# Patient Record
Sex: FEMALE | Race: WHITE | Employment: OTHER | URBAN - METROPOLITAN AREA
[De-identification: names, ages, dates, MRNs, and addresses within clinical notes are randomized per-mention and may not be internally consistent; named-entity substitution may affect disease eponyms.]

---

## 2019-03-06 LAB
CHOLESTEROL, TOTAL: NORMAL
CHOLESTEROL/HDL RATIO: NORMAL
HDLC SERPL-MCNC: NORMAL MG/DL
LDL CHOLESTEROL CALCULATED: NORMAL
TRIGL SERPL-MCNC: NORMAL MG/DL
TSH SERPL DL<=0.05 MIU/L-ACNC: NORMAL M[IU]/L
VLDLC SERPL CALC-MCNC: NORMAL MG/DL

## 2019-05-18 LAB
AVERAGE GLUCOSE: NORMAL
HBA1C MFR BLD: 5.4 %

## 2019-05-30 ENCOUNTER — TELEPHONE (OUTPATIENT)
Dept: PRIMARY CARE CLINIC | Age: 74
End: 2019-05-30

## 2019-05-30 DIAGNOSIS — Z12.39 SCREENING BREAST EXAMINATION: Primary | ICD-10-CM

## 2019-06-05 ENCOUNTER — OFFICE VISIT (OUTPATIENT)
Dept: PRIMARY CARE CLINIC | Age: 74
End: 2019-06-05
Payer: MEDICARE

## 2019-06-05 VITALS
SYSTOLIC BLOOD PRESSURE: 124 MMHG | OXYGEN SATURATION: 95 % | HEART RATE: 56 BPM | BODY MASS INDEX: 27.29 KG/M2 | HEIGHT: 63 IN | DIASTOLIC BLOOD PRESSURE: 68 MMHG | TEMPERATURE: 97.7 F | WEIGHT: 154 LBS

## 2019-06-05 DIAGNOSIS — I25.10 CORONARY ARTERY DISEASE INVOLVING NATIVE CORONARY ARTERY OF NATIVE HEART WITHOUT ANGINA PECTORIS: Primary | ICD-10-CM

## 2019-06-05 DIAGNOSIS — I10 ESSENTIAL HYPERTENSION, BENIGN: Chronic | ICD-10-CM

## 2019-06-05 DIAGNOSIS — E03.9 HYPOTHYROIDISM, UNSPECIFIED TYPE: Chronic | ICD-10-CM

## 2019-06-05 DIAGNOSIS — E78.2 MIXED HYPERLIPIDEMIA: ICD-10-CM

## 2019-06-05 DIAGNOSIS — E11.9 TYPE 2 DIABETES MELLITUS WITHOUT COMPLICATION, WITHOUT LONG-TERM CURRENT USE OF INSULIN (HCC): ICD-10-CM

## 2019-06-05 DIAGNOSIS — I61.9 NONTRAUMATIC INTRACEREBRAL HEMORRHAGE, UNSPECIFIED CEREBRAL LOCATION, UNSPECIFIED LATERALITY (HCC): ICD-10-CM

## 2019-06-05 DIAGNOSIS — N18.30 CKD (CHRONIC KIDNEY DISEASE) STAGE 3, GFR 30-59 ML/MIN (HCC): ICD-10-CM

## 2019-06-05 PROCEDURE — 99214 OFFICE O/P EST MOD 30 MIN: CPT | Performed by: FAMILY MEDICINE

## 2019-06-05 RX ORDER — CITALOPRAM 20 MG/1
20 TABLET ORAL DAILY
Qty: 90 TABLET | Refills: 3 | Status: SHIPPED
Start: 2019-06-05 | End: 2020-03-10 | Stop reason: SDUPTHER

## 2019-06-05 RX ORDER — CITALOPRAM 20 MG/1
TABLET ORAL
COMMUNITY
End: 2019-06-05 | Stop reason: SDUPTHER

## 2019-06-05 RX ORDER — LABETALOL 100 MG/1
100 TABLET, FILM COATED ORAL 2 TIMES DAILY
Qty: 180 TABLET | Refills: 3 | Status: SHIPPED
Start: 2019-06-05 | End: 2020-03-10 | Stop reason: SDUPTHER

## 2019-06-05 RX ORDER — FUROSEMIDE 20 MG/1
TABLET ORAL
Refills: 3 | COMMUNITY
Start: 2019-06-01 | End: 2019-08-17 | Stop reason: SDUPTHER

## 2019-06-05 RX ORDER — SPIRONOLACTONE 25 MG/1
TABLET ORAL
Refills: 3 | COMMUNITY
Start: 2019-06-01 | End: 2019-06-05 | Stop reason: SDUPTHER

## 2019-06-05 RX ORDER — LABETALOL 100 MG/1
100 TABLET, FILM COATED ORAL 2 TIMES DAILY
COMMUNITY
End: 2019-06-05 | Stop reason: SDUPTHER

## 2019-06-05 RX ORDER — HYDRALAZINE HYDROCHLORIDE 25 MG/1
25 TABLET, FILM COATED ORAL 4 TIMES DAILY
Qty: 360 TABLET | Refills: 3 | Status: SHIPPED
Start: 2019-06-05 | End: 2020-03-10 | Stop reason: SDUPTHER

## 2019-06-05 RX ORDER — CLONIDINE HYDROCHLORIDE 0.1 MG/1
0.1 TABLET ORAL 2 TIMES DAILY
Qty: 180 TABLET | Refills: 3 | Status: SHIPPED
Start: 2019-06-05 | End: 2020-03-10 | Stop reason: SDUPTHER

## 2019-06-05 RX ORDER — NITROGLYCERIN 40 MG/1
1 PATCH TRANSDERMAL DAILY
Qty: 90 PATCH | Refills: 3 | Status: SHIPPED
Start: 2019-06-05 | End: 2020-05-12

## 2019-06-05 RX ORDER — LEVOTHYROXINE SODIUM 88 UG/1
TABLET ORAL
Refills: 1 | COMMUNITY
Start: 2019-06-01 | End: 2019-06-05 | Stop reason: SDUPTHER

## 2019-06-05 RX ORDER — LABETALOL 100 MG/1
TABLET, FILM COATED ORAL
COMMUNITY
Start: 2015-01-05 | End: 2019-06-05 | Stop reason: SDUPTHER

## 2019-06-05 RX ORDER — LEVOTHYROXINE SODIUM 88 UG/1
TABLET ORAL
Qty: 90 TABLET | Refills: 3 | Status: SHIPPED
Start: 2019-06-05 | End: 2020-03-10 | Stop reason: SDUPTHER

## 2019-06-05 RX ORDER — NITROGLYCERIN 40 MG/1
1 PATCH TRANSDERMAL DAILY
COMMUNITY
End: 2019-06-05 | Stop reason: SDUPTHER

## 2019-06-05 RX ORDER — SPIRONOLACTONE 25 MG/1
TABLET ORAL
Qty: 90 TABLET | Refills: 3 | Status: SHIPPED
Start: 2019-06-05 | End: 2020-03-10 | Stop reason: SDUPTHER

## 2019-06-05 ASSESSMENT — PATIENT HEALTH QUESTIONNAIRE - PHQ9
1. LITTLE INTEREST OR PLEASURE IN DOING THINGS: 1
SUM OF ALL RESPONSES TO PHQ QUESTIONS 1-9: 1
SUM OF ALL RESPONSES TO PHQ QUESTIONS 1-9: 1
2. FEELING DOWN, DEPRESSED OR HOPELESS: 0
SUM OF ALL RESPONSES TO PHQ9 QUESTIONS 1 & 2: 1

## 2019-06-05 ASSESSMENT — ENCOUNTER SYMPTOMS
ALLERGIC/IMMUNOLOGIC NEGATIVE: 1
RESPIRATORY NEGATIVE: 1
GASTROINTESTINAL NEGATIVE: 1
EYES NEGATIVE: 1

## 2019-06-05 NOTE — PROGRESS NOTES
19     Jhon Yee    : 1945 Sex: female   Age: 69 y.o. Chief Complaint   Patient presents with    Hypertension    Hypothyroidism    Diabetes       Prior to Admission medications    Medication Sig Start Date End Date Taking? Authorizing Provider   citalopram (CELEXA) 20 MG tablet Take by mouth   Yes Historical Provider, MD   furosemide (LASIX) 20 MG tablet TK 1/2 T PO QOD 19  Yes Historical Provider, MD   spironolactone (ALDACTONE) 25 MG tablet TK 1 T PO QD 19  Yes Historical Provider, MD   levothyroxine (SYNTHROID) 88 MCG tablet TK 1 T PO QD 19  Yes Historical Provider, MD   labetalol (NORMODYNE) 100 MG tablet Take by mouth 1/5/15  Yes Historical Provider, MD   labetalol (NORMODYNE) 100 MG tablet Take 100 mg by mouth 2 times daily   Yes Yennifer Nicholson,    cloNIDine (CATAPRES) 0.1 MG tablet 1 tablet by Per NG tube route every 8 hours. 14  Yes IRENA Busby   hydrALAZINE (APRESOLINE) 25 MG tablet 3 tablets by Per NG tube route every 6 hours. 14  Yes IRENA Busby   nystatin (MYCOSTATIN) 588744 UNIT/ML suspension Take 5 mLs by mouth 4 times daily. 14   IRENA Busby          HPI: Patient is seen today in medical follow-up coronary artery disease hypertensive disease hypothyroid hyperlipidemia diabetes mellitus chronic kidney disease. Stable with current medication and care. History of cerebral bleed in the past from uncontrolled hypertension. Has done well since. Review of Systems   Constitutional: Negative. HENT: Negative. Eyes: Negative. Respiratory: Negative. Gastrointestinal: Negative. Endocrine: Negative. Genitourinary: Negative. Musculoskeletal: Negative. Skin: Negative. Allergic/Immunologic: Negative. Neurological: Negative. Hematological: Negative. Psychiatric/Behavioral: Negative.      present systems review all stable no specific complaints no additional complaints. Current Outpatient Medications:     citalopram (CELEXA) 20 MG tablet, Take by mouth, Disp: , Rfl:     furosemide (LASIX) 20 MG tablet, TK 1/2 T PO QOD, Disp: , Rfl: 3    spironolactone (ALDACTONE) 25 MG tablet, TK 1 T PO QD, Disp: , Rfl: 3    levothyroxine (SYNTHROID) 88 MCG tablet, TK 1 T PO QD, Disp: , Rfl: 1    labetalol (NORMODYNE) 100 MG tablet, Take by mouth, Disp: , Rfl:     labetalol (NORMODYNE) 100 MG tablet, Take 100 mg by mouth 2 times daily, Disp: , Rfl:     cloNIDine (CATAPRES) 0.1 MG tablet, 1 tablet by Per NG tube route every 8 hours. , Disp: 60 tablet, Rfl: 3    hydrALAZINE (APRESOLINE) 25 MG tablet, 3 tablets by Per NG tube route every 6 hours. , Disp: 90 tablet, Rfl: 3    nystatin (MYCOSTATIN) 435391 UNIT/ML suspension, Take 5 mLs by mouth 4 times daily. , Disp: , Rfl:     Allergies   Allergen Reactions    Latex      Possible allergy pending    Norvasc [Amlodipine Besylate] Anaphylaxis    Dye  [Iodides]        Social History     Tobacco Use    Smoking status: Former Smoker     Packs/day: 1.50     Years: 8.00     Pack years: 12.00     Last attempt to quit: 1976     Years since quittin.4    Smokeless tobacco: Never Used   Substance Use Topics    Alcohol use: No    Drug use: No      Past Surgical History:   Procedure Laterality Date    CARDIAC SURGERY N/A heart stents    10 years ago     SECTION      COLONOSCOPY      CORONARY ANGIOPLASTY WITH STENT PLACEMENT      CYST REMOVAL      ENDOSCOPY, COLON, DIAGNOSTIC      GASTROSTOMY TUBE PLACEMENT  14    [place]    JOINT REPLACEMENT Left     KNEE SURGERY      TONSILLECTOMY      TRACHEOSTOMY  14    [place]     No family history on file.   Past Medical History:   Diagnosis Date    Closed left arm fracture     Diabetes mellitus ([address]     Hypertension     Ovarian cyst rupture     Respiratory failure (HCC)     history of    Thyroid disease     Unspecified cerebral artery occlusion with cerebral infarction 9/26/2014    right side flacid, hemorrhagic stroke       Vitals:    06/05/19 1101   Pulse: 56   Temp: 97.7 °F (36.5 °C)   TempSrc: Temporal   SpO2: 95%   Weight: 154 lb (69.9 kg)   Height: 5' 3\" (1.6 m)     BP Readings from Last 3 Encounters:   No data found for BP        Physical Exam   Constitutional: She is oriented to person, place, and time. She appears well-developed and well-nourished. HENT:   Head: Normocephalic. Right Ear: External ear normal.   Left Ear: External ear normal.   Nose: Nose normal.   Mouth/Throat: Oropharynx is clear and moist.   Eyes: Pupils are equal, round, and reactive to light. Conjunctivae and EOM are normal.   Neck: Normal range of motion. Neck supple. Cardiovascular: Normal rate and intact distal pulses. Pulmonary/Chest: Breath sounds normal.   Abdominal: Soft. Bowel sounds are normal.   Musculoskeletal: Normal range of motion. Neurological: She is alert and oriented to person, place, and time. Skin: Skin is warm and dry. Psychiatric: She has a normal mood and affect. Her behavior is normal.   Nursing note and vitals reviewed. vitals physical examination all stable as noted. No peripheral edema. Labs reviewed A1c at 5.4  GFR 31 stable follows with Dr. Aldo Montano  hemoglobin hematocrit 12.7 37.7 chemistries otherwise stable platelets [phone]    Maintain current care reassessment in 3 months blood work again prior. Plan Per Assessment:  Marquis Collins was seen today for hypertension, hypothyroidism and diabetes.     Diagnoses and all orders for this visit:    Coronary artery disease involving native coronary artery of native heart without angina pectoris    Essential hypertension, benign    Hypothyroidism, unspecified type    Nontraumatic intracerebral hemorrhage, unspecified cerebral location, unspecified laterality ([facility] [address]    Mixed hyperlipidemia    Type 2 diabetes mellitus without complication, without long-term current use of insulin ([facility]            No follow-ups on file. Francisco Chavez DO    Note was generated with the assistance of voice recognition software. Document was reviewed however may contain grammatical errors.

## 2019-07-17 ENCOUNTER — HOSPITAL ENCOUNTER (OUTPATIENT)
Age: 74
Discharge: HOME | End: 2019-07-19
Payer: MEDICARE

## 2019-07-17 ENCOUNTER — OFFICE VISIT (OUTPATIENT)
Dept: FAMILY MEDICINE CLINIC | Age: 74
End: 2019-07-17
Payer: MEDICARE

## 2019-07-17 VITALS
BODY MASS INDEX: 27.67 KG/M2 | WEIGHT: 156.2 LBS | OXYGEN SATURATION: 96 % | SYSTOLIC BLOOD PRESSURE: 130 MMHG | DIASTOLIC BLOOD PRESSURE: 80 MMHG | TEMPERATURE: 96.2 F | HEART RATE: 62 BPM

## 2019-07-17 DIAGNOSIS — R30.0 DYSURIA: Primary | ICD-10-CM

## 2019-07-17 DIAGNOSIS — R30.0 DYSURIA: ICD-10-CM

## 2019-07-17 DIAGNOSIS — N39.0 URINARY TRACT INFECTION WITH HEMATURIA, SITE UNSPECIFIED: ICD-10-CM

## 2019-07-17 DIAGNOSIS — R31.9 URINARY TRACT INFECTION WITH HEMATURIA, SITE UNSPECIFIED: ICD-10-CM

## 2019-07-17 LAB
BILIRUBIN, POC: NORMAL
BLOOD URINE, POC: NORMAL
CLARITY, POC: NORMAL
COLOR, POC: YELLOW
GLUCOSE URINE, POC: NORMAL
KETONES, POC: NORMAL
LEUKOCYTE EST, POC: NORMAL
NITRITE, POC: NORMAL
PH, POC: 6
PROTEIN, POC: NORMAL
SPECIFIC GRAVITY, POC: 1.01
UROBILINOGEN, POC: 0.2

## 2019-07-17 PROCEDURE — 87088 URINE BACTERIA CULTURE: CPT

## 2019-07-17 PROCEDURE — 81002 URINALYSIS NONAUTO W/O SCOPE: CPT | Performed by: PHYSICIAN ASSISTANT

## 2019-07-17 PROCEDURE — 99214 OFFICE O/P EST MOD 30 MIN: CPT | Performed by: PHYSICIAN ASSISTANT

## 2019-07-17 RX ORDER — CEPHALEXIN 500 MG/1
500 CAPSULE ORAL 2 TIMES DAILY
Qty: 14 CAPSULE | Refills: 0 | Status: SHIPPED | OUTPATIENT
Start: 2019-07-17 | End: 2019-07-24

## 2019-07-17 NOTE — PROGRESS NOTES
19  Misty Coon : 1945 Sex: female  Age 69 y.o. Subjective:  Chief Complaint   Patient presents with    Other     urine cloudy         HPI:   Misty Coon , 69 y.o. female presents to express care for evaluation of cloudy urine. The patient believes that she may have a urinary tract infection. The patient states that is not really having any burning with urination. No back pain or flank pain. The patient is eating and drinking normally. The patient denies any vaginal bleeding or vaginal discharge. The patient states that she is really not been drinking much fluids over the last several days. The patient is not having any back pain or flank pain. This is relatively consistent with her UTIs in the past.      ROS:   Unless otherwise stated in this report the patient's positive and negative responses for review of systems for constitutional, eyes, ENT, cardiovascular, respiratory, gastrointestinal, neurological, , musculoskeletal, and integument systems and related systems to the presenting problem are either stated in the history of present illness or were not pertinent or were negative for the symptoms and/or complaints related to the presenting medical problem. Positives and pertinent negatives as per HPI. All others reviewed and are negative.       PMH:     Past Medical History:   Diagnosis Date    Closed left arm fracture     Diabetes mellitus ([address]     Hypertension     Ovarian cyst rupture     Respiratory failure ([address]     history of    Thyroid disease     Unspecified cerebral artery occlusion with cerebral infarction 2014    right side flacid, hemorrhagic stroke       Past Surgical History:   Procedure Laterality Date    CARDIAC SURGERY N/A heart stents    10 years ago     SECTION      COLONOSCOPY      CORONARY ANGIOPLASTY WITH STENT PLACEMENT      CYST REMOVAL      ENDOSCOPY, COLON, DIAGNOSTIC      GASTROSTOMY TUBE PLACEMENT  14 [place]    JOINT REPLACEMENT Left     KNEE SURGERY      TONSILLECTOMY      TRACHEOSTOMY  14    [place]     Medications:     Current Outpatient Medications:     furosemide (LASIX) 20 MG tablet, TK 1/2 T PO QOD, Disp: , Rfl: 3    cloNIDine (CATAPRES) 0.1 MG tablet, Take 1 tablet by mouth 2 times daily, Disp: 180 tablet, Rfl: 3    hydrALAZINE (APRESOLINE) 25 MG tablet, Take 1 tablet by mouth 4 times daily, Disp: 360 tablet, Rfl: 3    citalopram (CELEXA) 20 MG tablet, Take 1 tablet by mouth daily, Disp: 90 tablet, Rfl: 3    levothyroxine (SYNTHROID) 88 MCG tablet, TK 1 T PO QD, Disp: 90 tablet, Rfl: 3    spironolactone (ALDACTONE) 25 MG tablet, TK 1 T PO QD, Disp: 90 tablet, Rfl: 3    labetalol (NORMODYNE) 100 MG tablet, Take 1 tablet by mouth 2 times daily, Disp: 180 tablet, Rfl: 3    nitroGLYCERIN (NITRODUR) 0.2 MG/HR, Place 1 patch onto the skin daily, Disp: 90 patch, Rfl: 3    nystatin (MYCOSTATIN) 301329 UNIT/ML suspension, Take 5 mLs by mouth 4 times daily. , Disp: , Rfl:     Allergies: Allergies   Allergen Reactions    Latex      Possible allergy pending    Norvasc [Amlodipine Besylate] Anaphylaxis    Dye  [Iodides]        Social History:     Social History     Tobacco Use    Smoking status: Former Smoker     Packs/day: 1.50     Years: 8.00     Pack years: 12.00     Last attempt to quit: 1976     Years since quittin.5    Smokeless tobacco: Never Used   Substance Use Topics    Alcohol use: No    Drug use: No       Physical Exam:     Vitals:    19 1418   BP: 130/80   Site: Left Upper Arm   Position: Sitting   Pulse: 62   Temp: 96.2 °F (35.7 °C)   SpO2: 96%   Weight: 156 lb 3.2 oz (70.9 kg)       Exam:  Physical Exam  Nurses note and vital signs reviewed and patient is not hypoxic. General: The patient appears well and in no apparent distress. Patient is resting comfortably on cart. Skin: Warm, dry, no pallor noted. There is no rash noted.   Head: Normocephalic,

## 2019-07-20 LAB — URINE CULTURE, ROUTINE: NORMAL

## 2019-08-13 DIAGNOSIS — Z12.39 SCREENING BREAST EXAMINATION: ICD-10-CM

## 2019-08-19 RX ORDER — FUROSEMIDE 20 MG/1
TABLET ORAL
Qty: 30 TABLET | Refills: 0 | Status: SHIPPED | OUTPATIENT
Start: 2019-08-19 | End: 2019-12-09 | Stop reason: SDUPTHER

## 2019-09-11 ENCOUNTER — OFFICE VISIT (OUTPATIENT)
Dept: PRIMARY CARE CLINIC | Age: 74
End: 2019-09-11
Payer: MEDICARE

## 2019-09-11 VITALS
DIASTOLIC BLOOD PRESSURE: 78 MMHG | OXYGEN SATURATION: 96 % | TEMPERATURE: 97.3 F | HEART RATE: 66 BPM | WEIGHT: 153 LBS | BODY MASS INDEX: 27.1 KG/M2 | SYSTOLIC BLOOD PRESSURE: 120 MMHG

## 2019-09-11 DIAGNOSIS — E03.9 HYPOTHYROIDISM, UNSPECIFIED TYPE: ICD-10-CM

## 2019-09-11 DIAGNOSIS — I10 ESSENTIAL HYPERTENSION: Primary | ICD-10-CM

## 2019-09-11 DIAGNOSIS — E55.9 VITAMIN D DEFICIENCY: ICD-10-CM

## 2019-09-11 DIAGNOSIS — F41.9 ANXIETY: ICD-10-CM

## 2019-09-11 DIAGNOSIS — N18.30 CKD (CHRONIC KIDNEY DISEASE) STAGE 3, GFR 30-59 ML/MIN (HCC): ICD-10-CM

## 2019-09-11 DIAGNOSIS — E11.9 TYPE 2 DIABETES MELLITUS WITHOUT COMPLICATION, WITHOUT LONG-TERM CURRENT USE OF INSULIN (HCC): ICD-10-CM

## 2019-09-11 PROCEDURE — 90653 IIV ADJUVANT VACCINE IM: CPT | Performed by: FAMILY MEDICINE

## 2019-09-11 PROCEDURE — G0008 ADMIN INFLUENZA VIRUS VAC: HCPCS | Performed by: FAMILY MEDICINE

## 2019-09-11 PROCEDURE — 99214 OFFICE O/P EST MOD 30 MIN: CPT | Performed by: FAMILY MEDICINE

## 2019-09-11 RX ORDER — ERGOCALCIFEROL 1.25 MG/1
CAPSULE ORAL
Refills: 2 | COMMUNITY
Start: 2019-07-07 | End: 2019-12-09 | Stop reason: SDUPTHER

## 2019-09-11 ASSESSMENT — ENCOUNTER SYMPTOMS
ALLERGIC/IMMUNOLOGIC NEGATIVE: 1
RESPIRATORY NEGATIVE: 1
EYES NEGATIVE: 1
GASTROINTESTINAL NEGATIVE: 1

## 2019-09-11 NOTE — PROGRESS NOTES
19     Rubin Samuels    : 1945 Sex: female   Age: 69 y.o. Chief Complaint   Patient presents with    Hypertension    Diabetes       Prior to Admission medications    Medication Sig Start Date End Date Taking? Authorizing Provider   vitamin D (ERGOCALCIFEROL) 41120 units CAPS capsule TK 1 C PO WEEKLY 19  Yes Historical Provider, MD   furosemide (LASIX) 20 MG tablet TAKE 1/2 TABLET BY MOUTH EVERY OTHER DAY 19  Yes Marcell Nicholson DO   cloNIDine (CATAPRES) 0.1 MG tablet Take 1 tablet by mouth 2 times daily 19  Yes Marcell Nicholson DO   hydrALAZINE (APRESOLINE) 25 MG tablet Take 1 tablet by mouth 4 times daily 19  Yes Marcell Nicholson DO   citalopram (CELEXA) 20 MG tablet Take 1 tablet by mouth daily 19  Yes Marcell Nicholson DO   levothyroxine (SYNTHROID) 88 MCG tablet TK 1 T PO QD 19  Yes Marcell Nicholson DO   spironolactone (ALDACTONE) 25 MG tablet TK 1 T PO QD 19  Yes Marcell Nicholson DO   labetalol (NORMODYNE) 100 MG tablet Take 1 tablet by mouth 2 times daily 19  Yes Marcell Nicholson DO   nitroGLYCERIN (NITRODUR) 0.2 MG/HR Place 1 patch onto the skin daily 19  Yes Marcell Nicholson DO   nystatin (MYCOSTATIN) 537266 UNIT/ML suspension Take 5 mLs by mouth 4 times daily. 14  Yes IRENA Beyer - CNS          HPI: [facility] seen today in medical follow-up hypertension diabetes chronic kidney disease hypothyroid anxiety vitamin D deficiency all of which is been stable. Medications well-tolerated. Flu shot offered today. Review of Systems   Constitutional: Negative. HENT: Negative. Eyes: Negative. Respiratory: Negative. Gastrointestinal: Negative. Endocrine: Negative. Genitourinary: Negative. Musculoskeletal: Negative. Skin: Negative. Allergic/Immunologic: Negative. Neurological: Negative. Hematological: Negative. Psychiatric/Behavioral: Negative.     Current systems review stable as noted

## 2019-10-18 ENCOUNTER — TELEPHONE (OUTPATIENT)
Dept: ADMINISTRATIVE | Age: 74
End: 2019-10-18

## 2019-10-30 ENCOUNTER — OFFICE VISIT (OUTPATIENT)
Dept: FAMILY MEDICINE CLINIC | Age: 74
End: 2019-10-30
Payer: MEDICARE

## 2019-10-30 VITALS
HEIGHT: 63 IN | TEMPERATURE: 97.8 F | HEART RATE: 70 BPM | DIASTOLIC BLOOD PRESSURE: 64 MMHG | OXYGEN SATURATION: 98 % | SYSTOLIC BLOOD PRESSURE: 124 MMHG | RESPIRATION RATE: 20 BRPM | BODY MASS INDEX: 28.21 KG/M2 | WEIGHT: 159.2 LBS

## 2019-10-30 DIAGNOSIS — N30.01 ACUTE CYSTITIS WITH HEMATURIA: ICD-10-CM

## 2019-10-30 DIAGNOSIS — R30.0 DYSURIA: Primary | ICD-10-CM

## 2019-10-30 LAB
BILIRUBIN, POC: NEGATIVE
BLOOD URINE, POC: NORMAL
CLARITY, POC: NORMAL
COLOR, POC: YELLOW
GLUCOSE URINE, POC: NEGATIVE
KETONES, POC: NEGATIVE
LEUKOCYTE EST, POC: NORMAL
NITRITE, POC: NEGATIVE
PH, POC: 5.5
PROTEIN, POC: 30
SPECIFIC GRAVITY, POC: 1.02
UROBILINOGEN, POC: 0.2

## 2019-10-30 PROCEDURE — 1090F PRES/ABSN URINE INCON ASSESS: CPT | Performed by: PHYSICIAN ASSISTANT

## 2019-10-30 PROCEDURE — G8427 DOCREV CUR MEDS BY ELIG CLIN: HCPCS | Performed by: PHYSICIAN ASSISTANT

## 2019-10-30 PROCEDURE — G8598 ASA/ANTIPLAT THER USED: HCPCS | Performed by: PHYSICIAN ASSISTANT

## 2019-10-30 PROCEDURE — G8400 PT W/DXA NO RESULTS DOC: HCPCS | Performed by: PHYSICIAN ASSISTANT

## 2019-10-30 PROCEDURE — 81002 URINALYSIS NONAUTO W/O SCOPE: CPT | Performed by: PHYSICIAN ASSISTANT

## 2019-10-30 PROCEDURE — G8482 FLU IMMUNIZE ORDER/ADMIN: HCPCS | Performed by: PHYSICIAN ASSISTANT

## 2019-10-30 PROCEDURE — G9899 SCRN MAM PERF RSLTS DOC: HCPCS | Performed by: PHYSICIAN ASSISTANT

## 2019-10-30 PROCEDURE — 4040F PNEUMOC VAC/ADMIN/RCVD: CPT | Performed by: PHYSICIAN ASSISTANT

## 2019-10-30 PROCEDURE — 3017F COLORECTAL CA SCREEN DOC REV: CPT | Performed by: PHYSICIAN ASSISTANT

## 2019-10-30 PROCEDURE — G8417 CALC BMI ABV UP PARAM F/U: HCPCS | Performed by: PHYSICIAN ASSISTANT

## 2019-10-30 PROCEDURE — 99214 OFFICE O/P EST MOD 30 MIN: CPT | Performed by: PHYSICIAN ASSISTANT

## 2019-10-30 PROCEDURE — 1036F TOBACCO NON-USER: CPT | Performed by: PHYSICIAN ASSISTANT

## 2019-10-30 PROCEDURE — 1123F ACP DISCUSS/DSCN MKR DOCD: CPT | Performed by: PHYSICIAN ASSISTANT

## 2019-10-30 RX ORDER — CEFDINIR 300 MG/1
300 CAPSULE ORAL 2 TIMES DAILY
Qty: 14 CAPSULE | Refills: 0 | Status: SHIPPED | OUTPATIENT
Start: 2019-10-30 | End: 2019-11-06

## 2019-11-13 ENCOUNTER — OFFICE VISIT (OUTPATIENT)
Dept: PRIMARY CARE CLINIC | Age: 74
End: 2019-11-13
Payer: MEDICARE

## 2019-11-13 VITALS
TEMPERATURE: 97.7 F | WEIGHT: 158 LBS | BODY MASS INDEX: 27.99 KG/M2 | HEART RATE: 65 BPM | DIASTOLIC BLOOD PRESSURE: 72 MMHG | OXYGEN SATURATION: 96 % | SYSTOLIC BLOOD PRESSURE: 136 MMHG

## 2019-11-13 DIAGNOSIS — R31.1 BENIGN ESSENTIAL MICROSCOPIC HEMATURIA: ICD-10-CM

## 2019-11-13 DIAGNOSIS — G89.29 CHRONIC PAIN OF RIGHT KNEE: ICD-10-CM

## 2019-11-13 DIAGNOSIS — E03.9 HYPOTHYROIDISM, UNSPECIFIED TYPE: Chronic | ICD-10-CM

## 2019-11-13 DIAGNOSIS — E78.2 MIXED HYPERLIPIDEMIA: ICD-10-CM

## 2019-11-13 DIAGNOSIS — M25.561 CHRONIC PAIN OF RIGHT KNEE: ICD-10-CM

## 2019-11-13 DIAGNOSIS — I10 ESSENTIAL HYPERTENSION: Primary | Chronic | ICD-10-CM

## 2019-11-13 DIAGNOSIS — F41.9 ANXIETY: ICD-10-CM

## 2019-11-13 DIAGNOSIS — E11.9 TYPE 2 DIABETES MELLITUS WITHOUT COMPLICATION, WITHOUT LONG-TERM CURRENT USE OF INSULIN (HCC): Chronic | ICD-10-CM

## 2019-11-13 DIAGNOSIS — N18.30 CKD (CHRONIC KIDNEY DISEASE) STAGE 3, GFR 30-59 ML/MIN (HCC): ICD-10-CM

## 2019-11-13 PROCEDURE — 1123F ACP DISCUSS/DSCN MKR DOCD: CPT | Performed by: FAMILY MEDICINE

## 2019-11-13 PROCEDURE — G8427 DOCREV CUR MEDS BY ELIG CLIN: HCPCS | Performed by: FAMILY MEDICINE

## 2019-11-13 PROCEDURE — 1036F TOBACCO NON-USER: CPT | Performed by: FAMILY MEDICINE

## 2019-11-13 PROCEDURE — G8400 PT W/DXA NO RESULTS DOC: HCPCS | Performed by: FAMILY MEDICINE

## 2019-11-13 PROCEDURE — 99214 OFFICE O/P EST MOD 30 MIN: CPT | Performed by: FAMILY MEDICINE

## 2019-11-13 PROCEDURE — G8598 ASA/ANTIPLAT THER USED: HCPCS | Performed by: FAMILY MEDICINE

## 2019-11-13 PROCEDURE — G8417 CALC BMI ABV UP PARAM F/U: HCPCS | Performed by: FAMILY MEDICINE

## 2019-11-13 PROCEDURE — 3044F HG A1C LEVEL LT 7.0%: CPT | Performed by: FAMILY MEDICINE

## 2019-11-13 PROCEDURE — 3017F COLORECTAL CA SCREEN DOC REV: CPT | Performed by: FAMILY MEDICINE

## 2019-11-13 PROCEDURE — 4040F PNEUMOC VAC/ADMIN/RCVD: CPT | Performed by: FAMILY MEDICINE

## 2019-11-13 PROCEDURE — 2022F DILAT RTA XM EVC RTNOPTHY: CPT | Performed by: FAMILY MEDICINE

## 2019-11-13 PROCEDURE — 1090F PRES/ABSN URINE INCON ASSESS: CPT | Performed by: FAMILY MEDICINE

## 2019-11-13 PROCEDURE — G8482 FLU IMMUNIZE ORDER/ADMIN: HCPCS | Performed by: FAMILY MEDICINE

## 2019-11-13 PROCEDURE — G9899 SCRN MAM PERF RSLTS DOC: HCPCS | Performed by: FAMILY MEDICINE

## 2019-11-13 ASSESSMENT — ENCOUNTER SYMPTOMS
EYES NEGATIVE: 1
ALLERGIC/IMMUNOLOGIC NEGATIVE: 1
RESPIRATORY NEGATIVE: 1
GASTROINTESTINAL NEGATIVE: 1

## 2019-12-02 LAB
ALBUMIN SERPL-MCNC: NORMAL G/DL
ALP BLD-CCNC: NORMAL U/L
ALT SERPL-CCNC: NORMAL U/L
ANION GAP SERPL CALCULATED.3IONS-SCNC: NORMAL MMOL/L
AST SERPL-CCNC: NORMAL U/L
BILIRUB SERPL-MCNC: NORMAL MG/DL
BUN BLDV-MCNC: 36 MG/DL
CALCIUM SERPL-MCNC: NORMAL MG/DL
CHLORIDE BLD-SCNC: NORMAL MMOL/L
CO2: NORMAL
CREAT SERPL-MCNC: 1.32 MG/DL
GFR CALCULATED: NORMAL
GLUCOSE BLD-MCNC: 84 MG/DL
POTASSIUM SERPL-SCNC: 4 MMOL/L
SODIUM BLD-SCNC: NORMAL MMOL/L
TOTAL PROTEIN: NORMAL

## 2019-12-09 ENCOUNTER — OFFICE VISIT (OUTPATIENT)
Dept: PRIMARY CARE CLINIC | Age: 74
End: 2019-12-09
Payer: MEDICARE

## 2019-12-09 VITALS
OXYGEN SATURATION: 96 % | TEMPERATURE: 97.7 F | WEIGHT: 158 LBS | BODY MASS INDEX: 27.99 KG/M2 | SYSTOLIC BLOOD PRESSURE: 120 MMHG | DIASTOLIC BLOOD PRESSURE: 62 MMHG | HEART RATE: 61 BPM

## 2019-12-09 DIAGNOSIS — Z23 NEED FOR PNEUMOCOCCAL VACCINATION: ICD-10-CM

## 2019-12-09 DIAGNOSIS — I10 ESSENTIAL HYPERTENSION: Primary | Chronic | ICD-10-CM

## 2019-12-09 DIAGNOSIS — I61.9 NONTRAUMATIC INTRACEREBRAL HEMORRHAGE, UNSPECIFIED CEREBRAL LOCATION, UNSPECIFIED LATERALITY (HCC): ICD-10-CM

## 2019-12-09 DIAGNOSIS — R35.0 URINARY FREQUENCY: ICD-10-CM

## 2019-12-09 DIAGNOSIS — E78.2 MIXED HYPERLIPIDEMIA: ICD-10-CM

## 2019-12-09 DIAGNOSIS — N18.30 CKD (CHRONIC KIDNEY DISEASE) STAGE 3, GFR 30-59 ML/MIN (HCC): ICD-10-CM

## 2019-12-09 DIAGNOSIS — F41.9 ANXIETY: ICD-10-CM

## 2019-12-09 DIAGNOSIS — E11.9 TYPE 2 DIABETES MELLITUS WITHOUT COMPLICATION, WITHOUT LONG-TERM CURRENT USE OF INSULIN (HCC): Chronic | ICD-10-CM

## 2019-12-09 DIAGNOSIS — E03.9 HYPOTHYROIDISM, UNSPECIFIED TYPE: Chronic | ICD-10-CM

## 2019-12-09 PROCEDURE — 1123F ACP DISCUSS/DSCN MKR DOCD: CPT | Performed by: FAMILY MEDICINE

## 2019-12-09 PROCEDURE — G8482 FLU IMMUNIZE ORDER/ADMIN: HCPCS | Performed by: FAMILY MEDICINE

## 2019-12-09 PROCEDURE — 1090F PRES/ABSN URINE INCON ASSESS: CPT | Performed by: FAMILY MEDICINE

## 2019-12-09 PROCEDURE — 99214 OFFICE O/P EST MOD 30 MIN: CPT | Performed by: FAMILY MEDICINE

## 2019-12-09 PROCEDURE — G0009 ADMIN PNEUMOCOCCAL VACCINE: HCPCS | Performed by: FAMILY MEDICINE

## 2019-12-09 PROCEDURE — G8400 PT W/DXA NO RESULTS DOC: HCPCS | Performed by: FAMILY MEDICINE

## 2019-12-09 PROCEDURE — 4040F PNEUMOC VAC/ADMIN/RCVD: CPT | Performed by: FAMILY MEDICINE

## 2019-12-09 PROCEDURE — 3017F COLORECTAL CA SCREEN DOC REV: CPT | Performed by: FAMILY MEDICINE

## 2019-12-09 PROCEDURE — 3044F HG A1C LEVEL LT 7.0%: CPT | Performed by: FAMILY MEDICINE

## 2019-12-09 PROCEDURE — 1036F TOBACCO NON-USER: CPT | Performed by: FAMILY MEDICINE

## 2019-12-09 PROCEDURE — G8417 CALC BMI ABV UP PARAM F/U: HCPCS | Performed by: FAMILY MEDICINE

## 2019-12-09 PROCEDURE — 2022F DILAT RTA XM EVC RTNOPTHY: CPT | Performed by: FAMILY MEDICINE

## 2019-12-09 PROCEDURE — G8427 DOCREV CUR MEDS BY ELIG CLIN: HCPCS | Performed by: FAMILY MEDICINE

## 2019-12-09 PROCEDURE — 90732 PPSV23 VACC 2 YRS+ SUBQ/IM: CPT | Performed by: FAMILY MEDICINE

## 2019-12-09 PROCEDURE — G8598 ASA/ANTIPLAT THER USED: HCPCS | Performed by: FAMILY MEDICINE

## 2019-12-09 PROCEDURE — G9899 SCRN MAM PERF RSLTS DOC: HCPCS | Performed by: FAMILY MEDICINE

## 2019-12-09 RX ORDER — ERGOCALCIFEROL 1.25 MG/1
CAPSULE ORAL
Qty: 12 CAPSULE | Refills: 3 | Status: SHIPPED
Start: 2019-12-09 | End: 2020-10-27 | Stop reason: SDUPTHER

## 2019-12-09 RX ORDER — FUROSEMIDE 20 MG/1
TABLET ORAL
Qty: 30 TABLET | Refills: 11 | Status: SHIPPED
Start: 2019-12-09 | End: 2020-03-10

## 2019-12-09 ASSESSMENT — ENCOUNTER SYMPTOMS
EYES NEGATIVE: 1
ALLERGIC/IMMUNOLOGIC NEGATIVE: 1
GASTROINTESTINAL NEGATIVE: 1
RESPIRATORY NEGATIVE: 1

## 2019-12-19 ENCOUNTER — TELEPHONE (OUTPATIENT)
Dept: PRIMARY CARE CLINIC | Age: 74
End: 2019-12-19

## 2020-03-09 ENCOUNTER — HOSPITAL ENCOUNTER (OUTPATIENT)
Age: 75
Discharge: HOME | End: 2020-03-09
Payer: MEDICARE

## 2020-03-09 LAB
ALBUMIN SERPL-MCNC: 4.7 G/DL (ref 3.5–5.2)
ALP BLD-CCNC: 57 U/L (ref 35–104)
ALT SERPL-CCNC: 11 U/L (ref 0–32)
ANION GAP SERPL CALCULATED.3IONS-SCNC: 13 MMOL/L (ref 7–16)
AST SERPL-CCNC: 18 U/L (ref 0–31)
BASOPHILS ABSOLUTE: 0.03 E9/L (ref 0–0.2)
BASOPHILS RELATIVE PERCENT: 0.7 % (ref 0–2)
BILIRUB SERPL-MCNC: 0.5 MG/DL (ref 0–1.2)
BUN BLDV-MCNC: 37 MG/DL (ref 8–23)
CALCIUM SERPL-MCNC: 10.3 MG/DL (ref 8.6–10.2)
CHLORIDE BLD-SCNC: 101 MMOL/L (ref 98–107)
CO2: 26 MMOL/L (ref 22–29)
CREAT SERPL-MCNC: 1.6 MG/DL (ref 0.5–1)
EOSINOPHILS ABSOLUTE: 0.07 E9/L (ref 0.05–0.5)
EOSINOPHILS RELATIVE PERCENT: 1.6 % (ref 0–6)
GFR AFRICAN AMERICAN: 38
GFR NON-AFRICAN AMERICAN: 32 ML/MIN/1.73
GLUCOSE BLD-MCNC: 98 MG/DL (ref 74–99)
HBA1C MFR BLD: 5.5 % (ref 4–5.6)
HCT VFR BLD CALC: 43.1 % (ref 34–48)
HEMOGLOBIN: 14 G/DL (ref 11.5–15.5)
IMMATURE GRANULOCYTES #: 0.02 E9/L
IMMATURE GRANULOCYTES %: 0.5 % (ref 0–5)
LYMPHOCYTES ABSOLUTE: 1.46 E9/L (ref 1.5–4)
LYMPHOCYTES RELATIVE PERCENT: 33 % (ref 20–42)
MCH RBC QN AUTO: 30.2 PG (ref 26–35)
MCHC RBC AUTO-ENTMCNC: 32.5 % (ref 32–34.5)
MCV RBC AUTO: 92.9 FL (ref 80–99.9)
MONOCYTES ABSOLUTE: 0.43 E9/L (ref 0.1–0.95)
MONOCYTES RELATIVE PERCENT: 9.7 % (ref 2–12)
NEUTROPHILS ABSOLUTE: 2.42 E9/L (ref 1.8–7.3)
NEUTROPHILS RELATIVE PERCENT: 54.5 % (ref 43–80)
PDW BLD-RTO: 12.6 FL (ref 11.5–15)
PLATELET # BLD: 133 E9/L (ref 130–450)
PMV BLD AUTO: 10.1 FL (ref 7–12)
POTASSIUM SERPL-SCNC: 4.9 MMOL/L (ref 3.5–5)
RBC # BLD: 4.64 E12/L (ref 3.5–5.5)
SODIUM BLD-SCNC: 140 MMOL/L (ref 132–146)
T4 TOTAL: 9.5 MCG/DL (ref 4.5–11.7)
TOTAL PROTEIN: 8.2 G/DL (ref 6.4–8.3)
TSH SERPL DL<=0.05 MIU/L-ACNC: 2.44 UIU/ML (ref 0.27–4.2)
WBC # BLD: 4.4 E9/L (ref 4.5–11.5)

## 2020-03-09 PROCEDURE — 36415 COLL VENOUS BLD VENIPUNCTURE: CPT

## 2020-03-09 PROCEDURE — 84443 ASSAY THYROID STIM HORMONE: CPT

## 2020-03-09 PROCEDURE — 84436 ASSAY OF TOTAL THYROXINE: CPT

## 2020-03-09 PROCEDURE — 83036 HEMOGLOBIN GLYCOSYLATED A1C: CPT

## 2020-03-09 PROCEDURE — 80053 COMPREHEN METABOLIC PANEL: CPT

## 2020-03-09 PROCEDURE — 85025 COMPLETE CBC W/AUTO DIFF WBC: CPT

## 2020-03-10 ENCOUNTER — OFFICE VISIT (OUTPATIENT)
Dept: PRIMARY CARE CLINIC | Age: 75
End: 2020-03-10
Payer: MEDICARE

## 2020-03-10 VITALS
TEMPERATURE: 97.4 F | DIASTOLIC BLOOD PRESSURE: 58 MMHG | WEIGHT: 157 LBS | BODY MASS INDEX: 27.81 KG/M2 | HEART RATE: 78 BPM | RESPIRATION RATE: 18 BRPM | OXYGEN SATURATION: 97 % | SYSTOLIC BLOOD PRESSURE: 104 MMHG

## 2020-03-10 PROBLEM — I10 ESSENTIAL HYPERTENSION, BENIGN: Chronic | Status: ACTIVE | Noted: 2020-03-10

## 2020-03-10 PROBLEM — R06.2 WHEEZING: Status: ACTIVE | Noted: 2020-03-10

## 2020-03-10 PROCEDURE — G8400 PT W/DXA NO RESULTS DOC: HCPCS | Performed by: FAMILY MEDICINE

## 2020-03-10 PROCEDURE — G8427 DOCREV CUR MEDS BY ELIG CLIN: HCPCS | Performed by: FAMILY MEDICINE

## 2020-03-10 PROCEDURE — G8482 FLU IMMUNIZE ORDER/ADMIN: HCPCS | Performed by: FAMILY MEDICINE

## 2020-03-10 PROCEDURE — G8417 CALC BMI ABV UP PARAM F/U: HCPCS | Performed by: FAMILY MEDICINE

## 2020-03-10 PROCEDURE — 1123F ACP DISCUSS/DSCN MKR DOCD: CPT | Performed by: FAMILY MEDICINE

## 2020-03-10 PROCEDURE — 3017F COLORECTAL CA SCREEN DOC REV: CPT | Performed by: FAMILY MEDICINE

## 2020-03-10 PROCEDURE — 2022F DILAT RTA XM EVC RTNOPTHY: CPT | Performed by: FAMILY MEDICINE

## 2020-03-10 PROCEDURE — 4040F PNEUMOC VAC/ADMIN/RCVD: CPT | Performed by: FAMILY MEDICINE

## 2020-03-10 PROCEDURE — 1036F TOBACCO NON-USER: CPT | Performed by: FAMILY MEDICINE

## 2020-03-10 PROCEDURE — 3044F HG A1C LEVEL LT 7.0%: CPT | Performed by: FAMILY MEDICINE

## 2020-03-10 PROCEDURE — 1090F PRES/ABSN URINE INCON ASSESS: CPT | Performed by: FAMILY MEDICINE

## 2020-03-10 PROCEDURE — 99214 OFFICE O/P EST MOD 30 MIN: CPT | Performed by: FAMILY MEDICINE

## 2020-03-10 RX ORDER — SPIRONOLACTONE 25 MG/1
TABLET ORAL
Qty: 90 TABLET | Refills: 3 | Status: SHIPPED
Start: 2020-03-10 | End: 2020-06-10

## 2020-03-10 RX ORDER — LEVOTHYROXINE SODIUM 88 UG/1
TABLET ORAL
Qty: 90 TABLET | Refills: 3 | Status: SHIPPED
Start: 2020-03-10 | End: 2021-05-14

## 2020-03-10 RX ORDER — LABETALOL 100 MG/1
100 TABLET, FILM COATED ORAL 2 TIMES DAILY
Qty: 180 TABLET | Refills: 3 | Status: SHIPPED
Start: 2020-03-10 | End: 2021-02-09

## 2020-03-10 RX ORDER — HYDRALAZINE HYDROCHLORIDE 25 MG/1
25 TABLET, FILM COATED ORAL 4 TIMES DAILY
Qty: 360 TABLET | Refills: 3 | Status: SHIPPED
Start: 2020-03-10 | End: 2020-05-14

## 2020-03-10 RX ORDER — CLONIDINE HYDROCHLORIDE 0.1 MG/1
0.1 TABLET ORAL 2 TIMES DAILY
Qty: 180 TABLET | Refills: 3 | Status: SHIPPED
Start: 2020-03-10 | End: 2020-05-14

## 2020-03-10 RX ORDER — CITALOPRAM 20 MG/1
20 TABLET ORAL DAILY
Qty: 90 TABLET | Refills: 3 | Status: SHIPPED
Start: 2020-03-10 | End: 2020-05-14

## 2020-03-10 ASSESSMENT — PATIENT HEALTH QUESTIONNAIRE - PHQ9
2. FEELING DOWN, DEPRESSED OR HOPELESS: 0
SUM OF ALL RESPONSES TO PHQ9 QUESTIONS 1 & 2: 0
1. LITTLE INTEREST OR PLEASURE IN DOING THINGS: 0
SUM OF ALL RESPONSES TO PHQ QUESTIONS 1-9: 0
SUM OF ALL RESPONSES TO PHQ QUESTIONS 1-9: 0

## 2020-03-10 ASSESSMENT — ENCOUNTER SYMPTOMS
EYES NEGATIVE: 1
ALLERGIC/IMMUNOLOGIC NEGATIVE: 1
GASTROINTESTINAL NEGATIVE: 1
WHEEZING: 1

## 2020-03-10 NOTE — PROGRESS NOTES
3/10/20     Milad Dietz    : 1945 Sex: female   Age: 69 y.o. Chief Complaint   Patient presents with    Hypertension    Hyperlipidemia    Hypothyroidism    Anxiety    Chronic Kidney Disease    Discuss Labs    Referral - General     pulmonary at [facility] Dr. Kaden Nelson       Prior to Admission medications    Medication Sig Start Date End Date Taking? Authorizing Provider   citalopram (CELEXA) 20 MG tablet Take 1 tablet by mouth daily 3/10/20  Yes Gavi Gravel Traikoff, DO   cloNIDine (CATAPRES) 0.1 MG tablet Take 1 tablet by mouth 2 times daily 3/10/20  Yes Gavi Gravel Traikoff, DO   labetalol (NORMODYNE) 100 MG tablet Take 1 tablet by mouth 2 times daily 3/10/20  Yes Gavi Gravel Traikoff, DO   levothyroxine (SYNTHROID) 88 MCG tablet TK 1 T PO QD 3/10/20  Yes Gavi Gravel Traikoff, DO   spironolactone (ALDACTONE) 25 MG tablet TK 1 T PO QD 3/10/20  Yes Gavi Gravel Traikoff, DO   hydrALAZINE (APRESOLINE) 25 MG tablet Take 1 tablet by mouth 4 times daily 3/10/20  Yes Gavi Gravel Traikoff, DO   vitamin D (ERGOCALCIFEROL) 1.25 MG (87865 UT) CAPS capsule TK 1 C PO WEEKLY 19  Yes Gavi Hatfieldoff, DO   furosemide (LASIX) 20 MG tablet TAKE 1/2 TABLET BY MOUTH EVERY OTHER DAY 19  Yes Gavi Nicholson, DO   nitroGLYCERIN (NITRODUR) 0.2 MG/HR Place 1 patch onto the skin daily 19  Yes Valeria Hodge DO          HPI: Patient evaluated today for issues of hypothyroid hypertension hyperlipidemia chronic kidney disease diabetes mellitus. Stable current medications and care. Systems review overall stable. Some concerns for intermittent wheezing and reflux related symptoms. Possibly related to laryngeal pharyngeal reflux. Will be evaluated by pulmonary in [facility]. Review of Systems   Constitutional: Negative. HENT: Negative. Eyes: Negative. Respiratory: Positive for wheezing. Gastrointestinal: Negative. Endocrine: Negative. Genitourinary: Negative. PLACEMENT      CYST REMOVAL      ENDOSCOPY, COLON, DIAGNOSTIC      GASTROSTOMY TUBE PLACEMENT  9/24/14    [place]    JOINT REPLACEMENT Left     KNEE SURGERY      POLYPECTOMY  07/24/2013    internal hemorrhoids - michelle    TONSILLECTOMY      TRACHEOSTOMY  9/24/14    [place]    UPPER GASTROINTESTINAL ENDOSCOPY      gastritis w. superficial antral ulerations - [place]     No family history on file. Past Medical History:   Diagnosis Date    Closed left arm fracture     Diabetes mellitus ([address]     Hemorrhoids     Internal    Hypertension     Ovarian cyst rupture     Respiratory failure (HCC)     history of    Thyroid disease     Unspecified cerebral artery occlusion with cerebral infarction 9/26/2014    right side flacid, hemorrhagic stroke       Vitals:    03/10/20 1010   BP: (!) 104/58   Pulse: 78   Resp: 18   Temp: 97.4 °F (36.3 °C)   TempSrc: Temporal   SpO2: 97%   Weight: 157 lb (71.2 kg)     BP Readings from Last 3 Encounters:   03/10/20 (!) 104/58   12/09/19 120/62   11/13/19 136/72        Physical Exam  Vitals signs and nursing note reviewed. Constitutional:       Appearance: She is well-developed. HENT:      Head: Normocephalic. Right Ear: External ear normal.      Left Ear: External ear normal.      Nose: Nose normal.   Eyes:      Conjunctiva/sclera: Conjunctivae normal.      Pupils: Pupils are equal, round, and reactive to light. Neck:      Musculoskeletal: Normal range of motion and neck supple. Cardiovascular:      Rate and Rhythm: Normal rate. Pulmonary:      Breath sounds: Normal breath sounds. Abdominal:      General: Bowel sounds are normal.      Palpations: Abdomen is soft. Musculoskeletal: Normal range of motion. Skin:     General: Skin is warm and dry. Neurological:      Mental Status: She is alert and oriented to person, place, and time. Psychiatric:         Behavior: Behavior normal.     Today's exam findings are excellent. Lasix discontinued.   Labs

## 2020-05-12 RX ORDER — NITROGLYCERIN 40 MG/1
PATCH TRANSDERMAL
Qty: 90 PATCH | Refills: 3 | Status: SHIPPED
Start: 2020-05-12 | End: 2021-06-01 | Stop reason: SDUPTHER

## 2020-05-14 RX ORDER — CLONIDINE HYDROCHLORIDE 0.1 MG/1
TABLET ORAL
Qty: 180 TABLET | Refills: 3 | Status: SHIPPED
Start: 2020-05-14 | End: 2021-06-07 | Stop reason: SDUPTHER

## 2020-05-14 RX ORDER — CITALOPRAM 20 MG/1
20 TABLET ORAL DAILY
Qty: 90 TABLET | Refills: 3 | Status: SHIPPED
Start: 2020-05-14 | End: 2021-06-07 | Stop reason: SDUPTHER

## 2020-05-14 RX ORDER — HYDRALAZINE HYDROCHLORIDE 25 MG/1
TABLET, FILM COATED ORAL
Qty: 360 TABLET | Refills: 3 | Status: SHIPPED
Start: 2020-05-14 | End: 2021-06-07 | Stop reason: SDUPTHER

## 2020-06-03 ENCOUNTER — HOSPITAL ENCOUNTER (OUTPATIENT)
Age: 75
Discharge: HOME | End: 2020-06-05
Payer: MEDICARE

## 2020-06-03 LAB
ALBUMIN SERPL-MCNC: 4.7 G/DL (ref 3.5–5.2)
ALP BLD-CCNC: 54 U/L (ref 35–104)
ALT SERPL-CCNC: 12 U/L (ref 0–32)
ANION GAP SERPL CALCULATED.3IONS-SCNC: 14 MMOL/L (ref 7–16)
AST SERPL-CCNC: 21 U/L (ref 0–31)
BASOPHILS ABSOLUTE: 0.03 E9/L (ref 0–0.2)
BASOPHILS RELATIVE PERCENT: 0.7 % (ref 0–2)
BILIRUB SERPL-MCNC: 0.6 MG/DL (ref 0–1.2)
BUN BLDV-MCNC: 39 MG/DL (ref 8–23)
CALCIUM SERPL-MCNC: 9.9 MG/DL (ref 8.6–10.2)
CHLORIDE BLD-SCNC: 102 MMOL/L (ref 98–107)
CHOLESTEROL, TOTAL: 188 MG/DL (ref 0–199)
CO2: 22 MMOL/L (ref 22–29)
CREAT SERPL-MCNC: 1.5 MG/DL (ref 0.5–1)
EOSINOPHILS ABSOLUTE: 0.06 E9/L (ref 0.05–0.5)
EOSINOPHILS RELATIVE PERCENT: 1.4 % (ref 0–6)
GFR AFRICAN AMERICAN: 41
GFR NON-AFRICAN AMERICAN: 34 ML/MIN/1.73
GLUCOSE BLD-MCNC: 87 MG/DL (ref 74–99)
HBA1C MFR BLD: 5.3 % (ref 4–5.6)
HCT VFR BLD CALC: 42 % (ref 34–48)
HDLC SERPL-MCNC: 58 MG/DL
HEMOGLOBIN: 13.1 G/DL (ref 11.5–15.5)
IMMATURE GRANULOCYTES #: 0.01 E9/L
IMMATURE GRANULOCYTES %: 0.2 % (ref 0–5)
LDL CHOLESTEROL CALCULATED: 115 MG/DL (ref 0–99)
LYMPHOCYTES ABSOLUTE: 1.38 E9/L (ref 1.5–4)
LYMPHOCYTES RELATIVE PERCENT: 32.5 % (ref 20–42)
MCH RBC QN AUTO: 29.8 PG (ref 26–35)
MCHC RBC AUTO-ENTMCNC: 31.2 % (ref 32–34.5)
MCV RBC AUTO: 95.5 FL (ref 80–99.9)
MONOCYTES ABSOLUTE: 0.39 E9/L (ref 0.1–0.95)
MONOCYTES RELATIVE PERCENT: 9.2 % (ref 2–12)
NEUTROPHILS ABSOLUTE: 2.38 E9/L (ref 1.8–7.3)
NEUTROPHILS RELATIVE PERCENT: 56 % (ref 43–80)
PDW BLD-RTO: 13.6 FL (ref 11.5–15)
PLATELET # BLD: 142 E9/L (ref 130–450)
PMV BLD AUTO: 11.4 FL (ref 7–12)
POTASSIUM SERPL-SCNC: 5.7 MMOL/L (ref 3.5–5)
RBC # BLD: 4.4 E12/L (ref 3.5–5.5)
SODIUM BLD-SCNC: 138 MMOL/L (ref 132–146)
T4 TOTAL: 9.2 MCG/DL (ref 4.5–11.7)
TOTAL PROTEIN: 7.7 G/DL (ref 6.4–8.3)
TRIGL SERPL-MCNC: 77 MG/DL (ref 0–149)
TSH SERPL DL<=0.05 MIU/L-ACNC: 1.82 UIU/ML (ref 0.27–4.2)
VLDLC SERPL CALC-MCNC: 15 MG/DL
WBC # BLD: 4.3 E9/L (ref 4.5–11.5)

## 2020-06-03 PROCEDURE — 80053 COMPREHEN METABOLIC PANEL: CPT

## 2020-06-03 PROCEDURE — 84443 ASSAY THYROID STIM HORMONE: CPT

## 2020-06-03 PROCEDURE — 80061 LIPID PANEL: CPT

## 2020-06-03 PROCEDURE — 36415 COLL VENOUS BLD VENIPUNCTURE: CPT

## 2020-06-03 PROCEDURE — 84436 ASSAY OF TOTAL THYROXINE: CPT

## 2020-06-03 PROCEDURE — 85025 COMPLETE CBC W/AUTO DIFF WBC: CPT

## 2020-06-03 PROCEDURE — 83036 HEMOGLOBIN GLYCOSYLATED A1C: CPT

## 2020-06-10 ENCOUNTER — OFFICE VISIT (OUTPATIENT)
Dept: PRIMARY CARE CLINIC | Age: 75
End: 2020-06-10
Payer: MEDICARE

## 2020-06-10 VITALS
BODY MASS INDEX: 29.05 KG/M2 | RESPIRATION RATE: 16 BRPM | WEIGHT: 164 LBS | HEART RATE: 75 BPM | OXYGEN SATURATION: 96 % | TEMPERATURE: 98.1 F

## 2020-06-10 PROBLEM — M17.11 PRIMARY OSTEOARTHRITIS OF RIGHT KNEE: Status: ACTIVE | Noted: 2020-06-10

## 2020-06-10 PROBLEM — E87.5 HYPERKALEMIA: Status: ACTIVE | Noted: 2020-06-10

## 2020-06-10 PROCEDURE — 2022F DILAT RTA XM EVC RTNOPTHY: CPT | Performed by: FAMILY MEDICINE

## 2020-06-10 PROCEDURE — G8400 PT W/DXA NO RESULTS DOC: HCPCS | Performed by: FAMILY MEDICINE

## 2020-06-10 PROCEDURE — 1036F TOBACCO NON-USER: CPT | Performed by: FAMILY MEDICINE

## 2020-06-10 PROCEDURE — 99214 OFFICE O/P EST MOD 30 MIN: CPT | Performed by: FAMILY MEDICINE

## 2020-06-10 PROCEDURE — G8427 DOCREV CUR MEDS BY ELIG CLIN: HCPCS | Performed by: FAMILY MEDICINE

## 2020-06-10 PROCEDURE — 4040F PNEUMOC VAC/ADMIN/RCVD: CPT | Performed by: FAMILY MEDICINE

## 2020-06-10 PROCEDURE — 1090F PRES/ABSN URINE INCON ASSESS: CPT | Performed by: FAMILY MEDICINE

## 2020-06-10 PROCEDURE — 1123F ACP DISCUSS/DSCN MKR DOCD: CPT | Performed by: FAMILY MEDICINE

## 2020-06-10 PROCEDURE — 3017F COLORECTAL CA SCREEN DOC REV: CPT | Performed by: FAMILY MEDICINE

## 2020-06-10 PROCEDURE — 3044F HG A1C LEVEL LT 7.0%: CPT | Performed by: FAMILY MEDICINE

## 2020-06-10 PROCEDURE — G8417 CALC BMI ABV UP PARAM F/U: HCPCS | Performed by: FAMILY MEDICINE

## 2020-06-10 ASSESSMENT — ENCOUNTER SYMPTOMS
ALLERGIC/IMMUNOLOGIC NEGATIVE: 1
GASTROINTESTINAL NEGATIVE: 1
EYES NEGATIVE: 1
RESPIRATORY NEGATIVE: 1

## 2020-06-22 ENCOUNTER — HOSPITAL ENCOUNTER (OUTPATIENT)
Age: 75
Discharge: HOME | End: 2020-06-24
Payer: MEDICARE

## 2020-06-22 LAB
ANION GAP SERPL CALCULATED.3IONS-SCNC: 12 MMOL/L (ref 7–16)
BUN BLDV-MCNC: 27 MG/DL (ref 8–23)
CALCIUM SERPL-MCNC: 9.3 MG/DL (ref 8.6–10.2)
CHLORIDE BLD-SCNC: 107 MMOL/L (ref 98–107)
CO2: 23 MMOL/L (ref 22–29)
CREAT SERPL-MCNC: 1.7 MG/DL (ref 0.5–1)
GFR AFRICAN AMERICAN: 36
GFR NON-AFRICAN AMERICAN: 29 ML/MIN/1.73
GLUCOSE BLD-MCNC: 90 MG/DL (ref 74–99)
POTASSIUM SERPL-SCNC: 4.1 MMOL/L (ref 3.5–5)
SODIUM BLD-SCNC: 142 MMOL/L (ref 132–146)

## 2020-06-22 PROCEDURE — 36415 COLL VENOUS BLD VENIPUNCTURE: CPT

## 2020-06-22 PROCEDURE — 80048 BASIC METABOLIC PNL TOTAL CA: CPT

## 2020-06-24 ENCOUNTER — OFFICE VISIT (OUTPATIENT)
Dept: PRIMARY CARE CLINIC | Age: 75
End: 2020-06-24
Payer: MEDICARE

## 2020-06-24 VITALS — HEART RATE: 64 BPM | OXYGEN SATURATION: 97 % | SYSTOLIC BLOOD PRESSURE: 136 MMHG | DIASTOLIC BLOOD PRESSURE: 72 MMHG

## 2020-06-24 PROCEDURE — 1036F TOBACCO NON-USER: CPT | Performed by: FAMILY MEDICINE

## 2020-06-24 PROCEDURE — 1090F PRES/ABSN URINE INCON ASSESS: CPT | Performed by: FAMILY MEDICINE

## 2020-06-24 PROCEDURE — 4040F PNEUMOC VAC/ADMIN/RCVD: CPT | Performed by: FAMILY MEDICINE

## 2020-06-24 PROCEDURE — G8400 PT W/DXA NO RESULTS DOC: HCPCS | Performed by: FAMILY MEDICINE

## 2020-06-24 PROCEDURE — 1123F ACP DISCUSS/DSCN MKR DOCD: CPT | Performed by: FAMILY MEDICINE

## 2020-06-24 PROCEDURE — 3017F COLORECTAL CA SCREEN DOC REV: CPT | Performed by: FAMILY MEDICINE

## 2020-06-24 PROCEDURE — G8417 CALC BMI ABV UP PARAM F/U: HCPCS | Performed by: FAMILY MEDICINE

## 2020-06-24 PROCEDURE — 99214 OFFICE O/P EST MOD 30 MIN: CPT | Performed by: FAMILY MEDICINE

## 2020-06-24 PROCEDURE — 2022F DILAT RTA XM EVC RTNOPTHY: CPT | Performed by: FAMILY MEDICINE

## 2020-06-24 PROCEDURE — 3044F HG A1C LEVEL LT 7.0%: CPT | Performed by: FAMILY MEDICINE

## 2020-06-24 PROCEDURE — G8427 DOCREV CUR MEDS BY ELIG CLIN: HCPCS | Performed by: FAMILY MEDICINE

## 2020-06-24 ASSESSMENT — ENCOUNTER SYMPTOMS
GASTROINTESTINAL NEGATIVE: 1
EYES NEGATIVE: 1
RESPIRATORY NEGATIVE: 1
ALLERGIC/IMMUNOLOGIC NEGATIVE: 1

## 2020-06-30 ENCOUNTER — TELEPHONE (OUTPATIENT)
Dept: PRIMARY CARE CLINIC | Age: 75
End: 2020-06-30

## 2020-06-30 NOTE — TELEPHONE ENCOUNTER
Pt's daughter asking if you think she will need a cardiology clearance before having a knee surgery. Her orthopaedic dr wanted your opinion on clearance.

## 2020-07-02 ENCOUNTER — TELEPHONE (OUTPATIENT)
Dept: PRIMARY CARE CLINIC | Age: 75
End: 2020-07-02

## 2020-07-02 NOTE — TELEPHONE ENCOUNTER
The pt's daughter is calling because the pt needs cardiac clearance for her surgery that is scheduled 8/28.  The pt saw Dr. Clemente Hancock in the past but hasn't had to see him in awhile so they need a referral in order for her to see him

## 2020-07-07 ENCOUNTER — TELEPHONE (OUTPATIENT)
Dept: PRIMARY CARE CLINIC | Age: 75
End: 2020-07-07

## 2020-07-08 NOTE — TELEPHONE ENCOUNTER
pts daughter called and stated she got a phone call regarding this. Stated she will call her mother to advise.

## 2020-07-14 ENCOUNTER — HOSPITAL ENCOUNTER (OUTPATIENT)
Age: 75
Discharge: HOME | End: 2020-07-14
Payer: MEDICARE

## 2020-07-14 LAB
ALBUMIN SERPL-MCNC: 4.7 G/DL (ref 3.5–5.2)
ALP BLD-CCNC: 51 U/L (ref 35–104)
ALT SERPL-CCNC: 14 U/L (ref 0–32)
ANION GAP SERPL CALCULATED.3IONS-SCNC: 15 MMOL/L (ref 7–16)
AST SERPL-CCNC: 22 U/L (ref 0–31)
BASOPHILS ABSOLUTE: 0.02 E9/L (ref 0–0.2)
BASOPHILS RELATIVE PERCENT: 0.5 % (ref 0–2)
BILIRUB SERPL-MCNC: 0.6 MG/DL (ref 0–1.2)
BUN BLDV-MCNC: 30 MG/DL (ref 8–23)
CALCIUM SERPL-MCNC: 9.4 MG/DL (ref 8.6–10.2)
CHLORIDE BLD-SCNC: 98 MMOL/L (ref 98–107)
CHOLESTEROL, TOTAL: 214 MG/DL (ref 0–199)
CO2: 22 MMOL/L (ref 22–29)
CREAT SERPL-MCNC: 1.5 MG/DL (ref 0.5–1)
EOSINOPHILS ABSOLUTE: 0.05 E9/L (ref 0.05–0.5)
EOSINOPHILS RELATIVE PERCENT: 1.1 % (ref 0–6)
GFR AFRICAN AMERICAN: 41
GFR NON-AFRICAN AMERICAN: 34 ML/MIN/1.73
GLUCOSE BLD-MCNC: 134 MG/DL (ref 74–99)
HCT VFR BLD CALC: 41.7 % (ref 34–48)
HDLC SERPL-MCNC: 58 MG/DL
HEMOGLOBIN: 13.9 G/DL (ref 11.5–15.5)
IMMATURE GRANULOCYTES #: 0.02 E9/L
IMMATURE GRANULOCYTES %: 0.5 % (ref 0–5)
LDL CHOLESTEROL CALCULATED: 134 MG/DL (ref 0–99)
LYMPHOCYTES ABSOLUTE: 1.1 E9/L (ref 1.5–4)
LYMPHOCYTES RELATIVE PERCENT: 24.8 % (ref 20–42)
MCH RBC QN AUTO: 31.3 PG (ref 26–35)
MCHC RBC AUTO-ENTMCNC: 33.3 % (ref 32–34.5)
MCV RBC AUTO: 93.9 FL (ref 80–99.9)
MONOCYTES ABSOLUTE: 0.37 E9/L (ref 0.1–0.95)
MONOCYTES RELATIVE PERCENT: 8.4 % (ref 2–12)
NEUTROPHILS ABSOLUTE: 2.87 E9/L (ref 1.8–7.3)
NEUTROPHILS RELATIVE PERCENT: 64.7 % (ref 43–80)
PARATHYROID HORMONE INTACT: 46 PG/ML (ref 15–65)
PDW BLD-RTO: 13.2 FL (ref 11.5–15)
PHOSPHORUS: 4.1 MG/DL (ref 2.5–4.5)
PLATELET # BLD: 136 E9/L (ref 130–450)
PMV BLD AUTO: 10.8 FL (ref 7–12)
POTASSIUM SERPL-SCNC: 5.2 MMOL/L (ref 3.5–5)
RBC # BLD: 4.44 E12/L (ref 3.5–5.5)
SODIUM BLD-SCNC: 135 MMOL/L (ref 132–146)
TOTAL PROTEIN: 7.6 G/DL (ref 6.4–8.3)
TRIGL SERPL-MCNC: 108 MG/DL (ref 0–149)
VITAMIN D 25-HYDROXY: 72 NG/ML (ref 30–100)
VLDLC SERPL CALC-MCNC: 22 MG/DL
WBC # BLD: 4.4 E9/L (ref 4.5–11.5)

## 2020-07-14 PROCEDURE — 82306 VITAMIN D 25 HYDROXY: CPT

## 2020-07-14 PROCEDURE — 80053 COMPREHEN METABOLIC PANEL: CPT

## 2020-07-14 PROCEDURE — 85025 COMPLETE CBC W/AUTO DIFF WBC: CPT

## 2020-07-14 PROCEDURE — 83970 ASSAY OF PARATHORMONE: CPT

## 2020-07-14 PROCEDURE — 80061 LIPID PANEL: CPT

## 2020-07-14 PROCEDURE — 36415 COLL VENOUS BLD VENIPUNCTURE: CPT

## 2020-07-14 PROCEDURE — 84100 ASSAY OF PHOSPHORUS: CPT

## 2020-07-17 ENCOUNTER — TELEPHONE (OUTPATIENT)
Dept: PRIMARY CARE CLINIC | Age: 75
End: 2020-07-17

## 2020-07-17 RX ORDER — SPIRONOLACTONE 25 MG/1
25 TABLET ORAL DAILY
COMMUNITY
End: 2021-05-14

## 2020-07-17 RX ORDER — FUROSEMIDE 20 MG/1
10 TABLET ORAL EVERY OTHER DAY
COMMUNITY
End: 2020-08-05 | Stop reason: SDUPTHER

## 2020-07-17 NOTE — TELEPHONE ENCOUNTER
The pt's daughter is not comfortable with that because she just got back on that because the last time you took her off it her BP geoff rocketed. She is very upset asking to talk to you doctor, she says she just wants to put the pt back on everything she was on before when everything was okay.  If you want to contact her, her number is [phone]

## 2020-07-27 ENCOUNTER — TELEPHONE (OUTPATIENT)
Dept: PRIMARY CARE CLINIC | Age: 75
End: 2020-07-27

## 2020-07-27 NOTE — TELEPHONE ENCOUNTER
Called and spoke with Pt's daughter Ed Romero, who is on the HIPAA release form. She advised that Dr. Justin Maxwell wanted the lab work done 2 days prior to the pt next apt that's on 08/05/2020. Extended the order.    Pablo Kc

## 2020-08-03 ENCOUNTER — HOSPITAL ENCOUNTER (OUTPATIENT)
Age: 75
Discharge: HOME | End: 2020-08-05
Payer: MEDICARE

## 2020-08-03 LAB
ANION GAP SERPL CALCULATED.3IONS-SCNC: 15 MMOL/L (ref 7–16)
BUN BLDV-MCNC: 52 MG/DL (ref 8–23)
CALCIUM SERPL-MCNC: 10 MG/DL (ref 8.6–10.2)
CHLORIDE BLD-SCNC: 101 MMOL/L (ref 98–107)
CO2: 22 MMOL/L (ref 22–29)
CREAT SERPL-MCNC: 1.7 MG/DL (ref 0.5–1)
GFR AFRICAN AMERICAN: 36
GFR NON-AFRICAN AMERICAN: 29 ML/MIN/1.73
GLUCOSE BLD-MCNC: 87 MG/DL (ref 74–99)
POTASSIUM SERPL-SCNC: 4.7 MMOL/L (ref 3.5–5)
SODIUM BLD-SCNC: 138 MMOL/L (ref 132–146)

## 2020-08-03 PROCEDURE — 80048 BASIC METABOLIC PNL TOTAL CA: CPT

## 2020-08-03 PROCEDURE — 36415 COLL VENOUS BLD VENIPUNCTURE: CPT

## 2020-08-04 ENCOUNTER — HOSPITAL ENCOUNTER (OUTPATIENT)
Dept: NUCLEAR MEDICINE | Age: 75
Discharge: HOME | End: 2020-08-04
Payer: MEDICARE

## 2020-08-04 ENCOUNTER — HOSPITAL ENCOUNTER (OUTPATIENT)
Dept: NON INVASIVE DIAGNOSTICS | Age: 75
Discharge: HOME | End: 2020-08-04
Payer: MEDICARE

## 2020-08-04 VITALS — BODY MASS INDEX: 26.93 KG/M2 | WEIGHT: 152 LBS | HEIGHT: 63 IN

## 2020-08-04 LAB
LV EF: 51 %
LVEF MODALITY: NORMAL

## 2020-08-04 PROCEDURE — 78452 HT MUSCLE IMAGE SPECT MULT: CPT

## 2020-08-04 PROCEDURE — A9500 TC99M SESTAMIBI: HCPCS | Performed by: RADIOLOGY

## 2020-08-04 PROCEDURE — 3430000000 HC RX DIAGNOSTIC RADIOPHARMACEUTICAL: Performed by: RADIOLOGY

## 2020-08-04 PROCEDURE — 93017 CV STRESS TEST TRACING ONLY: CPT

## 2020-08-04 PROCEDURE — 6360000002 HC RX W HCPCS: Performed by: INTERNAL MEDICINE

## 2020-08-04 RX ADMIN — REGADENOSON 0.4 MG: 0.08 INJECTION, SOLUTION INTRAVENOUS at 09:41

## 2020-08-04 RX ADMIN — Medication 30 MILLICURIE: at 10:00

## 2020-08-04 RX ADMIN — Medication 10 MILLICURIE: at 08:00

## 2020-08-04 NOTE — PROGRESS NOTES
Stress test completed with procedure masks worn by Physician, RN, Nuclear tech. Pt. Removed her mask during stress portion of test for breathing purposes.

## 2020-08-05 ENCOUNTER — OFFICE VISIT (OUTPATIENT)
Dept: PRIMARY CARE CLINIC | Age: 75
End: 2020-08-05
Payer: MEDICARE

## 2020-08-05 VITALS
BODY MASS INDEX: 27.28 KG/M2 | SYSTOLIC BLOOD PRESSURE: 114 MMHG | RESPIRATION RATE: 18 BRPM | TEMPERATURE: 97.5 F | OXYGEN SATURATION: 98 % | HEART RATE: 66 BPM | WEIGHT: 154 LBS | DIASTOLIC BLOOD PRESSURE: 62 MMHG

## 2020-08-05 PROCEDURE — 2022F DILAT RTA XM EVC RTNOPTHY: CPT | Performed by: FAMILY MEDICINE

## 2020-08-05 PROCEDURE — 1123F ACP DISCUSS/DSCN MKR DOCD: CPT | Performed by: FAMILY MEDICINE

## 2020-08-05 PROCEDURE — 99214 OFFICE O/P EST MOD 30 MIN: CPT | Performed by: FAMILY MEDICINE

## 2020-08-05 PROCEDURE — 3017F COLORECTAL CA SCREEN DOC REV: CPT | Performed by: FAMILY MEDICINE

## 2020-08-05 PROCEDURE — G8417 CALC BMI ABV UP PARAM F/U: HCPCS | Performed by: FAMILY MEDICINE

## 2020-08-05 PROCEDURE — G8400 PT W/DXA NO RESULTS DOC: HCPCS | Performed by: FAMILY MEDICINE

## 2020-08-05 PROCEDURE — G8427 DOCREV CUR MEDS BY ELIG CLIN: HCPCS | Performed by: FAMILY MEDICINE

## 2020-08-05 PROCEDURE — 1036F TOBACCO NON-USER: CPT | Performed by: FAMILY MEDICINE

## 2020-08-05 PROCEDURE — 3044F HG A1C LEVEL LT 7.0%: CPT | Performed by: FAMILY MEDICINE

## 2020-08-05 PROCEDURE — 4040F PNEUMOC VAC/ADMIN/RCVD: CPT | Performed by: FAMILY MEDICINE

## 2020-08-05 PROCEDURE — 1090F PRES/ABSN URINE INCON ASSESS: CPT | Performed by: FAMILY MEDICINE

## 2020-08-05 RX ORDER — FUROSEMIDE 20 MG/1
10 TABLET ORAL EVERY OTHER DAY
Qty: 45 TABLET | Refills: 3 | Status: SHIPPED
Start: 2020-08-05 | End: 2021-08-25

## 2020-08-05 NOTE — PROGRESS NOTES
20     Elizabeth Mcmillan    : 1945 Sex: female   Age: 77 y.o. Chief Complaint   Patient presents with    Diabetes    Hypertension    Coronary Artery Disease    Chronic Kidney Disease    Hyperlipidemia    Discuss Labs     BMP done 8/3/20       Prior to Admission medications    Medication Sig Start Date End Date Taking? Authorizing Provider   furosemide (LASIX) 20 MG tablet Take 10 mg by mouth every other day    Yes Historical Provider, MD   spironolactone (ALDACTONE) 25 MG tablet Take 25 mg by mouth daily   Yes Historical Provider, MD   cloNIDine (CATAPRES) 0.1 MG tablet TAKE 1 TABLET BY MOUTH TWICE DAILY 20  Yes Mark Nicholson DO   hydrALAZINE (APRESOLINE) 25 MG tablet TAKE 1 TABLET BY MOUTH FOUR TIMES DAILY  Patient taking differently: 50 mg 2 times daily  20  Yes Angel Nicholson DO   citalopram (CELEXA) 20 MG tablet TAKE 1 TABLET BY MOUTH DAILY 20  Yes Mark Nicholson DO   nitroGLYCERIN (NITRODUR) 0.2 MG/HR APPLY 1 PATCH EXTERNALLY TO THE SKIN DAILY 20  Yes Mark Nicholson DO   labetalol (NORMODYNE) 100 MG tablet Take 1 tablet by mouth 2 times daily 3/10/20  Yes Mark Nicholson DO   levothyroxine (SYNTHROID) 88 MCG tablet TK 1 T PO QD 3/10/20  Yes Mark Nicholson DO   vitamin D (ERGOCALCIFEROL) 1.25 MG (47488 UT) CAPS capsule TK 1 C PO WEEKLY 19  Yes Carl Canales DO          HPI: Patient evaluated today hypertension hypothyroid diabetes mellitus chronic kidney disease. She is going to have surgery on  of this month for knee replacement. Medically she is very stable. Recent cardiology evaluation with Dr. Aviles Ahr was essentially stable no evidence of ischemia ejection fraction of 51% low risk from a cardiac standpoint. Review of Systems   Constitutional: Negative. HENT: Negative. Eyes: Negative. Respiratory: Negative. Gastrointestinal: Negative. Endocrine: Negative. Genitourinary: Negative. Musculoskeletal: Negative. Skin: Negative. Allergic/Immunologic: Negative. Neurological: Negative. Hematological: Negative. Psychiatric/Behavioral: Negative. Systems review is overall stable.       Current Outpatient Medications:     furosemide (LASIX) 20 MG tablet, Take 10 mg by mouth every other day , Disp: , Rfl:     spironolactone (ALDACTONE) 25 MG tablet, Take 25 mg by mouth daily, Disp: , Rfl:     cloNIDine (CATAPRES) 0.1 MG tablet, TAKE 1 TABLET BY MOUTH TWICE DAILY, Disp: 180 tablet, Rfl: 3    hydrALAZINE (APRESOLINE) 25 MG tablet, TAKE 1 TABLET BY MOUTH FOUR TIMES DAILY (Patient taking differently: 50 mg 2 times daily ), Disp: 360 tablet, Rfl: 3    citalopram (CELEXA) 20 MG tablet, TAKE 1 TABLET BY MOUTH DAILY, Disp: 90 tablet, Rfl: 3    nitroGLYCERIN (NITRODUR) 0.2 MG/HR, APPLY 1 PATCH EXTERNALLY TO THE SKIN DAILY, Disp: 90 patch, Rfl: 3    labetalol (NORMODYNE) 100 MG tablet, Take 1 tablet by mouth 2 times daily, Disp: 180 tablet, Rfl: 3    levothyroxine (SYNTHROID) 88 MCG tablet, TK 1 T PO QD, Disp: 90 tablet, Rfl: 3    vitamin D (ERGOCALCIFEROL) 1.25 MG (67764 UT) CAPS capsule, TK 1 C PO WEEKLY, Disp: 12 capsule, Rfl: 3    Allergies   Allergen Reactions    Latex      Possible allergy pending    Norvasc [Amlodipine Besylate] Anaphylaxis    Dye  [Iodides]        Social History     Tobacco Use    Smoking status: Former Smoker     Packs/day: 1.50     Years: 8.00     Pack years: 12.00     Last attempt to quit: 1976     Years since quittin.6    Smokeless tobacco: Never Used   Substance Use Topics    Alcohol use: No    Drug use: No      Past Surgical History:   Procedure Laterality Date    CARDIAC SURGERY N/A heart stents    10 years ago     SECTION      COLONOSCOPY      CORONARY ANGIOPLASTY WITH STENT PLACEMENT      CYST REMOVAL      ENDOSCOPY, COLON, DIAGNOSTIC      GASTROSTOMY TUBE PLACEMENT  14    [place]    JOINT REPLACEMENT Left     today for diabetes, hypertension, coronary artery disease, chronic kidney disease, hyperlipidemia and discuss labs. Diagnoses and all orders for this visit:    Essential hypertension    Hypothyroidism, unspecified type    Type 2 diabetes mellitus without complication, without long-term current use of insulin (HCC)    CKD (chronic kidney disease) stage 3, GFR 30-59 ml/min (HCC)    Anxiety    Chronic pain of right knee            No follow-ups on file. Kimberlyjermaine Canada DO    Note was generated with the assistance of voice recognition software. Document was reviewed however may contain grammatical errors.

## 2020-08-12 ASSESSMENT — KOOS JR
STANDING UPRIGHT: 3
BENDING TO THE FLOOR TO PICK UP OBJECT: 4
STRAIGHTENING KNEE FULLY: 3
HOW SEVERE IS YOUR KNEE STIFFNESS AFTER FIRST WAKING IN MORNING: 1
GOING UP OR DOWN STAIRS: 2
TWISING OR PIVOTING ON KNEE: 3
RISING FROM SITTING: 2

## 2020-08-12 ASSESSMENT — PROMIS GLOBAL HEALTH SCALE
HOW IS THE PROMIS V1.1 BEING ADMINISTERED?: 2
IN THE PAST 7 DAYS, HOW WOULD YOU RATE YOUR PAIN ON AVERAGE [ON A SCALE FROM 0 (NO PAIN) TO 10 (WORST IMAGINABLE PAIN)]?: 6
TO WHAT EXTENT ARE YOU ABLE TO CARRY OUT YOUR EVERYDAY PHYSICAL ACTIVITIES SUCH AS WALKING, CLIMBING STAIRS, CARRYING GROCERIES, OR MOVING A CHAIR [ON A SCALE OF 1 (NOT AT ALL) TO 5 (COMPLETELY)]?: 2
IN GENERAL, HOW WOULD YOU RATE YOUR PHYSICAL HEALTH [ON A SCALE OF 1 (POOR) TO 5 (EXCELLENT)]?: 3
IN GENERAL, WOULD YOU SAY YOUR HEALTH IS...[ON A SCALE OF 1 (POOR) TO 5 (EXCELLENT)]: 3
IN GENERAL, HOW WOULD YOU RATE YOUR SATISFACTION WITH YOUR SOCIAL ACTIVITIES AND RELATIONSHIPS [ON A SCALE OF 1 (POOR) TO 5 (EXCELLENT)]?: 3
IN GENERAL, PLEASE RATE HOW WELL YOU CARRY OUT YOUR USUAL SOCIAL ACTIVITIES (INCLUDES ACTIVITIES AT HOME, AT WORK, AND IN YOUR COMMUNITY, AND RESPONSIBILITIES AS A PARENT, CHILD, SPOUSE, EMPLOYEE, FRIEND, ETC) [ON A SCALE OF 1 (POOR) TO 5 (EXCELLENT)]?: 1
IN GENERAL, WOULD YOU SAY YOUR QUALITY OF LIFE IS...[ON A SCALE OF 1 (POOR) TO 5 (EXCELLENT)]: 4
IN GENERAL, HOW WOULD YOU RATE YOUR MENTAL HEALTH, INCLUDING YOUR MOOD AND YOUR ABILITY TO THINK [ON A SCALE OF 1 (POOR) TO 5 (EXCELLENT)]?: 4
IN THE PAST 7 DAYS, HOW WOULD YOU RATE YOUR FATIGUE ON AVERAGE [ON A SCALE FROM 1 (NONE) TO 5 (VERY SEVERE)]?: 3
IN THE PAST 7 DAYS, HOW OFTEN HAVE YOU BEEN BOTHERED BY EMOTIONAL PROBLEMS, SUCH AS FEELING ANXIOUS, DEPRESSED, OR IRRITABLE [ON A SCALE FROM 1 (NEVER) TO 5 (ALWAYS)]?: 2
WHO IS THE PERSON COMPLETING THE PROMIS V1.1 SURVEY?: 1

## 2020-08-20 NOTE — H&P
[address] [address]                       PREOPERATIVE HISTORY AND PHYSICAL    PATIENT NAME: Sarwat Jha              :        1945  MED REC NO:   [id number]                            ROOM:  ACCOUNT NO:   [de-identified]                           ADMIT DATE: 2020  PROVIDER:     SUZY Brown    DATE OF SURGERY:  2020    CHIEF COMPLAINT:  Right knee pain. HISTORY OF PRESENT ILLNESS:  This is a 58-year-old female with chronic  right knee pain secondary to osteoarthritis. She has failed  conservative treatments with anti-inflammatories, analgesic, injections,  home exercising, and bracing. She is now scheduled for right total knee  arthroplasty. PAST MEDICAL HISTORY:  Hypertension, heart disease, chronic kidney  disease, thyroid disease, and prior CVA. PRIOR SURGERIES:  Left total knee arthroplasty. CURRENT MEDICATIONS:  Citalopram, levothyroxine, Aldactone,  nitroglycerin, labetalol, clonidine, hydralazine, and vitamin D. ALLERGIES:  NORVASC. FAMILY HISTORY:  Diabetes and heart disease. SOCIAL HISTORY:  She admits to prior tobacco use. Denies alcohol  consumption. PCP is Dr. Jeanette Cox. REVIEW OF SYSTEMS:  ALLERGIES:  As stated above. SKIN AND LYMPHATIC:  Denies rashes or lesions. CNS:  Prior CVA. CIRCULATORY:  History of heart disease and hypertension. DIGESTIVE:  No dyspepsia. GENITOURINARY:  No dysuria. METABOLIC AND ENDOCRINE:  Positive thyroid disease. HEMATOLOGIC:  No anemia. MUSCULOSKELETAL:  Positive OA. PSYCHIATRIC:  Negative. PHYSICAL EXAMINATION:  GENERAL APPEARANCE:  A well-nourished, well-developed 58-year-old  female, no acute distress, alert and oriented x3. SKIN:  Without masses, rashes, or lesions. LYMPH NODES:  No adenopathy. HEAD:  Normocephalic, atraumatic. EARS:  Clear and functional.  EYES AND FUNDI:  PERRLA.   NOSE:  Clear without deviation. MOUTH, TEETH, AND THROAT:  Clear and functional.  NECK:  Supple. No JVD. CHEST:  No excursion. BREASTS:  Deferred. LUNGS:  Clear to auscultation and percussion. HEART:  Regular rate and rhythm. No murmurs, gallops, or rubs  appreciated. ABDOMEN:  Rounded, soft, nontender. Hernia negative. BACK:  Nontender. EXTREMITIES:  Without clubbing, cyanosis, or edema. Exam of right knee:  10 to 100 degrees range of motion. Varus alignment. Positive medial  joint tenderness. Positive laxity. No effusion. Positive crepitus. 2/2 pulses. Neurovascular status distally is intact. NEUROLOGIC:  Cranial nerves II through XII grossly intact. GENITAL AND RECTAL:  Deferred. DIAGNOSTIC IMPRESSION:  OA, right knee. PROCEDURE:  Right total knee arthroplasty.         SUZY De La Rosa    D: 08/20/2020 8:12:21       T: 08/20/2020 10:34:33     [id number]  Job#: [id number]     Doc#: [id number]

## 2020-08-24 ENCOUNTER — HOSPITAL ENCOUNTER (OUTPATIENT)
Age: 75
Discharge: HOME | End: 2020-08-26
Payer: MEDICARE

## 2020-08-24 ENCOUNTER — HOSPITAL ENCOUNTER (OUTPATIENT)
Dept: PREADMISSION TESTING | Age: 75
Discharge: HOME | End: 2020-08-24
Payer: MEDICARE

## 2020-08-24 ENCOUNTER — ANESTHESIA EVENT (OUTPATIENT)
Dept: OPERATING ROOM | Age: 75
End: 2020-08-24
Payer: MEDICARE

## 2020-08-24 VITALS
TEMPERATURE: 97.8 F | SYSTOLIC BLOOD PRESSURE: 112 MMHG | HEART RATE: 58 BPM | WEIGHT: 153 LBS | RESPIRATION RATE: 16 BRPM | BODY MASS INDEX: 27.11 KG/M2 | DIASTOLIC BLOOD PRESSURE: 69 MMHG | OXYGEN SATURATION: 95 % | HEIGHT: 63 IN

## 2020-08-24 LAB
ANION GAP SERPL CALCULATED.3IONS-SCNC: 10 MMOL/L (ref 7–16)
BUN BLDV-MCNC: 42 MG/DL (ref 8–23)
CALCIUM SERPL-MCNC: 9.8 MG/DL (ref 8.6–10.2)
CHLORIDE BLD-SCNC: 102 MMOL/L (ref 98–107)
CO2: 26 MMOL/L (ref 22–29)
CREAT SERPL-MCNC: 1.8 MG/DL (ref 0.5–1)
GFR AFRICAN AMERICAN: 33
GFR NON-AFRICAN AMERICAN: 27 ML/MIN/1.73
GLUCOSE BLD-MCNC: 94 MG/DL (ref 74–99)
HCT VFR BLD CALC: 41.6 % (ref 34–48)
HEMOGLOBIN: 13.3 G/DL (ref 11.5–15.5)
MCH RBC QN AUTO: 29.9 PG (ref 26–35)
MCHC RBC AUTO-ENTMCNC: 32 % (ref 32–34.5)
MCV RBC AUTO: 93.5 FL (ref 80–99.9)
PDW BLD-RTO: 12.8 FL (ref 11.5–15)
PLATELET # BLD: 121 E9/L (ref 130–450)
PMV BLD AUTO: 10.6 FL (ref 7–12)
POTASSIUM SERPL-SCNC: 5.2 MMOL/L (ref 3.5–5)
PREALBUMIN: 23 MG/DL (ref 20–40)
RBC # BLD: 4.45 E12/L (ref 3.5–5.5)
SODIUM BLD-SCNC: 138 MMOL/L (ref 132–146)
WBC # BLD: 3.8 E9/L (ref 4.5–11.5)

## 2020-08-24 PROCEDURE — 36415 COLL VENOUS BLD VENIPUNCTURE: CPT

## 2020-08-24 PROCEDURE — 84134 ASSAY OF PREALBUMIN: CPT

## 2020-08-24 PROCEDURE — 87081 CULTURE SCREEN ONLY: CPT

## 2020-08-24 PROCEDURE — 80048 BASIC METABOLIC PNL TOTAL CA: CPT

## 2020-08-24 PROCEDURE — 85027 COMPLETE CBC AUTOMATED: CPT

## 2020-08-24 PROCEDURE — U0003 INFECTIOUS AGENT DETECTION BY NUCLEIC ACID (DNA OR RNA); SEVERE ACUTE RESPIRATORY SYNDROME CORONAVIRUS 2 (SARS-COV-2) (CORONAVIRUS DISEASE [COVID-19]), AMPLIFIED PROBE TECHNIQUE, MAKING USE OF HIGH THROUGHPUT TECHNOLOGIES AS DESCRIBED BY CMS-2020-01-R: HCPCS

## 2020-08-24 RX ORDER — FENTANYL CITRATE 50 UG/ML
25 INJECTION, SOLUTION INTRAMUSCULAR; INTRAVENOUS PRN
Status: CANCELLED | OUTPATIENT
Start: 2020-08-24

## 2020-08-24 RX ORDER — MIDAZOLAM HYDROCHLORIDE 1 MG/ML
0.5 INJECTION INTRAMUSCULAR; INTRAVENOUS PRN
Status: CANCELLED | OUTPATIENT
Start: 2020-08-24

## 2020-08-24 RX ORDER — LIDOCAINE HYDROCHLORIDE 10 MG/ML
10 INJECTION, SOLUTION INFILTRATION; PERINEURAL
Status: CANCELLED | OUTPATIENT
Start: 2020-08-24 | End: 2020-08-24

## 2020-08-24 RX ORDER — LEVOTHYROXINE SODIUM 0.05 MG/1
50 TABLET ORAL DAILY
COMMUNITY
End: 2020-10-27

## 2020-08-24 ASSESSMENT — LIFESTYLE VARIABLES: SMOKING_STATUS: 0

## 2020-08-24 NOTE — PROGRESS NOTES
Have you been tested for COVID  Yes preop tested 8/24/2020         Have you been told you were positive for COVID No  Have you had any known exposure to someone that is positive for COVID No  Do you have a cough                   No              Do you have shortness of breath No                 Do you have a sore throat            No                Are you having chills                    No                Are you having muscle aches. No                    Please come to the hospital wearing a mask and have your significant other wear a mask as well. Both of you should check your temperature before leaving to come here,  if it is 100 or higher please call [phone] for instruction.

## 2020-08-24 NOTE — PROGRESS NOTES
Notified Dr. Christiane Gonzales of [facility],Bmp results from today, informed her will fax results to the surgeon. Faxed these  Results to Dr. Ron Ground office and also spoke with Stephenie Snell at office of these abnormal labs.

## 2020-08-24 NOTE — PROGRESS NOTES
[facility] PRE-ADMISSION TESTING INSTRUCTIONS    The Preadmission Testing patient is instructed accordingly using the following criteria (check applicable):    ARRIVAL INSTRUCTIONS:  [x] Parking the day of Surgery is located in the Main Entrance lot. Upon entering the door, make an immediate right to the surgery reception desk    [] [phone] is available Monday through Friday 6 am to 6 pm    [] Bring photo ID and insurance card    [x] Bring in a copy of Living will or Durable Power of  papers. [] Please be sure to arrange for responsible adult to provide transportation to and from the hospital    [] Please arrange for responsible adult to be with you for the 24 hour period post procedure due to having anesthesia      GENERAL INSTRUCTIONS:    [x] Nothing by mouth after midnight, including gum, candy, mints or water    [x] You may brush your teeth, but do not swallow any water    [x] Take medications as instructed with 1-2 oz of water    [x] Stop herbal supplements and vitamins 5 days prior to procedure    [] Follow preop dosing of blood thinners per physician instructions    [] Take 1/2 dose of evening insulin, but no insulin after midnight    [] No oral diabetic medications after midnight    [] If diabetic and have low blood sugar or feel symptomatic, take 1-2oz apple juice only    [] Bring inhalers day of surgery    [] Bring C-PAP/ Bi-Pap day of surgery    [] Bring urine specimen day of surgery    [x]  no lotion, powders or creams     [] Follow bowel prep as instructed per surgeon    [] No tobacco products within 24 hours of surgery     [] No alcohol or illegal drug use within 24 hours of surgery.     [x] Jewelry, body piercing's, eyeglasses, contact lenses and dentures are not permitted into surgery (bring cases)      [x] Please do not wear any nail polish, make up or hair products on the day of surgery    [] If not already done, you can expect a call from registration    [x] You can expect a call the business day prior to procedure to notify you if your arrival time changes    [x] If you receive a survey after surgery we would greatly appreciate your comments    [] Parent/guardian of a minor must accompany their child and remain on the premises  the entire time they are under our care     [] Pediatric patients may bring favorite toy, blanket or comfort item with them    [x] A caregiver or family member must remain with the patient during their stay if they are mentally handicapped, have dementia, disoriented or unable to use a call light or would be a safety concern if left unattended    [x] Please notify surgeon if you develop any illness between now and time of surgery (cold, cough, sore throat, fever, nausea, vomiting) or any signs of infections  including skin, wounds, and dental.    []  The Outpatient Pharmacy is available to fill your prescription here on your day of surgery, ask your preop nurse for details    [] Other instructions  EDUCATIONAL MATERIALS PROVIDED:    [x] PAT Preoperative Education Packet/Booklet     [x] Medication List    [] Fluoroscopy Information Pamphlet    [] Transfusion bracelet applied with instructions    [] Joint replacement video reviewed    [x] Shower with soap, lather and rinse well, and use CHG wipes provided the evening before surgery as instructed

## 2020-08-24 NOTE — PROGRESS NOTES
Patient hs a hx of CAD, follows with DR. Jansen, has cardiac clearance on chart. Patient and daughter denies any cardiac issues since saw Dr. Arcenio Jon.

## 2020-08-24 NOTE — ANESTHESIA PRE PROCEDURE
Department of Anesthesiology  Preprocedure Note       Name:  Thelma Light   Age:  77 y.o.  :  1945                                          MRN:  [id number]         Date:  2020      Surgeon: Jessica Nuno):  Lisa Gomez MD    Procedure: Procedure(s):  RIGHT KNEE TOTAL ARTHROPLASTY    ++RAYMOND++   ++PNB++     ++LATEX ALLERGY++    Medications prior to admission:   Prior to Admission medications    Medication Sig Start Date End Date Taking?  Authorizing Provider   furosemide (LASIX) 20 MG tablet Take 0.5 tablets by mouth every other day 20   Mattie Nicholson, DO   spironolactone (ALDACTONE) 25 MG tablet Take 25 mg by mouth daily    Historical Provider, MD   cloNIDine (CATAPRES) 0.1 MG tablet TAKE 1 TABLET BY MOUTH TWICE DAILY 20   [facility]   hydrALAZINE (APRESOLINE) 25 MG tablet TAKE 1 TABLET BY MOUTH FOUR TIMES DAILY  Patient taking differently: 50 mg 2 times daily  20   Mattie Nicholson, DO   citalopram (CELEXA) 20 MG tablet TAKE 1 TABLET BY MOUTH DAILY 20   [facility] Aleksandr, DO   nitroGLYCERIN (NITRODUR) 0.2 MG/HR APPLY 1 PATCH EXTERNALLY TO THE SKIN DAILY 20   [facility]   labetalol (NORMODYNE) 100 MG tablet Take 1 tablet by mouth 2 times daily 3/10/20   [facility] Aleksandr, DO   levothyroxine (SYNTHROID) 88 MCG tablet TK 1 T PO QD 3/10/20   [facility], DO   vitamin D (ERGOCALCIFEROL) 1.25 MG (46180 UT) CAPS capsule TK 1 C PO WEEKLY 19   [facility], DO       Current medications:    Current Outpatient Medications   Medication Sig Dispense Refill    furosemide (LASIX) 20 MG tablet Take 0.5 tablets by mouth every other day 45 tablet 3    spironolactone (ALDACTONE) 25 MG tablet Take 25 mg by mouth daily      cloNIDine (CATAPRES) 0.1 MG tablet TAKE 1 TABLET BY MOUTH TWICE DAILY 180 tablet 3    hydrALAZINE (APRESOLINE) 25 MG tablet TAKE 1 TABLET BY MOUTH FOUR TIMES DAILY (Patient taking differently: 50 mg 2 times daily ) 360 tablet 3    citalopram (CELEXA) 20 MG tablet TAKE 1 TABLET BY MOUTH DAILY 90 tablet 3    nitroGLYCERIN (NITRODUR) 0.2 MG/HR APPLY 1 PATCH EXTERNALLY TO THE SKIN DAILY 90 patch 3    labetalol (NORMODYNE) 100 MG tablet Take 1 tablet by mouth 2 times daily 180 tablet 3    levothyroxine (SYNTHROID) 88 MCG tablet TK 1 T PO QD 90 tablet 3    vitamin D (ERGOCALCIFEROL) 1.25 MG (60614 UT) CAPS capsule TK 1 C PO WEEKLY 12 capsule 3     No current facility-administered medications for this encounter. Allergies: Allergies   Allergen Reactions    Latex      Possible allergy pending    Norvasc [Amlodipine Besylate] Anaphylaxis    Dye  [Iodides]        Problem List:    Patient Active Problem List   Diagnosis Code    [facility] J96.00    Essential hypertension I10    Type 2 diabetes mellitus without complication, without long-term current use of insulin ([facility]) E11.9    Intracranial hemorrhage ([facility]) I62.9    MANAN (acute kidney injury) ([facility] N17.9    Malignant hypertension I10    CVA (cerebrovascular accident due to intracerebral hemorrhage) ([facility]) I61.9    CAD (coronary artery disease), native coronary artery I25.10    Angioedema T78. 3XXA    Hemorrhagic stroke ([facility]) I61.9    Contrast dye induced nephropathy N14.1, T50.8X5A    Mixed hyperlipidemia E78.2    CKD (chronic kidney disease) stage 3, GFR 30-59 ml/min ([facility]) N18.3    Hypothyroidism E03.9    Anxiety F41.9    Benign essential microscopic hematuria R31.1    Chronic pain of right knee M25.561, G89.29    Essential hypertension, benign I10    Wheezing R06.2    Primary osteoarthritis of right knee M17.11    Hyperkalemia E87.5       Past Medical History:        Diagnosis Date    Closed left arm fracture     Diabetes mellitus ([facility]     Hemorrhoids     Internal    Hypertension     Ovarian cyst rupture     Respiratory failure ([facility])     history of    Thyroid disease     Unspecified cerebral artery occlusion with cerebral infarction 2014    right side flacid, hemorrhagic stroke       Past Surgical History:        Procedure Laterality Date    CARDIAC SURGERY N/A heart stents    10 years ago     SECTION      COLONOSCOPY      CORONARY ANGIOPLASTY WITH STENT PLACEMENT      CYST REMOVAL      ENDOSCOPY, COLON, DIAGNOSTIC      GASTROSTOMY TUBE PLACEMENT  14    [place]    JOINT REPLACEMENT Left     KNEE SURGERY      POLYPECTOMY  2013    internal hemorrhoids - michelle    TONSILLECTOMY      TRACHEOSTOMY  14    [place]    UPPER GASTROINTESTINAL ENDOSCOPY      gastritis w. superficial antral ulerations - [facility]       Social History:    Social History     Tobacco Use    Smoking status: Former Smoker     Packs/day: 1.50     Years: 8.00     Pack years: 12.00     Last attempt to quit: 1976     Years since quittin.6    Smokeless tobacco: Never Used   Substance Use Topics    Alcohol use: No                                Counseling given: Not Answered      Vital Signs (Current): There were no vitals filed for this visit.                                            BP Readings from Last 3 Encounters:   20 114/62   20 136/72   03/10/20 (!) 104/58       NPO Status:  greater than 8 hours                                                                               BMI:   Wt Readings from Last 3 Encounters:   20 154 lb (69.9 kg)   20 152 lb (68.9 kg)   06/10/20 164 lb (74.4 kg)     There is no height or weight on file to calculate BMI.    CBC:   Lab Results   Component Value Date    WBC 4.4 2020    RBC 4.44 2020    HGB 13.9 2020    HCT 41.7 2020    MCV 93.9 2020    RDW 13.2 2020     2020       CMP:   Lab Results   Component Value Date     2020    K 4.7 2020     2020    CO2 22 2020    BUN 52 2020    CREATININE 1.7 2020    GFRAA 36 2020    LABGLOM 29 2020    GLUCOSE 87 08/03/2020    PROT 7.6 07/14/2020    CALCIUM 10.0 08/03/2020    BILITOT 0.6 07/14/2020    ALKPHOS 51 07/14/2020    AST 22 07/14/2020    ALT 14 07/14/2020       POC Tests: No results for input(s): POCGLU, POCNA, POCK, POCCL, POCBUN, POCHEMO, POCHCT in the last 72 hours. Coags:   Lab Results   Component Value Date    PROTIME 13.6 11/06/2014    INR 1.2 11/06/2014    APTT 70.2 11/06/2014       HCG (If Applicable): No results found for: PREGTESTUR, PREGSERUM, HCG, HCGQUANT     ABGs: No results found for: PHART, PO2ART, RXQ6KGG, SKO2ISK, BEART, J3PKDQNX     Type & Screen (If Applicable):  No results found for: LABABO, LABRH    Drug/Infectious Status (If Applicable):  No results found for: HIV, HEPCAB    COVID-19 Screening (If Applicable): No results found for: COVID19      Anesthesia Evaluation  Patient summary reviewed and Nursing notes reviewed no history of anesthetic complications:   Airway: Mallampati: III  TM distance: >3 FB   Neck ROM: full  Comment: Tracheal stenosis  Mouth opening: > = 3 FB Dental:    (+) caps and implants      Pulmonary:Negative Pulmonary ROS breath sounds clear to auscultation      (-) not a current smoker                           Cardiovascular:  Exercise tolerance: poor (<4 METS),   (+) hypertension:, CAD:, CABG/stent:,         Rhythm: regular  Rate: normal    Stress test reviewed       Beta Blocker:  Not on Beta Blocker      ROS comment:      FINDINGS:   Perfusion images demonstrate no reversible perfusion defect. Wall motion is within normal limits. The end diastolic volume is 37 ml. The end systolic volume is 18 ml. The estimated ejection fraction is 51 %.         Impression   1. No reversible perfusion defect   2. Ejection fraction is 51 %.    3. No significant wall motion abnormality                  Neuro/Psych:   (+) CVA: residual symptoms, depression/anxiety              ROS comment: R sided motor diminished  Aphasia  Memory loss GI/Hepatic/Renal:             Endo/Other: (+) DiabetesType II DM, , hypothyroidism: arthritis:., .                 Abdominal:           Vascular: negative vascular ROS. Anesthesia Plan      general and spinal     ASA 3     (Agrees to PNB & spinal with Ga as backup. See note from [facility] tracheal stenosis)  Induction: intravenous. Anesthetic plan and risks discussed with patient. Jaylene Clancy MD   8/24/2020      PNB Consent  Benefits, alternatives and risks of PNBs were discussed with patient. Risks include but are not limited to; bleeding, LAST, infection and possible nerve trauma. All questions answered. PNB was recommended and encouraged as part of multi-modal pain therapy. After consideration;    patient agrees to:   right        Adductor Canal Block. for management of post-op pain. Jaylene Clancy MD  Staff Anesthesiologist  August 24, 2020  7:46 AM     DOS STAFF ADDENDUM:    Patient seen and chart reviewed. No interval change in history or exam.   Anesthesia plan discussed, risk/benefits addressed. Patient's concerns and questions answered.      [address],   August 28, 2020  6:31 AM

## 2020-08-25 LAB — MRSA CULTURE ONLY: NORMAL

## 2020-08-26 LAB
SARS-COV-2: NOT DETECTED
SOURCE: NORMAL

## 2020-08-28 ENCOUNTER — HOSPITAL ENCOUNTER (OUTPATIENT)
Age: 75
Setting detail: OBSERVATION
Discharge: HOME | End: 2020-08-29
Attending: ORTHOPAEDIC SURGERY | Admitting: ORTHOPAEDIC SURGERY
Payer: MEDICARE

## 2020-08-28 ENCOUNTER — ANESTHESIA (OUTPATIENT)
Dept: OPERATING ROOM | Age: 75
End: 2020-08-28
Payer: MEDICARE

## 2020-08-28 VITALS — OXYGEN SATURATION: 98 % | SYSTOLIC BLOOD PRESSURE: 123 MMHG | TEMPERATURE: 98.2 F | DIASTOLIC BLOOD PRESSURE: 58 MMHG

## 2020-08-28 LAB
METER GLUCOSE: 161 MG/DL (ref 74–99)
METER GLUCOSE: 191 MG/DL (ref 74–99)
METER GLUCOSE: 198 MG/DL (ref 74–99)

## 2020-08-28 PROCEDURE — 6360000002 HC RX W HCPCS

## 2020-08-28 PROCEDURE — 6360000002 HC RX W HCPCS: Performed by: NURSE ANESTHETIST, CERTIFIED REGISTERED

## 2020-08-28 PROCEDURE — C1713 ANCHOR/SCREW BN/BN,TIS/BN: HCPCS | Performed by: ORTHOPAEDIC SURGERY

## 2020-08-28 PROCEDURE — 7100000001 HC PACU RECOVERY - ADDTL 15 MIN: Performed by: ORTHOPAEDIC SURGERY

## 2020-08-28 PROCEDURE — 97535 SELF CARE MNGMENT TRAINING: CPT

## 2020-08-28 PROCEDURE — 97165 OT EVAL LOW COMPLEX 30 MIN: CPT

## 2020-08-28 PROCEDURE — 88311 DECALCIFY TISSUE: CPT

## 2020-08-28 PROCEDURE — 6370000000 HC RX 637 (ALT 250 FOR IP)

## 2020-08-28 PROCEDURE — 6360000002 HC RX W HCPCS: Performed by: PHYSICIAN ASSISTANT

## 2020-08-28 PROCEDURE — C1776 JOINT DEVICE (IMPLANTABLE): HCPCS | Performed by: ORTHOPAEDIC SURGERY

## 2020-08-28 PROCEDURE — 2500000003 HC RX 250 WO HCPCS: Performed by: NURSE ANESTHETIST, CERTIFIED REGISTERED

## 2020-08-28 PROCEDURE — 6370000000 HC RX 637 (ALT 250 FOR IP): Performed by: ORTHOPAEDIC SURGERY

## 2020-08-28 PROCEDURE — 6370000000 HC RX 637 (ALT 250 FOR IP): Performed by: PHYSICIAN ASSISTANT

## 2020-08-28 PROCEDURE — G0378 HOSPITAL OBSERVATION PER HR: HCPCS

## 2020-08-28 PROCEDURE — 3600000005 HC SURGERY LEVEL 5 BASE: Performed by: ORTHOPAEDIC SURGERY

## 2020-08-28 PROCEDURE — 99214 OFFICE O/P EST MOD 30 MIN: CPT | Performed by: INTERNAL MEDICINE

## 2020-08-28 PROCEDURE — 6360000002 HC RX W HCPCS: Performed by: ANESTHESIOLOGY

## 2020-08-28 PROCEDURE — 3700000000 HC ANESTHESIA ATTENDED CARE: Performed by: ORTHOPAEDIC SURGERY

## 2020-08-28 PROCEDURE — 97161 PT EVAL LOW COMPLEX 20 MIN: CPT

## 2020-08-28 PROCEDURE — 6370000000 HC RX 637 (ALT 250 FOR IP): Performed by: INTERNAL MEDICINE

## 2020-08-28 PROCEDURE — 3600000015 HC SURGERY LEVEL 5 ADDTL 15MIN: Performed by: ORTHOPAEDIC SURGERY

## 2020-08-28 PROCEDURE — 88305 TISSUE EXAM BY PATHOLOGIST: CPT

## 2020-08-28 PROCEDURE — 3700000001 HC ADD 15 MINUTES (ANESTHESIA): Performed by: ORTHOPAEDIC SURGERY

## 2020-08-28 PROCEDURE — 6360000002 HC RX W HCPCS: Performed by: ORTHOPAEDIC SURGERY

## 2020-08-28 PROCEDURE — 2709999900 HC NON-CHARGEABLE SUPPLY: Performed by: ORTHOPAEDIC SURGERY

## 2020-08-28 PROCEDURE — 97530 THERAPEUTIC ACTIVITIES: CPT

## 2020-08-28 PROCEDURE — 7100000000 HC PACU RECOVERY - FIRST 15 MIN: Performed by: ORTHOPAEDIC SURGERY

## 2020-08-28 PROCEDURE — 82962 GLUCOSE BLOOD TEST: CPT

## 2020-08-28 PROCEDURE — 2580000003 HC RX 258: Performed by: PHYSICIAN ASSISTANT

## 2020-08-28 PROCEDURE — 64447 NJX AA&/STRD FEMORAL NRV IMG: CPT | Performed by: ANESTHESIOLOGY

## 2020-08-28 PROCEDURE — 2580000003 HC RX 258: Performed by: ORTHOPAEDIC SURGERY

## 2020-08-28 PROCEDURE — 2580000003 HC RX 258: Performed by: NURSE ANESTHETIST, CERTIFIED REGISTERED

## 2020-08-28 PROCEDURE — 2500000003 HC RX 250 WO HCPCS: Performed by: ORTHOPAEDIC SURGERY

## 2020-08-28 PROCEDURE — 2720000010 HC SURG SUPPLY STERILE: Performed by: ORTHOPAEDIC SURGERY

## 2020-08-28 DEVICE — COMPONENT PAT DIA29MM THK9MM SUP INFERIOR KNEE CONVENTIONAL: Type: IMPLANTABLE DEVICE | Site: KNEE | Status: FUNCTIONAL

## 2020-08-28 DEVICE — CEMENT BNE 40 GM RADIOPAQUE BA SIMPLEX P: Type: IMPLANTABLE DEVICE | Site: KNEE | Status: FUNCTIONAL

## 2020-08-28 DEVICE — COMPONENT FEM SZ 2 R KNEE POST STBL CEM TRIATHLON: Type: IMPLANTABLE DEVICE | Site: KNEE | Status: FUNCTIONAL

## 2020-08-28 DEVICE — INSERT TIB SZ 3 11MM KNEE POLY POST STBL CONVENTIONAL: Type: IMPLANTABLE DEVICE | Site: KNEE | Status: FUNCTIONAL

## 2020-08-28 DEVICE — BASEPLATE TIB SZ 3 KNEE TRITANIUM CEM PRI LO PROF TRIATHLON: Type: IMPLANTABLE DEVICE | Site: KNEE | Status: FUNCTIONAL

## 2020-08-28 RX ORDER — MIDAZOLAM HYDROCHLORIDE 1 MG/ML
0.5 INJECTION INTRAMUSCULAR; INTRAVENOUS PRN
Status: DISCONTINUED | OUTPATIENT
Start: 2020-08-28 | End: 2020-08-28 | Stop reason: HOSPADM

## 2020-08-28 RX ORDER — NITROGLYCERIN 40 MG/1
1 PATCH TRANSDERMAL DAILY
Status: DISCONTINUED | OUTPATIENT
Start: 2020-08-28 | End: 2020-08-29 | Stop reason: HOSPADM

## 2020-08-28 RX ORDER — DEXAMETHASONE SODIUM PHOSPHATE 10 MG/ML
8 INJECTION, SOLUTION INTRAMUSCULAR; INTRAVENOUS ONCE
Status: DISCONTINUED | OUTPATIENT
Start: 2020-08-28 | End: 2020-08-28 | Stop reason: HOSPADM

## 2020-08-28 RX ORDER — CHLORAL HYDRATE 500 MG
1000 CAPSULE ORAL DAILY
Status: DISCONTINUED | OUTPATIENT
Start: 2020-08-28 | End: 2020-08-28 | Stop reason: DRUGHIGH

## 2020-08-28 RX ORDER — ROPIVACAINE HYDROCHLORIDE 5 MG/ML
INJECTION, SOLUTION EPIDURAL; INFILTRATION; PERINEURAL
Status: COMPLETED | OUTPATIENT
Start: 2020-08-28 | End: 2020-08-28

## 2020-08-28 RX ORDER — SODIUM CHLORIDE 9 MG/ML
INJECTION, SOLUTION INTRAVENOUS CONTINUOUS PRN
Status: DISCONTINUED | OUTPATIENT
Start: 2020-08-28 | End: 2020-08-28 | Stop reason: SDUPTHER

## 2020-08-28 RX ORDER — SPIRONOLACTONE 25 MG/1
25 TABLET ORAL DAILY
Status: DISCONTINUED | OUTPATIENT
Start: 2020-08-28 | End: 2020-08-29 | Stop reason: HOSPADM

## 2020-08-28 RX ORDER — DEXAMETHASONE SODIUM PHOSPHATE 4 MG/ML
INJECTION, SOLUTION INTRA-ARTICULAR; INTRALESIONAL; INTRAMUSCULAR; INTRAVENOUS; SOFT TISSUE PRN
Status: DISCONTINUED | OUTPATIENT
Start: 2020-08-28 | End: 2020-08-28 | Stop reason: SDUPTHER

## 2020-08-28 RX ORDER — ACETAMINOPHEN 500 MG
1000 TABLET ORAL EVERY 8 HOURS SCHEDULED
Status: DISCONTINUED | OUTPATIENT
Start: 2020-08-28 | End: 2020-08-29 | Stop reason: HOSPADM

## 2020-08-28 RX ORDER — PROMETHAZINE HYDROCHLORIDE 25 MG/1
12.5 TABLET ORAL EVERY 6 HOURS PRN
Status: DISCONTINUED | OUTPATIENT
Start: 2020-08-28 | End: 2020-08-29 | Stop reason: HOSPADM

## 2020-08-28 RX ORDER — FENTANYL CITRATE 50 UG/ML
INJECTION, SOLUTION INTRAMUSCULAR; INTRAVENOUS
Status: COMPLETED
Start: 2020-08-28 | End: 2020-08-28

## 2020-08-28 RX ORDER — SODIUM CHLORIDE 9 MG/ML
INJECTION, SOLUTION INTRAVENOUS CONTINUOUS
Status: DISCONTINUED | OUTPATIENT
Start: 2020-08-28 | End: 2020-08-28

## 2020-08-28 RX ORDER — SODIUM CHLORIDE 0.9 % (FLUSH) 0.9 %
10 SYRINGE (ML) INJECTION EVERY 12 HOURS SCHEDULED
Status: DISCONTINUED | OUTPATIENT
Start: 2020-08-28 | End: 2020-08-28 | Stop reason: HOSPADM

## 2020-08-28 RX ORDER — SODIUM CHLORIDE 0.9 % (FLUSH) 0.9 %
10 SYRINGE (ML) INJECTION EVERY 12 HOURS SCHEDULED
Status: DISCONTINUED | OUTPATIENT
Start: 2020-08-28 | End: 2020-08-29 | Stop reason: HOSPADM

## 2020-08-28 RX ORDER — TRAMADOL HYDROCHLORIDE 50 MG/1
100 TABLET ORAL EVERY 6 HOURS PRN
Status: DISCONTINUED | OUTPATIENT
Start: 2020-08-28 | End: 2020-08-29 | Stop reason: HOSPADM

## 2020-08-28 RX ORDER — LABETALOL 100 MG/1
100 TABLET, FILM COATED ORAL 2 TIMES DAILY
Status: DISCONTINUED | OUTPATIENT
Start: 2020-08-28 | End: 2020-08-29 | Stop reason: HOSPADM

## 2020-08-28 RX ORDER — NICOTINE POLACRILEX 4 MG
15 LOZENGE BUCCAL PRN
Status: DISCONTINUED | OUTPATIENT
Start: 2020-08-28 | End: 2020-08-29 | Stop reason: HOSPADM

## 2020-08-28 RX ORDER — CLONIDINE HYDROCHLORIDE 0.1 MG/1
0.1 TABLET ORAL 2 TIMES DAILY
Status: DISCONTINUED | OUTPATIENT
Start: 2020-08-28 | End: 2020-08-29 | Stop reason: HOSPADM

## 2020-08-28 RX ORDER — ONDANSETRON 2 MG/ML
INJECTION INTRAMUSCULAR; INTRAVENOUS PRN
Status: DISCONTINUED | OUTPATIENT
Start: 2020-08-28 | End: 2020-08-28 | Stop reason: SDUPTHER

## 2020-08-28 RX ORDER — LEVOTHYROXINE SODIUM 0.05 MG/1
50 TABLET ORAL DAILY
Status: DISCONTINUED | OUTPATIENT
Start: 2020-08-28 | End: 2020-08-29 | Stop reason: HOSPADM

## 2020-08-28 RX ORDER — SENNA AND DOCUSATE SODIUM 50; 8.6 MG/1; MG/1
1 TABLET, FILM COATED ORAL 2 TIMES DAILY
Status: DISCONTINUED | OUTPATIENT
Start: 2020-08-28 | End: 2020-08-29 | Stop reason: HOSPADM

## 2020-08-28 RX ORDER — DEXAMETHASONE SODIUM PHOSPHATE 10 MG/ML
10 INJECTION INTRAMUSCULAR; INTRAVENOUS ONCE
Status: COMPLETED | OUTPATIENT
Start: 2020-08-29 | End: 2020-08-29

## 2020-08-28 RX ORDER — ACETAMINOPHEN 500 MG
1000 TABLET ORAL ONCE
Status: COMPLETED | OUTPATIENT
Start: 2020-08-28 | End: 2020-08-28

## 2020-08-28 RX ORDER — MIDAZOLAM HYDROCHLORIDE 1 MG/ML
INJECTION INTRAMUSCULAR; INTRAVENOUS
Status: COMPLETED
Start: 2020-08-28 | End: 2020-08-28

## 2020-08-28 RX ORDER — TRAMADOL HYDROCHLORIDE 50 MG/1
50 TABLET ORAL EVERY 6 HOURS PRN
Status: DISCONTINUED | OUTPATIENT
Start: 2020-08-28 | End: 2020-08-29 | Stop reason: HOSPADM

## 2020-08-28 RX ORDER — GABAPENTIN 300 MG/1
300 CAPSULE ORAL ONCE
Status: COMPLETED | OUTPATIENT
Start: 2020-08-28 | End: 2020-08-28

## 2020-08-28 RX ORDER — PROPOFOL 10 MG/ML
INJECTION, EMULSION INTRAVENOUS CONTINUOUS PRN
Status: DISCONTINUED | OUTPATIENT
Start: 2020-08-28 | End: 2020-08-28 | Stop reason: SDUPTHER

## 2020-08-28 RX ORDER — CITALOPRAM 20 MG/1
20 TABLET ORAL DAILY
Status: DISCONTINUED | OUTPATIENT
Start: 2020-08-28 | End: 2020-08-29 | Stop reason: HOSPADM

## 2020-08-28 RX ORDER — BUPIVACAINE HYDROCHLORIDE 7.5 MG/ML
INJECTION, SOLUTION INTRASPINAL PRN
Status: DISCONTINUED | OUTPATIENT
Start: 2020-08-28 | End: 2020-08-28 | Stop reason: SDUPTHER

## 2020-08-28 RX ORDER — FENTANYL CITRATE 50 UG/ML
25 INJECTION, SOLUTION INTRAMUSCULAR; INTRAVENOUS PRN
Status: DISCONTINUED | OUTPATIENT
Start: 2020-08-28 | End: 2020-08-28 | Stop reason: HOSPADM

## 2020-08-28 RX ORDER — DEXTROSE MONOHYDRATE 25 G/50ML
12.5 INJECTION, SOLUTION INTRAVENOUS PRN
Status: DISCONTINUED | OUTPATIENT
Start: 2020-08-28 | End: 2020-08-29 | Stop reason: HOSPADM

## 2020-08-28 RX ORDER — SODIUM CHLORIDE 9 MG/ML
INJECTION, SOLUTION INTRAVENOUS CONTINUOUS
Status: DISCONTINUED | OUTPATIENT
Start: 2020-08-28 | End: 2020-08-29 | Stop reason: HOSPADM

## 2020-08-28 RX ORDER — GABAPENTIN 300 MG/1
CAPSULE ORAL
Status: DISCONTINUED
Start: 2020-08-28 | End: 2020-08-28

## 2020-08-28 RX ORDER — LIDOCAINE HYDROCHLORIDE 20 MG/ML
INJECTION, SOLUTION EPIDURAL; INFILTRATION; INTRACAUDAL; PERINEURAL PRN
Status: DISCONTINUED | OUTPATIENT
Start: 2020-08-28 | End: 2020-08-28 | Stop reason: SDUPTHER

## 2020-08-28 RX ORDER — ACETAMINOPHEN 500 MG
TABLET ORAL
Status: COMPLETED
Start: 2020-08-28 | End: 2020-08-28

## 2020-08-28 RX ORDER — ONDANSETRON 2 MG/ML
4 INJECTION INTRAMUSCULAR; INTRAVENOUS EVERY 6 HOURS PRN
Status: DISCONTINUED | OUTPATIENT
Start: 2020-08-28 | End: 2020-08-29 | Stop reason: HOSPADM

## 2020-08-28 RX ORDER — SODIUM CHLORIDE 0.9 % (FLUSH) 0.9 %
10 SYRINGE (ML) INJECTION PRN
Status: DISCONTINUED | OUTPATIENT
Start: 2020-08-28 | End: 2020-08-29 | Stop reason: HOSPADM

## 2020-08-28 RX ORDER — DEXTROSE MONOHYDRATE 50 MG/ML
100 INJECTION, SOLUTION INTRAVENOUS PRN
Status: DISCONTINUED | OUTPATIENT
Start: 2020-08-28 | End: 2020-08-29 | Stop reason: HOSPADM

## 2020-08-28 RX ORDER — LIDOCAINE HYDROCHLORIDE 10 MG/ML
10 INJECTION, SOLUTION INFILTRATION; PERINEURAL
Status: DISCONTINUED | OUTPATIENT
Start: 2020-08-28 | End: 2020-08-28 | Stop reason: HOSPADM

## 2020-08-28 RX ORDER — SODIUM CHLORIDE 0.9 % (FLUSH) 0.9 %
10 SYRINGE (ML) INJECTION PRN
Status: DISCONTINUED | OUTPATIENT
Start: 2020-08-28 | End: 2020-08-28 | Stop reason: HOSPADM

## 2020-08-28 RX ORDER — ROPIVACAINE HYDROCHLORIDE 5 MG/ML
INJECTION, SOLUTION EPIDURAL; INFILTRATION; PERINEURAL
Status: COMPLETED
Start: 2020-08-28 | End: 2020-08-28

## 2020-08-28 RX ORDER — ROPIVACAINE HYDROCHLORIDE 5 MG/ML
30 INJECTION, SOLUTION EPIDURAL; INFILTRATION; PERINEURAL ONCE
Status: COMPLETED | OUTPATIENT
Start: 2020-08-28 | End: 2020-08-28

## 2020-08-28 RX ORDER — FUROSEMIDE 20 MG/1
10 TABLET ORAL EVERY OTHER DAY
Status: DISCONTINUED | OUTPATIENT
Start: 2020-08-29 | End: 2020-08-29 | Stop reason: HOSPADM

## 2020-08-28 RX ORDER — HYDRALAZINE HYDROCHLORIDE 50 MG/1
50 TABLET, FILM COATED ORAL 2 TIMES DAILY
Status: DISCONTINUED | OUTPATIENT
Start: 2020-08-28 | End: 2020-08-29 | Stop reason: HOSPADM

## 2020-08-28 RX ADMIN — ROPIVACAINE HYDROCHLORIDE 30 ML: 5 INJECTION, SOLUTION EPIDURAL; INFILTRATION; PERINEURAL at 07:00

## 2020-08-28 RX ADMIN — SODIUM CHLORIDE: 9 INJECTION, SOLUTION INTRAVENOUS at 08:14

## 2020-08-28 RX ADMIN — LEVOTHYROXINE SODIUM 50 MCG: 50 TABLET ORAL at 15:41

## 2020-08-28 RX ADMIN — SODIUM CHLORIDE, PRESERVATIVE FREE 10 ML: 5 INJECTION INTRAVENOUS at 11:10

## 2020-08-28 RX ADMIN — SODIUM CHLORIDE: 9 INJECTION, SOLUTION INTRAVENOUS at 06:15

## 2020-08-28 RX ADMIN — Medication 2 G: at 15:41

## 2020-08-28 RX ADMIN — DOCUSATE SODIUM 50 MG AND SENNOSIDES 8.6 MG 1 TABLET: 8.6; 5 TABLET, FILM COATED ORAL at 11:09

## 2020-08-28 RX ADMIN — FENTANYL CITRATE 50 MCG: 50 INJECTION, SOLUTION INTRAMUSCULAR; INTRAVENOUS at 06:35

## 2020-08-28 RX ADMIN — PROPOFOL 50 MCG/KG/MIN: 10 INJECTION, EMULSION INTRAVENOUS at 07:23

## 2020-08-28 RX ADMIN — Medication 1000 MG: at 06:17

## 2020-08-28 RX ADMIN — LIDOCAINE HYDROCHLORIDE 100 MG: 20 INJECTION, SOLUTION EPIDURAL; INFILTRATION; INTRACAUDAL; PERINEURAL at 07:23

## 2020-08-28 RX ADMIN — MIDAZOLAM HYDROCHLORIDE 0.5 MG: 1 INJECTION INTRAMUSCULAR; INTRAVENOUS at 06:35

## 2020-08-28 RX ADMIN — DEXAMETHASONE SODIUM PHOSPHATE 10 MG: 4 INJECTION, SOLUTION INTRAMUSCULAR; INTRAVENOUS at 07:21

## 2020-08-28 RX ADMIN — MIDAZOLAM HYDROCHLORIDE 0.5 MG: 1 INJECTION, SOLUTION INTRAMUSCULAR; INTRAVENOUS at 06:35

## 2020-08-28 RX ADMIN — SPIRONOLACTONE 25 MG: 25 TABLET ORAL at 15:41

## 2020-08-28 RX ADMIN — ACETAMINOPHEN 1000 MG: 500 TABLET ORAL at 06:17

## 2020-08-28 RX ADMIN — ACETAMINOPHEN 1000 MG: 500 TABLET ORAL at 14:23

## 2020-08-28 RX ADMIN — GABAPENTIN 300 MG: 300 CAPSULE ORAL at 06:18

## 2020-08-28 RX ADMIN — BUPIVACAINE HYDROCHLORIDE IN DEXTROSE 1.6 ML: 7.5 INJECTION, SOLUTION SUBARACHNOID at 07:08

## 2020-08-28 RX ADMIN — ROPIVACAINE HYDROCHLORIDE 150 MG: 5 INJECTION, SOLUTION EPIDURAL; INFILTRATION; PERINEURAL at 06:42

## 2020-08-28 RX ADMIN — ONDANSETRON 4 MG: 2 INJECTION INTRAMUSCULAR; INTRAVENOUS at 07:22

## 2020-08-28 RX ADMIN — INSULIN LISPRO 1 UNITS: 100 INJECTION, SOLUTION INTRAVENOUS; SUBCUTANEOUS at 21:24

## 2020-08-28 RX ADMIN — Medication 2 G: at 07:00

## 2020-08-28 RX ADMIN — DOCUSATE SODIUM 50 MG AND SENNOSIDES 8.6 MG 1 TABLET: 8.6; 5 TABLET, FILM COATED ORAL at 21:24

## 2020-08-28 RX ADMIN — SODIUM CHLORIDE: 9 INJECTION, SOLUTION INTRAVENOUS at 06:00

## 2020-08-28 RX ADMIN — SODIUM CHLORIDE: 9 INJECTION, SOLUTION INTRAVENOUS at 11:22

## 2020-08-28 RX ADMIN — SODIUM CHLORIDE: 9 INJECTION, SOLUTION INTRAVENOUS at 22:28

## 2020-08-28 RX ADMIN — SODIUM CHLORIDE, PRESERVATIVE FREE 10 ML: 5 INJECTION INTRAVENOUS at 15:45

## 2020-08-28 RX ADMIN — ASPIRIN 325 MG: 325 TABLET, DELAYED RELEASE ORAL at 11:09

## 2020-08-28 RX ADMIN — ACETAMINOPHEN 1000 MG: 500 TABLET ORAL at 22:29

## 2020-08-28 RX ADMIN — Medication 2 G: at 22:29

## 2020-08-28 ASSESSMENT — PULMONARY FUNCTION TESTS
PIF_VALUE: 0

## 2020-08-28 ASSESSMENT — PAIN SCALES - GENERAL
PAINLEVEL_OUTOF10: 0
PAINLEVEL_OUTOF10: 5
PAINLEVEL_OUTOF10: 0
PAINLEVEL_OUTOF10: 5
PAINLEVEL_OUTOF10: 0
PAINLEVEL_OUTOF10: 5
PAINLEVEL_OUTOF10: 0

## 2020-08-28 ASSESSMENT — PAIN - FUNCTIONAL ASSESSMENT: PAIN_FUNCTIONAL_ASSESSMENT: 0-10

## 2020-08-28 ASSESSMENT — PAIN SCALES - WONG BAKER: WONGBAKER_NUMERICALRESPONSE: 0

## 2020-08-28 ASSESSMENT — PAIN DESCRIPTION - DESCRIPTORS: DESCRIPTORS: DISCOMFORT

## 2020-08-28 NOTE — PROGRESS NOTES
Occupational Therapy  OCCUPATIONAL THERAPY INITIAL EVALUATION      Date:2020  Patient Name: Alberto Cuadra  MRN: [id number]  : 1945  Room: [address] [place] [address]    Referring Provider: Venita Elizabeth MD     Evaluating OT: Berta Tello OTR/L [id number]    AM-PAC Daily Activity Raw Score:     Recommended Adaptive Equipment: TBD    Diagnosis: R knee OA   Surgery: 20 R TKA   Pertinent Medical History: h/o CVA with R sided deficits   Precautions:  Falls, FWBAT R LE     Home Living: Pt lives with friend, daughter will be staying initially at home, in a 2 story home with 1st floor B/B and ramp on entry. Bathroom setup: walk in shower with grab bar and seat, handicapped height commode with grab bar     Prior Level of Function: Independent with ADLs, friend assists PRN with IADLs; completed functional mobility with no AD. Has [employer].     Pain Level: no reported pain    Cognition: A&O: 4/4. Pt is alert and oriented. Slow responses. Minimally conversive. Problem solving:  fair   Judgement/safety:  fair     Functional Assessment:   Initial Eval Status  Date: 20 Treatment session:  Short Term Goals  Treatment frequency: 2-5x/wk PRN x1-3 wks     Feeding Independent     Grooming SBA  Standing sink level for hand hygiene  Independent   UB Dressing Set up  Mod I   LB Dressing Mod A  Donning brief  Mod I    Bathing Mod A  Mod I   Toileting Mod A  Use of grab bar for support in transfer  Able to manage front yuan care   Assist in management of brief up/down over hips  Mod I   Bed Mobility  Supine to sit:  Max A     Functional Transfers STS: Min A  Mod I   Functional Mobility Min A with [employer]  Household distance  Slow pace  Min cues for safety  Mod I during ADLs   Balance Sitting: fair plus    Standing: fair minus at [employer]     Activity Tolerance fair  standing ale x6-7 min with fair plus balance during self care tasks           Treatment: Patient educated on techniques for completion of ADL, safe functional transfers and functional mobility. Patient required cues for follow through with proper hand/foot placement, pacing, safety, precautions and technique in bed mobility, functional transfers, functional mobility, toileting, grooming and LB dressing in preparation for maximum independence in all self care tasks. Hand Dominance: Right []  Left []   Strength ROM Additional Info:    RUE  4/5 WFL good  and fair FMC/dexterity noted during ADL tasks    Occasional ataxic movements R UE  Decreased dexterity     LUE 4/5 WFL good  and FMC/dexterity noted during ADL tasks         Hearing: WFL   Vision: WFL   Sensation:  No c/o numbness or tingling   Tone: WFL   Edema: mild numbness R LE                            Long Term Goal (1-3 wks): Pt will maximize functional performance in all self care tasks/functional transfers with good follow through of all trained techniques for safe transition to next level of care    Eval Complexity: Low    Assessment of current deficits   Functional mobility [x]  ADLs [x] Strength [x]  Cognition []  Functional transfers  [x] IADLs [x] Safety Awareness [x]  Endurance [x]  Fine Motor Coordination [] Balance [x] Vision/perception [] Sensation []   Gross Motor Coordination [] ROM [] Delirium []                  Motor Control []    Plan of Care:   ADL retraining [x]   Equipment needs [x]   Neuromuscular re-education [x] Energy Conservation Techniques [x]  Functional Transfer training [x] Patient and/or Family Education [x]  Functional Mobility training [x]  Environmental Modifications [x]  Cognitive re-training []   Compensatory techniques for ADLs [x]  Splinting Needs []   Positioning to improve overall function [x]   Therapeutic Activity [x]  Therapeutic Exercise  [x]  Visual/Perceptual: []    Delirium prevention/treatment  []   Other:  []    Rehab Potential: Good for established goals     Patient / Family Goal: To get home.        Patient and/or family were instructed on functional diagnosis, prognosis/goals and OT plan of care. Pt and daughter verbalized understanding. Upon arrival, patient supine in bed. At end of session, patient seated in armchair with call light and phone within reach, all lines and tubes intact. Pt would benefit from continued skilled OT to increase safety and independence with completion of ADL/IADL tasks for functional independence and quality of life. Daughter present.     Low Evaluation + 10 timed treatment minutes  Treatment Time In:1414   Treatment Time Out: 1424   Treatment Charges: Mins Units    ADL/Home Mgt [id number] 1    Thera Activities 56763 2     Ther Ex 31876      Manual Therapy 04997      Neuro Re-ed 19811      Orthotic manage/training  [id number]      Non Billable Time      Total Timed Treatment 10 1          Evaluation time includes thorough review of current medical information, gathering information on past medical history/social history and prior level of function, completion of standardized testing/informal observation of tasks, assessment of data, and development of POC/Goals    Soraya Robin OTR/L  [id number]

## 2020-08-28 NOTE — PROGRESS NOTES
The combination of citalopram and tramadol may cause serotonin syndrome. Please monitor patient for signs/symptoms of serotonin syndrome.   Fabio Peterson RPh 8/28/2020 3:14 PM

## 2020-08-28 NOTE — PROGRESS NOTES
Physical Therapy    Facility/Department: [facility] SURGERY  Initial Assessment    NAME: Jose Mcdonnell  : 1945  MRN: [id number]    Date of Service: 2020      Attending Provider:  Timo Rolon MD    Evaluating PT:  Emily Garcia. Sahra Morley, P.T. Room #:  [address] [place], [place] [address]  Diagnosis:  R knee OA  Pertinent PMHx/PSHx:  CVA with residual R side weakness  Procedure/Surgery:  R TKA 20  Precautions:  WBAT RLE, falls    SUBJECTIVE:    Pt lives with a friend and her daughter took FMLA to stay with mother at time of discharge. She lives in a 2 story home with a ramp to enter. Her bed and bath are on the first floor. Pt ambulated with ww PRN due to R knee and hip pain PTA. OBJECTIVE:   Initial Evaluation  Date: 20 Treatment Short Term/ Long Term   Goals   Was pt agreeable to Eval/treatment? yes     Does pt have pain? No c/o pain     Bed Mobility  Rolling: MOD A  Supine to sit: MAX A  Sit to supine: NA  Scooting: MIN A  Independent   Transfers Sit to stand: MIN A  Stand to sit: MIN A  Stand pivot: MIN A with ww  supervision   Ambulation   15+40 feet with ww MIN A  200 feet with ww SBA   Stair negotiation: ascended and descended NA  NA   AM-PAC 6 Clicks        BLE ROM is WFL, except R knee is 0-90°. LLE strength is grossly 4+/5 and R hip is 4/5, knee NA, and ankle 3-/5 DF and 4/5 PF. Sensation:  Pt c/o numbness RLE  Edema:  None noted  Balance: sitting is supervision and standing with ww is MIN A  Endurance: fair    Patient education  Pt educated on hand placement during tranfers and gait sequence with ww. Patient response to education:   Pt verbalized understanding Pt demonstrated skill Pt requires further education in this area   yes yes yes     ASSESSMENT:    Comments:  Pt has residual weakness RUE and RLE daughter states has been since her CVA. Pt walked to BR and transferred on/off commode with MIN A. She walked with ww another 40 feet before sitting in chair.   Pt walked with slow gait speed and mild drop foot on R. Treatment:  Patient practiced and was instructed in the following treatment:     Bed mobility, transfers, ADLs, and gait to improve functional strength and endurance. Pt was left sitting up in chair with call light left by patient and daughter was present. Pt's/ family goals   1. To go home. Patient and or family understand(s) diagnosis, prognosis, and plan of care. PLAN:    PT care will be provided in accordance with the objectives noted above. Exercises and functional mobility practice will be used as well as appropriate assistive devices or modalities to obtain goals. Patient and family education will also be administered as needed. Frequency of treatments: BID    Time in  14:00  Time out  14:20    Total Treatment Time  10 minutes     Evaluation Time includes thorough review of current medical information, gathering information on past medical history/social history and prior level of function, completion of standardized testing/informal observation of tasks, assessment of data and education on plan of care and goals. CPT codes:  [x] Low Complexity PT evaluation 33279  [] Moderate Complexity PT evaluation 57538  [] High Complexity PT evaluation 54362  [] PT Re-evaluation 94897  [] Gait training 54018 ** minutes  [] Manual therapy 19937 ** minutes  [x] Therapeutic activities 64923 10 minutes  [] Therapeutic exercises 74184 ** minutes  [] Neuromuscular reeducation 13820 ** minutes     Jorgito Solis., P.T.   License Number: [id number]

## 2020-08-28 NOTE — ANESTHESIA POSTPROCEDURE EVALUATION
Department of Anesthesiology  Postprocedure Note    Patient: Dakota Cárdenas  MRN: [id number]  YOB: 1945  Date of evaluation: 8/28/2020  Time:  11:30 AM     Procedure Summary     Date:  08/28/20 Room / Location:  [place] [address] / [address] [place] [address]    Anesthesia Start:  7006 Anesthesia Stop:  3396    Procedure:  RIGHT KNEE TOTAL ARTHROPLASTY    ++RAYMOND++   ++PNB++     ++LATEX ALLERGY++   ++IODINE ALLERGY++ (Right ) Diagnosis:  (OSTEOARTHRITIS)    Surgeon:  Sreekanth Barlow MD Responsible Provider:  Jerson Guerrero DO    Anesthesia Type:  general, spinal ASA Status:  3          Anesthesia Type: general, spinal    Darin Phase I: Darin Score: 10    Darin Phase II:      Last vitals: Reviewed and per EMR flowsheets.        Anesthesia Post Evaluation    Patient location during evaluation: PACU  Patient participation: complete - patient participated  Level of consciousness: awake and alert  Airway patency: patent  Nausea & Vomiting: no nausea and no vomiting  Complications: no  Cardiovascular status: hemodynamically stable  Respiratory status: acceptable  Hydration status: euvolemic

## 2020-08-28 NOTE — OP NOTE
Operative Note      Patient: Yany Penny  YOB: 1945  MRN: [id number]    Date of Procedure: 8/28/2020    Pre-Op Diagnosis: OSTEOARTHRITIS Right Knee    Post-Op Diagnosis: Same       Procedure(s):  RIGHT KNEE TOTAL ARTHROPLASTY    ++RAYMOND++   ++PNB++     ++LATEX ALLERGY++   ++IODINE ALLERGY++    Surgeon(s):  Lety Goff MD    Assistant:   Physician Assistant: SUZY Mendez    Anesthesia: Spinal    Estimated Blood Loss (mL): less than 50     Complications: None    Specimens:   ID Type Source Tests Collected by Time Destination   A : RIGHT KNEE BONE Bone Bone SURGICAL PATHOLOGY Lety Goff MD 8/28/2020 7277        Implants:  Implant Name Type Inv. Item Serial No.  Lot No. LRB No. Used Action   CEMENT BARIUM RADIOPQ FULL 40GR Cement CEMENT BARIUM RADIOPQ FULL 40GR  RAYMOND: ORTH[employer] [id number] Right 1 Implanted   CEMENT BARIUM RADIOPQ FULL 40GR Cement CEMENT BARIUM RADIOPQ FULL 40GR  RAYMOND: ORTHOPAEDICS [id number] Right 1 Implanted   IMPL KNEE TIB BASEPLT PRIME SZ 3 Knee IMPL KNEE TIB BASEPLT PRIME SZ 3  RAYMOND: ORTHOPAEDICS [id number] Right 1 Implanted   IMPL KNEE TIB INSRT TRIATHLON SZ 3 11MM Knee IMPL KNEE TIB INSRT TRIATHLON SZ 3 11MM  RAYMOND: ORTHOPAEDICS [id number] Right 1 Implanted   IMPL KNEE FEM COMP CMNTD SZ 2 Knee IMPL KNEE FEM COMP CMNTD SZ 2  RAYMOND: ORTHOPAEDICS [id number] Right 1 Implanted   IMPL KNEE PATELLA ASYM 9X29MM Knee IMPL KNEE PATELLA ASYM 9X29MM  RAYMOND: ORTHOPAEDICS [id number] Right 1 Implanted         Drains:   Gastrostomy/Enterostomy PEG-jejunostomy RLQ (Active)       Urethral Catheter Non-latex;Straight-tip (Active)       Findings: OA    Detailed Description of Procedure:                                                                                    HISTORY:  The patient is an 77y.o. -year-old female  with progressive pain in the Right knee. X-rays and evaluation revealed significant osteoarthritis, bone on bone, and significant peripheral osteophytes. ligaments, and found that 4 degrees external rotation gave us a rectangular flexion gap. We marked for that, and applied the size 2 cutting block. We made our posterior condylar cuts. We applied the spacer block. We also had a 11 mm flexion gap which matched our extension gap. We therefore made the remainder of our cuts for the size 2 Right posterior stabilized femoral component. We went to the tibia and sized the tibia to a size 3  tibia. We applied the trial femur and inserted the trial tibia with the 11 mm poly trial.  We set rotation for the tibial component and secured the tibial trial.  We then had excellent stability through a  full range of motion, and excellent patella tracking with a 29 mm patella trial in place. We removed the femoral trial and prepared for the tibial keel. We therefore chose this combination of implants. We inserted a distal bone plug in the femur. We copiously irrigated all bone cut surfaces and dried all bone cut surfaces in preparation for cementing. We mixed cement on the back table and cemented in our components, starting with a size 3 tibia, followed by a size 2 Right posterior stabilized femur. We cleared away cement and put in a 11 mm poly and put the knee in extension to get full compression of our implants. We again cleared away cement, and cemented in a 29 mm patella. We applied the clamp, removed the clamp, removed extraneous cement, and copiously irrigated the knee. We closed the deep and superficial layers of the vastus medialis using 0 Vicryl suture. We closed the capsule with #1 Stratofix running suture. We closed the subcutaneous layer with 2-0   Vicryl inverted suture and the skin with a running 3-0 Monocryl subcuticular stitch. Steri-Strips were applied to the skin. Sterile dressings applied with Aquacel. Tourniquet was let down at approximately 100 minutes of tourniquet time.   The patient was retrieved from anesthesia and taken to recovery room in good condition, having tolerated the procedure well. All needle, sponge and instrument counts were correct. SUMMARY OF IMPLANTS USED:  Octavia Triathlon knee system with a size 2 Right posterior stabilized femur, size 3  tibia, 11 mm posterior stabilized poly, and a 29 mm patella. We cut the femur at 5 degrees of valgus, 8 mm distal thickness, and 4 degrees external rotation on the femoral component. SPECIMEN:  Bone cuts      Lilia Quinones PA-C, was present for the entire procedure, assisting in   positioning & draping, each step of the procedure, and wound closure.         Shahnaz Nelson           Electronically signed by Shahnaz Nelson MD on 8/28/2020 at 8:46 AM

## 2020-08-28 NOTE — CONSULTS
[facility]      Reason for Consult:  Medical management    History of Present Illness:  77 y.o. female with a history of diabetes, was on insulin in the past, however made lifestyle modification and does not require any medications, hypertension, well controlled with Aldactone and Lasix, do have CKD as well as hyperlipidemia presents following surgical management for her right knee osteoarthritis requiring right total knee arthroplasty. Informant(s) for H&P: Patient as well as her daughter    REVIEW OF SYSTEMS:  Complete ROS performed with patient, pertinent positives and negatives are listed in the HPI. PMH:  Past Medical History:   Diagnosis Date    Arthritis     CAD (coronary artery disease)     follows with Dr. Keren Tidwell CKD (chronic kidney disease)     stage 3 / follows with Dr. Kevyn Simmons Hypertension     Respiratory failure ([facility] [address]     history of /   when had stroke, trach  x 2 weeks    Thyroid disease     Tracheal stenosis     Unspecified cerebral artery occlusion with cerebral infarction 2014    right side flacid, hemorrhagic stroke dt elevated BP/residual s/s is at right side loss of fine motor skills and some weakness, drags right leg, slight memory loss and aphasia when she is tired       Surgical History:  Past Surgical History:   Procedure Laterality Date     SECTION      1    COLONOSCOPY      CORONARY ANGIOPLASTY WITH STENT PLACEMENT  2004    CYST REMOVAL  's    ENDOSCOPY, COLON, DIAGNOSTIC      GASTROSTOMY TUBE PLACEMENT  2014    [place]. Nima Lozano  and later removed    HEEL SPUR SURGERY Left years ago    JOINT REPLACEMENT Left late 's    OTHER SURGICAL HISTORY      scraping of trach x 2 to treat stenosis / last one 2016    POLYPECTOMY  2013    internal hemorrhoids - michelle    TONSILLECTOMY      TOTAL KNEE ARTHROPLASTY Right 2020    RIGHT KNEE TOTAL auscultation, patient receiving IV fluids, did speak to nursing once he takes good p.o. liquids and when okay with orthopedics to DC the IV fluids. 6.    DVT prophylaxis, patient is already started on aspirin. Thank you very much for this interesting consult and we will continue to follow along. Feel free to call with any questions at [phone]. Electronically signed by Halle Combs MD on 8/28/2020 at 1:34 PM    NOTE: This report was transcribed using voice recognition software.  Every effort was made to ensure accuracy; however, inadvertent computerized transcription errors may be present.

## 2020-08-28 NOTE — ANESTHESIA PROCEDURE NOTES
Peripheral Block    Patient location during procedure: pre-op  Start time: 8/28/2020 6:40 AM  End time: 8/28/2020 6:45 AM  Staffing  Anesthesiologist: Vidhi Zabala DO  Performed: anesthesiologist   Preanesthetic Checklist  Completed: patient identified, site marked, surgical consent, pre-op evaluation, timeout performed, IV checked, risks and benefits discussed, monitors and equipment checked, anesthesia consent given, oxygen available and patient being monitored  Peripheral Block  Patient position: sitting  Prep: ChloraPrep  Patient monitoring: cardiac monitor, continuous pulse ox, frequent blood pressure checks and IV access  Block type: Femoral  Laterality: right  Injection technique: single-shot  Procedures: ultrasound guided  Local infiltration: lidocaine  Infiltration strength: 1 %  Dose: 3 mL  Adductor canal  Provider prep: mask and sterile gloves  Local infiltration: lidocaine  Needle  Needle gauge: 21 G  Needle length: 10 cm  Needle localization: ultrasound guidance  Assessment  Injection assessment: negative aspiration for heme, no paresthesia on injection and local visualized surrounding nerve on ultrasound  Paresthesia pain: none  Slow fractionated injection: yes  Hemodynamics: stable  Additional Notes  U/S 94738.  (1) Under ultrasound guidance, a 21 gauge needle was inserted and placed in close proximity to the  nerve.  (2) Ultrasound was also used to visualize the spread of the anesthetic in close proximity to the nerve being blocked. (3) The nerve appeared anatomically normal, and (4 there were no apparent abnormal pathological findings on the image that were readily visible and related to the nerve being blocked. (5) A permanent ultrasound image was saved in the patient's record.             Medications Administered  Ropivacaine (NAROPIN) injection 0.5%, 30 mL  Reason for block: post-op pain management and at surgeon's request

## 2020-08-28 NOTE — ANESTHESIA PROCEDURE NOTES
Spinal Block    Patient location during procedure: OR  Start time: 8/28/2020 7:02 AM  End time: 8/28/2020 7:08 AM  Reason for block: primary anesthetic and at surgeon's request  Staffing  Anesthesiologist: Scarlet Campos DO  Resident/CRNA: IRENA Simental - CRNA  Performed: resident/CRNA   Preanesthetic Checklist  Completed: patient identified, site marked, surgical consent, pre-op evaluation, timeout performed, IV checked, risks and benefits discussed, monitors and equipment checked, anesthesia consent given, oxygen available and patient being monitored  Spinal Block  Patient position: sitting  Prep: ChloraPrep  Patient monitoring: cardiac monitor, continuous pulse ox, continuous capnometry and frequent blood pressure checks  Approach: midline  Location: L3/L4  Provider prep: mask, sterile gloves and sterile gown  Local infiltration: lidocaine  Agent: bupivacaine  Dose: 1.6  Dose: 1.6  Needle  Needle type: Pencan   Needle gauge: 25 G  Needle length: 3.5 in  Assessment  Sensory level: T6  Swirl obtained: Yes  CSF: clear  Attempts: 1  Hemodynamics: stable

## 2020-08-29 VITALS
RESPIRATION RATE: 16 BRPM | OXYGEN SATURATION: 95 % | HEART RATE: 61 BPM | WEIGHT: 149 LBS | HEIGHT: 63 IN | TEMPERATURE: 98 F | SYSTOLIC BLOOD PRESSURE: 152 MMHG | BODY MASS INDEX: 26.4 KG/M2 | DIASTOLIC BLOOD PRESSURE: 70 MMHG

## 2020-08-29 LAB
HCT VFR BLD CALC: 32.4 % (ref 34–48)
HEMOGLOBIN: 10.3 G/DL (ref 11.5–15.5)
METER GLUCOSE: 150 MG/DL (ref 74–99)
METER GLUCOSE: 91 MG/DL (ref 74–99)

## 2020-08-29 PROCEDURE — 97140 MANUAL THERAPY 1/> REGIONS: CPT

## 2020-08-29 PROCEDURE — 85018 HEMOGLOBIN: CPT

## 2020-08-29 PROCEDURE — G0378 HOSPITAL OBSERVATION PER HR: HCPCS

## 2020-08-29 PROCEDURE — 36415 COLL VENOUS BLD VENIPUNCTURE: CPT

## 2020-08-29 PROCEDURE — 85014 HEMATOCRIT: CPT

## 2020-08-29 PROCEDURE — 97116 GAIT TRAINING THERAPY: CPT

## 2020-08-29 PROCEDURE — 6370000000 HC RX 637 (ALT 250 FOR IP): Performed by: ORTHOPAEDIC SURGERY

## 2020-08-29 PROCEDURE — 96374 THER/PROPH/DIAG INJ IV PUSH: CPT

## 2020-08-29 PROCEDURE — 6370000000 HC RX 637 (ALT 250 FOR IP): Performed by: INTERNAL MEDICINE

## 2020-08-29 PROCEDURE — 97535 SELF CARE MNGMENT TRAINING: CPT

## 2020-08-29 PROCEDURE — 6360000002 HC RX W HCPCS: Performed by: ORTHOPAEDIC SURGERY

## 2020-08-29 PROCEDURE — 99214 OFFICE O/P EST MOD 30 MIN: CPT | Performed by: INTERNAL MEDICINE

## 2020-08-29 PROCEDURE — 82962 GLUCOSE BLOOD TEST: CPT

## 2020-08-29 PROCEDURE — 97110 THERAPEUTIC EXERCISES: CPT

## 2020-08-29 RX ORDER — ASPIRIN 325 MG
325 TABLET, DELAYED RELEASE (ENTERIC COATED) ORAL DAILY
Qty: 28 TABLET | Refills: 0 | Status: SHIPPED | OUTPATIENT
Start: 2020-08-29 | End: 2022-01-10 | Stop reason: SDUPTHER

## 2020-08-29 RX ORDER — TRAMADOL HYDROCHLORIDE 50 MG/1
50-100 TABLET ORAL EVERY 6 HOURS PRN
Qty: 56 TABLET | Refills: 1 | Status: SHIPPED | OUTPATIENT
Start: 2020-08-29 | End: 2020-09-05

## 2020-08-29 RX ORDER — ACETAMINOPHEN 500 MG
1000 TABLET ORAL EVERY 8 HOURS
Qty: 180 TABLET | Refills: 0 | Status: SHIPPED | OUTPATIENT
Start: 2020-08-29 | End: 2022-02-24

## 2020-08-29 RX ADMIN — TRAMADOL HYDROCHLORIDE 100 MG: 50 TABLET, FILM COATED ORAL at 17:12

## 2020-08-29 RX ADMIN — DEXAMETHASONE SODIUM PHOSPHATE 10 MG: 10 INJECTION INTRAMUSCULAR; INTRAVENOUS at 06:37

## 2020-08-29 RX ADMIN — HYDRALAZINE HYDROCHLORIDE 50 MG: 50 TABLET, FILM COATED ORAL at 08:47

## 2020-08-29 RX ADMIN — TRAMADOL HYDROCHLORIDE 100 MG: 50 TABLET, FILM COATED ORAL at 05:11

## 2020-08-29 RX ADMIN — ACETAMINOPHEN 1000 MG: 500 TABLET ORAL at 15:51

## 2020-08-29 RX ADMIN — DOCUSATE SODIUM 50 MG AND SENNOSIDES 8.6 MG 1 TABLET: 8.6; 5 TABLET, FILM COATED ORAL at 08:47

## 2020-08-29 RX ADMIN — LABETALOL HYDROCHLORIDE 100 MG: 100 TABLET, FILM COATED ORAL at 08:47

## 2020-08-29 RX ADMIN — ASPIRIN 325 MG: 325 TABLET, DELAYED RELEASE ORAL at 08:47

## 2020-08-29 RX ADMIN — SPIRONOLACTONE 25 MG: 25 TABLET ORAL at 06:36

## 2020-08-29 RX ADMIN — CLONIDINE HYDROCHLORIDE 0.1 MG: 0.1 TABLET ORAL at 08:47

## 2020-08-29 RX ADMIN — FUROSEMIDE 10 MG: 20 TABLET ORAL at 06:36

## 2020-08-29 RX ADMIN — ACETAMINOPHEN 1000 MG: 500 TABLET ORAL at 06:35

## 2020-08-29 RX ADMIN — CITALOPRAM HYDROBROMIDE 20 MG: 20 TABLET ORAL at 08:47

## 2020-08-29 RX ADMIN — LEVOTHYROXINE SODIUM 50 MCG: 50 TABLET ORAL at 06:37

## 2020-08-29 RX ADMIN — TRAMADOL HYDROCHLORIDE 100 MG: 50 TABLET, FILM COATED ORAL at 11:12

## 2020-08-29 ASSESSMENT — PAIN SCALES - GENERAL
PAINLEVEL_OUTOF10: 4
PAINLEVEL_OUTOF10: 7
PAINLEVEL_OUTOF10: 7
PAINLEVEL_OUTOF10: 8
PAINLEVEL_OUTOF10: 7

## 2020-08-29 ASSESSMENT — PAIN DESCRIPTION - ORIENTATION: ORIENTATION: RIGHT

## 2020-08-29 ASSESSMENT — PAIN DESCRIPTION - ONSET: ONSET: ON-GOING

## 2020-08-29 ASSESSMENT — PAIN DESCRIPTION - LOCATION: LOCATION: KNEE

## 2020-08-29 ASSESSMENT — PAIN DESCRIPTION - DESCRIPTORS: DESCRIPTORS: ACHING;DISCOMFORT;DULL

## 2020-08-29 ASSESSMENT — PAIN DESCRIPTION - PAIN TYPE: TYPE: SURGICAL PAIN

## 2020-08-29 ASSESSMENT — PAIN DESCRIPTION - FREQUENCY: FREQUENCY: INTERMITTENT

## 2020-08-29 ASSESSMENT — PAIN - FUNCTIONAL ASSESSMENT: PAIN_FUNCTIONAL_ASSESSMENT: ACTIVITIES ARE NOT PREVENTED

## 2020-08-29 ASSESSMENT — PAIN DESCRIPTION - PROGRESSION: CLINICAL_PROGRESSION: GRADUALLY WORSENING

## 2020-08-29 NOTE — PROGRESS NOTES
Physical Therapy    S: In bed with Dtr in room on arrival.  Reports no increased in pain from AM session. Reports was using a [employer] past few years due to RT knee pain. RT sided CVA left some weakness RTLE with baseline limitations with ankle ROM with foot drop; has RT AFO but per Dtr never wore it and she will not wear it. Home today and re-Instructed on HEP and heel rool to facilitate knee ext. Also educated on imp on getting knee ext per Dr Krunal Atkins. Patient and Dtr fully aware and understand.      O: Bed mob: Indep  Transfers: S/Indep   ROM: RT knee -15* to 95* progressing post manual -5*-105*, RT ankle DF neutral, PF WNL  MMT: RT hip 3/5, knee 3-/5 ext and flex 3+/5, ankle 3+/5  Amb: [employer] S/'  There-x/manual: RTLE  Education: universal knee prec, HEP for ROM, knee ext heel rool/no posterior knee roll for comfort, transfers/hand place, gait pattern     A: Very pleasant and cooperative. Mild confusion with LTM impairments; Dtr facilitation/collateral information form her. Re-educated Dtr and patient on HEP for ROM as well as positioning for knee ext. Again improved ROM post manual stretching to -5*-105*. Gait pattern step to pattern short step/strides with RT foot drop minimal with ability to improve DF at ankle with decreased foot dropping; as AM session rapid return to toe walking. Good there-ex and manual ROM tolerances. wanted to remain in chair following care with Dtr assisting care.   Very appreciative for care.       P: Possible D/C home today with [facility] follow up care  [facility] 20/24

## 2020-08-29 NOTE — PLAN OF CARE
Problem: Falls - Risk of:  Goal: Will remain free from falls  Description: Will remain free from falls  Outcome: Met This Shift  Goal: Absence of physical injury  Description: Absence of physical injury  Outcome: Met This Shift     Problem: Infection - Surgical Site:  Goal: Will show no infection signs and symptoms  Description: Will show no infection signs and symptoms  Outcome: Met This Shift     Problem: Mobility - Impaired:  Goal: Mobility will improve  Description: Mobility will improve  Outcome: Met This Shift     Problem: Infection - Surgical Site:  Goal: Will show no infection signs and symptoms  Description: Will show no infection signs and symptoms  Outcome: Met This Shift     Problem: Pain - Acute:  Goal: Pain level will decrease  Description: Pain level will decrease  Outcome: Met This Shift     Problem: Cardiac:  Goal: Ability to maintain vital signs within normal range will improve  Description: Ability to maintain vital signs within normal range will improve  Outcome: Met This Shift

## 2020-08-29 NOTE — PROGRESS NOTES
Physical Therapy    S: In chair with Dtr in room on arrival.  Dtr assisting with Q&A as patient having some difficulty with questions. Reports was using a [employer] past few years due to RT knee pain. RT sided CVA left some weakness RTLE with baseline limitations with ankle ROM with foot drop. Dtr reports mom walks on her toes RT foot with sometimes able to self-limit that and have improved DF to advance foot/leg. Instructed on HEP and heel rool to facilitate knee ext. Also educated on imp on getting knee ext per Dr Madisyn Mcdonald. Patient and Dtr fully aware and understand. O: Bed mob: S/Indep  Transfers: SBA/S   ROM: RT knee -15* to 95* progressing post manual -5*-105*, RT ankle DF neutral, PF WNL  MMT: RT hip 3/5, knee 3-/5 ext and flex 3+/5, ankle 3+/5  Amb: [employer] S/'  There-x/manual: RTLE  Education: universal knee prec, HEP for ROM, knee ext heel rool/no posterior knee roll for comfort, transfers/hand place, gait pattern    A: Very pleasant and cooperative. Mild confusion with LTM impairments; Dtr facilitation/collateral information form her. Educated Dtra nd patient on HEP for ROM as well as positioning for knee ext. Improved ROM post manual stretching to -5*-105*. Gait pattern step to pattern short step/strides with RT foot drop minimal with ability to improve DF at ankle with decreased foot dropping; rapid return to toe walking/foot drop advancing. Good there-ex and manual ROM tolerances. Remained in chair following care with Dtr assisting care. Very appreciative for care.       P: Possible D/C home today with [facility] follow up care  [facility] 18/24

## 2020-08-29 NOTE — PROGRESS NOTES
[facility] Hospitalist   Progress Note    Admitting Date and Time: 8/28/2020  5:48 AM  Admit Dx: Primary osteoarthritis of right knee [M17.11]  Primary osteoarthritis of right knee [M17.11]    Subjective: Following for medical management. Patient was admitted with Primary osteoarthritis of right knee [M17.11]  Primary osteoarthritis of right knee [M17.11]. Patient feels much better, at this time awake, alert, sitting in chair, did work with physical therapy yesterday, patient was able to get up and go to the bathroom with help of 8 without difficulty. Per RN: No overnight issues. ROS: denies fever, chills, cp, sob, n/v, HA unless stated above.      sodium chloride flush  10 mL Intravenous 2 times per day    acetaminophen  1,000 mg Oral 3 times per day    sennosides-docusate sodium  1 tablet Oral BID    aspirin  325 mg Oral Daily    citalopram  20 mg Oral Daily    cloNIDine  0.1 mg Oral BID    furosemide  10 mg Oral Every Other Day    hydrALAZINE  50 mg Oral BID    labetalol  100 mg Oral BID    levothyroxine  50 mcg Oral Daily    nitroGLYCERIN  1 patch Transdermal Daily    spironolactone  25 mg Oral Daily    insulin lispro  0-6 Units Subcutaneous TID WC    insulin lispro  0-3 Units Subcutaneous Nightly     sodium chloride flush, 10 mL, PRN  magnesium hydroxide, 30 mL, Daily PRN  traMADol, 50 mg, Q6H PRN    Or  traMADol, 100 mg, Q6H PRN  HYDROmorphone, 0.5 mg, Q3H PRN  promethazine, 12.5 mg, Q6H PRN    Or  ondansetron, 4 mg, Q6H PRN  glucose, 15 g, PRN  dextrose, 12.5 g, PRN  glucagon (rDNA), 1 mg, PRN  dextrose, 100 mL/hr, PRN         Objective:    /81   Pulse 64   Temp 98.4 °F (36.9 °C) (Oral)   Resp 16   Ht 5' 3\" (1.6 m)   Wt 149 lb (67.6 kg)   SpO2 94%   BMI 26.39 kg/m²   General Appearance: alert and oriented to person, place and time, well-developed and well-nourished, in no acute distress  Skin: warm and dry, no rash or erythema  Head: normocephalic and atraumatic  Eyes: pupils equal, round, and reactive to light, extraocular eye movements intact, conjunctivae normal  ENT: tympanic membrane, external ear and ear canal normal bilaterally, oropharynx clear and moist with normal mucous membranes  Neck: neck supple and non tender without mass, no thyromegaly or thyroid nodules, no cervical lymphadenopathy   Pulmonary/Chest: clear to auscultation bilaterally- no wheezes, rales or rhonchi, normal air movement, no respiratory distress  Cardiovascular: normal rate, normal S1 and S2, no gallops, intact distal pulses and no carotid bruits  Abdomen: soft, non-tender, non-distended, normal bowel sounds, no masses or organomegaly      No results for input(s): NA, K, CL, CO2, BUN, CREATININE, GLUCOSE, CALCIUM in the last 72 hours. Recent Labs     08/29/20  0500   HGB 10.3*   HCT 32.4*     Blood glucose well controlled    Radiology:   No orders to display       Assessment:    Principal Problem:    Primary osteoarthritis of right knee  Resolved Problems:    * No resolved hospital problems. *      Plan:  1. Right knee arthroplasty, patient improving well, management as per the primary service. 2.    Hypertension, well controlled. 3.    Diabetes, blood glucose well controlled in spite of patient receiving Decadron, likely will DC Accu-Cheks if pain remains well controlled. 4.    Patient remains on DVT prophylaxis with aspirin.       Electronically signed by Brea Kellogg MD on 8/29/2020 at 8:22 AM

## 2020-08-29 NOTE — PROGRESS NOTES
Occupational Therapy  OT BEDSIDE TREATMENT NOTE      Date:2020  Patient Name: Patito Smith  MRN: [id number]  : 1945  Room: [address] [place], [place] [address]     Referring Provider: Maged Palacios MD      Evaluating OT: Robert Huitron OTR/L [id number]     AM-PAC Daily Activity Raw Score: 17     Recommended Adaptive Equipment: TBD     Diagnosis: R knee OA   Surgery: 20 R TKA   Pertinent Medical History: h/o CVA with R sided deficits   Precautions:  Falls, FWBAT R LE     Home Living: Pt lives with friend, daughter will be staying initially at home, in a 2 story home with 1st floor B/B and ramp on entry. Bathroom setup: walk in shower with grab bar and seat, handicapped height commode with grab bar      Prior Level of Function: Independent with ADLs, friend assists PRN with IADLs; completed functional mobility with no AD. Has [employer].      Pain Level: no reported pain     Cognition: A&O: Pt is pleasant and cooperative. Easily confused. Mildly impulsive. Daughter present throughout session to assist in answering questions regarding functioning and previous home set up              Problem solving:  fair              Judgement/safety:  fair                Functional Assessment:    Initial Eval Status  Date: 20 Treatment session:  Short Term Goals  Treatment frequency: 2-5x/wk PRN x1-3 wks      Feeding Independent       Grooming SBA  Standing sink level for hand hygiene   Independent   UB Dressing Set up   Mod I   LB Dressing Mod A  Donning brief Mod A  Donning/doffing socks  Mod I    Bathing Mod A Min A  Simulated transfer technique  Mod I   Toileting Mod A  Use of grab bar for support in transfer  Able to manage front yuan care   Assist in management of brief up/down over hips   Mod I   Bed Mobility  Supine to sit:  Max A  Sit to Supine: Min A     Functional Transfers STS: Min A  STS: CGA Mod I   Functional Mobility Min A with [employer]  Household distance  Slow pace  Min cues for safety CGA with [employer] distance  Min cues throughout for safety and technique  Mod I during ADLs   Balance Sitting: fair plus     Standing: fair minus at [employer]  sitting: fair plus    Standing: fair minus at [employer]     Activity Tolerance fair Fair  standing ale x6-7 min with fair plus balance during self care tasks     ADL: Pt trained on proper technique and precautions for LB dressing to avoid knee twisting. Pt requires increased time and cues throughout. Daughter present and verbalizes understanding. OT offers training on AE however daughter reports pt has had AE in past and it did not work well for her due to R hand coordination deficits and decreased sequencing/problem solving ability. Pts daughter trained on how to assist pt in LB dressing while promoting maximum independence for patient and maintain safety of R knee with good understanding. Pt's daughter to assist in CELIA hose management at home, she reports she has used CELIA hose in the past and is comfortable with techniques. OT trains pt and daughter on safe shower transfer techniques in simulated bathroom environment. Good understanding from pt and daughter. Daughter reports she is comfortable assisting pt in bathing initially at home. OT educates daughter and pt on proper techniques during commode transfers and safety in yuan care to avoid twist with good understanding. All questions/concerns addressed this session. Pt and daughter report feeling comfortable to return home. Comments: Upon arrival pt seated in armchair. At end of session supine in bed all lines and tubes intact, call light and phone within reach. Bed/chair alarm: ON    · Pt has made good progress towards set goals.    · Continue with current plan of care    Treatment Time In:1220  Treatment Time Out: 8098  Treatment Charges: Mins Units    ADL/Home Mgt 76153 20 2    Thera Activities 89184 5     Ther Ex 13010      Manual Therapy 42644      Neuro Re-ed 09262      Orthotic manage/training  [id number]      Non Billable Time Total Timed Treatment 25 2        Geeta Clipper OTR/L  [id number]

## 2020-09-24 ENCOUNTER — OFFICE VISIT (OUTPATIENT)
Dept: PRIMARY CARE CLINIC | Age: 75
End: 2020-09-24
Payer: MEDICARE

## 2020-09-24 VITALS
TEMPERATURE: 97 F | HEART RATE: 70 BPM | DIASTOLIC BLOOD PRESSURE: 70 MMHG | OXYGEN SATURATION: 97 % | SYSTOLIC BLOOD PRESSURE: 124 MMHG

## 2020-09-24 PROBLEM — R21 RASH AND NONSPECIFIC SKIN ERUPTION: Status: ACTIVE | Noted: 2020-09-24

## 2020-09-24 PROBLEM — Z96.651 HISTORY OF TOTAL RIGHT KNEE REPLACEMENT: Status: ACTIVE | Noted: 2020-09-24

## 2020-09-24 PROCEDURE — G8417 CALC BMI ABV UP PARAM F/U: HCPCS | Performed by: FAMILY MEDICINE

## 2020-09-24 PROCEDURE — 4040F PNEUMOC VAC/ADMIN/RCVD: CPT | Performed by: FAMILY MEDICINE

## 2020-09-24 PROCEDURE — 2022F DILAT RTA XM EVC RTNOPTHY: CPT | Performed by: FAMILY MEDICINE

## 2020-09-24 PROCEDURE — 99214 OFFICE O/P EST MOD 30 MIN: CPT | Performed by: FAMILY MEDICINE

## 2020-09-24 PROCEDURE — G8400 PT W/DXA NO RESULTS DOC: HCPCS | Performed by: FAMILY MEDICINE

## 2020-09-24 PROCEDURE — 1090F PRES/ABSN URINE INCON ASSESS: CPT | Performed by: FAMILY MEDICINE

## 2020-09-24 PROCEDURE — 1123F ACP DISCUSS/DSCN MKR DOCD: CPT | Performed by: FAMILY MEDICINE

## 2020-09-24 PROCEDURE — 3017F COLORECTAL CA SCREEN DOC REV: CPT | Performed by: FAMILY MEDICINE

## 2020-09-24 PROCEDURE — 3044F HG A1C LEVEL LT 7.0%: CPT | Performed by: FAMILY MEDICINE

## 2020-09-24 PROCEDURE — 1036F TOBACCO NON-USER: CPT | Performed by: FAMILY MEDICINE

## 2020-09-24 PROCEDURE — G8427 DOCREV CUR MEDS BY ELIG CLIN: HCPCS | Performed by: FAMILY MEDICINE

## 2020-09-24 RX ORDER — TRAMADOL HYDROCHLORIDE 50 MG/1
TABLET ORAL
COMMUNITY
Start: 2020-09-18 | End: 2022-02-24 | Stop reason: ALTCHOICE

## 2020-09-24 RX ORDER — PREDNISONE 20 MG/1
20 TABLET ORAL DAILY
Qty: 10 TABLET | Refills: 0 | Status: SHIPPED | OUTPATIENT
Start: 2020-09-24 | End: 2020-10-04

## 2020-09-24 ASSESSMENT — ENCOUNTER SYMPTOMS
GASTROINTESTINAL NEGATIVE: 1
ALLERGIC/IMMUNOLOGIC NEGATIVE: 1
RESPIRATORY NEGATIVE: 1
EYES NEGATIVE: 1

## 2020-09-24 NOTE — PROGRESS NOTES
20     Phoenix Benavidez    : 1945 Sex: female   Age: 77 y.o. Chief Complaint   Patient presents with    Rash     x 2 week. Right arm        Prior to Admission medications    Medication Sig Start Date End Date Taking?  Authorizing Provider   traMADol (ULTRAM) 50 MG tablet TK 1 TO 2 TS PO Q 6 H PRN P 20  Yes Historical Provider, MD   predniSONE (DELTASONE) 20 MG tablet Take 1 tablet by mouth daily for 10 days 9/24/20 10/4/20 Yes Hima Nicholson DO   acetaminophen (TYLENOL) 500 MG tablet Take 2 tablets by mouth every 8 hours 20  Yes Nolberto Stovall MD   aspirin 325 MG EC tablet Take 1 tablet by mouth daily for 28 days 20 Yes Nolberto Stovall MD   Omega-3 Fatty Acids (FISH OIL PO) Take by mouth daily LD 2020   Yes Historical Provider, MD   levothyroxine (SYNTHROID) 50 MCG tablet Take 50 mcg by mouth Daily   Yes Historical Provider, MD   furosemide (LASIX) 20 MG tablet Take 0.5 tablets by mouth every other day 20  Yes Hima Nicholson DO   spironolactone (ALDACTONE) 25 MG tablet Take 25 mg by mouth daily   Yes Historical Provider, MD   cloNIDine (CATAPRES) 0.1 MG tablet TAKE 1 TABLET BY MOUTH TWICE DAILY 20  Yes Hima Nicholson DO   hydrALAZINE (APRESOLINE) 25 MG tablet TAKE 1 TABLET BY MOUTH FOUR TIMES DAILY  Patient taking differently: 50 mg 2 times daily Take 50 mg / twice daily 20  Yes Angel Nicholson DO   citalopram (CELEXA) 20 MG tablet TAKE 1 TABLET BY MOUTH DAILY 20  Yes Hima Nicholson DO   nitroGLYCERIN (NITRODUR) 0.2 MG/HR APPLY 1 PATCH EXTERNALLY TO THE SKIN DAILY 20  Yes Hima Nicholson DO   labetalol (NORMODYNE) 100 MG tablet Take 1 tablet by mouth 2 times daily 3/10/20  Yes Hima Nicholson DO   levothyroxine (SYNTHROID) 88 MCG tablet TK 1 T PO QD 3/10/20  Yes Hima Nicholson DO   vitamin D (ERGOCALCIFEROL) 1.25 MG (37854 UT) CAPS capsule TK 1 C PO WEEKLY 19  Yes Gunnar Wood DO          HPI: Delbert Kumari seen today in follow-up complaints of rash consistent with poison ivy reaction. Treatment with prednisone 20 mg daily for 10 days follow-up if persistent. Benadryl as needed. Coronary artery disease hypertensive disease hypothyroidism stable. Labs to be completed prior to next visit. Diabetes mellitus in the past has been stable with diet alone currently on no medications will follow-up glycohemoglobin. Medically otherwise stable. Review of Systems   Constitutional: Negative. HENT: Negative. Eyes: Negative. Respiratory: Negative. Gastrointestinal: Negative. Endocrine: Negative. Genitourinary: Negative. Musculoskeletal: Negative. Skin: Negative. Allergic/Immunologic: Negative. Neurological: Negative. Hematological: Negative. Psychiatric/Behavioral: Negative. System review is all stable aside from skin rash as noted.           Current Outpatient Medications:     traMADol (ULTRAM) 50 MG tablet, TK 1 TO 2 TS PO Q 6 H PRN P, Disp: , Rfl:     predniSONE (DELTASONE) 20 MG tablet, Take 1 tablet by mouth daily for 10 days, Disp: 10 tablet, Rfl: 0    acetaminophen (TYLENOL) 500 MG tablet, Take 2 tablets by mouth every 8 hours, Disp: 180 tablet, Rfl: 0    aspirin 325 MG EC tablet, Take 1 tablet by mouth daily for 28 days, Disp: 28 tablet, Rfl: 0    Omega-3 Fatty Acids (FISH OIL PO), Take by mouth daily LD 8/22/2020, Disp: , Rfl:     levothyroxine (SYNTHROID) 50 MCG tablet, Take 50 mcg by mouth Daily, Disp: , Rfl:     furosemide (LASIX) 20 MG tablet, Take 0.5 tablets by mouth every other day, Disp: 45 tablet, Rfl: 3    spironolactone (ALDACTONE) 25 MG tablet, Take 25 mg by mouth daily, Disp: , Rfl:     cloNIDine (CATAPRES) 0.1 MG tablet, TAKE 1 TABLET BY MOUTH TWICE DAILY, Disp: 180 tablet, Rfl: 3    hydrALAZINE (APRESOLINE) 25 MG tablet, TAKE 1 TABLET BY MOUTH FOUR TIMES DAILY (Patient taking differently: 50 mg 2 times daily Take 50 mg / twice daily), Disp: 360 tablet, Rfl: 3    citalopram (CELEXA) 20 MG tablet, TAKE 1 TABLET BY MOUTH DAILY, Disp: 90 tablet, Rfl: 3    nitroGLYCERIN (NITRODUR) 0.2 MG/HR, APPLY 1 PATCH EXTERNALLY TO THE SKIN DAILY, Disp: 90 patch, Rfl: 3    labetalol (NORMODYNE) 100 MG tablet, Take 1 tablet by mouth 2 times daily, Disp: 180 tablet, Rfl: 3    levothyroxine (SYNTHROID) 88 MCG tablet, TK 1 T PO QD, Disp: 90 tablet, Rfl: 3    vitamin D (ERGOCALCIFEROL) 1.25 MG (73026 UT) CAPS capsule, TK 1 C PO WEEKLY, Disp: 12 capsule, Rfl: 3    Allergies   Allergen Reactions    Latex      Reaction unknown / may have been swelling    Norvasc [Amlodipine Besylate] Anaphylaxis    Dye [Iodides]      Reaction unknown       Social History     Tobacco Use    Smoking status: Former Smoker     Packs/day: 1.50     Years: 8.00     Pack years: 12.00     Last attempt to quit: 1976     Years since quittin.7    Smokeless tobacco: Never Used   Substance Use Topics    Alcohol use: No    Drug use: Never      Past Surgical History:   Procedure Laterality Date     SECTION      1    COLONOSCOPY      CORONARY ANGIOPLASTY WITH STENT PLACEMENT  2004    CYST REMOVAL  's    ENDOSCOPY, COLON, DIAGNOSTIC      GASTROSTOMY TUBE PLACEMENT  2014    [place]. Jordansuma Gutierrez and later removed    HEEL SPUR SURGERY Left years ago    JOINT REPLACEMENT Left late 's    OTHER SURGICAL HISTORY      scraping of trach x 2 to treat stenosis / last one 2016    POLYPECTOMY  2013    internal hemorrhoids - michelle    TONSILLECTOMY      TOTAL KNEE ARTHROPLASTY Right 2020    RIGHT KNEE TOTAL ARTHROPLASTY    ++RAYMOND++   ++PNB++     ++LATEX ALLERGY++   ++IODINE ALLERGY++ performed by Fabian Randle MD at [address] [place] [address],[address]  14    [place]    UPPER GASTROINTESTINAL ENDOSCOPY      gastritis w. superficial antral ulerations - [place]     No family history on file.   Past Medical History:   Diagnosis Date    Arthritis     CAD (coronary artery of native heart without angina pectoris  -     CBC Auto Differential; Future  -     Comprehensive Metabolic Panel; Future  -     Hemoglobin A1C; Future  -     Lipid Panel; Future  -     T4; Future  -     TSH without Reflex; Future    Essential hypertension  -     CBC Auto Differential; Future  -     Comprehensive Metabolic Panel; Future  -     Hemoglobin A1C; Future  -     Lipid Panel; Future  -     T4; Future  -     TSH without Reflex; Future    Hypothyroidism, unspecified type  -     CBC Auto Differential; Future  -     Comprehensive Metabolic Panel; Future  -     Hemoglobin A1C; Future  -     Lipid Panel; Future  -     T4; Future  -     TSH without Reflex; Future    History of total right knee replacement    Type 2 diabetes mellitus without complication, without long-term current use of insulin (HCC)  -     CBC Auto Differential; Future  -     Comprehensive Metabolic Panel; Future  -     Hemoglobin A1C; Future  -     Lipid Panel; Future  -     T4; Future  -     TSH without Reflex; Future    Other orders  -     predniSONE (DELTASONE) 20 MG tablet; Take 1 tablet by mouth daily for 10 days            No follow-ups on file. Lloyd Velazquez DO    Note was generated with the assistance of voice recognition software. Document was reviewed however may contain grammatical errors.

## 2020-10-22 ENCOUNTER — HOSPITAL ENCOUNTER (OUTPATIENT)
Age: 75
Discharge: HOME | End: 2020-10-24
Payer: MEDICARE

## 2020-10-22 LAB
ALBUMIN SERPL-MCNC: 4.4 G/DL (ref 3.5–5.2)
ALP BLD-CCNC: 55 U/L (ref 35–104)
ALT SERPL-CCNC: 11 U/L (ref 0–32)
ANION GAP SERPL CALCULATED.3IONS-SCNC: 15 MMOL/L (ref 7–16)
AST SERPL-CCNC: 16 U/L (ref 0–31)
BASOPHILS ABSOLUTE: 0.02 E9/L (ref 0–0.2)
BASOPHILS RELATIVE PERCENT: 0.7 % (ref 0–2)
BILIRUB SERPL-MCNC: 0.4 MG/DL (ref 0–1.2)
BUN BLDV-MCNC: 36 MG/DL (ref 8–23)
CALCIUM SERPL-MCNC: 9.6 MG/DL (ref 8.6–10.2)
CHLORIDE BLD-SCNC: 106 MMOL/L (ref 98–107)
CHOLESTEROL, TOTAL: 174 MG/DL (ref 0–199)
CO2: 21 MMOL/L (ref 22–29)
CREAT SERPL-MCNC: 1.5 MG/DL (ref 0.5–1)
EOSINOPHILS ABSOLUTE: 0.1 E9/L (ref 0.05–0.5)
EOSINOPHILS RELATIVE PERCENT: 3.4 % (ref 0–6)
GFR AFRICAN AMERICAN: 41
GFR NON-AFRICAN AMERICAN: 34 ML/MIN/1.73
GLUCOSE BLD-MCNC: 103 MG/DL (ref 74–99)
HBA1C MFR BLD: 5 % (ref 4–5.6)
HCT VFR BLD CALC: 39.4 % (ref 34–48)
HDLC SERPL-MCNC: 66 MG/DL
HEMOGLOBIN: 12.3 G/DL (ref 11.5–15.5)
IMMATURE GRANULOCYTES #: 0.01 E9/L
IMMATURE GRANULOCYTES %: 0.3 % (ref 0–5)
LDL CHOLESTEROL CALCULATED: 99 MG/DL (ref 0–99)
LYMPHOCYTES ABSOLUTE: 0.95 E9/L (ref 1.5–4)
LYMPHOCYTES RELATIVE PERCENT: 32.8 % (ref 20–42)
MCH RBC QN AUTO: 30.8 PG (ref 26–35)
MCHC RBC AUTO-ENTMCNC: 31.2 % (ref 32–34.5)
MCV RBC AUTO: 98.5 FL (ref 80–99.9)
MONOCYTES ABSOLUTE: 0.33 E9/L (ref 0.1–0.95)
MONOCYTES RELATIVE PERCENT: 11.4 % (ref 2–12)
NEUTROPHILS ABSOLUTE: 1.49 E9/L (ref 1.8–7.3)
NEUTROPHILS RELATIVE PERCENT: 51.4 % (ref 43–80)
PDW BLD-RTO: 13.8 FL (ref 11.5–15)
PLATELET # BLD: 153 E9/L (ref 130–450)
PMV BLD AUTO: 10.9 FL (ref 7–12)
POTASSIUM SERPL-SCNC: 4.4 MMOL/L (ref 3.5–5)
RBC # BLD: 4 E12/L (ref 3.5–5.5)
SODIUM BLD-SCNC: 142 MMOL/L (ref 132–146)
T4 TOTAL: 8.9 MCG/DL (ref 4.5–11.7)
TOTAL PROTEIN: 7 G/DL (ref 6.4–8.3)
TRIGL SERPL-MCNC: 47 MG/DL (ref 0–149)
TSH SERPL DL<=0.05 MIU/L-ACNC: 0.89 UIU/ML (ref 0.27–4.2)
VLDLC SERPL CALC-MCNC: 9 MG/DL
WBC # BLD: 2.9 E9/L (ref 4.5–11.5)

## 2020-10-22 PROCEDURE — 83036 HEMOGLOBIN GLYCOSYLATED A1C: CPT

## 2020-10-22 PROCEDURE — 85025 COMPLETE CBC W/AUTO DIFF WBC: CPT

## 2020-10-22 PROCEDURE — 36415 COLL VENOUS BLD VENIPUNCTURE: CPT

## 2020-10-22 PROCEDURE — 84443 ASSAY THYROID STIM HORMONE: CPT

## 2020-10-22 PROCEDURE — 80061 LIPID PANEL: CPT

## 2020-10-22 PROCEDURE — 84436 ASSAY OF TOTAL THYROXINE: CPT

## 2020-10-22 PROCEDURE — 80053 COMPREHEN METABOLIC PANEL: CPT

## 2020-10-27 ENCOUNTER — OFFICE VISIT (OUTPATIENT)
Dept: PRIMARY CARE CLINIC | Age: 75
End: 2020-10-27
Payer: MEDICARE

## 2020-10-27 VITALS
WEIGHT: 150 LBS | DIASTOLIC BLOOD PRESSURE: 82 MMHG | BODY MASS INDEX: 26.57 KG/M2 | HEART RATE: 62 BPM | SYSTOLIC BLOOD PRESSURE: 124 MMHG | OXYGEN SATURATION: 97 % | TEMPERATURE: 95.4 F

## 2020-10-27 PROBLEM — Z23 NEEDS FLU SHOT: Status: ACTIVE | Noted: 2020-10-27

## 2020-10-27 PROCEDURE — 90694 VACC AIIV4 NO PRSRV 0.5ML IM: CPT | Performed by: FAMILY MEDICINE

## 2020-10-27 PROCEDURE — 3017F COLORECTAL CA SCREEN DOC REV: CPT | Performed by: FAMILY MEDICINE

## 2020-10-27 PROCEDURE — G8400 PT W/DXA NO RESULTS DOC: HCPCS | Performed by: FAMILY MEDICINE

## 2020-10-27 PROCEDURE — 1123F ACP DISCUSS/DSCN MKR DOCD: CPT | Performed by: FAMILY MEDICINE

## 2020-10-27 PROCEDURE — 2022F DILAT RTA XM EVC RTNOPTHY: CPT | Performed by: FAMILY MEDICINE

## 2020-10-27 PROCEDURE — 1036F TOBACCO NON-USER: CPT | Performed by: FAMILY MEDICINE

## 2020-10-27 PROCEDURE — G8484 FLU IMMUNIZE NO ADMIN: HCPCS | Performed by: FAMILY MEDICINE

## 2020-10-27 PROCEDURE — G8427 DOCREV CUR MEDS BY ELIG CLIN: HCPCS | Performed by: FAMILY MEDICINE

## 2020-10-27 PROCEDURE — G8417 CALC BMI ABV UP PARAM F/U: HCPCS | Performed by: FAMILY MEDICINE

## 2020-10-27 PROCEDURE — 4040F PNEUMOC VAC/ADMIN/RCVD: CPT | Performed by: FAMILY MEDICINE

## 2020-10-27 PROCEDURE — 99214 OFFICE O/P EST MOD 30 MIN: CPT | Performed by: FAMILY MEDICINE

## 2020-10-27 PROCEDURE — 1090F PRES/ABSN URINE INCON ASSESS: CPT | Performed by: FAMILY MEDICINE

## 2020-10-27 PROCEDURE — G0008 ADMIN INFLUENZA VIRUS VAC: HCPCS | Performed by: FAMILY MEDICINE

## 2020-10-27 PROCEDURE — 3044F HG A1C LEVEL LT 7.0%: CPT | Performed by: FAMILY MEDICINE

## 2020-10-27 RX ORDER — ERGOCALCIFEROL 1.25 MG/1
CAPSULE ORAL
Qty: 12 CAPSULE | Refills: 3 | Status: SHIPPED
Start: 2020-10-27 | End: 2021-11-22

## 2020-10-27 ASSESSMENT — ENCOUNTER SYMPTOMS
ALLERGIC/IMMUNOLOGIC NEGATIVE: 1
GASTROINTESTINAL NEGATIVE: 1
RESPIRATORY NEGATIVE: 1
EYES NEGATIVE: 1

## 2020-10-27 NOTE — PROGRESS NOTES
10/27/20     Nicolette Burrows    : 1945 Sex: female   Age: 77 y.o. Chief Complaint   Patient presents with    Discuss Labs    Hypertension    Post-Op Check     2 months post op check knee replacement. Doing PT       Prior to Admission medications    Medication Sig Start Date End Date Taking? Authorizing Provider   vitamin D (ERGOCALCIFEROL) 1.25 MG (20373 UT) CAPS capsule TK 1 C PO WEEKLY 10/27/20  Yes Bee Branch Reap Traikoff, DO   traMADol (ULTRAM) 50 MG tablet TK 1 TO 2 TS PO Q 6 H PRN P 20  Yes Historical Provider, MD   acetaminophen (TYLENOL) 500 MG tablet Take 2 tablets by mouth every 8 hours 20  Yes Caitlin Can MD   Omega-3 Fatty Acids (FISH OIL PO) Take by mouth daily LD 2020   Yes Historical Provider, MD   furosemide (LASIX) 20 MG tablet Take 0.5 tablets by mouth every other day 20  Yes Bee Branch Reap Traikoff, DO   spironolactone (ALDACTONE) 25 MG tablet Take 25 mg by mouth daily   Yes Historical Provider, MD   cloNIDine (CATAPRES) 0.1 MG tablet TAKE 1 TABLET BY MOUTH TWICE DAILY 20  Yes Bee Branch Reap Traikoff, DO   hydrALAZINE (APRESOLINE) 25 MG tablet TAKE 1 TABLET BY MOUTH FOUR TIMES DAILY 20  Yes Bee Branch Reap Traikoff, DO   citalopram (CELEXA) 20 MG tablet TAKE 1 TABLET BY MOUTH DAILY 20  Yes Bee Branch Reap Traikoff, DO   nitroGLYCERIN (NITRODUR) 0.2 MG/HR APPLY 1 PATCH EXTERNALLY TO THE SKIN DAILY 20  Yes Bee Branch Reap Traikoff, DO   labetalol (NORMODYNE) 100 MG tablet Take 1 tablet by mouth 2 times daily 3/10/20  Yes Bee Branch Reap Traikoff, DO   levothyroxine (SYNTHROID) 88 MCG tablet TK 1 T PO QD 3/10/20  Yes Bee Branch Reap Traikoff, DO   aspirin 325 MG EC tablet Take 1 tablet by mouth daily for 28 days 20  Caitlin Can MD          HPI: She evaluated in follow-up on hypertension coronary artery disease diabetes hypothyroid hyperlipidemia and chronic kidney disease. Medically she is doing well. Flu shot updated today.   Medications reviewed and all well-tolerated. Review of Systems   Constitutional: Negative. HENT: Negative. Eyes: Negative. Respiratory: Negative. Gastrointestinal: Negative. Endocrine: Negative. Genitourinary: Negative. Musculoskeletal: Negative. Skin: Negative. Allergic/Immunologic: Negative. Neurological: Negative. Hematological: Negative. Psychiatric/Behavioral: Negative. Systems review currently stable and no additional complaints.           Current Outpatient Medications:     vitamin D (ERGOCALCIFEROL) 1.25 MG (09906 UT) CAPS capsule, TK 1 C PO WEEKLY, Disp: 12 capsule, Rfl: 3    traMADol (ULTRAM) 50 MG tablet, TK 1 TO 2 TS PO Q 6 H PRN P, Disp: , Rfl:     acetaminophen (TYLENOL) 500 MG tablet, Take 2 tablets by mouth every 8 hours, Disp: 180 tablet, Rfl: 0    Omega-3 Fatty Acids (FISH OIL PO), Take by mouth daily LD 8/22/2020, Disp: , Rfl:     furosemide (LASIX) 20 MG tablet, Take 0.5 tablets by mouth every other day, Disp: 45 tablet, Rfl: 3    spironolactone (ALDACTONE) 25 MG tablet, Take 25 mg by mouth daily, Disp: , Rfl:     cloNIDine (CATAPRES) 0.1 MG tablet, TAKE 1 TABLET BY MOUTH TWICE DAILY, Disp: 180 tablet, Rfl: 3    hydrALAZINE (APRESOLINE) 25 MG tablet, TAKE 1 TABLET BY MOUTH FOUR TIMES DAILY, Disp: 360 tablet, Rfl: 3    citalopram (CELEXA) 20 MG tablet, TAKE 1 TABLET BY MOUTH DAILY, Disp: 90 tablet, Rfl: 3    nitroGLYCERIN (NITRODUR) 0.2 MG/HR, APPLY 1 PATCH EXTERNALLY TO THE SKIN DAILY, Disp: 90 patch, Rfl: 3    labetalol (NORMODYNE) 100 MG tablet, Take 1 tablet by mouth 2 times daily, Disp: 180 tablet, Rfl: 3    levothyroxine (SYNTHROID) 88 MCG tablet, TK 1 T PO QD, Disp: 90 tablet, Rfl: 3    aspirin 325 MG EC tablet, Take 1 tablet by mouth daily for 28 days, Disp: 28 tablet, Rfl: 0    Allergies   Allergen Reactions    Latex      Reaction unknown / may have been swelling    Norvasc [Amlodipine Besylate] Anaphylaxis    Dye [Iodides]      Reaction unknown Social History     Tobacco Use    Smoking status: Former Smoker     Packs/day: 1.50     Years: 8.00     Pack years: 12.00     Last attempt to quit: 1976     Years since quittin.8    Smokeless tobacco: Never Used   Substance Use Topics    Alcohol use: No    Drug use: Never      Past Surgical History:   Procedure Laterality Date     SECTION      1    COLONOSCOPY      CORONARY ANGIOPLASTY WITH STENT PLACEMENT  2004    CYST REMOVAL  's    ENDOSCOPY, COLON, DIAGNOSTIC      GASTROSTOMY TUBE PLACEMENT  2014    [place]. Lorean Snare and later removed    HEEL SPUR SURGERY Left years ago    JOINT REPLACEMENT Left late     OTHER SURGICAL HISTORY      scraping of trach x 2 to treat stenosis / last one 2016    POLYPECTOMY  2013    internal hemorrhoids - michelle    TONSILLECTOMY      TOTAL KNEE ARTHROPLASTY Right 2020    RIGHT KNEE TOTAL ARTHROPLASTY    ++RAYMOND++   ++PNB++     ++LATEX ALLERGY++   ++IODINE ALLERGY++ performed by Louann Jeronimo MD at [address] [place] [address],[facility] Floor  14    [place]    UPPER GASTROINTESTINAL ENDOSCOPY      gastritis w. superficial antral ulerations - [place]     No family history on file.   Past Medical History:   Diagnosis Date    Arthritis     CAD (coronary artery disease)     follows with Dr. Fernando Soriano CKD (chronic kidney disease)     stage 3 / follows with Dr. Esther Mensah Hypertension     Respiratory failure ([facility] [address]     history of 2014  when had stroke, trach  x 2 weeks    Thyroid disease     Tracheal stenosis     Unspecified cerebral artery occlusion with cerebral infarction 2014    right side flacid, hemorrhagic stroke dt elevated BP/residual s/s is at right side loss of fine motor skills and some weakness, drags right leg, slight memory loss and aphasia when she is tired       Vitals:    10/27/20 1304   BP: 124/82   Pulse: 62   Temp: 95.4 °F (35.2 °C)   SpO2: 97%   Weight: 150 lb (68 kg)     BP Readings from Last 3 Encounters:   10/27/20 124/82   09/24/20 124/70   08/29/20 (!) 152/70    110/64    Physical Exam  Vitals signs and nursing note reviewed. Constitutional:       Appearance: She is well-developed. HENT:      Head: Normocephalic. Right Ear: External ear normal.      Left Ear: External ear normal.      Nose: Nose normal.   Eyes:      Conjunctiva/sclera: Conjunctivae normal.      Pupils: Pupils are equal, round, and reactive to light. Neck:      Musculoskeletal: Normal range of motion and neck supple. Cardiovascular:      Rate and Rhythm: Normal rate. Pulmonary:      Breath sounds: Normal breath sounds. Abdominal:      General: Bowel sounds are normal.      Palpations: Abdomen is soft. Musculoskeletal: Normal range of motion. Skin:     General: Skin is warm and dry. Neurological:      Mental Status: She is alert and oriented to person, place, and time. Psychiatric:         Behavior: Behavior normal.     Today's vitals physical examination stable. We will maintain present medications care. Reassessment 3 months with blood work at that time. Current labs are very stable. Renal function stable. A1c at 5.0.       Lab Results   Component Value Date    TSH 0.887 10/22/2020    TSH 1.820 06/03/2020    Y5QVJXG 8.9 10/22/2020    Q4YLLUS 9.2 06/03/2020     Lab Results   Component Value Date    CHOL 174 10/22/2020    CHOL 214 (H) 07/14/2020     Lab Results   Component Value Date    TRIG 47 10/22/2020    TRIG 108 07/14/2020     Lab Results   Component Value Date    HDL 66 10/22/2020    HDL 58 07/14/2020     No results found for: MATTY [facility]  Lab Results   Component Value Date    LABVLDL 9 10/22/2020    LABVLDL 22 07/14/2020     No results found for: [facility]  Lab Results   Component Value Date    WBC 2.9 (L) 10/22/2020    HGB 12.3 10/22/2020    HCT 39.4 10/22/2020    MCV 98.5 10/22/2020     10/22/2020    LYMPHOPCT 32.8 10/22/2020    RBC 4.00 10/22/2020    MCH 30.8 10/22/2020    MCHC 31.2 (L) 10/22/2020    RDW 13.8 10/22/2020     Lab Results   Component Value Date     10/22/2020    K 4.4 10/22/2020     10/22/2020    CO2 21 (L) 10/22/2020    BUN 36 (H) 10/22/2020    CREATININE 1.5 (H) 10/22/2020    GLUCOSE 103 (H) 10/22/2020    CALCIUM 9.6 10/22/2020    PROT 7.0 10/22/2020    LABALBU 4.4 10/22/2020    BILITOT 0.4 10/22/2020    ALKPHOS 55 10/22/2020    AST 16 10/22/2020    ALT 11 10/22/2020    LABGLOM 34 10/22/2020    GFRAA 41 10/22/2020      No results found for: PSA, PSADIA   Lab Results   Component Value Date    LABA1C 5.0 10/22/2020    LABA1C 5.3 06/03/2020    LABA1C 5.5 03/09/2020     No results found for: EAG     Plan Per Assessment:  Alesha Laird was seen today for discuss labs, hypertension and post-op check. Diagnoses and all orders for this visit:    Needs flu shot  -     INFLUENZA, QUADV, ADJUVANTED, 65 YRS =, IM, PF, PREFILL SYR, 0.5ML (FLUAD)    Other orders  -     vitamin D (ERGOCALCIFEROL) 1.25 MG (37991 UT) CAPS capsule; TK 1 C PO WEEKLY            No follow-ups on file. Lacie Turpin DO    Note was generated with the assistance of voice recognition software. Document was reviewed however may contain grammatical errors.

## 2020-11-03 PROBLEM — I10 ESSENTIAL HYPERTENSION, BENIGN: Status: RESOLVED | Noted: 2020-03-10 | Resolved: 2020-11-03

## 2021-01-22 DIAGNOSIS — I25.10 CORONARY ARTERY DISEASE INVOLVING NATIVE CORONARY ARTERY OF NATIVE HEART WITHOUT ANGINA PECTORIS: ICD-10-CM

## 2021-01-22 DIAGNOSIS — E11.9 TYPE 2 DIABETES MELLITUS WITHOUT COMPLICATION, WITHOUT LONG-TERM CURRENT USE OF INSULIN (HCC): Chronic | ICD-10-CM

## 2021-01-22 DIAGNOSIS — I10 ESSENTIAL HYPERTENSION, BENIGN: Chronic | ICD-10-CM

## 2021-01-22 DIAGNOSIS — E78.2 MIXED HYPERLIPIDEMIA: ICD-10-CM

## 2021-01-22 DIAGNOSIS — E03.9 HYPOTHYROIDISM, UNSPECIFIED TYPE: ICD-10-CM

## 2021-01-22 DIAGNOSIS — N18.32 STAGE 3B CHRONIC KIDNEY DISEASE (HCC): ICD-10-CM

## 2021-01-22 LAB
ALBUMIN SERPL-MCNC: 4.5 G/DL (ref 3.5–5.2)
ALP BLD-CCNC: 56 U/L (ref 35–104)
ALT SERPL-CCNC: 14 U/L (ref 0–32)
ANION GAP SERPL CALCULATED.3IONS-SCNC: 17 MMOL/L (ref 7–16)
AST SERPL-CCNC: 18 U/L (ref 0–31)
BASOPHILS ABSOLUTE: 0.02 E9/L (ref 0–0.2)
BASOPHILS RELATIVE PERCENT: 0.6 % (ref 0–2)
BILIRUB SERPL-MCNC: 0.5 MG/DL (ref 0–1.2)
BUN BLDV-MCNC: 35 MG/DL (ref 8–23)
CALCIUM SERPL-MCNC: 10 MG/DL (ref 8.6–10.2)
CHLORIDE BLD-SCNC: 105 MMOL/L (ref 98–107)
CO2: 23 MMOL/L (ref 22–29)
CREAT SERPL-MCNC: 1.6 MG/DL (ref 0.5–1)
EOSINOPHILS ABSOLUTE: 0.06 E9/L (ref 0.05–0.5)
EOSINOPHILS RELATIVE PERCENT: 1.7 % (ref 0–6)
GFR AFRICAN AMERICAN: 38
GFR NON-AFRICAN AMERICAN: 31 ML/MIN/1.73
GLUCOSE BLD-MCNC: 96 MG/DL (ref 74–99)
HBA1C MFR BLD: 5.8 % (ref 4–5.6)
HCT VFR BLD CALC: 42.6 % (ref 34–48)
HEMOGLOBIN: 13.4 G/DL (ref 11.5–15.5)
IMMATURE GRANULOCYTES #: 0.01 E9/L
IMMATURE GRANULOCYTES %: 0.3 % (ref 0–5)
LYMPHOCYTES ABSOLUTE: 1.12 E9/L (ref 1.5–4)
LYMPHOCYTES RELATIVE PERCENT: 32 % (ref 20–42)
MCH RBC QN AUTO: 29.5 PG (ref 26–35)
MCHC RBC AUTO-ENTMCNC: 31.5 % (ref 32–34.5)
MCV RBC AUTO: 93.8 FL (ref 80–99.9)
MONOCYTES ABSOLUTE: 0.35 E9/L (ref 0.1–0.95)
MONOCYTES RELATIVE PERCENT: 10 % (ref 2–12)
NEUTROPHILS ABSOLUTE: 1.94 E9/L (ref 1.8–7.3)
NEUTROPHILS RELATIVE PERCENT: 55.4 % (ref 43–80)
PDW BLD-RTO: 12.4 FL (ref 11.5–15)
PLATELET # BLD: 129 E9/L (ref 130–450)
PMV BLD AUTO: 11.2 FL (ref 7–12)
POTASSIUM SERPL-SCNC: 4.9 MMOL/L (ref 3.5–5)
RBC # BLD: 4.54 E12/L (ref 3.5–5.5)
SODIUM BLD-SCNC: 145 MMOL/L (ref 132–146)
TOTAL PROTEIN: 7.5 G/DL (ref 6.4–8.3)
WBC # BLD: 3.5 E9/L (ref 4.5–11.5)

## 2021-02-09 RX ORDER — LABETALOL 100 MG/1
TABLET, FILM COATED ORAL
Qty: 180 TABLET | Refills: 3 | Status: SHIPPED
Start: 2021-02-09 | End: 2022-02-08 | Stop reason: SDUPTHER

## 2021-04-05 ENCOUNTER — TELEPHONE (OUTPATIENT)
Dept: PRIMARY CARE CLINIC | Age: 76
End: 2021-04-05

## 2021-04-05 DIAGNOSIS — E78.2 MIXED HYPERLIPIDEMIA: ICD-10-CM

## 2021-04-05 DIAGNOSIS — I10 MALIGNANT HYPERTENSION: ICD-10-CM

## 2021-04-05 DIAGNOSIS — E11.9 TYPE 2 DIABETES MELLITUS WITHOUT COMPLICATION, WITHOUT LONG-TERM CURRENT USE OF INSULIN (HCC): Chronic | ICD-10-CM

## 2021-04-05 DIAGNOSIS — E03.9 HYPOTHYROIDISM, UNSPECIFIED TYPE: ICD-10-CM

## 2021-04-05 DIAGNOSIS — R31.1 BENIGN ESSENTIAL MICROSCOPIC HEMATURIA: ICD-10-CM

## 2021-04-05 DIAGNOSIS — N18.32 STAGE 3B CHRONIC KIDNEY DISEASE (HCC): ICD-10-CM

## 2021-04-05 DIAGNOSIS — I10 MALIGNANT HYPERTENSION: Primary | ICD-10-CM

## 2021-04-05 LAB
ALBUMIN SERPL-MCNC: 4.5 G/DL (ref 3.5–5.2)
ALP BLD-CCNC: 54 U/L (ref 35–104)
ALT SERPL-CCNC: 14 U/L (ref 0–32)
ANION GAP SERPL CALCULATED.3IONS-SCNC: 11 MMOL/L (ref 7–16)
AST SERPL-CCNC: 19 U/L (ref 0–31)
BASOPHILS ABSOLUTE: 0.03 E9/L (ref 0–0.2)
BASOPHILS RELATIVE PERCENT: 0.7 % (ref 0–2)
BILIRUB SERPL-MCNC: 0.5 MG/DL (ref 0–1.2)
BUN BLDV-MCNC: 45 MG/DL (ref 8–23)
CALCIUM SERPL-MCNC: 9.7 MG/DL (ref 8.6–10.2)
CHLORIDE BLD-SCNC: 102 MMOL/L (ref 98–107)
CHOLESTEROL, TOTAL: 207 MG/DL (ref 0–199)
CO2: 26 MMOL/L (ref 22–29)
CREAT SERPL-MCNC: 1.8 MG/DL (ref 0.5–1)
EOSINOPHILS ABSOLUTE: 0.08 E9/L (ref 0.05–0.5)
EOSINOPHILS RELATIVE PERCENT: 2 % (ref 0–6)
GFR AFRICAN AMERICAN: 33
GFR NON-AFRICAN AMERICAN: 27 ML/MIN/1.73
GLUCOSE BLD-MCNC: 96 MG/DL (ref 74–99)
HBA1C MFR BLD: 5.4 % (ref 4–5.6)
HCT VFR BLD CALC: 42.1 % (ref 34–48)
HDLC SERPL-MCNC: 60 MG/DL
HEMOGLOBIN: 13.2 G/DL (ref 11.5–15.5)
IMMATURE GRANULOCYTES #: 0.01 E9/L
IMMATURE GRANULOCYTES %: 0.2 % (ref 0–5)
LDL CHOLESTEROL CALCULATED: 131 MG/DL (ref 0–99)
LYMPHOCYTES ABSOLUTE: 1.52 E9/L (ref 1.5–4)
LYMPHOCYTES RELATIVE PERCENT: 37.1 % (ref 20–42)
MCH RBC QN AUTO: 29.3 PG (ref 26–35)
MCHC RBC AUTO-ENTMCNC: 31.4 % (ref 32–34.5)
MCV RBC AUTO: 93.6 FL (ref 80–99.9)
MONOCYTES ABSOLUTE: 0.41 E9/L (ref 0.1–0.95)
MONOCYTES RELATIVE PERCENT: 10 % (ref 2–12)
NEUTROPHILS ABSOLUTE: 2.05 E9/L (ref 1.8–7.3)
NEUTROPHILS RELATIVE PERCENT: 50 % (ref 43–80)
PDW BLD-RTO: 13.6 FL (ref 11.5–15)
PLATELET # BLD: 132 E9/L (ref 130–450)
PMV BLD AUTO: 11.4 FL (ref 7–12)
POTASSIUM SERPL-SCNC: 5.1 MMOL/L (ref 3.5–5)
RBC # BLD: 4.5 E12/L (ref 3.5–5.5)
SODIUM BLD-SCNC: 139 MMOL/L (ref 132–146)
T4 TOTAL: 8.4 MCG/DL (ref 4.5–11.7)
TOTAL PROTEIN: 7.4 G/DL (ref 6.4–8.3)
TRIGL SERPL-MCNC: 80 MG/DL (ref 0–149)
TSH SERPL DL<=0.05 MIU/L-ACNC: 1.15 UIU/ML (ref 0.27–4.2)
VLDLC SERPL CALC-MCNC: 16 MG/DL
WBC # BLD: 4.1 E9/L (ref 4.5–11.5)

## 2021-04-07 ENCOUNTER — OFFICE VISIT (OUTPATIENT)
Dept: PRIMARY CARE CLINIC | Age: 76
End: 2021-04-07
Payer: MEDICARE

## 2021-04-07 VITALS
HEART RATE: 53 BPM | SYSTOLIC BLOOD PRESSURE: 120 MMHG | HEIGHT: 63 IN | WEIGHT: 162 LBS | BODY MASS INDEX: 28.7 KG/M2 | DIASTOLIC BLOOD PRESSURE: 76 MMHG | OXYGEN SATURATION: 96 % | TEMPERATURE: 97.8 F

## 2021-04-07 DIAGNOSIS — N18.32 STAGE 3B CHRONIC KIDNEY DISEASE (HCC): ICD-10-CM

## 2021-04-07 DIAGNOSIS — E11.9 TYPE 2 DIABETES MELLITUS WITHOUT COMPLICATION, WITHOUT LONG-TERM CURRENT USE OF INSULIN (HCC): ICD-10-CM

## 2021-04-07 DIAGNOSIS — E03.9 HYPOTHYROIDISM, UNSPECIFIED TYPE: ICD-10-CM

## 2021-04-07 DIAGNOSIS — I10 ESSENTIAL HYPERTENSION: Primary | ICD-10-CM

## 2021-04-07 DIAGNOSIS — E78.2 MIXED HYPERLIPIDEMIA: ICD-10-CM

## 2021-04-07 DIAGNOSIS — I61.9 HEMORRHAGIC STROKE (HCC): ICD-10-CM

## 2021-04-07 PROCEDURE — 1090F PRES/ABSN URINE INCON ASSESS: CPT | Performed by: FAMILY MEDICINE

## 2021-04-07 PROCEDURE — G8400 PT W/DXA NO RESULTS DOC: HCPCS | Performed by: FAMILY MEDICINE

## 2021-04-07 PROCEDURE — 4040F PNEUMOC VAC/ADMIN/RCVD: CPT | Performed by: FAMILY MEDICINE

## 2021-04-07 PROCEDURE — 3044F HG A1C LEVEL LT 7.0%: CPT | Performed by: FAMILY MEDICINE

## 2021-04-07 PROCEDURE — 3017F COLORECTAL CA SCREEN DOC REV: CPT | Performed by: FAMILY MEDICINE

## 2021-04-07 PROCEDURE — 2022F DILAT RTA XM EVC RTNOPTHY: CPT | Performed by: FAMILY MEDICINE

## 2021-04-07 PROCEDURE — G8428 CUR MEDS NOT DOCUMENT: HCPCS | Performed by: FAMILY MEDICINE

## 2021-04-07 PROCEDURE — 1123F ACP DISCUSS/DSCN MKR DOCD: CPT | Performed by: FAMILY MEDICINE

## 2021-04-07 PROCEDURE — 99214 OFFICE O/P EST MOD 30 MIN: CPT | Performed by: FAMILY MEDICINE

## 2021-04-07 PROCEDURE — G8417 CALC BMI ABV UP PARAM F/U: HCPCS | Performed by: FAMILY MEDICINE

## 2021-04-07 PROCEDURE — 1036F TOBACCO NON-USER: CPT | Performed by: FAMILY MEDICINE

## 2021-04-07 ASSESSMENT — PATIENT HEALTH QUESTIONNAIRE - PHQ9
SUM OF ALL RESPONSES TO PHQ QUESTIONS 1-9: 0
2. FEELING DOWN, DEPRESSED OR HOPELESS: 0
SUM OF ALL RESPONSES TO PHQ9 QUESTIONS 1 & 2: 0

## 2021-04-07 NOTE — PROGRESS NOTES
21     Marilynn Bullock    : 1945 Sex: female   Age: 77 y.o. Chief Complaint   Patient presents with    Discuss Labs    Hypertension       Prior to Admission medications    Medication Sig Start Date End Date Taking? Authorizing Provider   labetalol (NORMODYNE) 100 MG tablet TAKE 1 TABLET BY MOUTH TWICE DAILY 21   Aris Yung DO   vitamin D (ERGOCALCIFEROL) 1.25 MG (42683 UT) CAPS capsule TK 1 C PO WEEKLY 10/27/20   Sally Nicholson,    traMADol (ULTRAM) 50 MG tablet TK 1 TO 2 TS PO Q 6 H PRN P 20   Historical Provider, MD   acetaminophen (TYLENOL) 500 MG tablet Take 2 tablets by mouth every 8 hours 20   Stephen Valdes MD   aspirin 325 MG EC tablet Take 1 tablet by mouth daily for 28 days 20  Stephen Valdes MD   Omega-3 Fatty Acids (FISH OIL PO) Take by mouth daily LD 2020    Historical Provider, MD   furosemide (LASIX) 20 MG tablet Take 0.5 tablets by mouth every other day 20   Aris Yung DO   spironolactone (ALDACTONE) 25 MG tablet Take 25 mg by mouth daily    Historical Provider, MD   cloNIDine (CATAPRES) 0.1 MG tablet TAKE 1 TABLET BY MOUTH TWICE DAILY 20   Aris Yung, DO   hydrALAZINE (APRESOLINE) 25 MG tablet TAKE 1 TABLET BY MOUTH FOUR TIMES DAILY 20   Aris Yung, DO   citalopram (CELEXA) 20 MG tablet TAKE 1 TABLET BY MOUTH DAILY 20   Sally Nicholson DO   nitroGLYCERIN (NITRODUR) 0.2 MG/HR APPLY 1 PATCH EXTERNALLY TO THE SKIN DAILY 20   Aris Yung DO   levothyroxine (SYNTHROID) 88 MCG tablet TK 1 T PO QD 3/10/20   Aris Yung DO          HPI: Ciararichard seen today medical follow-up diabetes hypertension hypothyroid hemorrhagic CVA in the past.  CVA was secondary to hypertensive bleed. Chronic kidney disease hyperlipidemia stable. All meds reviewed at this time stable. She is up 12 pounds since last October so I did encourage tighter control on a diet and exercise.   Medications reviewed and will be maintained as prescribed. Review of Systems   Constitutional: Negative. HENT: Negative. Eyes: Negative. Respiratory: Negative. Gastrointestinal: Negative. Endocrine: Negative. Genitourinary: Negative. Musculoskeletal: Negative. Skin: Negative. Allergic/Immunologic: Negative. Neurological: Negative. Hematological: Negative. Psychiatric/Behavioral: Negative. Today systems review stable.           Current Outpatient Medications:     labetalol (NORMODYNE) 100 MG tablet, TAKE 1 TABLET BY MOUTH TWICE DAILY, Disp: 180 tablet, Rfl: 3    vitamin D (ERGOCALCIFEROL) 1.25 MG (57165 UT) CAPS capsule, TK 1 C PO WEEKLY, Disp: 12 capsule, Rfl: 3    traMADol (ULTRAM) 50 MG tablet, TK 1 TO 2 TS PO Q 6 H PRN P, Disp: , Rfl:     acetaminophen (TYLENOL) 500 MG tablet, Take 2 tablets by mouth every 8 hours, Disp: 180 tablet, Rfl: 0    aspirin 325 MG EC tablet, Take 1 tablet by mouth daily for 28 days, Disp: 28 tablet, Rfl: 0    Omega-3 Fatty Acids (FISH OIL PO), Take by mouth daily LD 8/22/2020, Disp: , Rfl:     furosemide (LASIX) 20 MG tablet, Take 0.5 tablets by mouth every other day, Disp: 45 tablet, Rfl: 3    spironolactone (ALDACTONE) 25 MG tablet, Take 25 mg by mouth daily, Disp: , Rfl:     cloNIDine (CATAPRES) 0.1 MG tablet, TAKE 1 TABLET BY MOUTH TWICE DAILY, Disp: 180 tablet, Rfl: 3    hydrALAZINE (APRESOLINE) 25 MG tablet, TAKE 1 TABLET BY MOUTH FOUR TIMES DAILY, Disp: 360 tablet, Rfl: 3    citalopram (CELEXA) 20 MG tablet, TAKE 1 TABLET BY MOUTH DAILY, Disp: 90 tablet, Rfl: 3    nitroGLYCERIN (NITRODUR) 0.2 MG/HR, APPLY 1 PATCH EXTERNALLY TO THE SKIN DAILY, Disp: 90 patch, Rfl: 3    levothyroxine (SYNTHROID) 88 MCG tablet, TK 1 T PO QD, Disp: 90 tablet, Rfl: 3    Allergies   Allergen Reactions    Latex      Reaction unknown / may have been swelling    Norvasc [Amlodipine Besylate] Anaphylaxis    Dye [Iodides]      Reaction unknown       Social History     Tobacco Use    Smoking status: Former Smoker     Packs/day: 1.50     Years: 8.00     Pack years: 12.00     Quit date: 1976     Years since quittin.2    Smokeless tobacco: Never Used   Substance Use Topics    Alcohol use: No    Drug use: Never      Past Surgical History:   Procedure Laterality Date     SECTION      1    COLONOSCOPY      CORONARY ANGIOPLASTY WITH STENT PLACEMENT  2004    CYST REMOVAL  's    ENDOSCOPY, COLON, DIAGNOSTIC      GASTROSTOMY TUBE PLACEMENT  2014    [place]. Eusebio Couch and later removed    HEEL SPUR SURGERY Left years ago    JOINT REPLACEMENT Left late     OTHER SURGICAL HISTORY      scraping of trach x 2 to treat stenosis / last one 2016    POLYPECTOMY  2013    internal hemorrhoids - michelle    TONSILLECTOMY      TOTAL KNEE ARTHROPLASTY Right 2020    RIGHT KNEE TOTAL ARTHROPLASTY    ++RAYMOND++   ++PNB++     ++LATEX ALLERGY++   ++IODINE ALLERGY++ performed by Deedee Brunner, MD at [address] [place] [address] Road  [address]    [place]    UPPER GASTROINTESTINAL ENDOSCOPY      gastritis w. superficial antral ulerations - [place]     No family history on file.   Past Medical History:   Diagnosis Date    Arthritis     CAD (coronary artery disease)     follows with Dr. Manuelito Samano CKD (chronic kidney disease)     stage 3 / follows with Dr. Óscar Stevens Hypertension     Respiratory failure ([facility] [address]     history 2014  when had stroke, trach  x 2 weeks    Thyroid disease     Tracheal stenosis     Unspecified cerebral artery occlusion with cerebral infarction 2014    right side flacid, hemorrhagic stroke dt elevated BP/residual s/s is at right side loss of fine motor skills and some weakness, drags right leg, slight memory loss and aphasia when she is tired       Vitals:    21 1303   BP: 120/76   Pulse: 53   Temp: 97.8 °F (36.6 °C)   SpO2: 96%   Weight: 162 lb (73.5 kg)   Height: 5' 3\" (1.6 m)     BP Readings from Last 3 Encounters:   04/07/21 120/76   10/27/20 124/82   09/24/20 124/70      120/78  Physical Exam  Vitals signs and nursing note reviewed. Constitutional:       Appearance: She is well-developed. HENT:      Head: Normocephalic. Right Ear: External ear normal.      Left Ear: External ear normal.      Nose: Nose normal.   Eyes:      Conjunctiva/sclera: Conjunctivae normal.      Pupils: Pupils are equal, round, and reactive to light. Neck:      Musculoskeletal: Normal range of motion and neck supple. Cardiovascular:      Rate and Rhythm: Normal rate. Pulmonary:      Breath sounds: Normal breath sounds. Abdominal:      General: Bowel sounds are normal.      Palpations: Abdomen is soft. Musculoskeletal: Normal range of motion. Skin:     General: Skin is warm and dry. Neurological:      Mental Status: She is alert and oriented to person, place, and time. Psychiatric:         Behavior: Behavior normal.     Present vitals physical examination stable medications as prescribed. Blood pressure very well controlled. A1c at 5.4 cholesterol 207. Comfortable with current meds current dosing tighter control on a diet and exercise.   Reassessment with me in 3 months blood work prior  Lab Results   Component Value Date    TSH 1.150 04/05/2021    TSH 0.887 10/22/2020    S2SXYWC 8.4 04/05/2021    N0GMBRN 8.9 10/22/2020     Lab Results   Component Value Date    CHOL 207 (H) 04/05/2021    CHOL 174 10/22/2020     Lab Results   Component Value Date    TRIG 80 04/05/2021    TRIG 47 10/22/2020     Lab Results   Component Value Date    HDL 60 04/05/2021    HDL 66 10/22/2020     No results found for: [facility]  Lab Results   Component Value Date    LABVLDL 16 04/05/2021    LABVLDL 9 10/22/2020     No results found for: [facility]  Lab Results   Component Value Date    WBC 4.1 (L) 04/05/2021    HGB 13.2 04/05/2021    HCT 42.1 04/05/2021    MCV 93.6 04/05/2021     04/05/2021    LYMPHOPCT 37.1 04/05/2021 RBC 4.50 04/05/2021    MCH 29.3 04/05/2021    MCHC 31.4 (L) 04/05/2021    RDW 13.6 04/05/2021     Lab Results   Component Value Date     04/05/2021    K 5.1 (H) 04/05/2021     04/05/2021    CO2 26 04/05/2021    BUN 45 (H) 04/05/2021    CREATININE 1.8 (H) 04/05/2021    GLUCOSE 96 04/05/2021    CALCIUM 9.7 04/05/2021    PROT 7.4 04/05/2021    LABALBU 4.5 04/05/2021    BILITOT 0.5 04/05/2021    ALKPHOS 54 04/05/2021    AST 19 04/05/2021    ALT 14 04/05/2021    LABGLOM 27 04/05/2021    GFRAA 33 04/05/2021      No results found for: PSA, PSADIA   Lab Results   Component Value Date    LABA1C 5.4 04/05/2021    LABA1C 5.8 (H) 01/22/2021    LABA1C 5.0 10/22/2020     No results found for: EAG           Plan Per Assessment:  Aris Fernandes was seen today for discuss labs and hypertension. Diagnoses and all orders for this visit:    Essential hypertension  -     Comprehensive Metabolic Panel; Future  -     Lipid Panel; Future  -     Hemoglobin A1C; Future  -     CBC Auto Differential; Future  -     T4; Future  -     TSH without Reflex; Future    Type 2 diabetes mellitus without complication, without long-term current use of insulin (HCC)  -     Comprehensive Metabolic Panel; Future  -     Lipid Panel; Future  -     Hemoglobin A1C; Future    Hypothyroidism, unspecified type  -     Comprehensive Metabolic Panel; Future  -     Lipid Panel; Future  -     Hemoglobin A1C; Future  -     CBC Auto Differential; Future  -     T4; Future  -     TSH without Reflex; Future    Hemorrhagic stroke (HCC)    Stage 3b chronic kidney disease  -     Comprehensive Metabolic Panel; Future  -     Lipid Panel; Future  -     Hemoglobin A1C; Future    Mixed hyperlipidemia  -     Comprehensive Metabolic Panel; Future  -     Lipid Panel; Future  -     Hemoglobin A1C; Future            Return in about 3 months (around 7/7/2021). Heather Wilson DO    Note was generated with the assistance of voice recognition software.   Document was reviewed however may contain grammatical errors.

## 2021-04-20 ENCOUNTER — TELEPHONE (OUTPATIENT)
Dept: PRIMARY CARE CLINIC | Age: 76
End: 2021-04-20

## 2021-04-20 RX ORDER — SULFAMETHOXAZOLE AND TRIMETHOPRIM 800; 160 MG/1; MG/1
1 TABLET ORAL 2 TIMES DAILY
Qty: 14 TABLET | Refills: 0 | Status: SHIPPED | OUTPATIENT
Start: 2021-04-20 | End: 2021-04-27

## 2021-04-20 NOTE — TELEPHONE ENCOUNTER
Pt c/o urinary frequency for the past few days. She said that it has gotten worse over the past couple of days and she is not feeling well. She has no other symptoms. She does not think she can make it into the office and wants to know if you can send something in for her.

## 2021-05-14 RX ORDER — SPIRONOLACTONE 25 MG/1
TABLET ORAL
Qty: 90 TABLET | Refills: 1 | Status: SHIPPED
Start: 2021-05-14 | End: 2021-11-23

## 2021-05-14 RX ORDER — LEVOTHYROXINE SODIUM 88 UG/1
TABLET ORAL
Qty: 90 TABLET | Refills: 3 | Status: SHIPPED
Start: 2021-05-14 | End: 2022-02-22 | Stop reason: SDUPTHER

## 2021-06-01 RX ORDER — NITROGLYCERIN 40 MG/1
PATCH TRANSDERMAL
Qty: 90 PATCH | Refills: 3 | Status: SHIPPED
Start: 2021-06-01 | End: 2022-02-22 | Stop reason: SDUPTHER

## 2021-06-07 RX ORDER — HYDRALAZINE HYDROCHLORIDE 25 MG/1
TABLET, FILM COATED ORAL
Qty: 360 TABLET | Refills: 3 | Status: SHIPPED
Start: 2021-06-07 | End: 2022-02-22 | Stop reason: SDUPTHER

## 2021-06-07 RX ORDER — CLONIDINE HYDROCHLORIDE 0.1 MG/1
TABLET ORAL
Qty: 180 TABLET | Refills: 3 | Status: SHIPPED
Start: 2021-06-07 | End: 2022-02-22 | Stop reason: SDUPTHER

## 2021-06-07 RX ORDER — CITALOPRAM 20 MG/1
20 TABLET ORAL DAILY
Qty: 90 TABLET | Refills: 3 | Status: SHIPPED
Start: 2021-06-07 | End: 2022-02-22 | Stop reason: SDUPTHER

## 2021-06-30 ENCOUNTER — OFFICE VISIT (OUTPATIENT)
Dept: PRIMARY CARE CLINIC | Age: 76
End: 2021-06-30
Payer: MEDICARE

## 2021-06-30 VITALS
HEIGHT: 63 IN | TEMPERATURE: 97.1 F | OXYGEN SATURATION: 97 % | SYSTOLIC BLOOD PRESSURE: 132 MMHG | DIASTOLIC BLOOD PRESSURE: 86 MMHG | HEART RATE: 62 BPM | BODY MASS INDEX: 28.7 KG/M2

## 2021-06-30 DIAGNOSIS — I10 ESSENTIAL HYPERTENSION: ICD-10-CM

## 2021-06-30 DIAGNOSIS — I25.10 CORONARY ARTERY DISEASE INVOLVING NATIVE CORONARY ARTERY OF NATIVE HEART WITHOUT ANGINA PECTORIS: ICD-10-CM

## 2021-06-30 DIAGNOSIS — E11.9 TYPE 2 DIABETES MELLITUS WITHOUT COMPLICATION, WITHOUT LONG-TERM CURRENT USE OF INSULIN (HCC): Chronic | ICD-10-CM

## 2021-06-30 DIAGNOSIS — R21 RASH: Primary | ICD-10-CM

## 2021-06-30 DIAGNOSIS — E03.9 HYPOTHYROIDISM, UNSPECIFIED TYPE: ICD-10-CM

## 2021-06-30 PROBLEM — D68.9 COAGULATION DEFECT (HCC): Status: ACTIVE | Noted: 2021-06-30

## 2021-06-30 PROCEDURE — 99214 OFFICE O/P EST MOD 30 MIN: CPT | Performed by: FAMILY MEDICINE

## 2021-06-30 PROCEDURE — 1036F TOBACCO NON-USER: CPT | Performed by: FAMILY MEDICINE

## 2021-06-30 PROCEDURE — 2022F DILAT RTA XM EVC RTNOPTHY: CPT | Performed by: FAMILY MEDICINE

## 2021-06-30 PROCEDURE — G8427 DOCREV CUR MEDS BY ELIG CLIN: HCPCS | Performed by: FAMILY MEDICINE

## 2021-06-30 PROCEDURE — G8400 PT W/DXA NO RESULTS DOC: HCPCS | Performed by: FAMILY MEDICINE

## 2021-06-30 PROCEDURE — 4040F PNEUMOC VAC/ADMIN/RCVD: CPT | Performed by: FAMILY MEDICINE

## 2021-06-30 PROCEDURE — G8417 CALC BMI ABV UP PARAM F/U: HCPCS | Performed by: FAMILY MEDICINE

## 2021-06-30 PROCEDURE — 1123F ACP DISCUSS/DSCN MKR DOCD: CPT | Performed by: FAMILY MEDICINE

## 2021-06-30 PROCEDURE — 3017F COLORECTAL CA SCREEN DOC REV: CPT | Performed by: FAMILY MEDICINE

## 2021-06-30 PROCEDURE — 3044F HG A1C LEVEL LT 7.0%: CPT | Performed by: FAMILY MEDICINE

## 2021-06-30 PROCEDURE — 1090F PRES/ABSN URINE INCON ASSESS: CPT | Performed by: FAMILY MEDICINE

## 2021-06-30 RX ORDER — PREDNISONE 10 MG/1
TABLET ORAL
Qty: 30 TABLET | Refills: 0 | Status: SHIPPED
Start: 2021-06-30 | End: 2021-08-19

## 2021-06-30 NOTE — PROGRESS NOTES
21     Abdiel Lemon    : 1945 Sex: female   Age: 77 y.o. Chief Complaint   Patient presents with    Rash     stared a few days ago, very itchy and all over body hasbeen using benadryl and hydrocortisone cream to help but no relief       Prior to Admission medications    Medication Sig Start Date End Date Taking?  Authorizing Provider   predniSONE (DELTASONE) 10 MG tablet 3 qd x 5 days then 2 qd x 5 days then 1 qd x 5days 21  Yes Becky Nicholson DO   citalopram (CELEXA) 20 MG tablet Take 1 tablet by mouth daily 21  Yes Becky Nicholson DO   cloNIDine (CATAPRES) 0.1 MG tablet TAKE 1 TABLET BY MOUTH TWICE DAILY 21  Yes Becky Nicholson DO   hydrALAZINE (APRESOLINE) 25 MG tablet TAKE 1 TABLET BY MOUTH FOUR TIMES DAILY 21  Yes Becky Nicholson DO   nitroGLYCERIN (NITRODUR) 0.2 MG/HR APPLY 1 PATCH EXTERNALLY TO THE SKIN DAILY 21  Yes Becky Nicholson DO   spironolactone (ALDACTONE) 25 MG tablet TAKE 1 TABLET BY MOUTH EVERY DAY 21  Yes Becky Nicholson DO   levothyroxine (SYNTHROID) 88 MCG tablet TAKE 1 TABLET BY MOUTH EVERY DAY 21  Yes Becky Nicholson DO   labetalol (NORMODYNE) 100 MG tablet TAKE 1 TABLET BY MOUTH TWICE DAILY 21  Yes Becky Nicholson DO   vitamin D (ERGOCALCIFEROL) 1.25 MG (18448 UT) CAPS capsule TK 1 C PO WEEKLY 10/27/20  Yes Angel Nicholson, DO   traMADol (ULTRAM) 50 MG tablet TK 1 TO 2 TS PO Q 6 H PRN P 20  Yes Historical Provider, MD   acetaminophen (TYLENOL) 500 MG tablet Take 2 tablets by mouth every 8 hours 20  Yes Matty Pederson MD   Omega-3 Fatty Acids (FISH OIL PO) Take by mouth daily LD 2020   Yes Historical Provider, MD   furosemide (LASIX) 20 MG tablet Take 0.5 tablets by mouth every other day 20  Yes Becky Nicholson, DO   aspirin 325 MG EC tablet Take 1 tablet by mouth daily for 28 days 20  Matty Pederson MD          HPI: Patient seen today complaints of rash related to poison ivy. Past week. We will going to treat with some prednisone weaning course and recommending some Benadryl as needed. Hypertension coronary artery disease hypothyroid diabetes have all been stable. Lab work to be completed and then follow-up with me in the month. Sooner if problems. Medically otherwise stable. Review of Systems   Constitutional: Negative. HENT: Negative. Eyes: Negative. Respiratory: Negative. Gastrointestinal: Negative. Endocrine: Negative. Genitourinary: Negative. Musculoskeletal: Negative. Skin: Positive for rash. Allergic/Immunologic: Negative. Neurological: Negative. Hematological: Negative. Psychiatric/Behavioral: Negative. Today system review stable aside from the rash as noted.         Current Outpatient Medications:     predniSONE (DELTASONE) 10 MG tablet, 3 qd x 5 days then 2 qd x 5 days then 1 qd x 5days, Disp: 30 tablet, Rfl: 0    citalopram (CELEXA) 20 MG tablet, Take 1 tablet by mouth daily, Disp: 90 tablet, Rfl: 3    cloNIDine (CATAPRES) 0.1 MG tablet, TAKE 1 TABLET BY MOUTH TWICE DAILY, Disp: 180 tablet, Rfl: 3    hydrALAZINE (APRESOLINE) 25 MG tablet, TAKE 1 TABLET BY MOUTH FOUR TIMES DAILY, Disp: 360 tablet, Rfl: 3    nitroGLYCERIN (NITRODUR) 0.2 MG/HR, APPLY 1 PATCH EXTERNALLY TO THE SKIN DAILY, Disp: 90 patch, Rfl: 3    spironolactone (ALDACTONE) 25 MG tablet, TAKE 1 TABLET BY MOUTH EVERY DAY, Disp: 90 tablet, Rfl: 1    levothyroxine (SYNTHROID) 88 MCG tablet, TAKE 1 TABLET BY MOUTH EVERY DAY, Disp: 90 tablet, Rfl: 3    labetalol (NORMODYNE) 100 MG tablet, TAKE 1 TABLET BY MOUTH TWICE DAILY, Disp: 180 tablet, Rfl: 3    vitamin D (ERGOCALCIFEROL) 1.25 MG (17633 UT) CAPS capsule, TK 1 C PO WEEKLY, Disp: 12 capsule, Rfl: 3    traMADol (ULTRAM) 50 MG tablet, TK 1 TO 2 TS PO Q 6 H PRN P, Disp: , Rfl:     acetaminophen (TYLENOL) 500 MG tablet, Take 2 tablets by mouth every 8 hours, Disp: 180 tablet, Rfl: 0   Omega-3 Fatty Acids (FISH OIL PO), Take by mouth daily LD 2020, Disp: , Rfl:     furosemide (LASIX) 20 MG tablet, Take 0.5 tablets by mouth every other day, Disp: 45 tablet, Rfl: 3    aspirin 325 MG EC tablet, Take 1 tablet by mouth daily for 28 days, Disp: 28 tablet, Rfl: 0    Allergies   Allergen Reactions    Latex      Reaction unknown / may have been swelling    Norvasc [Amlodipine Besylate] Anaphylaxis    Dye [Iodides]      Reaction unknown       Social History     Tobacco Use    Smoking status: Former Smoker     Packs/day: 1.50     Years: 8.00     Pack years: 12.00     Quit date: 1976     Years since quittin.5    Smokeless tobacco: Never Used   Substance Use Topics    Alcohol use: No    Drug use: Never      Past Surgical History:   Procedure Laterality Date     SECTION      1    COLONOSCOPY      CORONARY ANGIOPLASTY WITH STENT PLACEMENT      CYST REMOVAL  's    ENDOSCOPY, COLON, DIAGNOSTIC      GASTROSTOMY TUBE PLACEMENT  2014    [place]. Nicole Cano and later removed    HEEL SPUR SURGERY Left years ago    JOINT REPLACEMENT Left late     OTHER SURGICAL HISTORY      scraping of trach x 2 to treat stenosis / last one 2016    POLYPECTOMY  2013    internal hemorrhoids - michelle    TONSILLECTOMY      TOTAL KNEE ARTHROPLASTY Right 2020    RIGHT KNEE TOTAL ARTHROPLASTY    ++RAYMOND++   ++PNB++     ++LATEX ALLERGY++   ++IODINE ALLERGY++ performed by Simon Hancock MD at [address] [place] [address],[address]  [address]    [place]    UPPER GASTROINTESTINAL ENDOSCOPY      gastritis w. superficial antral ulerations - [place]     No family history on file.   Past Medical History:   Diagnosis Date    Arthritis     CAD (coronary artery disease)     follows with Dr. Sha Javier CKD (chronic kidney disease)     stage 3 / follows with Dr. Kelly Ignacio Depression     Hypertension     Respiratory failure ([facility] [address]     history of /   when had stroke, trach  x 2 weeks    Thyroid disease     Tracheal stenosis     Unspecified cerebral artery occlusion with cerebral infarction 9/26/2014    right side flacid, hemorrhagic stroke dt elevated BP/residual s/s is at right side loss of fine motor skills and some weakness, drags right leg, slight memory loss and aphasia when she is tired       Vitals:    06/30/21 1243   BP: 132/86   Pulse: 62   Temp: 97.1 °F (36.2 °C)   SpO2: 97%   Weight: Comment: pt refused   Height: 5' 3\" (1.6 m)     BP Readings from Last 3 Encounters:   06/30/21 132/86   04/07/21 120/76   10/27/20 124/82        Physical Exam  Vitals and nursing note reviewed. Constitutional:       Appearance: She is well-developed. HENT:      Head: Normocephalic. Right Ear: External ear normal.      Left Ear: External ear normal.      Nose: Nose normal.   Eyes:      Conjunctiva/sclera: Conjunctivae normal.      Pupils: Pupils are equal, round, and reactive to light. Cardiovascular:      Rate and Rhythm: Normal rate. Pulmonary:      Breath sounds: Normal breath sounds. Abdominal:      General: Bowel sounds are normal.      Palpations: Abdomen is soft. Musculoskeletal:         General: Normal range of motion. Cervical back: Normal range of motion and neck supple. Skin:     General: Skin is warm and dry. Neurological:      Mental Status: She is alert and oriented to person, place, and time. Psychiatric:         Behavior: Behavior normal.     Present vitals physical examination stable. I will maintain current meds and care. Follow-up visit 1 month and lab studies to be completed prior. Sooner if problems. Prednisone discussed and continue over the next 2 weeks follow-up if worsening symptoms. Plan Per Assessment:  Abimael Paez was seen today for rash.     Diagnoses and all orders for this visit:    Rash    Essential hypertension    Coronary artery disease involving native coronary artery of native heart without angina pectoris    Hypothyroidism, unspecified type    Type 2 diabetes mellitus without complication, without long-term current use of insulin (HCC)    Other orders  -     predniSONE (DELTASONE) 10 MG tablet; 3 qd x 5 days then 2 qd x 5 days then 1 qd x 5days            Return in about 1 month (around 7/30/2021). [facility]     Note was generated with the assistance of voice recognition software. Document was reviewed however may contain grammatical errors.

## 2021-08-19 ENCOUNTER — OFFICE VISIT (OUTPATIENT)
Dept: PRIMARY CARE CLINIC | Age: 76
End: 2021-08-19
Payer: MEDICARE

## 2021-08-19 VITALS
OXYGEN SATURATION: 96 % | HEIGHT: 63 IN | SYSTOLIC BLOOD PRESSURE: 120 MMHG | WEIGHT: 165 LBS | DIASTOLIC BLOOD PRESSURE: 76 MMHG | HEART RATE: 69 BPM | TEMPERATURE: 96.9 F | BODY MASS INDEX: 29.23 KG/M2

## 2021-08-19 DIAGNOSIS — N18.32 STAGE 3B CHRONIC KIDNEY DISEASE (HCC): ICD-10-CM

## 2021-08-19 DIAGNOSIS — M79.671 RIGHT FOOT PAIN: ICD-10-CM

## 2021-08-19 DIAGNOSIS — I25.10 CORONARY ARTERY DISEASE INVOLVING NATIVE CORONARY ARTERY OF NATIVE HEART WITHOUT ANGINA PECTORIS: Primary | ICD-10-CM

## 2021-08-19 DIAGNOSIS — E03.9 HYPOTHYROIDISM, UNSPECIFIED TYPE: ICD-10-CM

## 2021-08-19 DIAGNOSIS — I10 ESSENTIAL HYPERTENSION: Chronic | ICD-10-CM

## 2021-08-19 PROBLEM — D68.9 COAGULATION DEFECT (HCC): Status: RESOLVED | Noted: 2021-06-30 | Resolved: 2021-08-19

## 2021-08-19 PROCEDURE — G8400 PT W/DXA NO RESULTS DOC: HCPCS | Performed by: FAMILY MEDICINE

## 2021-08-19 PROCEDURE — 3017F COLORECTAL CA SCREEN DOC REV: CPT | Performed by: FAMILY MEDICINE

## 2021-08-19 PROCEDURE — 99214 OFFICE O/P EST MOD 30 MIN: CPT | Performed by: FAMILY MEDICINE

## 2021-08-19 PROCEDURE — 1090F PRES/ABSN URINE INCON ASSESS: CPT | Performed by: FAMILY MEDICINE

## 2021-08-19 PROCEDURE — 1123F ACP DISCUSS/DSCN MKR DOCD: CPT | Performed by: FAMILY MEDICINE

## 2021-08-19 PROCEDURE — G8417 CALC BMI ABV UP PARAM F/U: HCPCS | Performed by: FAMILY MEDICINE

## 2021-08-19 PROCEDURE — 4040F PNEUMOC VAC/ADMIN/RCVD: CPT | Performed by: FAMILY MEDICINE

## 2021-08-19 PROCEDURE — G8427 DOCREV CUR MEDS BY ELIG CLIN: HCPCS | Performed by: FAMILY MEDICINE

## 2021-08-19 PROCEDURE — 1036F TOBACCO NON-USER: CPT | Performed by: FAMILY MEDICINE

## 2021-08-19 RX ORDER — METHYLPREDNISOLONE 4 MG/1
TABLET ORAL
Qty: 21 TABLET | Refills: 0 | Status: SHIPPED
Start: 2021-08-19 | End: 2021-10-06

## 2021-08-19 NOTE — PROGRESS NOTES
21     Lorri Blackwell    : 1945 Sex: female   Age: 77 y.o. Chief Complaint   Patient presents with    Foot Pain     right foot pain for about a month, had ice on it yesterday        Prior to Admission medications    Medication Sig Start Date End Date Taking? Authorizing Provider   methylPREDNISolone (MEDROL DOSEPACK) 4 MG tablet Take by mouth as directed.  21  Yes Ezio Nicholson, DO   citalopram (CELEXA) 20 MG tablet Take 1 tablet by mouth daily 21  Yes Ezio Nicholson, DO   cloNIDine (CATAPRES) 0.1 MG tablet TAKE 1 TABLET BY MOUTH TWICE DAILY 21  Yes Ezio Nicholson, DO   hydrALAZINE (APRESOLINE) 25 MG tablet TAKE 1 TABLET BY MOUTH FOUR TIMES DAILY 21  Yes Ezio Nicholson DO   nitroGLYCERIN (NITRODUR) 0.2 MG/HR APPLY 1 PATCH EXTERNALLY TO THE SKIN DAILY 21  Yes Ezio Nicholson DO   spironolactone (ALDACTONE) 25 MG tablet TAKE 1 TABLET BY MOUTH EVERY DAY 21  Yes Ezio Nicholson DO   levothyroxine (SYNTHROID) 88 MCG tablet TAKE 1 TABLET BY MOUTH EVERY DAY 21  Yes Ezio Nicholson DO   labetalol (NORMODYNE) 100 MG tablet TAKE 1 TABLET BY MOUTH TWICE DAILY 21  Yes Ezio Nicholson,    vitamin D (ERGOCALCIFEROL) 1.25 MG (85922 UT) CAPS capsule TK 1 C PO WEEKLY 10/27/20  Yes Ezio Nicholson DO   traMADol (ULTRAM) 50 MG tablet TK 1 TO 2 TS PO Q 6 H PRN P 20  Yes Historical Provider, MD   acetaminophen (TYLENOL) 500 MG tablet Take 2 tablets by mouth every 8 hours 20  Yes Izabel Combs MD   Omega-3 Fatty Acids (FISH OIL PO) Take by mouth daily LD 2020   Yes Historical Provider, MD   furosemide (LASIX) 20 MG tablet Take 0.5 tablets by mouth every other day 20  Yes Loreatha Rode, DO   aspirin 325 MG EC tablet Take 1 tablet by mouth daily for 28 days 20  Izabel Combs MD          HPI: Halima Milligan presents today medical problems coronary artery disease hypertensive disease hypothyroid chronic kidney disease all of which is been stable. History of malignant hypertension with cerebral bleed in the past and continues to do remarkably well. Blood sugars have been excellent. Blood work was to be completed and I have asked that she had that done prior to next week's visit. Today problems with left foot pain etiology uncertain. Pain is across the dorsal aspect of the left foot involving the metatarsals. No significant ankle pain. Review of Systems   Constitutional: Negative. HENT: Negative. Eyes: Negative. Respiratory: Negative. Gastrointestinal: Negative. Endocrine: Negative. Genitourinary: Negative. Musculoskeletal: Positive for arthralgias. Skin: Negative. Allergic/Immunologic: Negative. Neurological: Negative. Hematological: Negative. Psychiatric/Behavioral: Negative. Systems review stable aside from the foot pain as noted.         Current Outpatient Medications:     methylPREDNISolone (MEDROL DOSEPACK) 4 MG tablet, Take by mouth as directed., Disp: 21 tablet, Rfl: 0    citalopram (CELEXA) 20 MG tablet, Take 1 tablet by mouth daily, Disp: 90 tablet, Rfl: 3    cloNIDine (CATAPRES) 0.1 MG tablet, TAKE 1 TABLET BY MOUTH TWICE DAILY, Disp: 180 tablet, Rfl: 3    hydrALAZINE (APRESOLINE) 25 MG tablet, TAKE 1 TABLET BY MOUTH FOUR TIMES DAILY, Disp: 360 tablet, Rfl: 3    nitroGLYCERIN (NITRODUR) 0.2 MG/HR, APPLY 1 PATCH EXTERNALLY TO THE SKIN DAILY, Disp: 90 patch, Rfl: 3    spironolactone (ALDACTONE) 25 MG tablet, TAKE 1 TABLET BY MOUTH EVERY DAY, Disp: 90 tablet, Rfl: 1    levothyroxine (SYNTHROID) 88 MCG tablet, TAKE 1 TABLET BY MOUTH EVERY DAY, Disp: 90 tablet, Rfl: 3    labetalol (NORMODYNE) 100 MG tablet, TAKE 1 TABLET BY MOUTH TWICE DAILY, Disp: 180 tablet, Rfl: 3    vitamin D (ERGOCALCIFEROL) 1.25 MG (13424 UT) CAPS capsule, TK 1 C PO WEEKLY, Disp: 12 capsule, Rfl: 3    traMADol (ULTRAM) 50 MG tablet, TK 1 TO 2 TS PO Q 6 H PRN P, Disp: , Rfl:     acetaminophen Respiratory failure ([facility] [address]     history of / 2014  when had stroke, trach  x 2 weeks    Thyroid disease     Tracheal stenosis     Unspecified cerebral artery occlusion with cerebral infarction 9/26/2014    right side flacid, hemorrhagic stroke dt elevated BP/residual s/s is at right side loss of fine motor skills and some weakness, drags right leg, slight memory loss and aphasia when she is tired       Vitals:    08/19/21 1412   BP: 120/76   Pulse: 69   Temp: 96.9 °F (36.1 °C)   SpO2: 96%   Weight: 165 lb (74.8 kg)   Height: 5' 3\" (1.6 m)     BP Readings from Last 3 Encounters:   08/19/21 120/76   06/30/21 132/86   04/07/21 120/76        Physical Exam  Vitals and nursing note reviewed. Constitutional:       Appearance: She is well-developed. HENT:      Head: Normocephalic. Right Ear: External ear normal.      Left Ear: External ear normal.      Nose: Nose normal.   Eyes:      Conjunctiva/sclera: Conjunctivae normal.      Pupils: Pupils are equal, round, and reactive to light. Cardiovascular:      Rate and Rhythm: Normal rate. Pulmonary:      Breath sounds: Normal breath sounds. Abdominal:      General: Bowel sounds are normal.      Palpations: Abdomen is soft. Musculoskeletal:         General: Normal range of motion. Cervical back: Normal range of motion and neck supple. Skin:     General: Skin is warm and dry. Neurological:      Mental Status: She is alert and oriented to person, place, and time. Psychiatric:         Behavior: Behavior normal.     Present vitals physical exam stable. Foot pain and swelling is present dorsal aspect of the left foot. No known trauma. No bruising present. X-ray will be completed and then follow-up by phone. Medrol dose pack x1. Remainder meds as prescribed and then lab studies and follow-up peer next week for review. Further recommendations at that time.  Has a friend that is currently running a room offer her by the name of Pablo Elizabeth and he has been helpful with her activities of daily living and significant needs. Plan Per Assessment:  Armando Pelayo was seen today for foot pain. Diagnoses and all orders for this visit:    Coronary artery disease involving native coronary artery of native heart without angina pectoris    Essential hypertension    Hypothyroidism, unspecified type    Stage 3b chronic kidney disease (HCC)    Right foot pain  -     XR FOOT RIGHT (MIN 3 VIEWS); Future    Other orders  -     methylPREDNISolone (MEDROL DOSEPACK) 4 MG tablet; Take by mouth as directed. Return in about 1 week (around 8/26/2021). Cesia Cuenca DO    Note was generated with the assistance of voice recognition software. Document was reviewed however may contain grammatical errors.

## 2021-08-25 ENCOUNTER — OFFICE VISIT (OUTPATIENT)
Dept: PRIMARY CARE CLINIC | Age: 76
End: 2021-08-25
Payer: MEDICARE

## 2021-08-25 VITALS
OXYGEN SATURATION: 96 % | TEMPERATURE: 97.4 F | HEART RATE: 64 BPM | DIASTOLIC BLOOD PRESSURE: 78 MMHG | SYSTOLIC BLOOD PRESSURE: 140 MMHG

## 2021-08-25 DIAGNOSIS — E11.9 TYPE 2 DIABETES MELLITUS WITHOUT COMPLICATION, WITHOUT LONG-TERM CURRENT USE OF INSULIN (HCC): Chronic | ICD-10-CM

## 2021-08-25 DIAGNOSIS — E78.2 MIXED HYPERLIPIDEMIA: ICD-10-CM

## 2021-08-25 DIAGNOSIS — M79.671 RIGHT FOOT PAIN: ICD-10-CM

## 2021-08-25 DIAGNOSIS — I61.9 NONTRAUMATIC INTRACEREBRAL HEMORRHAGE, UNSPECIFIED CEREBRAL LOCATION, UNSPECIFIED LATERALITY (HCC): ICD-10-CM

## 2021-08-25 DIAGNOSIS — I10 ESSENTIAL HYPERTENSION: Primary | Chronic | ICD-10-CM

## 2021-08-25 PROCEDURE — G8400 PT W/DXA NO RESULTS DOC: HCPCS | Performed by: FAMILY MEDICINE

## 2021-08-25 PROCEDURE — 3017F COLORECTAL CA SCREEN DOC REV: CPT | Performed by: FAMILY MEDICINE

## 2021-08-25 PROCEDURE — 4040F PNEUMOC VAC/ADMIN/RCVD: CPT | Performed by: FAMILY MEDICINE

## 2021-08-25 PROCEDURE — G8427 DOCREV CUR MEDS BY ELIG CLIN: HCPCS | Performed by: FAMILY MEDICINE

## 2021-08-25 PROCEDURE — 1123F ACP DISCUSS/DSCN MKR DOCD: CPT | Performed by: FAMILY MEDICINE

## 2021-08-25 PROCEDURE — G8417 CALC BMI ABV UP PARAM F/U: HCPCS | Performed by: FAMILY MEDICINE

## 2021-08-25 PROCEDURE — 2022F DILAT RTA XM EVC RTNOPTHY: CPT | Performed by: FAMILY MEDICINE

## 2021-08-25 PROCEDURE — 3044F HG A1C LEVEL LT 7.0%: CPT | Performed by: FAMILY MEDICINE

## 2021-08-25 PROCEDURE — 99214 OFFICE O/P EST MOD 30 MIN: CPT | Performed by: FAMILY MEDICINE

## 2021-08-25 PROCEDURE — 1036F TOBACCO NON-USER: CPT | Performed by: FAMILY MEDICINE

## 2021-08-25 PROCEDURE — 1090F PRES/ABSN URINE INCON ASSESS: CPT | Performed by: FAMILY MEDICINE

## 2021-08-25 RX ORDER — FUROSEMIDE 20 MG/1
TABLET ORAL
Qty: 45 TABLET | Refills: 3 | Status: SHIPPED
Start: 2021-08-25 | End: 2021-10-06

## 2021-08-25 ASSESSMENT — ENCOUNTER SYMPTOMS
GASTROINTESTINAL NEGATIVE: 1
EYES NEGATIVE: 1
ALLERGIC/IMMUNOLOGIC NEGATIVE: 1
RESPIRATORY NEGATIVE: 1

## 2021-08-25 NOTE — PROGRESS NOTES
21     Stuart Reynoso    : 1945 Sex: female   Age: 77 y.o. Chief Complaint   Patient presents with    Foot Pain     recheck       Prior to Admission medications    Medication Sig Start Date End Date Taking? Authorizing Provider   furosemide (LASIX) 20 MG tablet TAKE 1/2 TABLET BY MOUTH EVERY OTHER DAY 21  Yes Selina Nicholson DO   methylPREDNISolone (MEDROL DOSEPACK) 4 MG tablet Take by mouth as directed. 21  Yes Selina Nicholson DO   citalopram (CELEXA) 20 MG tablet Take 1 tablet by mouth daily 21  Yes Selina Nicholson DO   cloNIDine (CATAPRES) 0.1 MG tablet TAKE 1 TABLET BY MOUTH TWICE DAILY 21  Yes Selina Nicholson DO   hydrALAZINE (APRESOLINE) 25 MG tablet TAKE 1 TABLET BY MOUTH FOUR TIMES DAILY 21  Yes Selina Nicholson DO   nitroGLYCERIN (NITRODUR) 0.2 MG/HR APPLY 1 PATCH EXTERNALLY TO THE SKIN DAILY 21  Yes Selina Nicholson DO   spironolactone (ALDACTONE) 25 MG tablet TAKE 1 TABLET BY MOUTH EVERY DAY 21  Yes Selina Nicholson DO   levothyroxine (SYNTHROID) 88 MCG tablet TAKE 1 TABLET BY MOUTH EVERY DAY 21  Yes Selina Nicholson DO   labetalol (NORMODYNE) 100 MG tablet TAKE 1 TABLET BY MOUTH TWICE DAILY 21  Yes Selina Nicholson DO   vitamin D (ERGOCALCIFEROL) 1.25 MG (81686 UT) CAPS capsule TK 1 C PO WEEKLY 10/27/20  Yes Selina Nicholson DO   traMADol (ULTRAM) 50 MG tablet TK 1 TO 2 TS PO Q 6 H PRN P 20  Yes Historical Provider, MD   acetaminophen (TYLENOL) 500 MG tablet Take 2 tablets by mouth every 8 hours 20  Yes Alexandria Harris MD   aspirin 325 MG EC tablet Take 1 tablet by mouth daily for 28 days 20 Yes Alexandria Harris MD   Omega-3 Fatty Acids (FISH OIL PO) Take by mouth daily LD 2020   Yes Historical Provider, MD          HPI: Patient evaluated today with hypertension diabetes history of cerebral hemorrhage hyperlipidemia all of which have been stable.   Problems with right foot pain related to degenerative joint disease and that is improving. Current medications to continue as prescribed. Reassessment 6 weeks. Concerns for weight gain and recommended diet and exercise with biking. Home exercise bike. Review of Systems   Constitutional: Negative. HENT: Negative. Eyes: Negative. Respiratory: Negative. Gastrointestinal: Negative. Endocrine: Negative. Genitourinary: Negative. Musculoskeletal: Negative. Skin: Negative. Allergic/Immunologic: Negative. Neurological: Negative. Hematological: Negative. Psychiatric/Behavioral: Negative.                Current Outpatient Medications:     furosemide (LASIX) 20 MG tablet, TAKE 1/2 TABLET BY MOUTH EVERY OTHER DAY, Disp: 45 tablet, Rfl: 3    methylPREDNISolone (MEDROL DOSEPACK) 4 MG tablet, Take by mouth as directed., Disp: 21 tablet, Rfl: 0    citalopram (CELEXA) 20 MG tablet, Take 1 tablet by mouth daily, Disp: 90 tablet, Rfl: 3    cloNIDine (CATAPRES) 0.1 MG tablet, TAKE 1 TABLET BY MOUTH TWICE DAILY, Disp: 180 tablet, Rfl: 3    hydrALAZINE (APRESOLINE) 25 MG tablet, TAKE 1 TABLET BY MOUTH FOUR TIMES DAILY, Disp: 360 tablet, Rfl: 3    nitroGLYCERIN (NITRODUR) 0.2 MG/HR, APPLY 1 PATCH EXTERNALLY TO THE SKIN DAILY, Disp: 90 patch, Rfl: 3    spironolactone (ALDACTONE) 25 MG tablet, TAKE 1 TABLET BY MOUTH EVERY DAY, Disp: 90 tablet, Rfl: 1    levothyroxine (SYNTHROID) 88 MCG tablet, TAKE 1 TABLET BY MOUTH EVERY DAY, Disp: 90 tablet, Rfl: 3    labetalol (NORMODYNE) 100 MG tablet, TAKE 1 TABLET BY MOUTH TWICE DAILY, Disp: 180 tablet, Rfl: 3    vitamin D (ERGOCALCIFEROL) 1.25 MG (56362 UT) CAPS capsule, TK 1 C PO WEEKLY, Disp: 12 capsule, Rfl: 3    traMADol (ULTRAM) 50 MG tablet, TK 1 TO 2 TS PO Q 6 H PRN P, Disp: , Rfl:     acetaminophen (TYLENOL) 500 MG tablet, Take 2 tablets by mouth every 8 hours, Disp: 180 tablet, Rfl: 0    aspirin 325 MG EC tablet, Take 1 tablet by mouth daily for 28 days, Disp: 28 tablet, Rfl: 0    Omega-3 Fatty Acids (FISH OIL PO), Take by mouth daily LD 2020, Disp: , Rfl:     Allergies   Allergen Reactions    Latex      Reaction unknown / may have been swelling    Norvasc [Amlodipine Besylate] Anaphylaxis    Dye [Iodides]      Reaction unknown       Social History     Tobacco Use    Smoking status: Former Smoker     Packs/day: 1.50     Years: 8.00     Pack years: 12.00     Quit date: 1976     Years since quittin.6    Smokeless tobacco: Never Used   Substance Use Topics    Alcohol use: No    Drug use: Never      Past Surgical History:   Procedure Laterality Date     SECTION      1    COLONOSCOPY      CORONARY ANGIOPLASTY WITH STENT PLACEMENT      CYST REMOVAL  's    ENDOSCOPY, COLON, DIAGNOSTIC      GASTROSTOMY TUBE PLACEMENT  2014    [place]. Christijennifer Khushbu and later removed    HEEL SPUR SURGERY Left years ago    JOINT REPLACEMENT Left late     OTHER SURGICAL HISTORY      scraping of trach x 2 to treat stenosis / last one 2016    POLYPECTOMY  2013    internal hemorrhoids - michelle    TONSILLECTOMY      TOTAL KNEE ARTHROPLASTY Right 2020    RIGHT KNEE TOTAL ARTHROPLASTY    ++RAYMOND++   ++PNB++     ++LATEX ALLERGY++   ++IODINE ALLERGY++ performed by Kathy Barbosa MD at [address] [place] [address],[address]  14    [place]    UPPER GASTROINTESTINAL ENDOSCOPY      gastritis w. superficial antral ulerations - [place]     No family history on file.   Past Medical History:   Diagnosis Date    Arthritis     CAD (coronary artery disease)     follows with Dr. Brendolyn Cushing CKD (chronic kidney disease)     stage 3 / follows with Dr. Erendira Muñoz Hypertension     Respiratory failure ([facility] [address]     history of /   when had stroke, trach  x 2 weeks    Thyroid disease     Tracheal stenosis     Unspecified cerebral artery occlusion with cerebral infarction 2014    right side flacid, hemorrhagic stroke dt elevated BP/residual s/s is at right side loss of fine motor skills and some weakness, drags right leg, slight memory loss and aphasia when she is tired       Vitals:    08/25/21 1528   BP: (!) 140/78   Pulse: 64   Temp: 97.4 °F (36.3 °C)   SpO2: 96%     BP Readings from Last 3 Encounters:   08/25/21 (!) 140/78   08/19/21 120/76   06/30/21 132/86        Physical Exam  Vitals and nursing note reviewed. Constitutional:       Appearance: She is well-developed. HENT:      Head: Normocephalic. Right Ear: External ear normal.      Left Ear: External ear normal.      Nose: Nose normal.   Eyes:      Conjunctiva/sclera: Conjunctivae normal.      Pupils: Pupils are equal, round, and reactive to light. Cardiovascular:      Rate and Rhythm: Normal rate. Pulmonary:      Breath sounds: Normal breath sounds. Abdominal:      General: Bowel sounds are normal.      Palpations: Abdomen is soft. Musculoskeletal:         General: Normal range of motion. Cervical back: Normal range of motion and neck supple. Skin:     General: Skin is warm and dry. Neurological:      Mental Status: She is alert and oriented to person, place, and time. Psychiatric:         Behavior: Behavior normal.     Today's vitals physical examination stable. Reviewed x-rays of the foot and were stable. Discussed weight management through diet and exercise and encourage Mediterranean diet. Reassessment with me 6 weeks and sooner if problems. Plan Per Assessment:  Jennifer Sanchez was seen today for foot pain. Diagnoses and all orders for this visit:    Essential hypertension    Type 2 diabetes mellitus without complication, without long-term current use of insulin (HCC)    Nontraumatic intracerebral hemorrhage, unspecified cerebral location, unspecified laterality ([address]    Right foot pain    Mixed hyperlipidemia            Return in about 6 weeks (around 10/6/2021). Morro Tidwell DO    Note was generated with the assistance of voice recognition software. Document was reviewed however may contain grammatical errors.

## 2021-10-06 ENCOUNTER — OFFICE VISIT (OUTPATIENT)
Dept: PRIMARY CARE CLINIC | Age: 76
End: 2021-10-06
Payer: MEDICARE

## 2021-10-06 VITALS
TEMPERATURE: 97.3 F | WEIGHT: 166.6 LBS | HEART RATE: 65 BPM | BODY MASS INDEX: 29.52 KG/M2 | DIASTOLIC BLOOD PRESSURE: 74 MMHG | HEIGHT: 63 IN | OXYGEN SATURATION: 97 % | SYSTOLIC BLOOD PRESSURE: 126 MMHG

## 2021-10-06 VITALS
TEMPERATURE: 97.3 F | HEART RATE: 65 BPM | BODY MASS INDEX: 29.52 KG/M2 | WEIGHT: 166.6 LBS | SYSTOLIC BLOOD PRESSURE: 126 MMHG | HEIGHT: 63 IN | DIASTOLIC BLOOD PRESSURE: 74 MMHG | OXYGEN SATURATION: 97 %

## 2021-10-06 DIAGNOSIS — N18.32 STAGE 3B CHRONIC KIDNEY DISEASE (HCC): ICD-10-CM

## 2021-10-06 DIAGNOSIS — Z23 NEED FOR PROPHYLACTIC VACCINATION AND INOCULATION AGAINST VARICELLA: ICD-10-CM

## 2021-10-06 DIAGNOSIS — I10 ESSENTIAL HYPERTENSION: Primary | Chronic | ICD-10-CM

## 2021-10-06 DIAGNOSIS — Z23 NEED FOR PROPHYLACTIC VACCINATION AGAINST DIPHTHERIA-TETANUS-PERTUSSIS (DTP): ICD-10-CM

## 2021-10-06 DIAGNOSIS — Z23 NEEDS FLU SHOT: ICD-10-CM

## 2021-10-06 DIAGNOSIS — Z96.651 HISTORY OF TOTAL RIGHT KNEE REPLACEMENT: ICD-10-CM

## 2021-10-06 DIAGNOSIS — E78.2 MIXED HYPERLIPIDEMIA: ICD-10-CM

## 2021-10-06 DIAGNOSIS — Z00.00 ROUTINE GENERAL MEDICAL EXAMINATION AT A HEALTH CARE FACILITY: ICD-10-CM

## 2021-10-06 PROCEDURE — 99214 OFFICE O/P EST MOD 30 MIN: CPT | Performed by: FAMILY MEDICINE

## 2021-10-06 PROCEDURE — 3017F COLORECTAL CA SCREEN DOC REV: CPT | Performed by: FAMILY MEDICINE

## 2021-10-06 PROCEDURE — 1036F TOBACCO NON-USER: CPT | Performed by: FAMILY MEDICINE

## 2021-10-06 PROCEDURE — 1123F ACP DISCUSS/DSCN MKR DOCD: CPT | Performed by: FAMILY MEDICINE

## 2021-10-06 PROCEDURE — 90694 VACC AIIV4 NO PRSRV 0.5ML IM: CPT | Performed by: FAMILY MEDICINE

## 2021-10-06 PROCEDURE — G8427 DOCREV CUR MEDS BY ELIG CLIN: HCPCS | Performed by: FAMILY MEDICINE

## 2021-10-06 PROCEDURE — G8417 CALC BMI ABV UP PARAM F/U: HCPCS | Performed by: FAMILY MEDICINE

## 2021-10-06 PROCEDURE — G8484 FLU IMMUNIZE NO ADMIN: HCPCS | Performed by: FAMILY MEDICINE

## 2021-10-06 PROCEDURE — 4040F PNEUMOC VAC/ADMIN/RCVD: CPT | Performed by: FAMILY MEDICINE

## 2021-10-06 PROCEDURE — 1090F PRES/ABSN URINE INCON ASSESS: CPT | Performed by: FAMILY MEDICINE

## 2021-10-06 PROCEDURE — G8400 PT W/DXA NO RESULTS DOC: HCPCS | Performed by: FAMILY MEDICINE

## 2021-10-06 PROCEDURE — G0438 PPPS, INITIAL VISIT: HCPCS | Performed by: FAMILY MEDICINE

## 2021-10-06 PROCEDURE — G0008 ADMIN INFLUENZA VIRUS VAC: HCPCS | Performed by: FAMILY MEDICINE

## 2021-10-06 SDOH — ECONOMIC STABILITY: FOOD INSECURITY: WITHIN THE PAST 12 MONTHS, THE FOOD YOU BOUGHT JUST DIDN'T LAST AND YOU DIDN'T HAVE MONEY TO GET MORE.: NEVER TRUE

## 2021-10-06 SDOH — ECONOMIC STABILITY: FOOD INSECURITY: WITHIN THE PAST 12 MONTHS, YOU WORRIED THAT YOUR FOOD WOULD RUN OUT BEFORE YOU GOT MONEY TO BUY MORE.: NEVER TRUE

## 2021-10-06 ASSESSMENT — PATIENT HEALTH QUESTIONNAIRE - PHQ9
1. LITTLE INTEREST OR PLEASURE IN DOING THINGS: 0
SUM OF ALL RESPONSES TO PHQ QUESTIONS 1-9: 0
SUM OF ALL RESPONSES TO PHQ9 QUESTIONS 1 & 2: 0
SUM OF ALL RESPONSES TO PHQ QUESTIONS 1-9: 0
2. FEELING DOWN, DEPRESSED OR HOPELESS: 0
SUM OF ALL RESPONSES TO PHQ QUESTIONS 1-9: 0

## 2021-10-06 ASSESSMENT — SOCIAL DETERMINANTS OF HEALTH (SDOH): HOW HARD IS IT FOR YOU TO PAY FOR THE VERY BASICS LIKE FOOD, HOUSING, MEDICAL CARE, AND HEATING?: NOT HARD AT ALL

## 2021-10-06 ASSESSMENT — LIFESTYLE VARIABLES: HOW OFTEN DO YOU HAVE A DRINK CONTAINING ALCOHOL: 0

## 2021-10-06 NOTE — PROGRESS NOTES
Medicare Annual Wellness Visit  Name: Dewayne Vela Date: 10/6/2021   MRN: [id number] Sex: Female   Age: 77 y.o. Ethnicity: Declined   : 1945 Race: White (non-)      Kiki Mota is here for Medicare AWV    Screenings for behavioral, psychosocial and functional/safety risks, and cognitive dysfunction are all negative except as indicated below. These results, as well as other patient data from the [address] form, are documented in Flowsheets linked to this Encounter. Allergies   Allergen Reactions    Latex      Reaction unknown / may have been swelling    Norvasc [Amlodipine Besylate] Anaphylaxis    Dye [Iodides]      Reaction unknown       Prior to Visit Medications    Medication Sig Taking?  Authorizing Provider   citalopram (CELEXA) 20 MG tablet Take 1 tablet by mouth daily  Mercedes Medina,    cloNIDine (CATAPRES) 0.1 MG tablet TAKE 1 TABLET BY MOUTH TWICE DAILY  Ceferino Nicholson DO   hydrALAZINE (APRESOLINE) 25 MG tablet TAKE 1 TABLET BY MOUTH FOUR TIMES DAILY  Ceferino Nicholson, DO   nitroGLYCERIN (NITRODUR) 0.2 MG/HR APPLY 1 PATCH EXTERNALLY TO THE SKIN DAILY  Ceferino Nicholson, DO   spironolactone (ALDACTONE) 25 MG tablet TAKE 1 TABLET BY MOUTH EVERY DAY  Ceferino Nicholson DO   levothyroxine (SYNTHROID) 88 MCG tablet TAKE 1 TABLET BY MOUTH EVERY DAY  Ceferino Nicholson DO   labetalol (NORMODYNE) 100 MG tablet TAKE 1 TABLET BY MOUTH TWICE DAILY  Ceferino Nicholson, DO   vitamin D (ERGOCALCIFEROL) 1.25 MG (30235 UT) CAPS capsule TK 1 C PO WEEKLY  Angel Nicholson DO   traMADol (ULTRAM) 50 MG tablet TK 1 TO 2 TS PO Q 6 H PRN P  Historical Provider, MD   acetaminophen (TYLENOL) 500 MG tablet Take 2 tablets by mouth every 8 hours  Caitlin Woo MD   aspirin 325 MG EC tablet Take 1 tablet by mouth daily for 28 days  Caitlin Woo MD   Omega-3 Fatty Acids (FISH OIL PO) Take by mouth daily LD 2020  Historical Provider, MD       Past Medical History:   Diagnosis Date    Arthritis     CAD (coronary artery disease)     follows with Dr. Hammad Altamirano CKD (chronic kidney disease)     stage 3 / follows with Dr. Jaylin Levy Hypertension     Respiratory failure ([facility] [address]     history of /   when had stroke, trach  x 2 weeks    Thyroid disease     Tracheal stenosis     Unspecified cerebral artery occlusion with cerebral infarction 2014    right side flacid, hemorrhagic stroke dt elevated BP/residual s/s is at right side loss of fine motor skills and some weakness, drags right leg, slight memory loss and aphasia when she is tired       Past Surgical History:   Procedure Laterality Date     SECTION      1    COLONOSCOPY      CORONARY ANGIOPLASTY WITH STENT PLACEMENT      CYST REMOVAL      ENDOSCOPY, COLON, DIAGNOSTIC      GASTROSTOMY TUBE PLACEMENT  2014    [place]. Sherryle Moder and later removed    HEEL SPUR SURGERY Left years ago    JOINT REPLACEMENT Left late     OTHER SURGICAL HISTORY      scraping of trach x 2 to treat stenosis / last one     POLYPECTOMY  2013    internal hemorrhoids - michelle    TONSILLECTOMY      TOTAL KNEE ARTHROPLASTY Right 2020    RIGHT KNEE TOTAL ARTHROPLASTY    ++RAYMOND++   ++PNB++     ++LATEX ALLERGY++   ++IODINE ALLERGY++ performed by Danelle Davis MD at [address] [place] [address]  14    [place]    UPPER GASTROINTESTINAL ENDOSCOPY      gastritis w. superficial antral ulerations - [place]       No family history on file.     CareTeam (Including outside providers/suppliers regularly involved in providing care):   Patient Care Team:  Libra Paige DO as PCP - General (Family Medicine)  Libra Paige DO as PCP - [facility] Empaneled Provider    Wt Readings from Last 3 Encounters:   10/06/21 166 lb 9.6 oz (75.6 kg)   10/06/21 166 lb 9.6 oz (75.6 kg)   21 165 lb (74.8 kg)     Vitals:    10/06/21 1400   BP: 126/74   Pulse: 65   Temp: 97.3 °F (36.3 °C) TempSrc: Temporal   SpO2: 97%   Weight: 166 lb 9.6 oz (75.6 kg)   Height: 5' 3\" (1.6 m)     Body mass index is 29.51 kg/m². Based upon direct observation of the patient, evaluation of cognition reveals recent and remote memory intact. Patient's complete Health Risk Assessment and screening values have been reviewed and are found in Flowsheets. The following problems were reviewed today and where indicated follow up appointments were made and/or referrals ordered. Positive Risk Factor Screenings with Interventions:      Cognitive: Words recalled: 0 Words Recalled  Clock Drawing Test (CDT) Score: Normal  Total Score Interpretation: Positive Mini-Cog  Did the patient refuse to take the cognition test?: No  Cognitive Impairment Interventions:  · Reviewed with patient today. Health Habits/Nutrition:  Health Habits/Nutrition  Do you exercise for at least 20 minutes 2-3 times per week?: (!) No  Have you lost any weight without trying in the past 3 months?: No  Do you eat only one meal per day?: No  Have you seen the dentist within the past year?: Yes  Body mass index: (!) 29.51  Health Habits/Nutrition Interventions:  · Encouraged activity.      Safety:  Safety  Do you have working smoke detectors?: Yes  Have all throw rugs been removed or fastened?: Yes  Do you have non-slip mats or surfaces in all bathtubs/showers?: (!) No  Do all of your stairways have a railing or banister?: Yes  Are your doorways, halls and stairs free of clutter?: Yes  Do you always fasten your seatbelt when you are in a car?: Yes  Safety Interventions:  · Home safety tips provided     Personalized Preventive Plan   Current Health Maintenance Status  Immunization History   Administered Date(s) Administered    COVID-19, Moderna, PF, 100mcg/0.5mL 01/25/2021, 02/22/2021    Influenza Virus Vaccine 10/01/2015, 10/24/2016, 12/03/2017    Influenza, High Dose (Fluzone 65 yrs and older) 09/22/2018    Influenza, Quadv, IM, PF (6 mo and older Fluzone, Flulaval, Fluarix, and 3 yrs and older Afluria) 10/28/2014    Influenza, Quadv, adjuvanted, 65 yrs +, IM, PF (Fluad) 10/27/2020    Influenza, Triv, inactivated, subunit, adjuvanted, IM (Fluad 65 yrs and older) 09/11/2019    Pneumococcal Polysaccharide ([id number]) 12/09/2019        Health Maintenance   Topic Date Due    Diabetic foot exam  Never done    Diabetic retinal exam  Never done    DTaP/Tdap/Td vaccine (1 - Tdap) Never done    Shingles Vaccine (1 of 2) Never done   [employer] Visit (AWV)  Never done    Flu vaccine (1) 09/01/2021    A1C test (Diabetic or Prediabetic)  10/04/2022    Lipid screen  10/04/2022    TSH testing  10/04/2022    Potassium monitoring  10/04/2022    Creatinine monitoring  10/04/2022    Colon cancer screen colonoscopy  07/24/2023    DEXA (modify frequency per FRAX score)  Completed    Pneumococcal 65+ years Vaccine  Completed    COVID-19 Vaccine  Completed    Hepatitis C screen  Completed    Hepatitis A vaccine  Aged Out    Hib vaccine  Aged Out    Meningococcal (ACWY) vaccine  Aged Out     Recommendations for [facility] Due: see orders and patient instructions/AVS.  . Recommended screening schedule for the next 5-10 years is provided to the patient in written form: see Patient Instructions/AVS.    There are no diagnoses linked to this encounter. Cardiovascular Disease Risk Counseling: Assessed the patient's risk to develop cardiovascular disease and reviewed main risk factors.    Reviewed steps to reduce disease risk including:   · Quitting tobacco use, reducing amount smoked, or not starting the habit  · Making healthy food choices  · Being physically active and gradualy increasing activity levels   · Reduce weight and determine a healthy BMI goal  · Monitor blood pressure and treat if higher than 140/90 mmHg  · Maintain blood total cholesterol levels under 5 mmol/l or 190 mg/dl  · Maintain LDL cholesterol levels

## 2021-10-06 NOTE — PROGRESS NOTES
10/6/21     Maximiliano Oakes    : 1945 Sex: female   Age: 77 y.o. Chief Complaint   Patient presents with    Hypertension    Diabetes       Prior to Admission medications    Medication Sig Start Date End Date Taking?  Authorizing Provider   Tdap (ADACEL) 5-2-15.5 LF-MCG/0.5 injection Inject 0.5 mLs into the muscle once for 1 dose 10/6/21 10/6/21  Kelly Bagley DO   zoster recombinant adjuvanted vaccine [facility]) 50 MCG/0.5ML SUSR injection Inject 0.5 mLs into the muscle once for 1 dose 10/6/21 10/6/21  Kelly Bagley DO   citalopram (CELEXA) 20 MG tablet Take 1 tablet by mouth daily 21   Kelly Bagley DO   cloNIDine (CATAPRES) 0.1 MG tablet TAKE 1 TABLET BY MOUTH TWICE DAILY 21   Kelly Bagley DO   hydrALAZINE (APRESOLINE) 25 MG tablet TAKE 1 TABLET BY MOUTH FOUR TIMES DAILY 21   Jeffrey Nicholson DO   nitroGLYCERIN (NITRODUR) 0.2 MG/HR APPLY 1 PATCH EXTERNALLY TO THE SKIN DAILY 21   Jeffrey Nicholson DO   spironolactone (ALDACTONE) 25 MG tablet TAKE 1 TABLET BY MOUTH EVERY DAY 21   Kelly Bagley DO   levothyroxine (SYNTHROID) 88 MCG tablet TAKE 1 TABLET BY MOUTH EVERY DAY 21   Kelly Bagley DO   labetalol (NORMODYNE) 100 MG tablet TAKE 1 TABLET BY MOUTH TWICE DAILY 21   Kelly Bagley DO   vitamin D (ERGOCALCIFEROL) 1.25 MG (97886 UT) CAPS capsule TK 1 C PO WEEKLY 10/27/20   Jeffrey Nicholson DO   traMADol (ULTRAM) 50 MG tablet TK 1 TO 2 TS PO Q 6 H PRN P 20   Historical Provider, MD   acetaminophen (TYLENOL) 500 MG tablet Take 2 tablets by mouth every 8 hours 20   Ludin Arriaza MD   aspirin 325 MG EC tablet Take 1 tablet by mouth daily for 28 days 20  Ludin Arriaza MD   Omega-3 Fatty Acids (FISH OIL PO) Take by mouth daily LD 2020    Historical Provider, MD          HPI: Patient evaluated today with hypertension chronic kidney disease hyperlipidemia history of CVA related to malignant hypertension. Currently she is doing fairly well. Renal function continues to diminish and we will adjust medications at this time. Lasix has been discontinued and then reassess labs again in 6 weeks and have asked that she bring me all medications for my review at that time. Flu shot offered today. Shingrix and tetanus will be through pharmacy. Review of Systems   Constitutional: Negative. HENT: Negative. Eyes: Negative. Respiratory: Negative. Gastrointestinal: Negative. Endocrine: Negative. Genitourinary: Negative. Musculoskeletal: Negative. Skin: Negative. Allergic/Immunologic: Negative. Neurological: Negative. Hematological: Negative. Psychiatric/Behavioral: Negative. Today systems review is overall stable meds as prescribed.           Current Outpatient Medications:     Tdap (ADACEL) 5-2-15.5 LF-MCG/0.5 injection, Inject 0.5 mLs into the muscle once for 1 dose, Disp: 0.5 mL, Rfl: 0    zoster recombinant adjuvanted vaccine (SHINGRIX) 50 MCG/0.5ML SUSR injection, Inject 0.5 mLs into the muscle once for 1 dose, Disp: 0.5 mL, Rfl: 0    citalopram (CELEXA) 20 MG tablet, Take 1 tablet by mouth daily, Disp: 90 tablet, Rfl: 3    cloNIDine (CATAPRES) 0.1 MG tablet, TAKE 1 TABLET BY MOUTH TWICE DAILY, Disp: 180 tablet, Rfl: 3    hydrALAZINE (APRESOLINE) 25 MG tablet, TAKE 1 TABLET BY MOUTH FOUR TIMES DAILY, Disp: 360 tablet, Rfl: 3    nitroGLYCERIN (NITRODUR) 0.2 MG/HR, APPLY 1 PATCH EXTERNALLY TO THE SKIN DAILY, Disp: 90 patch, Rfl: 3    spironolactone (ALDACTONE) 25 MG tablet, TAKE 1 TABLET BY MOUTH EVERY DAY, Disp: 90 tablet, Rfl: 1    levothyroxine (SYNTHROID) 88 MCG tablet, TAKE 1 TABLET BY MOUTH EVERY DAY, Disp: 90 tablet, Rfl: 3    labetalol (NORMODYNE) 100 MG tablet, TAKE 1 TABLET BY MOUTH TWICE DAILY, Disp: 180 tablet, Rfl: 3    vitamin D (ERGOCALCIFEROL) 1.25 MG (83047 UT) CAPS capsule, TK 1 C PO WEEKLY, Disp: 12 capsule, Rfl: 3    traMADol (ULTRAM) 50 MG tablet, TK 1 TO 2 TS PO Q 6 H PRN P, Disp: , Rfl:     acetaminophen (TYLENOL) 500 MG tablet, Take 2 tablets by mouth every 8 hours, Disp: 180 tablet, Rfl: 0    aspirin 325 MG EC tablet, Take 1 tablet by mouth daily for 28 days, Disp: 28 tablet, Rfl: 0    Omega-3 Fatty Acids (FISH OIL PO), Take by mouth daily LD 2020, Disp: , Rfl:     Allergies   Allergen Reactions    Latex      Reaction unknown / may have been swelling    Norvasc [Amlodipine Besylate] Anaphylaxis    Dye [Iodides]      Reaction unknown       Social History     Tobacco Use    Smoking status: Former Smoker     Packs/day: 1.50     Years: 8.00     Pack years: 12.00     Quit date: 1976     Years since quittin.7    Smokeless tobacco: Never Used   Substance Use Topics    Alcohol use: No    Drug use: Never      Past Surgical History:   Procedure Laterality Date     SECTION      1    COLONOSCOPY      CORONARY ANGIOPLASTY WITH STENT PLACEMENT      CYST REMOVAL  's    ENDOSCOPY, COLON, DIAGNOSTIC      GASTROSTOMY TUBE PLACEMENT  2014    [place]. Ellie Bucknerumbdarcy and later removed    HEEL SPUR SURGERY Left years ago    JOINT REPLACEMENT Left late 's    OTHER SURGICAL HISTORY      scraping of trach x 2 to treat stenosis / last one 2016    POLYPECTOMY  2013    internal hemorrhoids - michelle    TONSILLECTOMY      TOTAL KNEE ARTHROPLASTY Right 2020    RIGHT KNEE TOTAL ARTHROPLASTY    ++RAYMOND++   ++PNB++     ++LATEX ALLERGY++   ++IODINE ALLERGY++ performed by Ceferino Sands MD at [address] [place] [address],[address]  [address]    [place]    UPPER GASTROINTESTINAL ENDOSCOPY      gastritis w. superficial antral ulerations - [place]     No family history on file.   Past Medical History:   Diagnosis Date    Arthritis     CAD (coronary artery disease)     follows with Dr. Jessica García CKD (chronic kidney disease)     stage 3 / follows with Dr. Kristin Young Hypertension     Respiratory failure ([facility] [address] history of / 2014  when had stroke, trach  x 2 weeks    Thyroid disease     Tracheal stenosis     Unspecified cerebral artery occlusion with cerebral infarction 9/26/2014    right side flacid, hemorrhagic stroke dt elevated BP/residual s/s is at right side loss of fine motor skills and some weakness, drags right leg, slight memory loss and aphasia when she is tired       Vitals:    10/06/21 1357   BP: 126/74   Site: Right Upper Arm   Position: Sitting   Pulse: 65   Temp: 97.3 °F (36.3 °C)   TempSrc: Temporal   SpO2: 97%   Weight: 166 lb 9.6 oz (75.6 kg)   Height: 5' 3\" (1.6 m)     BP Readings from Last 3 Encounters:   10/06/21 126/74   10/06/21 126/74   08/25/21 (!) 140/78        Physical Exam  Vitals and nursing note reviewed. Constitutional:       Appearance: She is well-developed. HENT:      Head: Normocephalic. Right Ear: External ear normal.      Left Ear: External ear normal.      Nose: Nose normal.   Eyes:      Conjunctiva/sclera: Conjunctivae normal.      Pupils: Pupils are equal, round, and reactive to light. Cardiovascular:      Rate and Rhythm: Normal rate. Pulmonary:      Breath sounds: Normal breath sounds. Abdominal:      General: Bowel sounds are normal.      Palpations: Abdomen is soft. Musculoskeletal:         General: Normal range of motion. Cervical back: Normal range of motion and neck supple. Skin:     General: Skin is warm and dry. Neurological:      Mental Status: She is alert and oriented to person, place, and time. Psychiatric:         Behavior: Behavior normal.     Present vitals physical examination stable. I will maintain present meds and care. Reassessment 6 weeks and blood work again just prior. Maintain present meds care. Lasix discontinued today. Plan Per Assessment:  Viraj Dumont was seen today for hypertension and diabetes.     Diagnoses and all orders for this visit:    Essential hypertension    Stage 3b chronic kidney disease ([facility] [address]    History of total right knee replacement    Needs flu shot    Mixed hyperlipidemia    Other orders  -     Cancel: INFLUENZA, QUADV, 3 YRS AND OLDER, IM PF, PREFILL SYR OR SDV, 0.5ML (AFLURIA QUADV, PF)  -     INFLUENZA, QUADV, ADJUVANTED, 65 YRS =, IM, PF, PREFILL SYR, 0.5ML (FLUAD)            No follow-ups on file. Franklyne Gun, DO    Note was generated with the assistance of voice recognition software. Document was reviewed however may contain grammatical errors.

## 2021-10-06 NOTE — PATIENT INSTRUCTIONS
Advance Directives: Care Instructions  Overview  An advance directive is a legal way to state your wishes at the end of your life. It tells your family and your doctor what to do if you can't say what you want. There are two main types of advance directives. You can change them any time your wishes change. Living will. This form tells your family and your doctor your wishes about life support and other treatment. The form is also called a declaration. Medical power of . This form lets you name a person to make treatment decisions for you when you can't speak for yourself. This person is called a health care agent (health care proxy, health care surrogate). The form is also called a durable power of  for health care. If you do not have an advance directive, decisions about your medical care may be made by a family member, or by a doctor or a  who doesn't know you. It may help to think of an advance directive as a gift to the people who care for you. If you have one, they won't have to make tough decisions by themselves. Follow-up care is a key part of your treatment and safety. Be sure to make and go to all appointments, and call your doctor if you are having problems. It's also a good idea to know your test results and keep a list of the medicines you take. What should you include in an advance directive? Many states have a unique advance directive form. (It may ask you to address specific issues.) Or you might use a universal form that's approved by many states. If your form doesn't tell you what to address, it may be hard to know what to include in your advance directive. Use the questions below to help you get started. · Who do you want to make decisions about your medical care if you are not able to? · What life-support measures do you want if you have a serious illness that gets worse over time or can't be cured? · What are you most afraid of that might happen? (Maybe you're afraid of having pain, losing your independence, or being kept alive by machines.)  · Where would you prefer to die? (Your home? A hospital? A nursing home?)  · Do you want to donate your organs when you die? · Do you want certain [employer] practices performed before you die? When should you call for help? Be sure to contact your doctor if you have any questions. Where can you learn more? Go to [email or web address] and sign in to your [employer] account. Enter R264 in the [employer] box to learn more about \"Advance Directives: Care Instructions. \"     If you do not have an account, please click on the \"Sign Up Now\" link. Current as of: March 17, 2021               Content Version: 13.0  © 5480-6621 Healthwise, Incorporated. Care instructions adapted under license by [facility] ([facility]). If you have questions about a medical condition or this instruction, always ask your healthcare professional. [address] any warranty or liability for your use of this information. Personalized Preventive Plan for Adán Brennan - 10/6/2021  Medicare offers a range of preventive health benefits. Some of the tests and screenings are paid in full while other may be subject to a deductible, co-insurance, and/or copay. Some of these benefits include a comprehensive review of your medical history including lifestyle, illnesses that may run in your family, and various assessments and screenings as appropriate. After reviewing your medical record and screening and assessments performed today your provider may have ordered immunizations, labs, imaging, and/or referrals for you. A list of these orders (if applicable) as well as your Preventive Care list are included within your After Visit Summary for your review.     Other Preventive Recommendations:    · A preventive eye exam performed by an eye specialist is recommended every 1-2 years to screen for glaucoma; cataracts, macular degeneration, and other eye disorders. · A preventive dental visit is recommended every 6 months. · Try to get at least 150 minutes of exercise per week or 10,000 steps per day on a pedometer . · Order or download the FREE \"Exercise & Physical Activity: Your Everyday Guide\" from [employer]. Call 7-762.718.1268 or search [employer] online. · You need 9312-2409 mg of calcium and 9730-0902 IU of vitamin D per day. It is possible to meet your calcium requirement with diet alone, but a vitamin D supplement is usually necessary to meet this goal.  · When exposed to the sun, use a sunscreen that protects against both UVA and UVB radiation with an SPF of 30 or greater. Reapply every 2 to 3 hours or after sweating, drying off with a towel, or swimming. · Always wear a seat belt when traveling in a car. Always wear a helmet when riding a bicycle or motorcycle.

## 2021-11-22 RX ORDER — ERGOCALCIFEROL 1.25 MG/1
CAPSULE ORAL
Qty: 12 CAPSULE | Refills: 3 | Status: SHIPPED
Start: 2021-11-22 | End: 2022-01-10

## 2021-11-23 RX ORDER — SPIRONOLACTONE 25 MG/1
TABLET ORAL
Qty: 90 TABLET | Refills: 1 | Status: SHIPPED
Start: 2021-11-23 | End: 2022-02-22 | Stop reason: SDUPTHER

## 2021-12-02 ENCOUNTER — OFFICE VISIT (OUTPATIENT)
Dept: PRIMARY CARE CLINIC | Age: 76
End: 2021-12-02
Payer: MEDICARE

## 2021-12-02 VITALS
BODY MASS INDEX: 28.87 KG/M2 | WEIGHT: 163 LBS | HEART RATE: 65 BPM | SYSTOLIC BLOOD PRESSURE: 138 MMHG | OXYGEN SATURATION: 96 % | DIASTOLIC BLOOD PRESSURE: 72 MMHG | TEMPERATURE: 98.1 F

## 2021-12-02 DIAGNOSIS — I61.9 NONTRAUMATIC INTRACEREBRAL HEMORRHAGE, UNSPECIFIED CEREBRAL LOCATION, UNSPECIFIED LATERALITY (HCC): ICD-10-CM

## 2021-12-02 DIAGNOSIS — E11.9 TYPE 2 DIABETES MELLITUS WITHOUT COMPLICATION, WITHOUT LONG-TERM CURRENT USE OF INSULIN (HCC): Chronic | ICD-10-CM

## 2021-12-02 DIAGNOSIS — I25.10 CORONARY ARTERY DISEASE INVOLVING NATIVE CORONARY ARTERY OF NATIVE HEART WITHOUT ANGINA PECTORIS: ICD-10-CM

## 2021-12-02 DIAGNOSIS — N18.4 STAGE 4 CHRONIC KIDNEY DISEASE (HCC): ICD-10-CM

## 2021-12-02 DIAGNOSIS — I10 ESSENTIAL HYPERTENSION: Primary | Chronic | ICD-10-CM

## 2021-12-02 PROCEDURE — 1036F TOBACCO NON-USER: CPT | Performed by: FAMILY MEDICINE

## 2021-12-02 PROCEDURE — 2022F DILAT RTA XM EVC RTNOPTHY: CPT | Performed by: FAMILY MEDICINE

## 2021-12-02 PROCEDURE — G8400 PT W/DXA NO RESULTS DOC: HCPCS | Performed by: FAMILY MEDICINE

## 2021-12-02 PROCEDURE — 99214 OFFICE O/P EST MOD 30 MIN: CPT | Performed by: FAMILY MEDICINE

## 2021-12-02 PROCEDURE — G8427 DOCREV CUR MEDS BY ELIG CLIN: HCPCS | Performed by: FAMILY MEDICINE

## 2021-12-02 PROCEDURE — G8484 FLU IMMUNIZE NO ADMIN: HCPCS | Performed by: FAMILY MEDICINE

## 2021-12-02 PROCEDURE — 3044F HG A1C LEVEL LT 7.0%: CPT | Performed by: FAMILY MEDICINE

## 2021-12-02 PROCEDURE — 1090F PRES/ABSN URINE INCON ASSESS: CPT | Performed by: FAMILY MEDICINE

## 2021-12-02 PROCEDURE — 1123F ACP DISCUSS/DSCN MKR DOCD: CPT | Performed by: FAMILY MEDICINE

## 2021-12-02 PROCEDURE — 3017F COLORECTAL CA SCREEN DOC REV: CPT | Performed by: FAMILY MEDICINE

## 2021-12-02 PROCEDURE — G8417 CALC BMI ABV UP PARAM F/U: HCPCS | Performed by: FAMILY MEDICINE

## 2021-12-02 PROCEDURE — 4040F PNEUMOC VAC/ADMIN/RCVD: CPT | Performed by: FAMILY MEDICINE

## 2021-12-02 ASSESSMENT — ENCOUNTER SYMPTOMS
RESPIRATORY NEGATIVE: 1
ALLERGIC/IMMUNOLOGIC NEGATIVE: 1
EYES NEGATIVE: 1
GASTROINTESTINAL NEGATIVE: 1

## 2021-12-02 NOTE — PROGRESS NOTES
21     Loida Huang    : 1945 Sex: female   Age: 77 y.o. Chief Complaint   Patient presents with    Hypertension     occasionally gets high bp readings but only in the morning    Diabetes       Prior to Admission medications    Medication Sig Start Date End Date Taking? Authorizing Provider   spironolactone (ALDACTONE) 25 MG tablet TAKE 1 TABLET BY MOUTH EVERY DAY 21  Yes Kristian Nicholson DO   vitamin D (ERGOCALCIFEROL) 1.25 MG (99446 UT) CAPS capsule TAKE 1 CAPSULE BY MOUTH WEEKLY 21  Yes Kristian Nicholson DO   citalopram (CELEXA) 20 MG tablet Take 1 tablet by mouth daily 21  Yes Kristian Nicholson DO   cloNIDine (CATAPRES) 0.1 MG tablet TAKE 1 TABLET BY MOUTH TWICE DAILY 21  Yes Kristian Nicholson DO   hydrALAZINE (APRESOLINE) 25 MG tablet TAKE 1 TABLET BY MOUTH FOUR TIMES DAILY 21  Yes Kristian Nicholson DO   nitroGLYCERIN (NITRODUR) 0.2 MG/HR APPLY 1 PATCH EXTERNALLY TO THE SKIN DAILY 21  Yes Kristian Nicholson DO   levothyroxine (SYNTHROID) 88 MCG tablet TAKE 1 TABLET BY MOUTH EVERY DAY 21  Yes Kristian Nicholson DO   labetalol (NORMODYNE) 100 MG tablet TAKE 1 TABLET BY MOUTH TWICE DAILY 21  Yes Kristian Nicholson DO   traMADol (ULTRAM) 50 MG tablet TK 1 TO 2 TS PO Q 6 H PRN P 20  Yes Historical Provider, MD   acetaminophen (TYLENOL) 500 MG tablet Take 2 tablets by mouth every 8 hours 20  Yes Ronnie Hudson MD   aspirin 325 MG EC tablet Take 1 tablet by mouth daily for 28 days 20 Yes Ronnie Hudson MD   Omega-3 Fatty Acids (FISH OIL PO) Take by mouth daily LD 2020   Yes Historical Provider, MD          HPI: Evaluated today with hypertension coronary artery disease diabetes mellitus chronic kidney disease cerebrovascular disease. Medically she continues to do well. Medications well-tolerated. Kidney function slightly reduced and we will closely monitor.   I did encourage follow-up with Dr. Solitario Leavitt nephrology. Review of Systems   Constitutional: Negative. HENT: Negative. Eyes: Negative. Respiratory: Negative. Gastrointestinal: Negative. Endocrine: Negative. Genitourinary: Negative. Musculoskeletal: Negative. Skin: Negative. Allergic/Immunologic: Negative. Neurological: Negative. Hematological: Negative. Psychiatric/Behavioral: Negative. Systems review overall stable. Meds as prescribed.         Current Outpatient Medications:     spironolactone (ALDACTONE) 25 MG tablet, TAKE 1 TABLET BY MOUTH EVERY DAY, Disp: 90 tablet, Rfl: 1    vitamin D (ERGOCALCIFEROL) 1.25 MG (67392 UT) CAPS capsule, TAKE 1 CAPSULE BY MOUTH WEEKLY, Disp: 12 capsule, Rfl: 3    citalopram (CELEXA) 20 MG tablet, Take 1 tablet by mouth daily, Disp: 90 tablet, Rfl: 3    cloNIDine (CATAPRES) 0.1 MG tablet, TAKE 1 TABLET BY MOUTH TWICE DAILY, Disp: 180 tablet, Rfl: 3    hydrALAZINE (APRESOLINE) 25 MG tablet, TAKE 1 TABLET BY MOUTH FOUR TIMES DAILY, Disp: 360 tablet, Rfl: 3    nitroGLYCERIN (NITRODUR) 0.2 MG/HR, APPLY 1 PATCH EXTERNALLY TO THE SKIN DAILY, Disp: 90 patch, Rfl: 3    levothyroxine (SYNTHROID) 88 MCG tablet, TAKE 1 TABLET BY MOUTH EVERY DAY, Disp: 90 tablet, Rfl: 3    labetalol (NORMODYNE) 100 MG tablet, TAKE 1 TABLET BY MOUTH TWICE DAILY, Disp: 180 tablet, Rfl: 3    traMADol (ULTRAM) 50 MG tablet, TK 1 TO 2 TS PO Q 6 H PRN P, Disp: , Rfl:     acetaminophen (TYLENOL) 500 MG tablet, Take 2 tablets by mouth every 8 hours, Disp: 180 tablet, Rfl: 0    aspirin 325 MG EC tablet, Take 1 tablet by mouth daily for 28 days, Disp: 28 tablet, Rfl: 0    Omega-3 Fatty Acids (FISH OIL PO), Take by mouth daily LD 8/22/2020, Disp: , Rfl:     Allergies   Allergen Reactions    Latex      Reaction unknown / may have been swelling    Norvasc [Amlodipine Besylate] Anaphylaxis    Dye [Iodides]      Reaction unknown       Social History     Tobacco Use    Smoking status: Former Smoker Packs/day: 1.50     Years: 8.00     Pack years: 12.00     Quit date: 1976     Years since quittin.9    Smokeless tobacco: Never Used   Substance Use Topics    Alcohol use: No    Drug use: Never      Past Surgical History:   Procedure Laterality Date     SECTION      1    COLONOSCOPY      CORONARY ANGIOPLASTY WITH STENT PLACEMENT  2004    CYST REMOVAL  's    ENDOSCOPY, COLON, DIAGNOSTIC      GASTROSTOMY TUBE PLACEMENT  2014    [place]. Rani Collins and later removed    HEEL SPUR SURGERY Left years ago    JOINT REPLACEMENT Left late     OTHER SURGICAL HISTORY      scraping of trach x 2 to treat stenosis / last one 2016    POLYPECTOMY  2013    internal hemorrhoids - michelle    TONSILLECTOMY      TOTAL KNEE ARTHROPLASTY Right 2020    RIGHT KNEE TOTAL ARTHROPLASTY    ++RAYMOND++   ++PNB++     ++LATEX ALLERGY++   ++IODINE ALLERGY++ performed by Ronnie Hudson MD at [address] [place] [address],[address]  [address]    [place]    UPPER GASTROINTESTINAL ENDOSCOPY      gastritis w. superficial antral ulerations - [place]     No family history on file.   Past Medical History:   Diagnosis Date    Arthritis     CAD (coronary artery disease)     follows with Dr. Samreen Cedillo CKD (chronic kidney disease)     stage 3 / follows with Dr. Ester Sosa Hypertension     Respiratory failure ([facility] [address]     history 2014  when had stroke, trach  x 2 weeks    Thyroid disease     Tracheal stenosis     Unspecified cerebral artery occlusion with cerebral infarction 2014    right side flacid, hemorrhagic stroke dt elevated BP/residual s/s is at right side loss of fine motor skills and some weakness, drags right leg, slight memory loss and aphasia when she is tired       Vitals:    21 1418   BP: 138/72   Pulse: 65   Temp: 98.1 °F (36.7 °C)   SpO2: 96%   Weight: 163 lb (73.9 kg)     BP Readings from Last 3 Encounters:   21 138/72   10/06/21 126/74   10/06/21 126/74 138/70  Physical Exam  Vitals and nursing note reviewed. Constitutional:       Appearance: She is well-developed. HENT:      Head: Normocephalic. Right Ear: External ear normal.      Left Ear: External ear normal.      Nose: Nose normal.   Eyes:      Conjunctiva/sclera: Conjunctivae normal.      Pupils: Pupils are equal, round, and reactive to light. Cardiovascular:      Rate and Rhythm: Normal rate. Pulmonary:      Breath sounds: Normal breath sounds. Abdominal:      General: Bowel sounds are normal.      Palpations: Abdomen is soft. Musculoskeletal:         General: Normal range of motion. Cervical back: Normal range of motion and neck supple. Skin:     General: Skin is warm and dry. Neurological:      Mental Status: She is alert and oriented to person, place, and time. Psychiatric:         Behavior: Behavior normal.     Today's vitals physical examination stable. We will sit tight with current meds and care. Follow-up visit with me in 3 months and blood work at that time. Encourage proper follow-up with Dr. Zafar Louie. Reassessment 3 months            Plan Per Assessment:  Douglas Nobles was seen today for hypertension and diabetes. Diagnoses and all orders for this visit:    Essential hypertension  -     CBC Auto Differential; Future  -     Comprehensive Metabolic Panel; Future  -     Hemoglobin A1C; Future  -     Lipid Panel; Future  -     TSH without Reflex; Future    Coronary artery disease involving native coronary artery of native heart without angina pectoris  -     CBC Auto Differential; Future  -     Comprehensive Metabolic Panel; Future  -     Hemoglobin A1C; Future  -     Lipid Panel; Future  -     TSH without Reflex;  Future    Nontraumatic intracerebral hemorrhage, unspecified cerebral location, unspecified laterality ([facility] [address]    Type 2 diabetes mellitus without complication, without long-term current use of insulin ([facility])  -     CBC Auto Differential; Future  -     Comprehensive Metabolic Panel; Future  -     Hemoglobin A1C; Future  -     Lipid Panel; Future  -     TSH without Reflex; Future    Stage 4 chronic kidney disease (HCC)  -     CBC Auto Differential; Future  -     Comprehensive Metabolic Panel; Future  -     Hemoglobin A1C; Future  -     Lipid Panel; Future  -     TSH without Reflex; Future            Return in about 3 months (around 3/2/2022). Mercedes Medina DO    Note was generated with the assistance of voice recognition software. Document was reviewed however may contain grammatical errors.

## 2022-01-07 ENCOUNTER — TELEPHONE (OUTPATIENT)
Dept: PRIMARY CARE CLINIC | Age: 77
End: 2022-01-07

## 2022-01-07 LAB — DIABETIC RETINOPATHY: NORMAL

## 2022-01-07 NOTE — TELEPHONE ENCOUNTER
Pt daughter Lorena Osman calling stating patient woke up Monday with blindness in her right eye. Her kidney dr advised patient's kidney function came up but her vitamin d level is very high and was instructed to stop taking her vitamin d. Her eye dr at eye care [facility] believes pt had another stroke. She would like a return call at [phone] to discuss her mom's condition.

## 2022-01-10 ENCOUNTER — OFFICE VISIT (OUTPATIENT)
Dept: PRIMARY CARE CLINIC | Age: 77
End: 2022-01-10
Payer: MEDICARE

## 2022-01-10 VITALS
WEIGHT: 164 LBS | OXYGEN SATURATION: 97 % | DIASTOLIC BLOOD PRESSURE: 74 MMHG | HEART RATE: 62 BPM | BODY MASS INDEX: 29.05 KG/M2 | SYSTOLIC BLOOD PRESSURE: 122 MMHG | TEMPERATURE: 98.1 F

## 2022-01-10 DIAGNOSIS — I10 ESSENTIAL HYPERTENSION: Primary | Chronic | ICD-10-CM

## 2022-01-10 DIAGNOSIS — I61.9 NONTRAUMATIC INTRACEREBRAL HEMORRHAGE, UNSPECIFIED CEREBRAL LOCATION, UNSPECIFIED LATERALITY (HCC): ICD-10-CM

## 2022-01-10 DIAGNOSIS — I63.9 CEREBROVASCULAR ACCIDENT (CVA), UNSPECIFIED MECHANISM (HCC): ICD-10-CM

## 2022-01-10 DIAGNOSIS — H34.12 CENTRAL RETINAL ARTERY OCCLUSION OF LEFT EYE: ICD-10-CM

## 2022-01-10 DIAGNOSIS — I61.9 HEMORRHAGIC STROKE (HCC): Primary | ICD-10-CM

## 2022-01-10 DIAGNOSIS — I73.9 PERIPHERAL VASCULAR DISEASE, UNSPECIFIED (HCC): ICD-10-CM

## 2022-01-10 DIAGNOSIS — I25.10 CORONARY ARTERY DISEASE INVOLVING NATIVE CORONARY ARTERY OF NATIVE HEART WITHOUT ANGINA PECTORIS: ICD-10-CM

## 2022-01-10 DIAGNOSIS — I10 ESSENTIAL HYPERTENSION: Chronic | ICD-10-CM

## 2022-01-10 LAB
ALBUMIN SERPL-MCNC: 4.4 G/DL (ref 3.5–5.2)
ALP BLD-CCNC: 54 U/L (ref 35–104)
ALT SERPL-CCNC: 15 U/L (ref 0–32)
ANION GAP SERPL CALCULATED.3IONS-SCNC: 13 MMOL/L (ref 7–16)
AST SERPL-CCNC: 25 U/L (ref 0–31)
BASOPHILS ABSOLUTE: 0.02 E9/L (ref 0–0.2)
BASOPHILS RELATIVE PERCENT: 0.5 % (ref 0–2)
BILIRUB SERPL-MCNC: 0.7 MG/DL (ref 0–1.2)
BUN BLDV-MCNC: 28 MG/DL (ref 6–23)
C-REACTIVE PROTEIN: 0.3 MG/DL (ref 0–0.4)
CALCIUM SERPL-MCNC: 9.9 MG/DL (ref 8.6–10.2)
CHLORIDE BLD-SCNC: 100 MMOL/L (ref 98–107)
CO2: 22 MMOL/L (ref 22–29)
CREAT SERPL-MCNC: 1.6 MG/DL (ref 0.5–1)
EOSINOPHILS ABSOLUTE: 0.08 E9/L (ref 0.05–0.5)
EOSINOPHILS RELATIVE PERCENT: 2 % (ref 0–6)
FIBRINOGEN: 375 MG/DL (ref 225–540)
GFR AFRICAN AMERICAN: 38
GFR NON-AFRICAN AMERICAN: 31 ML/MIN/1.73
GLUCOSE BLD-MCNC: 79 MG/DL (ref 74–99)
HCT VFR BLD CALC: 42.3 % (ref 34–48)
HEMOGLOBIN: 13.5 G/DL (ref 11.5–15.5)
IMMATURE GRANULOCYTES #: 0.03 E9/L
IMMATURE GRANULOCYTES %: 0.7 % (ref 0–5)
LYMPHOCYTES ABSOLUTE: 0.88 E9/L (ref 1.5–4)
LYMPHOCYTES RELATIVE PERCENT: 21.9 % (ref 20–42)
MCH RBC QN AUTO: 29.9 PG (ref 26–35)
MCHC RBC AUTO-ENTMCNC: 31.9 % (ref 32–34.5)
MCV RBC AUTO: 93.8 FL (ref 80–99.9)
MONOCYTES ABSOLUTE: 0.39 E9/L (ref 0.1–0.95)
MONOCYTES RELATIVE PERCENT: 9.7 % (ref 2–12)
NEUTROPHILS ABSOLUTE: 2.61 E9/L (ref 1.8–7.3)
NEUTROPHILS RELATIVE PERCENT: 65.2 % (ref 43–80)
PDW BLD-RTO: 12.8 FL (ref 11.5–15)
PLATELET # BLD: 124 E9/L (ref 130–450)
PMV BLD AUTO: 11.2 FL (ref 7–12)
POTASSIUM SERPL-SCNC: 5.1 MMOL/L (ref 3.5–5)
RBC # BLD: 4.51 E12/L (ref 3.5–5.5)
SEDIMENTATION RATE, ERYTHROCYTE: 5 MM/HR (ref 0–20)
SODIUM BLD-SCNC: 135 MMOL/L (ref 132–146)
TOTAL PROTEIN: 7.7 G/DL (ref 6.4–8.3)
WBC # BLD: 4 E9/L (ref 4.5–11.5)

## 2022-01-10 PROCEDURE — G8417 CALC BMI ABV UP PARAM F/U: HCPCS | Performed by: FAMILY MEDICINE

## 2022-01-10 PROCEDURE — 3017F COLORECTAL CA SCREEN DOC REV: CPT | Performed by: FAMILY MEDICINE

## 2022-01-10 PROCEDURE — G8484 FLU IMMUNIZE NO ADMIN: HCPCS | Performed by: FAMILY MEDICINE

## 2022-01-10 PROCEDURE — G8400 PT W/DXA NO RESULTS DOC: HCPCS | Performed by: FAMILY MEDICINE

## 2022-01-10 PROCEDURE — 1123F ACP DISCUSS/DSCN MKR DOCD: CPT | Performed by: FAMILY MEDICINE

## 2022-01-10 PROCEDURE — 1090F PRES/ABSN URINE INCON ASSESS: CPT | Performed by: FAMILY MEDICINE

## 2022-01-10 PROCEDURE — G8427 DOCREV CUR MEDS BY ELIG CLIN: HCPCS | Performed by: FAMILY MEDICINE

## 2022-01-10 PROCEDURE — 99214 OFFICE O/P EST MOD 30 MIN: CPT | Performed by: FAMILY MEDICINE

## 2022-01-10 PROCEDURE — 4040F PNEUMOC VAC/ADMIN/RCVD: CPT | Performed by: FAMILY MEDICINE

## 2022-01-10 PROCEDURE — 1036F TOBACCO NON-USER: CPT | Performed by: FAMILY MEDICINE

## 2022-01-10 RX ORDER — ASPIRIN 81 MG/1
TABLET ORAL
Qty: 90 TABLET | Refills: 3 | Status: SHIPPED
Start: 2022-01-10 | End: 2022-02-22 | Stop reason: SDUPTHER

## 2022-01-10 ASSESSMENT — ENCOUNTER SYMPTOMS
EYES NEGATIVE: 1
GASTROINTESTINAL NEGATIVE: 1
ALLERGIC/IMMUNOLOGIC NEGATIVE: 1
RESPIRATORY NEGATIVE: 1

## 2022-01-10 NOTE — PROGRESS NOTES
arrange with cardiology. Additional lab studies. Review of Systems   Constitutional: Negative. HENT: Negative. Eyes: Negative. Respiratory: Negative. Gastrointestinal: Negative. Endocrine: Negative. Genitourinary: Negative. Musculoskeletal: Negative. Skin: Negative. Allergic/Immunologic: Negative. Neurological: Negative. Hematological: Negative. Psychiatric/Behavioral: Negative. Systems review stable additional lab studies to be completed. Visual loss as noted. Left eye.         Current Outpatient Medications:     aspirin 81 MG EC tablet, 1 qd, Disp: 90 tablet, Rfl: 3    spironolactone (ALDACTONE) 25 MG tablet, TAKE 1 TABLET BY MOUTH EVERY DAY, Disp: 90 tablet, Rfl: 1    citalopram (CELEXA) 20 MG tablet, Take 1 tablet by mouth daily, Disp: 90 tablet, Rfl: 3    cloNIDine (CATAPRES) 0.1 MG tablet, TAKE 1 TABLET BY MOUTH TWICE DAILY, Disp: 180 tablet, Rfl: 3    hydrALAZINE (APRESOLINE) 25 MG tablet, TAKE 1 TABLET BY MOUTH FOUR TIMES DAILY, Disp: 360 tablet, Rfl: 3    nitroGLYCERIN (NITRODUR) 0.2 MG/HR, APPLY 1 PATCH EXTERNALLY TO THE SKIN DAILY, Disp: 90 patch, Rfl: 3    levothyroxine (SYNTHROID) 88 MCG tablet, TAKE 1 TABLET BY MOUTH EVERY DAY, Disp: 90 tablet, Rfl: 3    labetalol (NORMODYNE) 100 MG tablet, TAKE 1 TABLET BY MOUTH TWICE DAILY, Disp: 180 tablet, Rfl: 3    traMADol (ULTRAM) 50 MG tablet, TK 1 TO 2 TS PO Q 6 H PRN P, Disp: , Rfl:     acetaminophen (TYLENOL) 500 MG tablet, Take 2 tablets by mouth every 8 hours, Disp: 180 tablet, Rfl: 0    Omega-3 Fatty Acids (FISH OIL PO), Take by mouth daily LD 8/22/2020, Disp: , Rfl:     Allergies   Allergen Reactions    Latex      Reaction unknown / may have been swelling    Norvasc [Amlodipine Besylate] Anaphylaxis    Dye [Iodides]      Reaction unknown       Social History     Tobacco Use    Smoking status: Former Smoker     Packs/day: 1.50     Years: 8.00     Pack years: 12.00     Quit date: 1/1/1976 Years since quittin.0    Smokeless tobacco: Never Used   Substance Use Topics    Alcohol use: No    Drug use: Never      Past Surgical History:   Procedure Laterality Date     SECTION      1    COLONOSCOPY  2014    CORONARY ANGIOPLASTY WITH STENT PLACEMENT  2004    CYST REMOVAL  's    ENDOSCOPY, COLON, DIAGNOSTIC      GASTROSTOMY TUBE PLACEMENT  2014    [place]. [place] Lever and later removed    HEEL SPUR SURGERY Left years ago    JOINT REPLACEMENT Left late     OTHER SURGICAL HISTORY      scraping of trach x 2 to treat stenosis / last one 2016    POLYPECTOMY  2013    internal hemorrhoids - michelle    TONSILLECTOMY      TOTAL KNEE ARTHROPLASTY Right 2020    RIGHT KNEE TOTAL ARTHROPLASTY    ++RAYMOND++   ++PNB++     ++LATEX ALLERGY++   ++IODINE ALLERGY++ performed by Cristina Desai MD at [address] [place] [address],[address]  [address]    [place]    UPPER GASTROINTESTINAL ENDOSCOPY      gastritis w. superficial antral ulerations - [place]     No family history on file. Past Medical History:   Diagnosis Date    Arthritis     CAD (coronary artery disease)     follows with Dr. Jr Owusu CKD (chronic kidney disease)     stage 3 / follows with Dr. Henrry Davis Hypertension     Respiratory failure ([facility] [address]     history of /   when had stroke, trach  x 2 weeks    Thyroid disease     Tracheal stenosis     Unspecified cerebral artery occlusion with cerebral infarction 2014    right side flacid, hemorrhagic stroke dt elevated BP/residual s/s is at right side loss of fine motor skills and some weakness, drags right leg, slight memory loss and aphasia when she is tired       Vitals:    01/10/22 0937   BP: 122/74   Pulse: 62   Temp: 98.1 °F (36.7 °C)   SpO2: 97%   Weight: 164 lb (74.4 kg)     BP Readings from Last 3 Encounters:   01/10/22 122/74   21 138/72   10/06/21 126/74        Physical Exam  Vitals and nursing note reviewed.    Constitutional:       Appearance:

## 2022-01-26 ENCOUNTER — APPOINTMENT (OUTPATIENT)
Dept: GENERAL RADIOLOGY | Age: 77
End: 2022-01-26
Payer: MEDICARE

## 2022-01-26 ENCOUNTER — APPOINTMENT (OUTPATIENT)
Dept: CT IMAGING | Age: 77
End: 2022-01-26
Payer: MEDICARE

## 2022-01-26 ENCOUNTER — HOSPITAL ENCOUNTER (EMERGENCY)
Age: 77
Discharge: HOME | End: 2022-01-27
Attending: STUDENT IN AN ORGANIZED HEALTH CARE EDUCATION/TRAINING PROGRAM
Payer: MEDICARE

## 2022-01-26 DIAGNOSIS — I63.9 INFARCTION OF LEFT BASAL GANGLIA (HCC): ICD-10-CM

## 2022-01-26 DIAGNOSIS — R51.9 NONINTRACTABLE HEADACHE, UNSPECIFIED CHRONICITY PATTERN, UNSPECIFIED HEADACHE TYPE: Primary | ICD-10-CM

## 2022-01-26 DIAGNOSIS — R07.89 ATYPICAL CHEST PAIN: ICD-10-CM

## 2022-01-26 LAB
ALBUMIN SERPL-MCNC: 4.5 G/DL (ref 3.5–5.2)
ALP BLD-CCNC: 58 U/L (ref 35–104)
ALT SERPL-CCNC: 13 U/L (ref 0–32)
ANION GAP SERPL CALCULATED.3IONS-SCNC: 11 MMOL/L (ref 7–16)
AST SERPL-CCNC: 19 U/L (ref 0–31)
BASOPHILS ABSOLUTE: 0.03 E9/L (ref 0–0.2)
BASOPHILS RELATIVE PERCENT: 0.7 % (ref 0–2)
BILIRUB SERPL-MCNC: 0.5 MG/DL (ref 0–1.2)
BUN BLDV-MCNC: 34 MG/DL (ref 6–23)
CALCIUM SERPL-MCNC: 9.9 MG/DL (ref 8.6–10.2)
CHLORIDE BLD-SCNC: 102 MMOL/L (ref 98–107)
CO2: 25 MMOL/L (ref 22–29)
CREAT SERPL-MCNC: 2 MG/DL (ref 0.5–1)
EOSINOPHILS ABSOLUTE: 0.07 E9/L (ref 0.05–0.5)
EOSINOPHILS RELATIVE PERCENT: 1.7 % (ref 0–6)
GFR AFRICAN AMERICAN: 29
GFR NON-AFRICAN AMERICAN: 24 ML/MIN/1.73
GLUCOSE BLD-MCNC: 100 MG/DL (ref 74–99)
HCT VFR BLD CALC: 42 % (ref 34–48)
HEMOGLOBIN: 13.5 G/DL (ref 11.5–15.5)
IMMATURE GRANULOCYTES #: 0.02 E9/L
IMMATURE GRANULOCYTES %: 0.5 % (ref 0–5)
LYMPHOCYTES ABSOLUTE: 0.94 E9/L (ref 1.5–4)
LYMPHOCYTES RELATIVE PERCENT: 22.8 % (ref 20–42)
MCH RBC QN AUTO: 29.7 PG (ref 26–35)
MCHC RBC AUTO-ENTMCNC: 32.1 % (ref 32–34.5)
MCV RBC AUTO: 92.3 FL (ref 80–99.9)
MONOCYTES ABSOLUTE: 0.37 E9/L (ref 0.1–0.95)
MONOCYTES RELATIVE PERCENT: 9 % (ref 2–12)
NEUTROPHILS ABSOLUTE: 2.7 E9/L (ref 1.8–7.3)
NEUTROPHILS RELATIVE PERCENT: 65.3 % (ref 43–80)
PDW BLD-RTO: 12.8 FL (ref 11.5–15)
PLATELET # BLD: 129 E9/L (ref 130–450)
PMV BLD AUTO: 10.5 FL (ref 7–12)
POTASSIUM SERPL-SCNC: 4.7 MMOL/L (ref 3.5–5)
PRO-BNP: 479 PG/ML (ref 0–450)
RBC # BLD: 4.55 E12/L (ref 3.5–5.5)
SODIUM BLD-SCNC: 138 MMOL/L (ref 132–146)
TOTAL PROTEIN: 7.4 G/DL (ref 6.4–8.3)
TROPONIN, HIGH SENSITIVITY: 20 NG/L (ref 0–9)
TROPONIN, HIGH SENSITIVITY: 21 NG/L (ref 0–9)
TROPONIN, HIGH SENSITIVITY: 21 NG/L (ref 0–9)
WBC # BLD: 4.1 E9/L (ref 4.5–11.5)

## 2022-01-26 PROCEDURE — 83880 ASSAY OF NATRIURETIC PEPTIDE: CPT

## 2022-01-26 PROCEDURE — 85025 COMPLETE CBC W/AUTO DIFF WBC: CPT

## 2022-01-26 PROCEDURE — 71045 X-RAY EXAM CHEST 1 VIEW: CPT

## 2022-01-26 PROCEDURE — 99283 EMERGENCY DEPT VISIT LOW MDM: CPT

## 2022-01-26 PROCEDURE — 80053 COMPREHEN METABOLIC PANEL: CPT

## 2022-01-26 PROCEDURE — 70450 CT HEAD/BRAIN W/O DYE: CPT

## 2022-01-26 PROCEDURE — 2580000003 HC RX 258: Performed by: STUDENT IN AN ORGANIZED HEALTH CARE EDUCATION/TRAINING PROGRAM

## 2022-01-26 PROCEDURE — 93005 ELECTROCARDIOGRAM TRACING: CPT | Performed by: PHYSICIAN ASSISTANT

## 2022-01-26 PROCEDURE — 71046 X-RAY EXAM CHEST 2 VIEWS: CPT

## 2022-01-26 PROCEDURE — 84484 ASSAY OF TROPONIN QUANT: CPT

## 2022-01-26 RX ORDER — 0.9 % SODIUM CHLORIDE 0.9 %
1000 INTRAVENOUS SOLUTION INTRAVENOUS ONCE
Status: COMPLETED | OUTPATIENT
Start: 2022-01-26 | End: 2022-01-26

## 2022-01-26 RX ADMIN — SODIUM CHLORIDE 1000 ML: 9 INJECTION, SOLUTION INTRAVENOUS at 23:11

## 2022-01-26 ASSESSMENT — ENCOUNTER SYMPTOMS
EYE DISCHARGE: 0
SORE THROAT: 0
VOMITING: 0
EYE PAIN: 0
SINUS PRESSURE: 0
SHORTNESS OF BREATH: 0
BACK PAIN: 0
EYE REDNESS: 0
COUGH: 0
NAUSEA: 0
DIARRHEA: 0
WHEEZING: 0
ABDOMINAL DISTENTION: 0

## 2022-01-26 NOTE — ED NOTES
Department of Emergency Medicine  FIRST PROVIDER TRIAGE NOTE             Independent MLP           1/26/22  3:36 PM EST    Date of Encounter: 1/26/22   MRN: [id number]      HPI: Lisette Villatoro is a 77 y.o. female who presents to the ED for Headache (sharp pain intermittently on the right side, blurred vision in right eye onset 0900 today, lost vision in left eye first week of january from a stroke ) and Chest Pain (right side, onset yesterday)     Pt presenting with intermittent right sided HA starting at 0900 this morning. Pt states she also had blurred vision in her right eye at that time but states that has now resolved. Pt also c/o right intermittent cp since yesterday. ROS: Negative for sob or abd pain. PE: Gen Appearance/Constitutional: alert  HEENT: NC/NT. PERRLA,  Airway patent. Initial Plan of Care: All treatment areas with department are currently occupied. Plan to order/Initiate the following while awaiting opening in ED: labs, EKG and imaging studies.     Initial Plan of Care: Initiate Treatment-Testing, Proceed toTreatment Area When Bed Available for ED Attending/MLP to Continue Care    Electronically signed by Kailee Cornelius PA-C   DD: 1/26/22       Kailee Cornelius PA-C  01/26/22 [id number]

## 2022-01-27 VITALS
WEIGHT: 164 LBS | HEART RATE: 62 BPM | BODY MASS INDEX: 29.06 KG/M2 | TEMPERATURE: 98.2 F | OXYGEN SATURATION: 95 % | DIASTOLIC BLOOD PRESSURE: 73 MMHG | RESPIRATION RATE: 16 BRPM | SYSTOLIC BLOOD PRESSURE: 147 MMHG | HEIGHT: 63 IN

## 2022-01-27 LAB
EKG ATRIAL RATE: 60 BPM
EKG P AXIS: 42 DEGREES
EKG P-R INTERVAL: 150 MS
EKG Q-T INTERVAL: 440 MS
EKG QRS DURATION: 94 MS
EKG QTC CALCULATION (BAZETT): 440 MS
EKG R AXIS: 32 DEGREES
EKG T AXIS: 86 DEGREES
EKG VENTRICULAR RATE: 60 BPM

## 2022-01-27 NOTE — ED NOTES
Visual acuity  Right eye 20/50  Right and left eye 20/70  Left eye can only see shadows     Dwain Montreo RN  01/26/22 [id number]

## 2022-01-27 NOTE — ED PROVIDER NOTES
Calista Massey is a 77 y.o. female with a PMHx significant for HTN, DM, intracranial hemorrhage, HLD, CKD who presents for evaluation of right sided blurry vision and chest discomfort, beginning prior to arrival.  The complaint has been intermittent, moderate in severity, and worsened by nothing. The patient states that about three weeks ago she lost vision in her left eye. She was evaluated at her opthalmologist and was told she had a mini stroke. She has not had any additional imaging at this point in time. Patient had a large hemorrhage stroke years ago for which she has recovered, but has short term memory since this occurred. Daughter states that this morning she was complaining of dizziness and blurry vision out of her right eye which has since improved. She also noted a brief episode of chest discomfort that lasted for about 15 minutes yesterday and has since subsided. The history is provided by the patient and medical records. Review of Systems   Constitutional: Negative for chills and fever. HENT: Negative for ear pain, sinus pressure and sore throat. Eyes: Positive for visual disturbance. Negative for pain, discharge and redness. Respiratory: Negative for cough, shortness of breath and wheezing. Cardiovascular: Positive for chest pain. Gastrointestinal: Negative for abdominal distention, diarrhea, nausea and vomiting. Genitourinary: Negative for dysuria and frequency. Musculoskeletal: Negative for arthralgias and back pain. Skin: Negative for rash and wound. Neurological: Positive for dizziness. Negative for weakness and headaches. Hematological: Negative for adenopathy. All other systems reviewed and are negative. Physical Exam  Vitals and nursing note reviewed. Constitutional:       General: She is not in acute distress. Appearance: Normal appearance. She is well-developed. She is not ill-appearing.    HENT:      Head: Normocephalic and atraumatic. Right Ear: External ear normal.      Left Ear: External ear normal.   Eyes:      General:         Right eye: No discharge. Left eye: No discharge. Extraocular Movements: Extraocular movements intact. Conjunctiva/sclera: Conjunctivae normal.      Pupils: Pupils are equal, round, and reactive to light. Comments: No vision out of left eye     Cardiovascular:      Rate and Rhythm: Normal rate and regular rhythm. Heart sounds: Normal heart sounds. No murmur heard. Pulmonary:      Effort: Pulmonary effort is normal. No respiratory distress. Breath sounds: Normal breath sounds. No stridor. Abdominal:      General: There is no distension. Palpations: Abdomen is soft. There is no mass. Tenderness: There is no abdominal tenderness. Hernia: No hernia is present. Musculoskeletal:         General: No deformity. Normal range of motion. Cervical back: Normal range of motion and neck supple. Skin:     General: Skin is warm. Findings: No rash. Neurological:      General: No focal deficit present. Mental Status: She is alert and oriented to person, place, and time. Cranial Nerves: No cranial nerve deficit. Sensory: No sensory deficit. Motor: No weakness. Coordination: Coordination normal.          Procedures     [facility]     ED Course as of 01/27/22 1932 Wed Jan 26, 2022 2348 Patient reeval laying bed no acute distress. She has had no chest discomfort during her stay here. She has had no blurry vision to her eye. Repeat troponin of 21, likely secondary to her CKD with a creatinine of 2. 0. Shared medical decision making had with the patient and her daughter at bedside. The patient had her loss of vision 3 weeks ago, she is already seeing cardiology for an echocardiogram tomorrow outpatient. She is seen neurologist vascular surgery in the past.  Recommended outpatient follow-up.   Daughter and patient are agreeable at this point in time. [BB]      ED Course User Index  [BB] DO Calista Hampton presents to the ED for evaluation of headache and chest discomfort yesterday. Workup in the ED revealed labs near normal limits, negative CT head. CT head did demonstrate a remote infarct. Patient did lose her vision 3 weeks prior and has been getting this worked up outpatient. She has no new focal deficits on physical exam.  She is not having any chest pain, she had a short brief episode the day prior. She already has scheduled outpatient follow-up with an echo being performed by cardiology tomorrow. Patient continues to be non-toxic on re-evaluation. Findings were discussed with the patient and reasons to immediately return to the ED were articulated to them. They will follow-up with their PCP. Shared medical decision making with the patient and her daughter. Is there is currently no neurology and there would be a long transfer time, they are agreeable to be worked up outpatient however strict return precautions given. EKG: This EKG is signed and interpreted by me. Rate: 60  Rhythm: Sinus  Interpretation: non-specific EKG, no supervisions, ST depressions, T wave inversions in the lateral leads, , QRS 24, QTc 440  Comparison: no previous EKG available       --------------------------------------------- PAST HISTORY ---------------------------------------------  Past Medical History:  has a past medical history of Arthritis, CAD (coronary artery disease), CKD (chronic kidney disease), Depression, Hypertension, Respiratory failure ([facility] [address] Thyroid disease, Tracheal stenosis, and Unspecified cerebral artery occlusion with cerebral infarction. Past Surgical History:  has a past surgical history that includes Coronary angioplasty with stent (); cyst removal ('s);  section; Endoscopy, colon, diagnostic; Colonoscopy (); tracheostomy (14);  Gastrostomy tube placement (2014); Upper gastrointestinal endoscopy; polypectomy (07/24/2013); other surgical history; joint replacement (Left, late 1990's); Tonsillectomy; Heel spur surgery (Left, years ago); and Total knee arthroplasty (Right, 8/28/2020). Social History:  reports that she quit smoking about 46 years ago. She has a 12.00 pack-year smoking history. She has never used smokeless tobacco. She reports that she does not drink alcohol and does not use drugs. Family History: family history is not on file. The patients home medications have been reviewed.     Allergies: Latex, Norvasc [amlodipine besylate], and Dye [iodides]    -------------------------------------------------- RESULTS -------------------------------------------------  Labs:  Results for orders placed or performed during the hospital encounter of 01/26/22   CBC Auto Differential   Result Value Ref Range    WBC 4.1 (L) 4.5 - 11.5 E9/L    RBC 4.55 3.50 - 5.50 E12/L    Hemoglobin 13.5 11.5 - 15.5 g/dL    Hematocrit 42.0 34.0 - 48.0 %    MCV 92.3 80.0 - 99.9 fL    MCH 29.7 26.0 - 35.0 pg    MCHC 32.1 32.0 - 34.5 %    RDW 12.8 11.5 - 15.0 fL    Platelets 410 (L) 306 - 450 E9/L    MPV 10.5 7.0 - 12.0 fL    Neutrophils % 65.3 43.0 - 80.0 %    Immature Granulocytes % 0.5 0.0 - 5.0 %    Lymphocytes % 22.8 20.0 - 42.0 %    Monocytes % 9.0 2.0 - 12.0 %    Eosinophils % 1.7 0.0 - 6.0 %    Basophils % 0.7 0.0 - 2.0 %    Neutrophils Absolute 2.70 1.80 - 7.30 E9/L    Immature Granulocytes # 0.02 E9/L    Lymphocytes Absolute 0.94 (L) 1.50 - 4.00 E9/L    Monocytes Absolute 0.37 0.10 - 0.95 E9/L    Eosinophils Absolute 0.07 0.05 - 0.50 E9/L    Basophils Absolute 0.03 0.00 - 0.20 E9/L   Comprehensive Metabolic Panel   Result Value Ref Range    Sodium 138 132 - 146 mmol/L    Potassium 4.7 3.5 - 5.0 mmol/L    Chloride 102 98 - 107 mmol/L    CO2 25 22 - 29 mmol/L    Anion Gap 11 7 - 16 mmol/L    Glucose 100 (H) 74 - 99 mg/dL    BUN 34 (H) 6 - 23 mg/dL    CREATININE 2.0 (H) 0.5 - 1.0 mg/dL    GFR Non-African American 24 >=60 mL/min/1.73    GFR African American 29     Calcium 9.9 8.6 - 10.2 mg/dL    Total Protein 7.4 6.4 - 8.3 g/dL    Albumin 4.5 3.5 - 5.2 g/dL    Total Bilirubin 0.5 0.0 - 1.2 mg/dL    Alkaline Phosphatase 58 35 - 104 U/L    ALT 13 0 - 32 U/L    AST 19 0 - 31 U/L   Troponin   Result Value Ref Range    Troponin, High Sensitivity 20 (H) 0 - 9 ng/L   Brain Natriuretic Peptide   Result Value Ref Range    Pro- (H) 0 - 450 pg/mL   Troponin   Result Value Ref Range    Troponin, High Sensitivity 21 (H) 0 - 9 ng/L   Troponin   Result Value Ref Range    Troponin, High Sensitivity 21 (H) 0 - 9 ng/L   EKG 12 Lead   Result Value Ref Range    Ventricular Rate 60 BPM    Atrial Rate 60 BPM    P-R Interval 150 ms    QRS Duration 94 ms    Q-T Interval 440 ms    QTc Calculation (Bazett) 440 ms    P Axis 42 degrees    R Axis 32 degrees    T Axis 86 degrees       Radiology:  CT HEAD WO CONTRAST   Final Result   No evidence of acute intracranial process. Mild atrophy and moderate chronic   small vessel ischemic changes noted along with a remote infarct of the left   basal ganglia region. XR CHEST PORTABLE   Final Result   No acute process. ------------------------- NURSING NOTES AND VITALS REVIEWED ---------------------------  Date / Time Roomed:  1/26/2022  8:15 PM  ED Bed Assignment:  19/19    The nursing notes within the ED encounter and vital signs as below have been reviewed. BP (!) 147/73   Pulse 62   Temp 98.2 °F (36.8 °C)   Resp 16   Ht 5' 3\" (1.6 m)   Wt 164 lb (74.4 kg)   SpO2 95%   BMI 29.05 kg/m²   Oxygen Saturation Interpretation: Normal      ------------------------------------------ PROGRESS NOTES ------------------------------------------  7:32 PM EST  I have spoken with the patient and Daughter and discussed todays results, in addition to providing specific details for the plan of care and counseling regarding the diagnosis and prognosis.   Their questions are answered at this time and they are agreeable with the plan. I discussed at length with them reasons for immediate return here for re evaluation. They will followup with their Cardiologist, neurologist and primary care physician by calling their office tomorrow. --------------------------------- ADDITIONAL PROVIDER NOTES ---------------------------------  At this time the patient is without objective evidence of an acute process requiring hospitalization or inpatient management. They have remained hemodynamically stable throughout their entire ED visit and are stable for discharge with outpatient follow-up. The plan has been discussed in detail and they are aware of the specific conditions for emergent return, as well as the importance of follow-up. Discharge Medication List as of 1/26/2022 11:52 PM          Diagnosis:  1. Nonintractable headache, unspecified chronicity pattern, unspecified headache type    2. Atypical chest pain    3. Remote Infarction of left basal ganglia (HCC)        Disposition:  Patient's disposition: Discharge to home  Patient's condition is stable.          Jacqueline Hernandez DO  01/27/22 1935

## 2022-01-28 ENCOUNTER — TELEPHONE (OUTPATIENT)
Dept: PRIMARY CARE CLINIC | Age: 77
End: 2022-01-28

## 2022-01-28 DIAGNOSIS — I61.9 HEMORRHAGIC STROKE (HCC): Primary | ICD-10-CM

## 2022-01-28 DIAGNOSIS — I73.9 PERIPHERAL VASCULAR DISEASE, UNSPECIFIED (HCC): ICD-10-CM

## 2022-01-28 DIAGNOSIS — I63.9 CEREBROVASCULAR ACCIDENT (CVA), UNSPECIFIED MECHANISM (HCC): ICD-10-CM

## 2022-01-28 NOTE — TELEPHONE ENCOUNTER
Er doctor and heart doctor recommended her see vascular doctor based on her u/s. Daughter has no prefrence but would like to get ball rolling, please advise who you would like patient referred to.

## 2022-02-02 LAB
ALBUMIN SERPL-MCNC: 4.3 G/DL
ALP BLD-CCNC: 57 U/L
ALT SERPL-CCNC: 20 U/L
ANION GAP SERPL CALCULATED.3IONS-SCNC: 10 MMOL/L
AST SERPL-CCNC: 16 U/L
AVERAGE GLUCOSE: 117
BASOPHILS ABSOLUTE: 0.02 /ΜL
BASOPHILS RELATIVE PERCENT: 0.5 %
BILIRUB SERPL-MCNC: 0.5 MG/DL (ref 0.1–1.4)
BUN BLDV-MCNC: 42 MG/DL
CALCIUM SERPL-MCNC: 9.7 MG/DL
CHLORIDE BLD-SCNC: 111 MMOL/L
CHOLESTEROL, TOTAL: 180 MG/DL
CHOLESTEROL/HDL RATIO: 3
CO2: 25 MMOL/L
CREAT SERPL-MCNC: 1.63 MG/DL
EOSINOPHILS ABSOLUTE: 0.08 /ΜL
EOSINOPHILS RELATIVE PERCENT: 1.9 %
GFR CALCULATED: 31
GLUCOSE BLD-MCNC: 101 MG/DL
HBA1C MFR BLD: 5.7 %
HCT VFR BLD CALC: 41.7 % (ref 36–46)
HDLC SERPL-MCNC: 53 MG/DL (ref 35–70)
HEMOGLOBIN: 13.4 G/DL (ref 12–16)
LDL CHOLESTEROL CALCULATED: 117 MG/DL (ref 0–160)
LYMPHOCYTES ABSOLUTE: 0.84 /ΜL
LYMPHOCYTES RELATIVE PERCENT: 19.8 %
MCH RBC QN AUTO: 29.7 PG
MCHC RBC AUTO-ENTMCNC: 32.1 G/DL
MCV RBC AUTO: 92.5 FL
MONOCYTES ABSOLUTE: 0.31 /ΜL
MONOCYTES RELATIVE PERCENT: 7.3 %
NEUTROPHILS ABSOLUTE: 2.98 /ΜL
NEUTROPHILS RELATIVE PERCENT: 70 %
NONHDLC SERPL-MCNC: NORMAL MG/DL
PDW BLD-RTO: 13.1 %
PHOSPHORUS: 4.1 MG/DL
PLATELET # BLD: 133 K/ΜL
PMV BLD AUTO: 11 FL
POTASSIUM SERPL-SCNC: 4.6 MMOL/L
PTH INTACT: 45
RBC # BLD: 4.51 10^6/ΜL
SODIUM BLD-SCNC: 141 MMOL/L
TOTAL PROTEIN: 7.4
TRIGL SERPL-MCNC: 52 MG/DL
VITAMIN D 25-HYDROXY: 81.2
VITAMIN D2, 25 HYDROXY: NORMAL
VITAMIN D3,25 HYDROXY: NORMAL
VLDLC SERPL CALC-MCNC: NORMAL MG/DL
WBC # BLD: 4.3 10^3/ML

## 2022-02-08 ENCOUNTER — OFFICE VISIT (OUTPATIENT)
Dept: PRIMARY CARE CLINIC | Age: 77
End: 2022-02-08
Payer: MEDICARE

## 2022-02-08 VITALS
TEMPERATURE: 97.6 F | DIASTOLIC BLOOD PRESSURE: 78 MMHG | HEART RATE: 67 BPM | OXYGEN SATURATION: 97 % | SYSTOLIC BLOOD PRESSURE: 144 MMHG

## 2022-02-08 DIAGNOSIS — I25.10 CORONARY ARTERY DISEASE INVOLVING NATIVE CORONARY ARTERY OF NATIVE HEART WITHOUT ANGINA PECTORIS: Primary | ICD-10-CM

## 2022-02-08 DIAGNOSIS — I10 ESSENTIAL HYPERTENSION: Chronic | ICD-10-CM

## 2022-02-08 DIAGNOSIS — E03.9 HYPOTHYROIDISM, UNSPECIFIED TYPE: ICD-10-CM

## 2022-02-08 DIAGNOSIS — I73.9 PVD (PERIPHERAL VASCULAR DISEASE) (HCC): ICD-10-CM

## 2022-02-08 DIAGNOSIS — N18.32 STAGE 3B CHRONIC KIDNEY DISEASE (HCC): ICD-10-CM

## 2022-02-08 PROCEDURE — 4040F PNEUMOC VAC/ADMIN/RCVD: CPT | Performed by: FAMILY MEDICINE

## 2022-02-08 PROCEDURE — 1090F PRES/ABSN URINE INCON ASSESS: CPT | Performed by: FAMILY MEDICINE

## 2022-02-08 PROCEDURE — G8427 DOCREV CUR MEDS BY ELIG CLIN: HCPCS | Performed by: FAMILY MEDICINE

## 2022-02-08 PROCEDURE — G8400 PT W/DXA NO RESULTS DOC: HCPCS | Performed by: FAMILY MEDICINE

## 2022-02-08 PROCEDURE — G8417 CALC BMI ABV UP PARAM F/U: HCPCS | Performed by: FAMILY MEDICINE

## 2022-02-08 PROCEDURE — 1123F ACP DISCUSS/DSCN MKR DOCD: CPT | Performed by: FAMILY MEDICINE

## 2022-02-08 PROCEDURE — G8484 FLU IMMUNIZE NO ADMIN: HCPCS | Performed by: FAMILY MEDICINE

## 2022-02-08 PROCEDURE — 3017F COLORECTAL CA SCREEN DOC REV: CPT | Performed by: FAMILY MEDICINE

## 2022-02-08 PROCEDURE — 99214 OFFICE O/P EST MOD 30 MIN: CPT | Performed by: FAMILY MEDICINE

## 2022-02-08 PROCEDURE — 1036F TOBACCO NON-USER: CPT | Performed by: FAMILY MEDICINE

## 2022-02-08 RX ORDER — LABETALOL 200 MG/1
TABLET, FILM COATED ORAL
Qty: 60 TABLET | Refills: 1 | Status: SHIPPED
Start: 2022-02-08 | End: 2022-02-21

## 2022-02-08 ASSESSMENT — ENCOUNTER SYMPTOMS
RESPIRATORY NEGATIVE: 1
ALLERGIC/IMMUNOLOGIC NEGATIVE: 1
GASTROINTESTINAL NEGATIVE: 1
EYES NEGATIVE: 1

## 2022-02-08 NOTE — PROGRESS NOTES
22     Rick Falcon    : 1945 Sex: female   Age: 77 y.o. Chief Complaint   Patient presents with    Results     review carotid ultrasound -- had echo done at dr Avni Rivera Other     was in ER for kidney function       Prior to Admission medications    Medication Sig Start Date End Date Taking? Authorizing Provider   labetalol (NORMODYNE) 200 MG tablet 1 q 12 22  Yes Morna Serve Liuoff, DO   aspirin 81 MG EC tablet 1 qd 1/10/22  Yes Morna Serve Traikoff, DO   spironolactone (ALDACTONE) 25 MG tablet TAKE 1 TABLET BY MOUTH EVERY DAY 21  Yes Morna Serve Traikoff, DO   citalopram (CELEXA) 20 MG tablet Take 1 tablet by mouth daily 21  Yes Morna Serve Traikoff, DO   cloNIDine (CATAPRES) 0.1 MG tablet TAKE 1 TABLET BY MOUTH TWICE DAILY 21  Yes Morna Serve Traikoff, DO   hydrALAZINE (APRESOLINE) 25 MG tablet TAKE 1 TABLET BY MOUTH FOUR TIMES DAILY 21  Yes Morna Serve Traikoff, DO   nitroGLYCERIN (NITRODUR) 0.2 MG/HR APPLY 1 PATCH EXTERNALLY TO THE SKIN DAILY 21  Yes Morna Serve Traikoff, DO   levothyroxine (SYNTHROID) 88 MCG tablet TAKE 1 TABLET BY MOUTH EVERY DAY 21  Yes Morna Serve Traikoff, DO   traMADol (ULTRAM) 50 MG tablet TK 1 TO 2 TS PO Q 6 H PRN P 20  Yes Historical Provider, MD   acetaminophen (TYLENOL) 500 MG tablet Take 2 tablets by mouth every 8 hours 20  Yes Joyce Thornton MD   Omega-3 Fatty Acids (FISH OIL PO) Take by mouth daily LD 2020   Yes Historical Provider, MD          HPI: Patient seen today with coronary artery disease hypertension hypothyroid chronic kidney disease peripheral vascular disease . Recent evaluation through emergency room because of elevated blood pressure. Patient was evaluated by both nephrology service and cardiac service. Stable and discharged home. We reviewed her medications and adjusted Normodyne today to 200 mg twice a day and then will reassess pressure in 1 week.   Of note is chronic vascular disease left carotid primarily and recommending vascular evaluation. Review of Systems   Constitutional: Negative. HENT: Negative. Eyes: Negative. Respiratory: Negative. Gastrointestinal: Negative. Endocrine: Negative. Genitourinary: Negative. Musculoskeletal: Negative. Skin: Negative. Allergic/Immunologic: Negative. Neurological: Negative. Hematological: Negative. Psychiatric/Behavioral: Negative. Today systems review is overall stable Meds seem to be well-tolerated. Neurologically she is intact. Denies headaches. Denies lightheadedness. We will continue with current medications.       Current Outpatient Medications:     labetalol (NORMODYNE) 200 MG tablet, 1 q 12, Disp: 60 tablet, Rfl: 1    aspirin 81 MG EC tablet, 1 qd, Disp: 90 tablet, Rfl: 3    spironolactone (ALDACTONE) 25 MG tablet, TAKE 1 TABLET BY MOUTH EVERY DAY, Disp: 90 tablet, Rfl: 1    citalopram (CELEXA) 20 MG tablet, Take 1 tablet by mouth daily, Disp: 90 tablet, Rfl: 3    cloNIDine (CATAPRES) 0.1 MG tablet, TAKE 1 TABLET BY MOUTH TWICE DAILY, Disp: 180 tablet, Rfl: 3    hydrALAZINE (APRESOLINE) 25 MG tablet, TAKE 1 TABLET BY MOUTH FOUR TIMES DAILY, Disp: 360 tablet, Rfl: 3    nitroGLYCERIN (NITRODUR) 0.2 MG/HR, APPLY 1 PATCH EXTERNALLY TO THE SKIN DAILY, Disp: 90 patch, Rfl: 3    levothyroxine (SYNTHROID) 88 MCG tablet, TAKE 1 TABLET BY MOUTH EVERY DAY, Disp: 90 tablet, Rfl: 3    traMADol (ULTRAM) 50 MG tablet, TK 1 TO 2 TS PO Q 6 H PRN P, Disp: , Rfl:     acetaminophen (TYLENOL) 500 MG tablet, Take 2 tablets by mouth every 8 hours, Disp: 180 tablet, Rfl: 0    Omega-3 Fatty Acids (FISH OIL PO), Take by mouth daily LD 8/22/2020, Disp: , Rfl:     Allergies   Allergen Reactions    Latex      Reaction unknown / may have been swelling    Norvasc [Amlodipine Besylate] Anaphylaxis    Dye [Iodides]      Reaction unknown       Social History     Tobacco Use    Smoking status: Former Smoker     Packs/day: 1.50     Years: 8.00     Pack years: 12.00     Quit date: 1976     Years since quittin.1    Smokeless tobacco: Never Used   Substance Use Topics    Alcohol use: No    Drug use: Never      Past Surgical History:   Procedure Laterality Date     SECTION      1    COLONOSCOPY      CORONARY ANGIOPLASTY WITH STENT PLACEMENT  2004    CYST REMOVAL  's    ENDOSCOPY, COLON, DIAGNOSTIC      GASTROSTOMY TUBE PLACEMENT  2014    [place]. Blake Parry and later removed    HEEL SPUR SURGERY Left years ago    JOINT REPLACEMENT Left late     OTHER SURGICAL HISTORY      scraping of trach x 2 to treat stenosis / last one 2016    POLYPECTOMY  2013    internal hemorrhoids - michelle    TONSILLECTOMY      TOTAL KNEE ARTHROPLASTY Right 2020    RIGHT KNEE TOTAL ARTHROPLASTY    ++RAYMOND++   ++PNB++     ++LATEX ALLERGY++   ++IODINE ALLERGY++ performed by Juan Luis Torres MD at [address] [place] [address],[address]  [address]    [place]    UPPER GASTROINTESTINAL ENDOSCOPY      gastritis w. superficial antral ulerations - [place]     No family history on file. Past Medical History:   Diagnosis Date    Arthritis     CAD (coronary artery disease)     follows with Dr. Gary Aleman CKD (chronic kidney disease)     stage 3 / follows with Dr. Chemo Dunn Hypertension     Respiratory failure ([facility] [address]     history of /   when had stroke, trach  x 2 weeks    Thyroid disease     Tracheal stenosis     Unspecified cerebral artery occlusion with cerebral infarction 2014    right side flacid, hemorrhagic stroke dt elevated BP/residual s/s is at right side loss of fine motor skills and some weakness, drags right leg, slight memory loss and aphasia when she is tired       Vitals:    22 1415   BP: (!) 144/78   Pulse: 67   Temp: 97.6 °F (36.4 °C)   SpO2: 97%     BP Readings from Last 3 Encounters:   22 (!) 144/78   22 (!) 147/73   01/10/22 122/74        Physical Exam  Vitals and nursing note reviewed. Constitutional:       Appearance: She is well-developed. HENT:      Head: Normocephalic. Right Ear: External ear normal.      Left Ear: External ear normal.      Nose: Nose normal.   Eyes:      Conjunctiva/sclera: Conjunctivae normal.      Pupils: Pupils are equal, round, and reactive to light. Cardiovascular:      Rate and Rhythm: Normal rate. Pulmonary:      Breath sounds: Normal breath sounds. Abdominal:      General: Bowel sounds are normal.      Palpations: Abdomen is soft. Musculoskeletal:         General: Normal range of motion. Cervical back: Normal range of motion and neck supple. Skin:     General: Skin is warm and dry. Neurological:      Mental Status: She is alert and oriented to person, place, and time. Psychiatric:         Behavior: Behavior normal.        Today on exam patient is afebrile and vitals are stable. Pressure per myself 156/78. Adjusted medication and I will recheck next week. Lab studies reviewed today. Noted on labs kidney function has improved. Lipids remain well controlled. A1c is excellent. We will sit tight with current meds care reassessment in 1 week. Plan Per Assessment:  Kofi Kee was seen today for results and other. Diagnoses and all orders for this visit:    Coronary artery disease involving native coronary artery of native heart without angina pectoris    Essential hypertension    Hypothyroidism, unspecified type    Stage 3b chronic kidney disease ([address]    PVD (peripheral vascular disease) ([facility] [address]  -     Ambulatory referral to Vascular Surgery    Other orders  -     labetalol (NORMODYNE) 200 MG tablet; 1 q 12            Return in about 1 week (around 2/15/2022). Gabriella Stephens DO    Note was generated with the assistance of voice recognition software. Document was reviewed however may contain grammatical errors.

## 2022-02-08 NOTE — PROGRESS NOTES
22     Libertad Uriarte    : 1945 Sex: female   Age: 77 y.o. Chief Complaint   Patient presents with    Results     review carotid ultrasound -- had echo done at dr Martha Bauer Other     was in ER for kidney function       Prior to Admission medications    Medication Sig Start Date End Date Taking?  Authorizing Provider   aspirin 81 MG EC tablet 1 qd 1/10/22  Yes Danuta Nicholson DO   spironolactone (ALDACTONE) 25 MG tablet TAKE 1 TABLET BY MOUTH EVERY DAY 21  Yes Danuta Nicholson DO   citalopram (CELEXA) 20 MG tablet Take 1 tablet by mouth daily 21  Yes Danuta Nicholson DO   cloNIDine (CATAPRES) 0.1 MG tablet TAKE 1 TABLET BY MOUTH TWICE DAILY 21  Yes Danuta Nicholson DO   hydrALAZINE (APRESOLINE) 25 MG tablet TAKE 1 TABLET BY MOUTH FOUR TIMES DAILY 21  Yes Danuta Nicholson DO   nitroGLYCERIN (NITRODUR) 0.2 MG/HR APPLY 1 PATCH EXTERNALLY TO THE SKIN DAILY 21  Yes Danuta Nicholson DO   levothyroxine (SYNTHROID) 88 MCG tablet TAKE 1 TABLET BY MOUTH EVERY DAY 21  Yes Danuta Nicholson DO   labetalol (NORMODYNE) 100 MG tablet TAKE 1 TABLET BY MOUTH TWICE DAILY 21  Yes Danuta Nicholson DO   traMADol (ULTRAM) 50 MG tablet TK 1 TO 2 TS PO Q 6 H PRN P 20  Yes Historical Provider, MD   acetaminophen (TYLENOL) 500 MG tablet Take 2 tablets by mouth every 8 hours 20  Yes Giorgi Shukla MD   Omega-3 Fatty Acids (FISH OIL PO) Take by mouth daily LD 2020   Yes Historical Provider, MD          HPI:           Review of Systems           Current Outpatient Medications:     aspirin 81 MG EC tablet, 1 qd, Disp: 90 tablet, Rfl: 3    spironolactone (ALDACTONE) 25 MG tablet, TAKE 1 TABLET BY MOUTH EVERY DAY, Disp: 90 tablet, Rfl: 1    citalopram (CELEXA) 20 MG tablet, Take 1 tablet by mouth daily, Disp: 90 tablet, Rfl: 3    cloNIDine (CATAPRES) 0.1 MG tablet, TAKE 1 TABLET BY MOUTH TWICE DAILY, Disp: 180 tablet, Rfl: 3    hydrALAZINE (APRESOLINE) 25 MG tablet, TAKE 1 TABLET BY MOUTH FOUR TIMES DAILY, Disp: 360 tablet, Rfl: 3    nitroGLYCERIN (NITRODUR) 0.2 MG/HR, APPLY 1 PATCH EXTERNALLY TO THE SKIN DAILY, Disp: 90 patch, Rfl: 3    levothyroxine (SYNTHROID) 88 MCG tablet, TAKE 1 TABLET BY MOUTH EVERY DAY, Disp: 90 tablet, Rfl: 3    labetalol (NORMODYNE) 100 MG tablet, TAKE 1 TABLET BY MOUTH TWICE DAILY, Disp: 180 tablet, Rfl: 3    traMADol (ULTRAM) 50 MG tablet, TK 1 TO 2 TS PO Q 6 H PRN P, Disp: , Rfl:     acetaminophen (TYLENOL) 500 MG tablet, Take 2 tablets by mouth every 8 hours, Disp: 180 tablet, Rfl: 0    Omega-3 Fatty Acids (FISH OIL PO), Take by mouth daily LD 2020, Disp: , Rfl:     Allergies   Allergen Reactions    Latex      Reaction unknown / may have been swelling    Norvasc [Amlodipine Besylate] Anaphylaxis    Dye [Iodides]      Reaction unknown       Social History     Tobacco Use    Smoking status: Former Smoker     Packs/day: 1.50     Years: 8.00     Pack years: 12.00     Quit date: 1976     Years since quittin.1    Smokeless tobacco: Never Used   Substance Use Topics    Alcohol use: No    Drug use: Never      Past Surgical History:   Procedure Laterality Date     SECTION      1    COLONOSCOPY      CORONARY ANGIOPLASTY WITH STENT PLACEMENT      CYST REMOVAL  's    ENDOSCOPY, COLON, DIAGNOSTIC      GASTROSTOMY TUBE PLACEMENT  2014    Mikayla. Blake Parry  and later removed    HEEL SPUR SURGERY Left years ago    JOINT REPLACEMENT Left late     OTHER SURGICAL HISTORY      scraping of trach x 2 to treat stenosis / last one 2016    POLYPECTOMY  2013    internal hemorrhoids - michelle    TONSILLECTOMY      TOTAL KNEE ARTHROPLASTY Right 2020    RIGHT KNEE TOTAL ARTHROPLASTY    ++RAYMOND++   ++PNB++     ++LATEX ALLERGY++   ++IODINE ALLERGY++ performed by Juan Luis Torres MD at [address] [place] [address],[address]  14    Mikayla    UPPER GASTROINTESTINAL ENDOSCOPY      gastritis w. superficial antral ulerations - Yandel     No family history on file. Past Medical History:   Diagnosis Date    Arthritis     CAD (coronary artery disease)     follows with Dr. Gary Aleman CKD (chronic kidney disease)     stage 3 / follows with Dr. Chemo Dunn Hypertension     Respiratory failure ([facility] [address]     history of / 2014  when had stroke, trach  x 2 weeks    Thyroid disease     Tracheal stenosis     Unspecified cerebral artery occlusion with cerebral infarction 9/26/2014    right side flacid, hemorrhagic stroke dt elevated BP/residual s/s is at right side loss of fine motor skills and some weakness, drags right leg, slight memory loss and aphasia when she is tired       Vitals:    02/08/22 1415   BP: (!) 144/78   Pulse: 67   Temp: 97.6 °F (36.4 °C)   SpO2: 97%     BP Readings from Last 3 Encounters:   02/08/22 (!) 144/78   01/27/22 (!) 147/73   01/10/22 122/74      156/78  Physical Exam             Plan Per Assessment:  There are no diagnoses linked to this encounter. No follow-ups on file. Felecia Anguiano DO    Note was generated with the assistance of voice recognition software. Document was reviewed however may contain grammatical errors.

## 2022-02-10 ENCOUNTER — CARE COORDINATION (OUTPATIENT)
Dept: CARE COORDINATION | Age: 77
End: 2022-02-10

## 2022-02-10 DIAGNOSIS — N18.4 STAGE 4 CHRONIC KIDNEY DISEASE (HCC): ICD-10-CM

## 2022-02-10 DIAGNOSIS — I10 ESSENTIAL HYPERTENSION: Chronic | ICD-10-CM

## 2022-02-10 DIAGNOSIS — I25.10 CORONARY ARTERY DISEASE INVOLVING NATIVE CORONARY ARTERY OF NATIVE HEART WITHOUT ANGINA PECTORIS: ICD-10-CM

## 2022-02-10 DIAGNOSIS — E11.9 TYPE 2 DIABETES MELLITUS WITHOUT COMPLICATION, WITHOUT LONG-TERM CURRENT USE OF INSULIN (HCC): Chronic | ICD-10-CM

## 2022-02-10 NOTE — CARE COORDINATION
-ACM spoke to patient to offer enrollment into Care Coordination program.  -Introduced self, reason for call, and explanation of program.   -Patient said she is interested but would like a call tomorrow, ACM agreed.

## 2022-02-11 ENCOUNTER — CARE COORDINATION (OUTPATIENT)
Dept: CARE COORDINATION | Age: 77
End: 2022-02-11

## 2022-02-11 SDOH — HEALTH STABILITY: PHYSICAL HEALTH: ON AVERAGE, HOW MANY DAYS PER WEEK DO YOU ENGAGE IN MODERATE TO STRENUOUS EXERCISE (LIKE A BRISK WALK)?: 5 DAYS

## 2022-02-11 SDOH — ECONOMIC STABILITY: TRANSPORTATION INSECURITY
IN THE PAST 12 MONTHS, HAS THE LACK OF TRANSPORTATION KEPT YOU FROM MEDICAL APPOINTMENTS OR FROM GETTING MEDICATIONS?: NO

## 2022-02-11 SDOH — ECONOMIC STABILITY: TRANSPORTATION INSECURITY
IN THE PAST 12 MONTHS, HAS LACK OF TRANSPORTATION KEPT YOU FROM MEETINGS, WORK, OR FROM GETTING THINGS NEEDED FOR DAILY LIVING?: NO

## 2022-02-11 SDOH — HEALTH STABILITY: PHYSICAL HEALTH: ON AVERAGE, HOW MANY MINUTES DO YOU ENGAGE IN EXERCISE AT THIS LEVEL?: 10 MIN

## 2022-02-11 ASSESSMENT — SOCIAL DETERMINANTS OF HEALTH (SDOH)
HOW OFTEN DO YOU ATTEND CHURCH OR RELIGIOUS SERVICES?: NEVER
HOW OFTEN DO YOU GET TOGETHER WITH FRIENDS OR RELATIVES?: ONCE A WEEK
DO YOU BELONG TO ANY CLUBS OR ORGANIZATIONS SUCH AS CHURCH GROUPS UNIONS, FRATERNAL OR ATHLETIC GROUPS, OR SCHOOL GROUPS?: NO
IN A TYPICAL WEEK, HOW MANY TIMES DO YOU TALK ON THE PHONE WITH FAMILY, FRIENDS, OR NEIGHBORS?: MORE THAN THREE TIMES A WEEK
HOW OFTEN DO YOU ATTENT MEETINGS OF THE CLUB OR ORGANIZATION YOU BELONG TO?: NEVER

## 2022-02-11 ASSESSMENT — LIFESTYLE VARIABLES: HOW OFTEN DO YOU HAVE A DRINK CONTAINING ALCOHOL: NEVER

## 2022-02-11 NOTE — CARE COORDINATION
Ambulatory Care Coordination Note  2/11/2022  CM Risk Score: 3  Charlson 10 Year Mortality Risk Score: 100%     ACC: Anibal Cárdenas RN    Summary Note:   Enrollment  ACM contacted patient to complete Care Coordination enrollment regarding DM, HTN, Dietitian, Social Work & explore community resources. Specialist noted: Dr Naga Garcia (Renal). Dr. Taina Campoverde (Cardio), [facility].   -Pt lives in a 2 story home with a ramp at the front door and one step from the garage. Pt's bed and bath on the 1st level and 3 bedrooms upstairs. Laundry in basement.   -Pt is pleasant and informative but she has difficulty finding the right word to complete a sentence at times. Something's she is unable to remember or describe. -Pt has a renter named Nirmala Meyers living in her home.   -Pt had 5 children. 3 local and 2 are in [place] 2 [place].2 hours away. -Pt has a BP monitor and scale.   -Pt has a straight cane, walker, shower chair, BSC and a wheelchair. Pt said she does not use the walker when she is in her home. When she leaves her home, she always uses a walker.   -Pt reports she does her own laundry. Pt said Nirmala Meyers walks and guides Pt up and down the stairs and Nirmala Meyers carries the [employer].   -Pt reports her son manages her medications. She has 3 seven day med boxes. Pt's son fills the med boxes and she independently takes the medications daily as prescribed.   -Pt no longer drives. Nirmala Meyers is Pt's transportation. [facility]. Pt was appreciative.   -Pt reports she lost most of her vision in her left eye. Pt said she has good vision in her right eye with glasses.    -Pt reports her daughter Tara Cox is her POA and manages her finances.    -Pt reports her daughter, Tara Cox or [address] [place] [address],[address] for patient. Pt said sometimes Nirmala Meyers takes Pt grocery shopping.  Pt said she can navigate around the store as long as she can push the cart.   -Pt reports she does some simple cooking and her daughter, Tara Cox prepares some food for her also. Discussed home delivered meals, Pt interested. Offered Social Work, Pt accepted. Referral made.   -Pt reports she has a  scheduled every 2 weeks.   -Pt reports she has an exercise bike in the basement. Pt rides the bike 10-15 minutes for 5-7 days a week. Jessi Holt guides Pt up and down the stairs. Pt said she uses the bike mostly in the winter and less in the summer.   -Pt reports she stopped going to [employer] when the pandemic occurred. Pt watches [employer] on TV. -Pt repots that she and Jessi Holt both have all 3 Covid vaccines. DM:  -2-2-2022 A1C 5.7.  -Pt reports she is no longer Diabetic. Pt said she has never been to DM education class.   -Pt repots she is not on DM medications and she no longer check her BS.   -Pt reports she does have a glucometer and equipment needed.  -Pt reports her meal planning is avoiding sweets.   -Discussed low carb diet, Pt interested. Offered Dietitian referral, Pt accepted. Referral made. Pt said she is interested in low carb diet, healthy food choices and portion control. Pt is also interested in meal plans.   -Educated DM Zone tool, will mail. HTN  -Pt reports having a BP monitor and a weight scale. .   -Pt reports she checks her BP twice a day. -Today's /89 before morning medications. BP last evening was 102/67.   -Pt reports she weighs herself on occasion. Pt said she weighed herself at 159 lbs about 2 weeks ago. Pt said she would like to lose some weight. Her goal is 140 lbs.   -2-8-2022 PCP office visit: /78.  -1- PCP office visit: weight 164 lbs.  -Discussed how sodium is hidden in foods. Like canned soup, canned vegetables, packaged products like rice, boxed mac & cheese, lunch meats, salad dressings, soy sauce, tinajero, sausage, cheese. .. Alvin Roberson Pt said she doesn't add salt when she cooks.  Pt said she started reading food labels.   -Pt reports she will talk with dietitian about low sodium diet, weight loss and portion control.   -Pt reports no ankle edema.  -Will mail HTN education along with monitoring sodium, reading labels and monitoring logs for BP & weight. Resources  -Pt reports she does not have a list of her medications. Pt asked ACM to reconcile medications with daughterAftab on Monday, 2-. Aftab Baldwin is working today.   -Discussed [place], Pt is not familiar. Pt asked ACM to talk with her daughter, Aftab Baldwin. ACM agreed.   -Pt reports she does not have a life alert button but is interested. Pt agrees to talk with Social Work about life alert button also. Discussed talking with her insurance and [facility] for [employer] about a life alert button  -Pt reports Aftab Baldwin is active on Pt's [employer] account.   -Pt reports she has a POA & LW. Decline ACP. PLAN:  -Continue Care Coordination  -Reinforce Zone Management Tools (DM)  -Dietitian referral > low carb, low sodium diet, weight loss  -Social work referral > home delivered meals, life alert button  -F/u med reconciliation > daughterAftab. -F/u [place] [employer]  -Continue to monitor BP, log results and follow trend. -F/u on weighing self more often.   -Will mail DM zone tool and all education for DM, HTN, Fall precautions, monitoring sodium, reading labels and monitoring logs for BP & weight.     -Next anticipated outreach by [address] [place] [address] Team: one weeks. Ambulatory Care Coordination Assessment    Care Coordination Protocol  Program Enrollment: Complex Care  Referral from Primary Care Provider: No  Week 1 - Initial Assessment     Do you have all of your prescriptions and are they filled?: Yes  Barriers to medication adherence: None  Are you able to afford your medications?: Yes  How often do you have trouble taking your medications the way you have been told to take them?: I always take them as prescribed.      Do you have Home O2 Therapy?: No      Ability to seek help/take action for Emergent Urgent situations i.e. fire, crime, inclement weather or health crisis. : Independent  Ability to ambulate to restroom: Independent  Ability handle personal hygeine needs (bathing/dressing/grooming): Independent  Ability to manage Medications: Dependent  Ability to prepare Food Preparation: Needs Assistance  Ability to maintain home (clean home, laundry): Needs Assistance  Ability to drive and/or has transportation: Dependent  Ability to do shopping: Dependent  Ability to manage finances: Dependent  Is patient able to live independently?: Yes     Current Housing: Private Residence        Per the Fall Risk Screening, did the patient have 2 or more falls or 1 fall with injury in the past year?: Yes  How often do you think you are about to fall and you do NOT fall? For example, you grab something to stabilize yourself or hold onto a wall/furniture?: Rarely  Use of a Mobility Aid: Yes (Comment: 2/11/22 Pt reprots she only uses a walker when she leaves her home. She does not use the walker in the home.  )  Difficulty walking/impaired gait: Yes (Comment: 2/11/22 Pt said she has some difficulty walking since her stroke 7 yrs ago. )  Issues with feet or shoes like numbness, edema, shoes not fitting: No  Changes in vision, poor vision or poor lighting in environment: Yes (Comment: 2/11/22 Pt said she lost most vision in her left eye 2 months ago.  Right eye has good vision with her glasses. )  Dizziness: No  Other Fall Risk: No     Frequent urination at night?: No  Do you use rails/bars?: Yes  Do you have a non-slip tub mat?: No     Are you experiencing loss of meaning?: No  Are you experiencing loss of hope and peace?: No     Thinking about your patient's physical health needs, are there any symptoms or problems (risk indicators) you are unsure about that require further investigation?: Mild vague physical symptoms or problems; but do not impact on daily life or are not of concern to patient   Are the patients physical health problems impacting on their mental well-being?: Mild impact on mental well-being e.g. \"\"feeling fed-up\"\", \"\"reduced enjoyment\"\"   Are there any problems with your patients lifestyle behaviors (alcohol, drugs, diet, exercise) that are impacting on physical or mental well-being?: Some mild concern of potential negative impact on well-being   Do you have any other concerns about your patients mental well-being? How would you rate their severity and impact on the patient?: Mild problems - don't interfere with function   How would you rate their home environment in terms of safety and stability (including domestic violence, insecure housing, neighbor harassment)?: Consistently safe, supportive, stable, no identified problems   How do daily activities impact on the patient's well-being? (include current or anticipated unemployment, work, caregiving, access to transportation or other): Some general dissatisfaction but no concern   How would you rate their social network (family, work, friends)?: Good participation with social networks   How would you rate their financial resources (including ability to afford all required medical care)?: Financially secure, resources adequate, no identified problems   How wells does the patient now understand their health and well-being (symptoms, signs or risk factors) and what they need to do to manage their health?: Reasonable to good understanding and already engages in managing health or is willing to undertake better management   How well do you think your patient can engage in healthcare discussions? (Barriers include language, deafness, aphasia, alcohol or drug problems, learning difficulties, concentration): Adequate communication, with or without minor barriers   Do other services need to be involved to help this patient?: Other care/services not in place and required   Are current services involved with this patient well-coordinated? (Include coordination with other services you are now recommendation):  All required care/services in place and well-coordinated   Suggested Interventions and Community Resources  Fall Risk Prevention: Completed Other Services or Interventions: Will mail education for DM, HTN, Fall precautions, monitoring sodium, reading labels and monitoring logs for BP & weight. Medication Assistance Program: Declined   Pharmacist: Declined   Registered Dietician: Completed   Social Work: Completed   Zone Management Tools: Completed                  Prior to Admission medications    Medication Sig Start Date End Date Taking?  Authorizing Provider   labetalol (NORMODYNE) 200 MG tablet 1 q 12 2/8/22   Cricket Haines DO   aspirin 81 MG EC tablet 1 qd 1/10/22   Maki Nicholson,    spironolactone (ALDACTONE) 25 MG tablet TAKE 1 TABLET BY MOUTH EVERY DAY 11/23/21   Cricket Haines DO   citalopram (CELEXA) 20 MG tablet Take 1 tablet by mouth daily 6/7/21   Cricket Haines DO   cloNIDine (CATAPRES) 0.1 MG tablet TAKE 1 TABLET BY MOUTH TWICE DAILY 6/7/21   Cricket Haines DO   hydrALAZINE (APRESOLINE) 25 MG tablet TAKE 1 TABLET BY MOUTH FOUR TIMES DAILY 6/7/21   Maki Nicholson,    nitroGLYCERIN (NITRODUR) 0.2 MG/HR APPLY 1 PATCH EXTERNALLY TO THE SKIN DAILY 6/1/21   Cricket Haines DO   levothyroxine (SYNTHROID) 88 MCG tablet TAKE 1 TABLET BY MOUTH EVERY DAY 5/14/21   Cricket Haines DO   traMADol (ULTRAM) 50 MG tablet TK 1 TO 2 TS PO Q 6 H PRN P 9/18/20   Historical Provider, MD   acetaminophen (TYLENOL) 500 MG tablet Take 2 tablets by mouth every 8 hours 8/29/20   Jason Castrejon MD   Omega-3 Fatty Acids (FISH OIL PO) Take by mouth daily LD 8/22/2020    Historical Provider, MD       Future Appointments   Date Time Provider Bert Wahl   2/22/2022  3:00 PM DO JORGE Smith [facility]   2/24/2022  1:30 PM Demi Peterson MD [facility]   3/3/2022  2:00 PM DO JORGE Garduno [facility]      and   Diabetes Assessment    Medic Alert ID: No  Meal Planning: Avoidance of concentrated sweets   How often do you test your blood sugar?: No Testing   Do you have barriers with adherence to non-pharmacologic self-management interventions?  (Nutrition/Exercise/Self-Monitoring): No   Have you ever had to go to the ED for symptoms of low blood sugar?: No       No patient-reported symptoms   Do you have hyperglycemia symptoms?: No   Do you have hypoglycemia symptoms?: No   Blood Sugar Monitoring Regimen: Not Testing

## 2022-02-14 ENCOUNTER — CARE COORDINATION (OUTPATIENT)
Dept: CARE COORDINATION | Age: 77
End: 2022-02-14

## 2022-02-14 NOTE — CARE COORDINATION
-ACM spoke to patient's daughter, Donell Devries (HIPAA) to complete patient's medication reconciliation for Care Coordination.  -Introduced self, reason for call, and explanation of program.   -Donell Devries declined at this time.  -Donell Devries reports she monitors Pt's diet and is not interested in home delivered meals.   -Donell Devries reports Pt has 5 children that check on Pt often and Pt lives with a friend.   -Discussed life alert button for Pt when Pt's friend is not there. Donell Devries declined. Donell Devries said Pt will be fine home alone for an hour or 2 and Pt may push the button when not needed. Jessica Gallegos declined dietitian and Social Work referrals. Donell Devries said Pt has a short term memory loss and won't remember what is discussed. Jessica Gallegos encouraged to contact ACM or inform PCP if she should change her mind.  ACM contact information given to Donell Devries.  -Will resolve Care Coordination episode and remove name from care team.

## 2022-02-14 NOTE — CARE COORDINATION
[facility] made call to pt's primary phone number listed, to initiate SW referral. Pt's daughter/POA Aftab Baldwin answered, [facility] asked for pt. Aftab Baldwin reports she is pt's POA and asks to be called rather than pt d/t pt's aphasia from stroke and confusion. CC explained purpose of call was for SW referral regarding home delivered meals. Dtr Aftab Baldwin reports pt does not need anything, does not need home delivered meals and was not accepting of this SW call. [facility] provided blameless apology, and Aftab Baldwin ended call at this time. [facility] provided update to [address] and [facility] signed off.     Ni WARE, [facility]   Care Coordinator  [phone]

## 2022-02-15 ENCOUNTER — CARE COORDINATION (OUTPATIENT)
Dept: CARE COORDINATION | Age: 77
End: 2022-02-15

## 2022-02-15 NOTE — CARE COORDINATION
RD received in basket message from Danuta Gay on 2/14 regarding referral:    Just spoke with patient's daughter, Aretha Aceves and she declined Care Coordination. Aretha Aceves does not want any of us calling patient. Please disregard the referral.     RD will not outreach to patient at this time and will follow up as appropriate.      [address]

## 2022-02-21 RX ORDER — LABETALOL 200 MG/1
TABLET, FILM COATED ORAL
Qty: 180 TABLET | Refills: 2 | Status: SHIPPED
Start: 2022-02-21 | End: 2022-02-22 | Stop reason: SDUPTHER

## 2022-02-22 ENCOUNTER — OFFICE VISIT (OUTPATIENT)
Dept: PRIMARY CARE CLINIC | Age: 77
End: 2022-02-22
Payer: MEDICARE

## 2022-02-22 VITALS
WEIGHT: 168 LBS | BODY MASS INDEX: 29.77 KG/M2 | OXYGEN SATURATION: 96 % | DIASTOLIC BLOOD PRESSURE: 84 MMHG | SYSTOLIC BLOOD PRESSURE: 150 MMHG | TEMPERATURE: 97.1 F | HEART RATE: 60 BPM | HEIGHT: 63 IN

## 2022-02-22 DIAGNOSIS — I25.10 CORONARY ARTERY DISEASE INVOLVING NATIVE CORONARY ARTERY OF NATIVE HEART WITHOUT ANGINA PECTORIS: Primary | ICD-10-CM

## 2022-02-22 DIAGNOSIS — I10 ESSENTIAL HYPERTENSION: Chronic | ICD-10-CM

## 2022-02-22 DIAGNOSIS — N18.32 STAGE 3B CHRONIC KIDNEY DISEASE (HCC): ICD-10-CM

## 2022-02-22 DIAGNOSIS — E78.2 MIXED HYPERLIPIDEMIA: ICD-10-CM

## 2022-02-22 DIAGNOSIS — E03.9 HYPOTHYROIDISM, UNSPECIFIED TYPE: ICD-10-CM

## 2022-02-22 DIAGNOSIS — E11.22 TYPE 2 DIABETES MELLITUS WITH CHRONIC KIDNEY DISEASE, WITHOUT LONG-TERM CURRENT USE OF INSULIN, UNSPECIFIED CKD STAGE (HCC): ICD-10-CM

## 2022-02-22 DIAGNOSIS — I61.9 NONTRAUMATIC INTRACEREBRAL HEMORRHAGE, UNSPECIFIED CEREBRAL LOCATION, UNSPECIFIED LATERALITY (HCC): ICD-10-CM

## 2022-02-22 DIAGNOSIS — E11.9 TYPE 2 DIABETES MELLITUS WITHOUT COMPLICATION, WITHOUT LONG-TERM CURRENT USE OF INSULIN (HCC): Chronic | ICD-10-CM

## 2022-02-22 PROCEDURE — 1036F TOBACCO NON-USER: CPT | Performed by: FAMILY MEDICINE

## 2022-02-22 PROCEDURE — G8400 PT W/DXA NO RESULTS DOC: HCPCS | Performed by: FAMILY MEDICINE

## 2022-02-22 PROCEDURE — G8427 DOCREV CUR MEDS BY ELIG CLIN: HCPCS | Performed by: FAMILY MEDICINE

## 2022-02-22 PROCEDURE — 1123F ACP DISCUSS/DSCN MKR DOCD: CPT | Performed by: FAMILY MEDICINE

## 2022-02-22 PROCEDURE — G8417 CALC BMI ABV UP PARAM F/U: HCPCS | Performed by: FAMILY MEDICINE

## 2022-02-22 PROCEDURE — 3017F COLORECTAL CA SCREEN DOC REV: CPT | Performed by: FAMILY MEDICINE

## 2022-02-22 PROCEDURE — 4040F PNEUMOC VAC/ADMIN/RCVD: CPT | Performed by: FAMILY MEDICINE

## 2022-02-22 PROCEDURE — G8484 FLU IMMUNIZE NO ADMIN: HCPCS | Performed by: FAMILY MEDICINE

## 2022-02-22 PROCEDURE — 3044F HG A1C LEVEL LT 7.0%: CPT | Performed by: FAMILY MEDICINE

## 2022-02-22 PROCEDURE — 1090F PRES/ABSN URINE INCON ASSESS: CPT | Performed by: FAMILY MEDICINE

## 2022-02-22 PROCEDURE — 99214 OFFICE O/P EST MOD 30 MIN: CPT | Performed by: FAMILY MEDICINE

## 2022-02-22 PROCEDURE — 2022F DILAT RTA XM EVC RTNOPTHY: CPT | Performed by: FAMILY MEDICINE

## 2022-02-22 RX ORDER — CLONIDINE HYDROCHLORIDE 0.1 MG/1
TABLET ORAL
Qty: 180 TABLET | Refills: 3 | Status: SHIPPED
Start: 2022-02-22 | End: 2022-02-22 | Stop reason: SDUPTHER

## 2022-02-22 RX ORDER — CLONIDINE HYDROCHLORIDE 0.1 MG/1
TABLET ORAL
Qty: 90 TABLET | Refills: 3 | Status: SHIPPED
Start: 2022-02-22 | End: 2022-08-04 | Stop reason: SDUPTHER

## 2022-02-22 RX ORDER — SPIRONOLACTONE 25 MG/1
TABLET ORAL
Qty: 90 TABLET | Refills: 3 | Status: SHIPPED
Start: 2022-02-22 | End: 2022-05-23

## 2022-02-22 RX ORDER — LABETALOL 100 MG/1
TABLET, FILM COATED ORAL
Qty: 60 TABLET | Refills: 3 | Status: SHIPPED
Start: 2022-02-22 | End: 2022-05-10 | Stop reason: SDUPTHER

## 2022-02-22 RX ORDER — LEVOTHYROXINE SODIUM 88 UG/1
TABLET ORAL
Qty: 90 TABLET | Refills: 3 | Status: SHIPPED
Start: 2022-02-22 | End: 2022-02-24 | Stop reason: DRUGHIGH

## 2022-02-22 RX ORDER — NITROGLYCERIN 40 MG/1
PATCH TRANSDERMAL
Qty: 90 PATCH | Refills: 3 | Status: SHIPPED
Start: 2022-02-22 | End: 2022-05-23

## 2022-02-22 RX ORDER — ASPIRIN 81 MG/1
TABLET ORAL
Qty: 90 TABLET | Refills: 3 | Status: SHIPPED | OUTPATIENT
Start: 2022-02-22

## 2022-02-22 RX ORDER — CITALOPRAM 20 MG/1
20 TABLET ORAL DAILY
Qty: 90 TABLET | Refills: 3 | Status: SHIPPED
Start: 2022-02-22 | End: 2022-05-23

## 2022-02-22 RX ORDER — HYDRALAZINE HYDROCHLORIDE 25 MG/1
TABLET, FILM COATED ORAL
Qty: 360 TABLET | Refills: 3 | Status: SHIPPED
Start: 2022-02-22 | End: 2022-02-24

## 2022-02-22 ASSESSMENT — ENCOUNTER SYMPTOMS
RESPIRATORY NEGATIVE: 1
GASTROINTESTINAL NEGATIVE: 1
EYES NEGATIVE: 1
ALLERGIC/IMMUNOLOGIC NEGATIVE: 1

## 2022-02-22 NOTE — PROGRESS NOTES
22     Bryce Weaver    : 1945 Sex: female   Age: 77 y.o. Chief Complaint   Patient presents with    Hypertension       Prior to Admission medications    Medication Sig Start Date End Date Taking? Authorizing Provider   citalopram (CELEXA) 20 MG tablet Take 1 tablet by mouth daily 22  Yes Gumarotroy Nicholson, DO   levothyroxine (SYNTHROID) 88 MCG tablet TAKE 1 TABLET BY MOUTH EVERY DAY 22  Yes Gumaro Charter Liuoff, DO   spironolactone (ALDACTONE) 25 MG tablet TAKE 1 TABLET BY MOUTH EVERY DAY 22  Yes Gumaro Adalid Nicholson, DO   nitroGLYCERIN (NITRODUR) 0.2 MG/HR APPLY 1 PATCH EXTERNALLY TO THE SKIN DAILY 22  Yes Gumaro Adalid Nicholson, DO   hydrALAZINE (APRESOLINE) 25 MG tablet TAKE 1 TABLET BY MOUTH FOUR TIMES DAILY 22  Yes Gumaro Nicholson, DO   aspirin EC 81 MG EC tablet 1 qd 22  Yes Angel Nicholson, DO   labetalol (NORMODYNE) 100 MG tablet TAKE 1 TABLET BY MOUTH EVERY 12 HOURS 22  Yes Gumaro Nicholson, DO   cloNIDine (CATAPRES) 0.1 MG tablet TAKE 1 TABLET BY MOUTH 3 x day 22  Yes Gumaro Nicholson, DO   traMADol (ULTRAM) 50 MG tablet TK 1 TO 2 TS PO Q 6 H PRN P 20  Yes Historical Provider, MD   Omega-3 Fatty Acids (FISH OIL PO) Take by mouth daily LD 2020   Yes Historical Provider, MD   acetaminophen (TYLENOL) 500 MG tablet Take 2 tablets by mouth every 8 hours 20   Emerson Gonzales MD          HPI: Patient seen today medical follow-up on coronary artery disease hypertension hypothyroid diabetes mellitus chronic kidney disease. She is medically doing fairly well at this time but still some lability with the blood pressure. No significant change with labetalol so we backed off to 100 mg twice a day and we will adjust clonidine to 3 times a day and then reassess in the next 2 weeks with home blood pressures. Review of Systems   Constitutional: Negative. HENT: Negative. Eyes: Negative. Respiratory: Negative. Gastrointestinal: Negative. Endocrine: Negative. Genitourinary: Negative. Musculoskeletal: Negative. Skin: Negative. Allergic/Immunologic: Negative. Neurological: Negative. Hematological: Negative. Psychiatric/Behavioral: Negative. Today systems review is overall stable lability of the pressure as noted.         Current Outpatient Medications:     citalopram (CELEXA) 20 MG tablet, Take 1 tablet by mouth daily, Disp: 90 tablet, Rfl: 3    levothyroxine (SYNTHROID) 88 MCG tablet, TAKE 1 TABLET BY MOUTH EVERY DAY, Disp: 90 tablet, Rfl: 3    spironolactone (ALDACTONE) 25 MG tablet, TAKE 1 TABLET BY MOUTH EVERY DAY, Disp: 90 tablet, Rfl: 3    nitroGLYCERIN (NITRODUR) 0.2 MG/HR, APPLY 1 PATCH EXTERNALLY TO THE SKIN DAILY, Disp: 90 patch, Rfl: 3    hydrALAZINE (APRESOLINE) 25 MG tablet, TAKE 1 TABLET BY MOUTH FOUR TIMES DAILY, Disp: 360 tablet, Rfl: 3    aspirin EC 81 MG EC tablet, 1 qd, Disp: 90 tablet, Rfl: 3    labetalol (NORMODYNE) 100 MG tablet, TAKE 1 TABLET BY MOUTH EVERY 12 HOURS, Disp: 60 tablet, Rfl: 3    cloNIDine (CATAPRES) 0.1 MG tablet, TAKE 1 TABLET BY MOUTH 3 x day, Disp: 90 tablet, Rfl: 3    traMADol (ULTRAM) 50 MG tablet, TK 1 TO 2 TS PO Q 6 H PRN P, Disp: , Rfl:     Omega-3 Fatty Acids (FISH OIL PO), Take by mouth daily LD 2020, Disp: , Rfl:     acetaminophen (TYLENOL) 500 MG tablet, Take 2 tablets by mouth every 8 hours, Disp: 180 tablet, Rfl: 0    Allergies   Allergen Reactions    Latex      Reaction unknown / may have been swelling    Norvasc [Amlodipine Besylate] Anaphylaxis    Dye [Iodides]      Reaction unknown       Social History     Tobacco Use    Smoking status: Former Smoker     Packs/day: 1.50     Years: 8.00     Pack years: 12.00     Quit date: 1976     Years since quittin.1    Smokeless tobacco: Never Used   Substance Use Topics    Alcohol use: No    Drug use: Never      Past Surgical History:   Procedure Laterality Date     SECTION      1    COLONOSCOPY      CORONARY ANGIOPLASTY WITH STENT PLACEMENT  2004    CYST REMOVAL  's    ENDOSCOPY, COLON, DIAGNOSTIC      GASTROSTOMY TUBE PLACEMENT  2014    [facility]. Julio Heller and later removed    HEEL SPUR SURGERY Left years ago    JOINT REPLACEMENT Left late     OTHER SURGICAL HISTORY      scraping of trach x 2 to treat stenosis / last one 2016    POLYPECTOMY  2013    internal hemorrhoids - michelle    TONSILLECTOMY      TOTAL KNEE ARTHROPLASTY Right 2020    RIGHT KNEE TOTAL ARTHROPLASTY    ++RAYMOND++   ++PNB++     ++LATEX ALLERGY++   ++IODINE ALLERGY++ performed by Anselmo Vasquez MD at [address] [place] [address],[address]  14    [facility]    UPPER GASTROINTESTINAL ENDOSCOPY      gastritis w. superficial antral ulerations - [place]     No family history on file. Past Medical History:   Diagnosis Date    Arthritis     CAD (coronary artery disease)     follows with Dr. Meme Juárez CKD (chronic kidney disease)     stage 3 / follows with Dr. Wang Rea Hypertension     Respiratory failure ([facility]     history of /   when had stroke, trach  x 2 weeks    Thyroid disease     Tracheal stenosis     Unspecified cerebral artery occlusion with cerebral infarction 2014    right side flacid, hemorrhagic stroke dt elevated BP/residual s/s is at right side loss of fine motor skills and some weakness, drags right leg, slight memory loss and aphasia when she is tired       Vitals:    22 1500   BP: (!) 150/84   Pulse: 60   Temp: 97.1 °F (36.2 °C)   SpO2: 96%   Weight: 168 lb (76.2 kg)   Height: 5' 3\" (1.6 m)     BP Readings from Last 3 Encounters:   22 (!) 150/84   22 (!) 144/78   22 (!) 147/73    122/70  Physical Exam  Vitals and nursing note reviewed. Constitutional:       Appearance: She is well-developed. HENT:      Head: Normocephalic.       Right Ear: External ear normal.      Left Ear: External ear normal.      Nose: Nose normal.   Eyes:      Conjunctiva/sclera: Conjunctivae normal.      Pupils: Pupils are equal, round, and reactive to light. Cardiovascular:      Rate and Rhythm: Normal rate. Pulmonary:      Breath sounds: Normal breath sounds. Abdominal:      General: Bowel sounds are normal.      Palpations: Abdomen is soft. Musculoskeletal:         General: Normal range of motion. Cervical back: Normal range of motion and neck supple. Skin:     General: Skin is warm and dry. Neurological:      Mental Status: She is alert and oriented to person, place, and time. Psychiatric:         Behavior: Behavior normal.     Today's vitals physical exam stable. I will maintain present meds and care. Adjustment with clonidine as noted. Reassessment with me 2 weeks and sooner if problems. Plan Per Assessment:  Savana Mehta was seen today for hypertension. Diagnoses and all orders for this visit:    Coronary artery disease involving native coronary artery of native heart without angina pectoris    Essential hypertension    Hypothyroidism, unspecified type    Type 2 diabetes mellitus without complication, without long-term current use of insulin (HCC)    Type 2 diabetes mellitus with chronic kidney disease, without long-term current use of insulin, unspecified CKD stage (HCC)    Nontraumatic intracerebral hemorrhage, unspecified cerebral location, unspecified laterality (HCC)    Mixed hyperlipidemia    Stage 3b chronic kidney disease ([facility] [address] [facility]    Other orders  -     citalopram (CELEXA) 20 MG tablet;  Take 1 tablet by mouth daily  -     levothyroxine (SYNTHROID) 88 MCG tablet; TAKE 1 TABLET BY MOUTH EVERY DAY  -     spironolactone (ALDACTONE) 25 MG tablet; TAKE 1 TABLET BY MOUTH EVERY DAY  -     nitroGLYCERIN (NITRODUR) 0.2 MG/HR; APPLY 1 PATCH EXTERNALLY TO THE SKIN DAILY  -     Discontinue: cloNIDine (CATAPRES) 0.1 MG tablet; TAKE 1 TABLET BY MOUTH TWICE DAILY  -     hydrALAZINE (APRESOLINE) 25 MG tablet; TAKE 1 TABLET BY MOUTH FOUR TIMES DAILY  -     aspirin EC 81 MG EC tablet; 1 qd  -     labetalol (NORMODYNE) 100 MG tablet; TAKE 1 TABLET BY MOUTH EVERY 12 HOURS  -     cloNIDine (CATAPRES) 0.1 MG tablet; TAKE 1 TABLET BY MOUTH 3 x day            Return in about 2 weeks (around 3/8/2022). Emilee nEnis DO    Note was generated with the assistance of voice recognition software. Document was reviewed however may contain grammatical errors.

## 2022-02-23 ENCOUNTER — TELEPHONE (OUTPATIENT)
Dept: VASCULAR SURGERY | Age: 77
End: 2022-02-23

## 2022-02-24 ENCOUNTER — TELEPHONE (OUTPATIENT)
Dept: VASCULAR SURGERY | Age: 77
End: 2022-02-24

## 2022-02-24 ENCOUNTER — OFFICE VISIT (OUTPATIENT)
Dept: VASCULAR SURGERY | Age: 77
End: 2022-02-24
Payer: MEDICARE

## 2022-02-24 VITALS — DIASTOLIC BLOOD PRESSURE: 78 MMHG | SYSTOLIC BLOOD PRESSURE: 122 MMHG

## 2022-02-24 DIAGNOSIS — I61.9 NONTRAUMATIC INTRACEREBRAL HEMORRHAGE, UNSPECIFIED CEREBRAL LOCATION, UNSPECIFIED LATERALITY (HCC): Primary | ICD-10-CM

## 2022-02-24 DIAGNOSIS — I65.22 LEFT CAROTID STENOSIS: ICD-10-CM

## 2022-02-24 DIAGNOSIS — H53.132 ACUTE LOSS OF VISION, LEFT: ICD-10-CM

## 2022-02-24 PROBLEM — M79.671 RIGHT FOOT PAIN: Status: RESOLVED | Noted: 2021-08-19 | Resolved: 2022-02-24

## 2022-02-24 PROBLEM — E87.5 HYPERKALEMIA: Status: RESOLVED | Noted: 2020-06-10 | Resolved: 2022-02-24

## 2022-02-24 PROBLEM — R06.2 WHEEZING: Status: RESOLVED | Noted: 2020-03-10 | Resolved: 2022-02-24

## 2022-02-24 PROCEDURE — 1036F TOBACCO NON-USER: CPT | Performed by: SURGERY

## 2022-02-24 PROCEDURE — 3017F COLORECTAL CA SCREEN DOC REV: CPT | Performed by: SURGERY

## 2022-02-24 PROCEDURE — 1090F PRES/ABSN URINE INCON ASSESS: CPT | Performed by: SURGERY

## 2022-02-24 PROCEDURE — G8417 CALC BMI ABV UP PARAM F/U: HCPCS | Performed by: SURGERY

## 2022-02-24 PROCEDURE — G8400 PT W/DXA NO RESULTS DOC: HCPCS | Performed by: SURGERY

## 2022-02-24 PROCEDURE — 99204 OFFICE O/P NEW MOD 45 MIN: CPT | Performed by: SURGERY

## 2022-02-24 PROCEDURE — G8427 DOCREV CUR MEDS BY ELIG CLIN: HCPCS | Performed by: SURGERY

## 2022-02-24 PROCEDURE — 4040F PNEUMOC VAC/ADMIN/RCVD: CPT | Performed by: SURGERY

## 2022-02-24 PROCEDURE — 1123F ACP DISCUSS/DSCN MKR DOCD: CPT | Performed by: SURGERY

## 2022-02-24 PROCEDURE — G8484 FLU IMMUNIZE NO ADMIN: HCPCS | Performed by: SURGERY

## 2022-02-24 RX ORDER — LEVOTHYROXINE SODIUM 0.05 MG/1
50 TABLET ORAL DAILY
COMMUNITY

## 2022-02-24 RX ORDER — HYDRALAZINE HYDROCHLORIDE 25 MG/1
50 TABLET, FILM COATED ORAL 2 TIMES DAILY
COMMUNITY
End: 2022-05-23

## 2022-02-24 NOTE — PROGRESS NOTES
Chief Complaint:   Chief Complaint   Patient presents with    Surgical Consult     Carotid stenosis.          HPI: Patient came to the office, with her family, for the evaluation of abnormal carotid ultrasound that revealed 50 to 69% left carotid stenosis, the testing done because of loss of vision left eye, underwent extensive evaluation by her ophthalmologist, was told that she had a stroke in the eye, stroke almost blind, no improvement    Patient also underwent sed rate, normal    Patient denies any history of chest pain, palpitation or shortness of breath    Patient sustained a stroke, 7 years ago, at that time, it was suspected to be due to hypertension hypertensive bleed and no significant stenosis noted in the carotid artery    Patient denies any focal lateralizing neurological symptoms like loss of speech, or loss of function of extremity    After the stroke, most of the function of the right side has improved, still has some mild incoordination right hand, some mild weakness of the right leg      Patient can walk short distance slowly and denies any symptoms of rest pain    Allergies   Allergen Reactions    Latex      Reaction unknown / may have been swelling    Norvasc [Amlodipine Besylate] Anaphylaxis    Dye [Iodides]      Reaction unknown       Current Outpatient Medications   Medication Sig Dispense Refill    levothyroxine (SYNTHROID) 50 MCG tablet Take 50 mcg by mouth Daily      hydrALAZINE (APRESOLINE) 25 MG tablet Take 50 mg by mouth 2 times daily      citalopram (CELEXA) 20 MG tablet Take 1 tablet by mouth daily 90 tablet 3    spironolactone (ALDACTONE) 25 MG tablet TAKE 1 TABLET BY MOUTH EVERY DAY 90 tablet 3    nitroGLYCERIN (NITRODUR) 0.2 MG/HR APPLY 1 PATCH EXTERNALLY TO THE SKIN DAILY 90 patch 3    aspirin EC 81 MG EC tablet 1 qd 90 tablet 3    labetalol (NORMODYNE) 100 MG tablet TAKE 1 TABLET BY MOUTH EVERY 12 HOURS 60 tablet 3    cloNIDine (CATAPRES) 0.1 MG tablet TAKE 1 TABLET BY MOUTH 3 x day 90 tablet 3     No current facility-administered medications for this visit. Past Medical History:   Diagnosis Date    Acute loss of vision, left 2022    Arthritis     CAD (coronary artery disease)     follows with Dr. Sergio Cox CKD (chronic kidney disease)     stage 3 / follows with Dr. Judd Patches Depression     Hypertension     Left carotid stenosis 2022    Respiratory failure ([address]     history of /   when had stroke, trach  x 2 weeks    Thyroid disease     Tracheal stenosis     Unspecified cerebral artery occlusion with cerebral infarction 2014    right side flacid, hemorrhagic stroke dt elevated BP/residual s/s is at right side loss of fine motor skills and some weakness, drags right leg, slight memory loss and aphasia when she is tired       Past Surgical History:   Procedure Laterality Date     SECTION      1    COLONOSCOPY      CORONARY ANGIOPLASTY WITH STENT PLACEMENT      CYST REMOVAL  's    ENDOSCOPY, COLON, DIAGNOSTIC      GASTROSTOMY TUBE PLACEMENT  2014    [place]. Sultana Dach and later removed    HEEL SPUR SURGERY Left years ago    JOINT REPLACEMENT Left late     OTHER SURGICAL HISTORY      scraping of trach x 2 to treat stenosis / last one 2016    POLYPECTOMY  2013    internal hemorrhoids - michelle    TONSILLECTOMY      TOTAL KNEE ARTHROPLASTY Right 2020    RIGHT KNEE TOTAL ARTHROPLASTY    ++RAYMOND++   ++PNB++     ++LATEX ALLERGY++   ++IODINE ALLERGY++ performed by Darek Jarrett MD at [address] [place] [address] Road  14    [place]    UPPER GASTROINTESTINAL ENDOSCOPY      gastritis w. superficial antral ulerations - [place]       History reviewed. No pertinent family history. Social History     Socioeconomic History    Marital status:       Spouse name: Not on file    Number of children: 11    Years of education: 15    Highest education level: Not on file   Occupational History    Not on file Tobacco Use    Smoking status: Former Smoker     Packs/day: 1.50     Years: 8.00     Pack years: 12.00     Types: Cigarettes     Quit date: 1976     Years since quittin.1    Smokeless tobacco: Never Used   Vaping Use    Vaping Use: Never used   Substance and Sexual Activity    Alcohol use: No    Drug use: Never    Sexual activity: Not on file   Other Topics Concern    Not on file   Social History Narrative    Not on file     Social Determinants of Health     Financial Resource Strain: Low Risk     Difficulty of Paying Living Expenses: Not hard at all   Food Insecurity: No Food Insecurity    Worried About [employer] in the Last Year: Never true    [address] [employer] in the Last Year: Never true   Transportation Needs: No Transportation Needs    Lack of Transportation (Medical): No    Lack of Transportation (Non-Medical): No   Physical Activity: Insufficiently Active    Days of Exercise per Week: 5 days    Minutes of Exercise per Session: 10 min   Stress: No Stress Concern Present    Feeling of Stress : Not at all   Social Connections: Socially Isolated    Frequency of Communication with Friends and Family: More than three times a week    Frequency of Social Gatherings with Friends and Family: Once a week    Attends [employer] Services: Never    Active Member of Clubs or Organizations: No    Attends Club or Organization Meetings: Never    Marital Status:     Intimate Partner Violence:     Fear of Current or Ex-Partner: Not on file    Emotionally Abused: Not on file    Physically Abused: Not on file    Sexually Abused: Not on file   Housing Stability:     Unable to Pay for Housing in the Last Year: Not on file    Number of [place] in the Last Year: Not on file    Unstable Housing in the Last Year: Not on file       Review of Systems:  Skin:  No abnormal pigmentation or rash  Eyes:  No blurring, diplopia or vision loss  Ears/Nose/Throat:  No hearing loss or vertigo  Respiratory:  No cough, pleuritic chest pain, dyspnea, or wheezing. Cardiovascular: No angina, palpitations . Hypertension  Gastrointestinal:  No nausea or vomiting; no abdominal pain or rectal bleeding  Musculoskeletal:  No arthritis or weakness. Neurologic:  No paralysis, paresis, paresthesia, seizures or headaches. History of stroke due to hypertensive bleed, several years ago with residual right-sided weakness  Hematologic/Lymphatic/Immunologic:  No anemia, abnormal bleeding/bruising, fever, chills or night sweats. Endocrine:  No heat or cold intolerance. No polyphagia, polydipsia or polyuria. Hypothyroidism    Nephrology: Chronic renal failure, creatinine of approximately 2, GFR of 30      Physical Exam:  General appearance:  Alert, awake, oriented x 3. No distress. Skin:  Warm and dry  Head:  Normocephalic. No masses, lesions or tenderness  Eyes:  Conjunctivae appear normal; PERRL. Almost blind in the left eye  Ears:  External ears normal  Nose/Sinuses:  Septum midline, mucosa normal; no drainage  Oropharynx:  Clear, no exudate noted  Neck:  No jugular venous distention, lymphadenopathy or thyromegaly. No evidence of carotid bruit  Lungs:  Clear to ausculation bilaterally. No rhonchi, crackles, wheezes  Heart:  Regular rate and rhythm. No rub or murmur  Abdomen:  Soft, non-tender. No masses, organomegaly. Musculoskeletal : No joint effusions, tenderness swelling    Neuro: Speech is intact. Moving all extremities. No focal motor or sensory deficits      Extremities:  Both feet are warm to touch. The color of both feet is normal.        Pulses Right  Left    Brachial 3 3    Radial    3=normal   Femoral 2 2  2=diminished   Popliteal    1=barely palpable   Dorsalis pedis 1 1  0=absent   Posterior tibial    4=aneurysmal             Other pertinent information:1. The past medical records were reviewed.       2.  The carotid ultrasound was personally reviewed by me based upon the velocities and atherosclerotic plaque was noted, approximate 60% stenosis is suspected      Assessment:    1. Left carotid stenosis    2. Acute loss of vision, left    3. History of CVA in the past due to hypertensive bleed          Plan:       Discussed the patient and her family, options, risks benefits and alternatives were explained, because of her symptoms, in the left eye, and because of her previous history of stroke, and also because of her abnormal carotid ultrasound, it is felt, it is prudent to document with imaging study, unfortunately patient does have renal insufficiency, as such, the CTA of the carotid arteries was deferred, will proceed initially with MRI of the carotids without contrast, other medication recommendations, based upon the findings, if necessary, may need to consider CT of the carotids with limited contrast after IV hydration, and after holding her Aldactone etc.          Patient was instructed to continue walking program and to call if any worsening of symptoms and to call if any focal lateralizing neurological symptoms like loss of speech, vision or loss of function of extremity. All the questions were answered.       Orders Placed This Encounter   Procedures    MRA NECK WO CONTRAST             Indicated follow-up: Call as needed

## 2022-02-24 NOTE — TELEPHONE ENCOUNTER
Notified Latrice Walsh of [address] [place] [address] at [facility] on Tuesday, 3-15-22 at 8:00 am. Fabius at 7:30 am.  Refrain from wearing anything with metal.

## 2022-03-08 ENCOUNTER — OFFICE VISIT (OUTPATIENT)
Dept: PRIMARY CARE CLINIC | Age: 77
End: 2022-03-08
Payer: MEDICARE

## 2022-03-08 VITALS
SYSTOLIC BLOOD PRESSURE: 132 MMHG | HEART RATE: 70 BPM | DIASTOLIC BLOOD PRESSURE: 60 MMHG | TEMPERATURE: 98.2 F | OXYGEN SATURATION: 96 %

## 2022-03-08 DIAGNOSIS — Z91.81 AT HIGH RISK FOR FALLS: ICD-10-CM

## 2022-03-08 DIAGNOSIS — I10 ESSENTIAL HYPERTENSION: Chronic | ICD-10-CM

## 2022-03-08 DIAGNOSIS — N18.32 STAGE 3B CHRONIC KIDNEY DISEASE (HCC): ICD-10-CM

## 2022-03-08 DIAGNOSIS — E03.9 HYPOTHYROIDISM, UNSPECIFIED TYPE: ICD-10-CM

## 2022-03-08 DIAGNOSIS — I25.10 CORONARY ARTERY DISEASE INVOLVING NATIVE CORONARY ARTERY OF NATIVE HEART WITHOUT ANGINA PECTORIS: Primary | ICD-10-CM

## 2022-03-08 PROCEDURE — 4040F PNEUMOC VAC/ADMIN/RCVD: CPT | Performed by: FAMILY MEDICINE

## 2022-03-08 PROCEDURE — 1090F PRES/ABSN URINE INCON ASSESS: CPT | Performed by: FAMILY MEDICINE

## 2022-03-08 PROCEDURE — 1123F ACP DISCUSS/DSCN MKR DOCD: CPT | Performed by: FAMILY MEDICINE

## 2022-03-08 PROCEDURE — 3017F COLORECTAL CA SCREEN DOC REV: CPT | Performed by: FAMILY MEDICINE

## 2022-03-08 PROCEDURE — G8400 PT W/DXA NO RESULTS DOC: HCPCS | Performed by: FAMILY MEDICINE

## 2022-03-08 PROCEDURE — 1036F TOBACCO NON-USER: CPT | Performed by: FAMILY MEDICINE

## 2022-03-08 PROCEDURE — G8484 FLU IMMUNIZE NO ADMIN: HCPCS | Performed by: FAMILY MEDICINE

## 2022-03-08 PROCEDURE — G8427 DOCREV CUR MEDS BY ELIG CLIN: HCPCS | Performed by: FAMILY MEDICINE

## 2022-03-08 PROCEDURE — G8417 CALC BMI ABV UP PARAM F/U: HCPCS | Performed by: FAMILY MEDICINE

## 2022-03-08 PROCEDURE — 99214 OFFICE O/P EST MOD 30 MIN: CPT | Performed by: FAMILY MEDICINE

## 2022-03-08 ASSESSMENT — ENCOUNTER SYMPTOMS
EYES NEGATIVE: 1
RESPIRATORY NEGATIVE: 1
ALLERGIC/IMMUNOLOGIC NEGATIVE: 1
GASTROINTESTINAL NEGATIVE: 1

## 2022-03-08 NOTE — PROGRESS NOTES
3/8/22     Jah Mcarthur    : 1945 Sex: female   Age: 77 y.o. Chief Complaint   Patient presents with    Blood Pressure Check     has been checking at home       Prior to Admission medications    Medication Sig Start Date End Date Taking? Authorizing Provider   levothyroxine (SYNTHROID) 50 MCG tablet Take 50 mcg by mouth Daily   Yes Historical Provider, MD   hydrALAZINE (APRESOLINE) 25 MG tablet Take 50 mg by mouth 2 times daily   Yes Historical Provider, MD   citalopram (CELEXA) 20 MG tablet Take 1 tablet by mouth daily 22  Yes Keon Nicholson,    spironolactone (ALDACTONE) 25 MG tablet TAKE 1 TABLET BY MOUTH EVERY DAY 22  Yes Keon Nicholson DO   nitroGLYCERIN (NITRODUR) 0.2 MG/HR APPLY 1 PATCH EXTERNALLY TO THE SKIN DAILY 22  Yes Keon Nicholson DO   aspirin EC 81 MG EC tablet 1 qd 22  Yes Angel Nicholson DO   labetalol (NORMODYNE) 100 MG tablet TAKE 1 TABLET BY MOUTH EVERY 12 HOURS 22  Yes Keon Nicholson DO   cloNIDine (CATAPRES) 0.1 MG tablet TAKE 1 TABLET BY MOUTH 3 x day 22  Yes Mumtaz Branch DO          HPI: Patient seen today with coronary artery disease hypertension hypothyroid chronic kidney disease all of which has been stable. Medications currently well-tolerated continue as prescribed. Systems review overall stable. Blood pressures doing much better we will maintain clonidine 3 times a day but adjust dosing to 8 AM 4 PM and 10 PM and then reassess with next visit. Review of Systems   Constitutional: Negative. HENT: Negative. Eyes: Negative. Respiratory: Negative. Gastrointestinal: Negative. Endocrine: Negative. Genitourinary: Negative. Musculoskeletal: Negative. Skin: Negative. Allergic/Immunologic: Negative. Neurological: Negative. Hematological: Negative. Psychiatric/Behavioral: Negative. Today systems review stable meds as prescribed.           Current Outpatient Medications:   levothyroxine (SYNTHROID) 50 MCG tablet, Take 50 mcg by mouth Daily, Disp: , Rfl:     hydrALAZINE (APRESOLINE) 25 MG tablet, Take 50 mg by mouth 2 times daily, Disp: , Rfl:     citalopram (CELEXA) 20 MG tablet, Take 1 tablet by mouth daily, Disp: 90 tablet, Rfl: 3    spironolactone (ALDACTONE) 25 MG tablet, TAKE 1 TABLET BY MOUTH EVERY DAY, Disp: 90 tablet, Rfl: 3    nitroGLYCERIN (NITRODUR) 0.2 MG/HR, APPLY 1 PATCH EXTERNALLY TO THE SKIN DAILY, Disp: 90 patch, Rfl: 3    aspirin EC 81 MG EC tablet, 1 qd, Disp: 90 tablet, Rfl: 3    labetalol (NORMODYNE) 100 MG tablet, TAKE 1 TABLET BY MOUTH EVERY 12 HOURS, Disp: 60 tablet, Rfl: 3    cloNIDine (CATAPRES) 0.1 MG tablet, TAKE 1 TABLET BY MOUTH 3 x day, Disp: 90 tablet, Rfl: 3    Allergies   Allergen Reactions    Latex      Reaction unknown / may have been swelling    Norvasc [Amlodipine Besylate] Anaphylaxis    Dye [Iodides]      Reaction unknown       Social History     Tobacco Use    Smoking status: Former Smoker     Packs/day: 1.50     Years: 8.00     Pack years: 12.00     Types: Cigarettes     Quit date: 1976     Years since quittin.2    Smokeless tobacco: Never Used   Vaping Use    Vaping Use: Never used   Substance Use Topics    Alcohol use: No    Drug use: Never      Past Surgical History:   Procedure Laterality Date     SECTION      1    COLONOSCOPY      CORONARY ANGIOPLASTY WITH STENT PLACEMENT  2004    CYST REMOVAL  1970's    ENDOSCOPY, COLON, DIAGNOSTIC      GASTROSTOMY TUBE PLACEMENT  2014    Martin Kilpatrick  and later removed    HEEL SPUR SURGERY Left years ago    JOINT REPLACEMENT Left late 's    OTHER SURGICAL HISTORY      scraping of trach x 2 to treat stenosis / last one 2016    POLYPECTOMY  2013    internal hemorrhoids - michelle    TONSILLECTOMY      TOTAL KNEE ARTHROPLASTY Right 2020    RIGHT KNEE TOTAL ARTHROPLASTY    ++RAYMOND++   ++PNB++     ++LATEX ALLERGY++   ++IODINE ALLERGY++ performed by Mabel Jones MD at [address] [facility],[address]  9/24/14    [place]    UPPER GASTROINTESTINAL ENDOSCOPY      gastritis w. superficial antral ulerations - [place]     No family history on file. Past Medical History:   Diagnosis Date    Acute loss of vision, left 2/24/2022    Arthritis     CAD (coronary artery disease)     follows with Dr. Emma Newman CKD (chronic kidney disease)     stage 3 / follows with Dr. Yue Ferguson Depression     Hypertension     Left carotid stenosis 2/24/2022    Respiratory failure ([address]     history of / 2014  when had stroke, trach  x 2 weeks    Thyroid disease     Tracheal stenosis     Unspecified cerebral artery occlusion with cerebral infarction 9/26/2014    right side flacid, hemorrhagic stroke dt elevated BP/residual s/s is at right side loss of fine motor skills and some weakness, drags right leg, slight memory loss and aphasia when she is tired       Vitals:    03/08/22 1458   BP: 132/60   Pulse: 70   Temp: 98.2 °F (36.8 °C)   SpO2: 96%     BP Readings from Last 3 Encounters:   03/08/22 132/60   02/24/22 122/78   02/22/22 (!) 150/84    120/68    Physical Exam  Vitals and nursing note reviewed. Constitutional:       Appearance: She is well-developed. HENT:      Head: Normocephalic. Right Ear: External ear normal.      Left Ear: External ear normal.      Nose: Nose normal.   Eyes:      Conjunctiva/sclera: Conjunctivae normal.      Pupils: Pupils are equal, round, and reactive to light. Cardiovascular:      Rate and Rhythm: Normal rate. Pulmonary:      Breath sounds: Normal breath sounds. Abdominal:      General: Bowel sounds are normal.      Palpations: Abdomen is soft. Musculoskeletal:         General: Normal range of motion. Cervical back: Normal range of motion and neck supple. Skin:     General: Skin is warm and dry. Neurological:      Mental Status: She is alert and oriented to person, place, and time.    Psychiatric:         Behavior: Behavior normal.     Present vitals physical exam stable. Heart is controlled lungs are clear. We will sit tight with present meds and care. Follow-up visit with me 6 weeks sooner if problems and blood work at that time. Most recent labs were all stable. Lab Results   Component Value Date    TSH 2.050 10/04/2021    TSH 1.150 04/05/2021    J4KRLOT 7.9 10/04/2021    Q2VXAWQ 8.4 04/05/2021     Lab Results   Component Value Date    CHOL 180 02/02/2022    CHOL 182 10/04/2021     Lab Results   Component Value Date    TRIG 52 02/02/2022    TRIG 54 10/04/2021     Lab Results   Component Value Date    HDL 53 02/02/2022    HDL 53 10/04/2021     No results found for: [facility]  Lab Results   Component Value Date    LABVLDL 11 10/04/2021    LABVLDL 16 04/05/2021     Lab Results   Component Value Date    CHOLHDLRATIO 3 02/02/2022     Lab Results   Component Value Date    WBC 4.3 02/02/2022    HGB 13.4 02/02/2022    HCT 41.7 02/02/2022    MCV 92.5 02/02/2022     02/02/2022    LYMPHOPCT 19.8 02/02/2022    RBC 4.51 02/02/2022    MCH 29.7 02/02/2022    MCHC 32.1 02/02/2022    RDW 13.1 02/02/2022     Lab Results   Component Value Date     02/02/2022    K 4.6 02/02/2022     02/02/2022    CO2 25 02/02/2022    BUN 42 02/02/2022    CREATININE 1.63 02/02/2022    GLUCOSE 101 02/02/2022    CALCIUM 9.7 02/02/2022    PROT 7.4 01/26/2022    LABALBU 4.3 02/02/2022    BILITOT 0.5 02/02/2022    ALKPHOS 57 02/02/2022    AST 16 02/02/2022    ALT 20 02/02/2022    LABGLOM 31 02/02/2022    GFRAA 29 01/26/2022      No results found for: PSA, PSADIA   Lab Results   Component Value Date    LABA1C 5.7 02/02/2022    LABA1C 5.7 (H) 10/04/2021    LABA1C 5.4 04/05/2021     No results found for: EAG         Plan Per Assessment:  Rosalia Moody was seen today for blood pressure check.     Diagnoses and all orders for this visit:    Coronary artery disease involving native coronary artery of native heart without angina pectoris  -     CBC with Auto Differential; Future  -     Comprehensive Metabolic Panel; Future  -     Lipid Panel; Future  -     T4; Future  -     TSH; Future    Essential hypertension  -     CBC with Auto Differential; Future  -     Comprehensive Metabolic Panel; Future  -     Lipid Panel; Future  -     T4; Future  -     TSH; Future    Hypothyroidism, unspecified type  -     CBC with Auto Differential; Future  -     Comprehensive Metabolic Panel; Future  -     Lipid Panel; Future  -     T4; Future  -     TSH; Future    Stage 3b chronic kidney disease ([facility] [address]  -     CBC with Auto Differential; Future  -     Comprehensive Metabolic Panel; Future  -     Lipid Panel; Future  -     T4; Future  -     TSH; Future            No follow-ups on file. Makenzie Cardona DO    Note was generated with the assistance of voice recognition software. Document was reviewed however may contain grammatical errors. On the basis of positive falls risk screening, assessment and plan is as follows: home safety tips provided.

## 2022-03-15 ENCOUNTER — HOSPITAL ENCOUNTER (OUTPATIENT)
Dept: MRI IMAGING | Age: 77
Discharge: HOME | End: 2022-03-17
Payer: MEDICARE

## 2022-03-15 DIAGNOSIS — H53.132 ACUTE LOSS OF VISION, LEFT: ICD-10-CM

## 2022-03-15 DIAGNOSIS — I65.22 LEFT CAROTID STENOSIS: ICD-10-CM

## 2022-03-15 PROCEDURE — 70547 MR ANGIOGRAPHY NECK W/O DYE: CPT

## 2022-03-21 ENCOUNTER — CLINICAL DOCUMENTATION (OUTPATIENT)
Dept: VASCULAR SURGERY | Age: 77
End: 2022-03-21

## 2022-03-21 NOTE — PROGRESS NOTES
The MRA of the carotids done without contrast was personally reviewed by me, after accounting for some artifact, I do not see any significant stenosis that warrants vascular surgical intervention    I went back and saw the carotid ultrasound again carefully, other than mild to moderate risk of plaques, causing 30 to 40% stenosis I do not see any significant stenosis that warrants surgical intervention    I noted, that a 2D echo of the heart was ordered by her PCP in January, will wait for those results    No answer on patient's home #[phone]    Message left on patient's daughters Yolanda's cell phone [phone]    We will have our office reach out to the patient and family to see them back in 1 year and to call anytime if there are any questions or concerns

## 2022-03-31 ENCOUNTER — TELEPHONE (OUTPATIENT)
Dept: PRIMARY CARE CLINIC | Age: 77
End: 2022-03-31

## 2022-03-31 NOTE — TELEPHONE ENCOUNTER
----- Message from Brittany Ny sent at 3/31/2022  2:29 PM EDT -----  Subject: Message to Provider    QUESTIONS  Information for Provider? Dick. called pertaining to the   [facility] Rx for the pt. They need a copy of the med list and the   last visit to be sent to them the fax # [phone].  ---------------------------------------------------------------------------  --------------  Judith COLUNGA  What is the best way for the office to contact you? OK to leave message on   voicemail  Preferred Call Back Phone Number? [phone]  ---------------------------------------------------------------------------  --------------  SCRIPT ANSWERS  Relationship to Patient? Third Party  Third Party Type? Durable Medical Equipment? Representative Name?  Barbara Starr

## 2022-04-12 ENCOUNTER — OFFICE VISIT (OUTPATIENT)
Dept: PRIMARY CARE CLINIC | Age: 77
End: 2022-04-12
Payer: MEDICARE

## 2022-04-12 VITALS
TEMPERATURE: 97.8 F | OXYGEN SATURATION: 96 % | HEART RATE: 65 BPM | SYSTOLIC BLOOD PRESSURE: 138 MMHG | DIASTOLIC BLOOD PRESSURE: 72 MMHG

## 2022-04-12 DIAGNOSIS — M79.671 RIGHT FOOT PAIN: ICD-10-CM

## 2022-04-12 DIAGNOSIS — I10 MALIGNANT HYPERTENSION: ICD-10-CM

## 2022-04-12 DIAGNOSIS — I25.10 CORONARY ARTERY DISEASE INVOLVING NATIVE CORONARY ARTERY OF NATIVE HEART WITHOUT ANGINA PECTORIS: Primary | ICD-10-CM

## 2022-04-12 DIAGNOSIS — N39.41 URGE INCONTINENCE OF URINE: ICD-10-CM

## 2022-04-12 PROCEDURE — G8417 CALC BMI ABV UP PARAM F/U: HCPCS | Performed by: FAMILY MEDICINE

## 2022-04-12 PROCEDURE — 0509F URINE INCON PLAN DOCD: CPT | Performed by: FAMILY MEDICINE

## 2022-04-12 PROCEDURE — 1036F TOBACCO NON-USER: CPT | Performed by: FAMILY MEDICINE

## 2022-04-12 PROCEDURE — 99214 OFFICE O/P EST MOD 30 MIN: CPT | Performed by: FAMILY MEDICINE

## 2022-04-12 PROCEDURE — 1090F PRES/ABSN URINE INCON ASSESS: CPT | Performed by: FAMILY MEDICINE

## 2022-04-12 PROCEDURE — G8427 DOCREV CUR MEDS BY ELIG CLIN: HCPCS | Performed by: FAMILY MEDICINE

## 2022-04-12 PROCEDURE — 3017F COLORECTAL CA SCREEN DOC REV: CPT | Performed by: FAMILY MEDICINE

## 2022-04-12 PROCEDURE — G8400 PT W/DXA NO RESULTS DOC: HCPCS | Performed by: FAMILY MEDICINE

## 2022-04-12 PROCEDURE — 1123F ACP DISCUSS/DSCN MKR DOCD: CPT | Performed by: FAMILY MEDICINE

## 2022-04-12 PROCEDURE — 4040F PNEUMOC VAC/ADMIN/RCVD: CPT | Performed by: FAMILY MEDICINE

## 2022-04-12 RX ORDER — SOLIFENACIN SUCCINATE 5 MG/1
5 TABLET, FILM COATED ORAL DAILY
Qty: 30 TABLET | Refills: 1 | Status: SHIPPED
Start: 2022-04-12 | End: 2022-05-10 | Stop reason: SDUPTHER

## 2022-04-12 ASSESSMENT — ENCOUNTER SYMPTOMS
EYES NEGATIVE: 1
RESPIRATORY NEGATIVE: 1
GASTROINTESTINAL NEGATIVE: 1
ALLERGIC/IMMUNOLOGIC NEGATIVE: 1

## 2022-04-12 NOTE — PROGRESS NOTES
22     Sera Estrella    : 1945 Sex: female   Age: 77 y.o. Chief Complaint   Patient presents with    Coronary Artery Disease    Hypertension    Foot Pain     right foot issues     Urinary Frequency       Prior to Admission medications    Medication Sig Start Date End Date Taking? Authorizing Provider   solifenacin (VESICARE) 5 MG tablet Take 1 tablet by mouth daily 22 Yes Kelvin Nicholson DO   levothyroxine (SYNTHROID) 50 MCG tablet Take 50 mcg by mouth Daily   Yes Historical Provider, MD   hydrALAZINE (APRESOLINE) 25 MG tablet Take 50 mg by mouth 2 times daily   Yes Historical Provider, MD   citalopram (CELEXA) 20 MG tablet Take 1 tablet by mouth daily 22  Yes Kelvin Nicholson DO   spironolactone (ALDACTONE) 25 MG tablet TAKE 1 TABLET BY MOUTH EVERY DAY 22  Yes Kelvin Nicholson DO   nitroGLYCERIN (NITRODUR) 0.2 MG/HR APPLY 1 PATCH EXTERNALLY TO THE SKIN DAILY 22  Yes Kelvin Nicholson DO   aspirin EC 81 MG EC tablet 1 qd 22  Yes Angel Nicholson DO   labetalol (NORMODYNE) 100 MG tablet TAKE 1 TABLET BY MOUTH EVERY 12 HOURS 22  Yes Kelvin Nicholson DO   cloNIDine (CATAPRES) 0.1 MG tablet TAKE 1 TABLET BY MOUTH 3 x day 22  Yes Rolf Rodriguez DO          HPI: Seen today in follow-up on coronary artery disease hypertensive disease urinary urgency and urge incontinence and then today with some right foot pain. No known trauma to the foot. We will check x-ray studies and recommending podiatry referral and patient in agreement. Medications all reviewed well-tolerated blood pressure is better controlled. Thyroid is been stable. Medically otherwise doing well we will continue current meds treatment. Review of Systems   Constitutional: Negative. HENT: Negative. Eyes: Negative. Respiratory: Negative. Gastrointestinal: Negative. Endocrine: Negative. Genitourinary: Negative. Musculoskeletal: Negative. Skin: Negative. Allergic/Immunologic: Negative. Neurological: Negative. Hematological: Negative. Psychiatric/Behavioral: Negative. Today systems review is overall stable and we will maintain present meds and care. Current Outpatient Medications:     solifenacin (VESICARE) 5 MG tablet, Take 1 tablet by mouth daily, Disp: 30 tablet, Rfl: 1    levothyroxine (SYNTHROID) 50 MCG tablet, Take 50 mcg by mouth Daily, Disp: , Rfl:     hydrALAZINE (APRESOLINE) 25 MG tablet, Take 50 mg by mouth 2 times daily, Disp: , Rfl:     citalopram (CELEXA) 20 MG tablet, Take 1 tablet by mouth daily, Disp: 90 tablet, Rfl: 3    spironolactone (ALDACTONE) 25 MG tablet, TAKE 1 TABLET BY MOUTH EVERY DAY, Disp: 90 tablet, Rfl: 3    nitroGLYCERIN (NITRODUR) 0.2 MG/HR, APPLY 1 PATCH EXTERNALLY TO THE SKIN DAILY, Disp: 90 patch, Rfl: 3    aspirin EC 81 MG EC tablet, 1 qd, Disp: 90 tablet, Rfl: 3    labetalol (NORMODYNE) 100 MG tablet, TAKE 1 TABLET BY MOUTH EVERY 12 HOURS, Disp: 60 tablet, Rfl: 3    cloNIDine (CATAPRES) 0.1 MG tablet, TAKE 1 TABLET BY MOUTH 3 x day, Disp: 90 tablet, Rfl: 3    Allergies   Allergen Reactions    Latex      Reaction unknown / may have been swelling    Norvasc [Amlodipine Besylate] Anaphylaxis    Dye [Iodides]      Reaction unknown       Social History     Tobacco Use    Smoking status: Former Smoker     Packs/day: 1.50     Years: 8.00     Pack years: 12.00     Types: Cigarettes     Quit date: 1976     Years since quittin.3    Smokeless tobacco: Never Used   Vaping Use    Vaping Use: Never used   Substance Use Topics    Alcohol use: No    Drug use: Never      Past Surgical History:   Procedure Laterality Date     SECTION      1    COLONOSCOPY      CORONARY ANGIOPLASTY WITH STENT PLACEMENT      CYST REMOVAL  's    ENDOSCOPY, COLON, DIAGNOSTIC      GASTROSTOMY TUBE PLACEMENT  2014    Martin Latham  and later removed    HEEL SPUR SURGERY Left years ago   [facility] [address] Left late 1990's    OTHER SURGICAL HISTORY      scraping of trach x 2 to treat stenosis / last one 2016    POLYPECTOMY  07/24/2013    internal hemorrhoids - michelle    TONSILLECTOMY      TOTAL KNEE ARTHROPLASTY Right 8/28/2020    RIGHT KNEE TOTAL ARTHROPLASTY    ++RAYMOND++   ++PNB++     ++LATEX ALLERGY++   ++IODINE ALLERGY++ performed by Antonina Knapp MD at [address] [place] [address],[address]  9/24/14    [place]    UPPER GASTROINTESTINAL ENDOSCOPY      gastritis w. superficial antral ulerations - [place]     No family history on file. Past Medical History:   Diagnosis Date    Acute loss of vision, left 2/24/2022    Arthritis     CAD (coronary artery disease)     follows with Dr. Lynda Londono CKD (chronic kidney disease)     stage 3 / follows with Dr. Wing Varela Depression     Hypertension     Left carotid stenosis 2/24/2022    Res[address] failure ([facility] [address]     history of / 2014  when had stroke, trach  x 2 weeks    Thyroid disease     Tracheal stenosis     Unspecified cerebral artery occlusion with cerebral infarction 9/26/2014    right side flacid, hemorrhagic stroke dt elevated BP/residual s/s is at right side loss of fine motor skills and some weakness, drags right leg, slight memory loss and aphasia when she is tired       Vitals:    04/12/22 1149   BP: 138/72   Pulse: 65   Temp: 97.8 °F (36.6 °C)   SpO2: 96%     BP Readings from Last 3 Encounters:   04/12/22 138/72   03/08/22 132/60   02/24/22 122/78        Physical Exam  Vitals and nursing note reviewed. Constitutional:       Appearance: She is well-developed. HENT:      Head: Normocephalic. Right Ear: External ear normal.      Left Ear: External ear normal.      Nose: Nose normal.   Eyes:      Conjunctiva/sclera: Conjunctivae normal.      Pupils: Pupils are equal, round, and reactive to light. Cardiovascular:      Rate and Rhythm: Normal rate. Pulmonary:      Breath sounds: Normal breath sounds.    Abdominal: General: Bowel sounds are normal.      Palpations: Abdomen is soft. Musculoskeletal:         General: Normal range of motion. Cervical back: Normal range of motion and neck supple. Skin:     General: Skin is warm and dry. Neurological:      Mental Status: She is alert and oriented to person, place, and time. Psychiatric:         Behavior: Behavior normal.     Today's vitals physical examination stable. Heart remains controlled lungs are clear. Medications as prescribed. Reassessment 1 month with the addition of Vesicare today for some urinary urgency incontinence and reassess with next visit. Urology referral if persistent or worsening symptoms. Podiatry referral today and follow-up visit with me in the month. Plan Per Assessment:  Pelon Hines was seen today for coronary artery disease, hypertension, foot pain and urinary frequency. Diagnoses and all orders for this visit:    Coronary artery disease involving native coronary artery of native heart without angina pectoris    Malignant hypertension    Right foot pain  -     S[facility] - Dylan Lugo, [place], Podiatry, [address] (MIN 3 VIEWS); Future    Urge incontinence of urine    Other orders  -     solifenacin (VESICARE) 5 MG tablet; Take 1 tablet by mouth daily            Return in about 4 weeks (around 5/10/2022). Olga Tolbert DO    Note was generated with the assistance of voice recognition software. Document was reviewed however may contain grammatical errors.

## 2022-05-10 ENCOUNTER — OFFICE VISIT (OUTPATIENT)
Dept: PRIMARY CARE CLINIC | Age: 77
End: 2022-05-10
Payer: MEDICARE

## 2022-05-10 VITALS
HEART RATE: 59 BPM | SYSTOLIC BLOOD PRESSURE: 126 MMHG | OXYGEN SATURATION: 95 % | TEMPERATURE: 97.5 F | BODY MASS INDEX: 28.88 KG/M2 | WEIGHT: 163 LBS | DIASTOLIC BLOOD PRESSURE: 68 MMHG | HEIGHT: 63 IN

## 2022-05-10 DIAGNOSIS — I10 ESSENTIAL HYPERTENSION: Primary | Chronic | ICD-10-CM

## 2022-05-10 DIAGNOSIS — N18.32 STAGE 3B CHRONIC KIDNEY DISEASE (HCC): ICD-10-CM

## 2022-05-10 DIAGNOSIS — E03.9 HYPOTHYROIDISM, UNSPECIFIED TYPE: ICD-10-CM

## 2022-05-10 DIAGNOSIS — E11.22 TYPE 2 DIABETES MELLITUS WITH STAGE 3B CHRONIC KIDNEY DISEASE, WITHOUT LONG-TERM CURRENT USE OF INSULIN (HCC): ICD-10-CM

## 2022-05-10 DIAGNOSIS — E11.9 TYPE 2 DIABETES MELLITUS WITHOUT COMPLICATION, WITHOUT LONG-TERM CURRENT USE OF INSULIN (HCC): ICD-10-CM

## 2022-05-10 DIAGNOSIS — N18.32 TYPE 2 DIABETES MELLITUS WITH STAGE 3B CHRONIC KIDNEY DISEASE, WITHOUT LONG-TERM CURRENT USE OF INSULIN (HCC): ICD-10-CM

## 2022-05-10 PROBLEM — N18.4 STAGE 4 CHRONIC KIDNEY DISEASE (HCC): Status: RESOLVED | Noted: 2021-12-02 | Resolved: 2022-05-10

## 2022-05-10 PROCEDURE — 3017F COLORECTAL CA SCREEN DOC REV: CPT | Performed by: FAMILY MEDICINE

## 2022-05-10 PROCEDURE — 2022F DILAT RTA XM EVC RTNOPTHY: CPT | Performed by: FAMILY MEDICINE

## 2022-05-10 PROCEDURE — G8400 PT W/DXA NO RESULTS DOC: HCPCS | Performed by: FAMILY MEDICINE

## 2022-05-10 PROCEDURE — 3044F HG A1C LEVEL LT 7.0%: CPT | Performed by: FAMILY MEDICINE

## 2022-05-10 PROCEDURE — G8427 DOCREV CUR MEDS BY ELIG CLIN: HCPCS | Performed by: FAMILY MEDICINE

## 2022-05-10 PROCEDURE — 1036F TOBACCO NON-USER: CPT | Performed by: FAMILY MEDICINE

## 2022-05-10 PROCEDURE — G8417 CALC BMI ABV UP PARAM F/U: HCPCS | Performed by: FAMILY MEDICINE

## 2022-05-10 PROCEDURE — 4040F PNEUMOC VAC/ADMIN/RCVD: CPT | Performed by: FAMILY MEDICINE

## 2022-05-10 PROCEDURE — 1123F ACP DISCUSS/DSCN MKR DOCD: CPT | Performed by: FAMILY MEDICINE

## 2022-05-10 PROCEDURE — 1090F PRES/ABSN URINE INCON ASSESS: CPT | Performed by: FAMILY MEDICINE

## 2022-05-10 PROCEDURE — 99214 OFFICE O/P EST MOD 30 MIN: CPT | Performed by: FAMILY MEDICINE

## 2022-05-10 RX ORDER — LABETALOL 100 MG/1
TABLET, FILM COATED ORAL
Qty: 60 TABLET | Refills: 3 | Status: SHIPPED | OUTPATIENT
Start: 2022-05-10

## 2022-05-10 RX ORDER — SOLIFENACIN SUCCINATE 5 MG/1
5 TABLET, FILM COATED ORAL DAILY
Qty: 90 TABLET | OUTPATIENT
Start: 2022-05-10 | End: 2023-05-10

## 2022-05-10 RX ORDER — SOLIFENACIN SUCCINATE 5 MG/1
5 TABLET, FILM COATED ORAL DAILY
Qty: 30 TABLET | Refills: 1 | Status: SHIPPED
Start: 2022-05-10 | End: 2022-06-07 | Stop reason: SDUPTHER

## 2022-05-10 NOTE — PROGRESS NOTES
5/10/22     Shantal Wall    : 1945 Sex: female   Age: 77 y.o. Chief Complaint   Patient presents with    Medication Check     meds helping    Foot Pain     foot is feeling alot better       Prior to Admission medications    Medication Sig Start Date End Date Taking? Authorizing Provider   solifenacin (VESICARE) 5 MG tablet Take 1 tablet by mouth daily 5/10/22 5/10/23 Yes Lina Heart Lny, DO   levothyroxine (SYNTHROID) 50 MCG tablet Take 50 mcg by mouth Daily   Yes Historical Provider, MD   hydrALAZINE (APRESOLINE) 25 MG tablet Take 50 mg by mouth 2 times daily   Yes Historical Provider, MD   citalopram (CELEXA) 20 MG tablet Take 1 tablet by mouth daily 22  Yes Lina Heart Lyn, DO   spironolactone (ALDACTONE) 25 MG tablet TAKE 1 TABLET BY MOUTH EVERY DAY 22  Yes Lina Heart Lyn, DO   nitroGLYCERIN (NITRODUR) 0.2 MG/HR APPLY 1 PATCH EXTERNALLY TO THE SKIN DAILY 22  Yes Lina Nicholson, DO   aspirin EC 81 MG EC tablet 1 qd 22  Yes Angel Nicholson DO   labetalol (NORMODYNE) 100 MG tablet TAKE 1 TABLET BY MOUTH EVERY 12 HOURS 22  Yes Lina Nicholson, DO   cloNIDine (CATAPRES) 0.1 MG tablet TAKE 1 TABLET BY MOUTH 3 x day 22  Yes Tiffanie Xie DO          HPI: Patient evaluated today with hypertension hypothyroid chronic kidney disease diabetes all of which currently stable. Meds well-tolerated and we will maintain as prescribed. Lab studies with next visit. Recent problems with foot pain all resolved doing much better. Reviewed x-rays and arthritic findings as well as some osteopenia but no other acute findings. Review of Systems   Constitutional: Negative. HENT: Negative. Eyes: Negative. Respiratory: Negative. Gastrointestinal: Negative. Endocrine: Negative. Genitourinary: Negative. Musculoskeletal: Negative. Skin: Negative. Allergic/Immunologic: Negative. Neurological: Negative. Hematological: Negative. Psychiatric/Behavioral: Negative. Current Outpatient Medications:     solifenacin (VESICARE) 5 MG tablet, Take 1 tablet by mouth daily, Disp: 30 tablet, Rfl: 1    levothyroxine (SYNTHROID) 50 MCG tablet, Take 50 mcg by mouth Daily, Disp: , Rfl:     hydrALAZINE (APRESOLINE) 25 MG tablet, Take 50 mg by mouth 2 times daily, Disp: , Rfl:     citalopram (CELEXA) 20 MG tablet, Take 1 tablet by mouth daily, Disp: 90 tablet, Rfl: 3    spironolactone (ALDACTONE) 25 MG tablet, TAKE 1 TABLET BY MOUTH EVERY DAY, Disp: 90 tablet, Rfl: 3    nitroGLYCERIN (NITRODUR) 0.2 MG/HR, APPLY 1 PATCH EXTERNALLY TO THE SKIN DAILY, Disp: 90 patch, Rfl: 3    aspirin EC 81 MG EC tablet, 1 qd, Disp: 90 tablet, Rfl: 3    labetalol (NORMODYNE) 100 MG tablet, TAKE 1 TABLET BY MOUTH EVERY 12 HOURS, Disp: 60 tablet, Rfl: 3    cloNIDine (CATAPRES) 0.1 MG tablet, TAKE 1 TABLET BY MOUTH 3 x day, Disp: 90 tablet, Rfl: 3    Allergies   Allergen Reactions    Latex      Reaction unknown / may have been swelling    Norvasc [Amlodipine Besylate] Anaphylaxis    Dye [Iodides]      Reaction unknown       Social History     Tobacco Use    Smoking status: Former Smoker     Packs/day: 1.50     Years: 8.00     Pack years: 12.00     Types: Cigarettes     Quit date: 1976     Years since quittin.3    Smokeless tobacco: Never Used   Vaping Use    Vaping Use: Never used   Substance Use Topics    Alcohol use: No    Drug use: Never      Past Surgical History:   Procedure Laterality Date     SECTION      1    COLONOSCOPY  2014    CORONARY ANGIOPLASTY WITH STENT PLACEMENT  2004    CYST REMOVAL  's    ENDOSCOPY, COLON, DIAGNOSTIC      GASTROSTOMY TUBE PLACEMENT  2014    [place]. Conner Montoya  and later removed    HEEL SPUR SURGERY Left years ago    JOINT REPLACEMENT Left late 's    OTHER SURGICAL HISTORY      scraping of trach x 2 to treat stenosis / last one 2016    POLYPECTOMY  2013    internal hemorrhoids - michelle    TONSILLECTOMY      TOTAL KNEE ARTHROPLASTY Right 8/28/2020    RIGHT KNEE TOTAL ARTHROPLASTY    ++RAYMOND++   ++PNB++     ++LATEX ALLERGY++   ++IODINE ALLERGY++ performed by Radha Posada MD at [address] [place] [address],[address]  9/24/14    [place]    UPPER GASTROINTESTINAL ENDOSCOPY      gastritis w. superficial antral ulerations - Michelle     No family history on file. Past Medical History:   Diagnosis Date    Acute loss of vision, left 2/24/2022    Arthritis     CAD (coronary artery disease)     follows with Dr. Edenilson Boyd CKD (chronic kidney disease)     stage 3 / follows with Dr. Obey Joyner Depression     Hypertension     Left carotid stenosis 2/24/2022    Respiratory failure ([address]     history of / 2014  when had stroke, trach  x 2 weeks    Thyroid disease     Tracheal stenosis     Unspecified cerebral artery occlusion with cerebral infarction 9/26/2014    right side flacid, hemorrhagic stroke dt elevated BP/residual s/s is at right side loss of fine motor skills and some weakness, drags right leg, slight memory loss and aphasia when she is tired       Vitals:    05/10/22 1148   BP: 126/68   Pulse: 59   Temp: 97.5 °F (36.4 °C)   SpO2: 95%   Weight: 163 lb (73.9 kg)   Height: 5' 3\" (1.6 m)     BP Readings from Last 3 Encounters:   05/10/22 126/68   04/12/22 138/72   03/08/22 132/60    110/62    Physical Exam  Vitals and nursing note reviewed. Constitutional:       Appearance: She is well-developed. HENT:      Head: Normocephalic. Right Ear: External ear normal.      Left Ear: External ear normal.      Nose: Nose normal.   Eyes:      Conjunctiva/sclera: Conjunctivae normal.      Pupils: Pupils are equal, round, and reactive to light. Cardiovascular:      Rate and Rhythm: Normal rate. Pulmonary:      Breath sounds: Normal breath sounds. Abdominal:      General: Bowel sounds are normal.      Palpations: Abdomen is soft. Musculoskeletal:         General: Normal range of motion. Cervical back: Normal range of motion and neck supple. Skin:     General: Skin is warm and dry. Neurological:      Mental Status: She is alert and oriented to person, place, and time. Psychiatric:         Behavior: Behavior normal.     Present vitals physical exam stable. I will maintain present meds and care. Follow-up visit 3 months and blood work prior. Sooner if problems. Plan Per Assessment:  Venecia Hines was seen today for medication check and foot pain. Diagnoses and all orders for this visit:    Essential hypertension  -     CBC with Auto Differential; Future  -     Comprehensive Metabolic Panel; Future  -     Hemoglobin A1C; Future  -     Lipid Panel; Future  -     T4; Future  -     TSH; Future    Hypothyroidism, unspecified type  -     CBC with Auto Differential; Future  -     Comprehensive Metabolic Panel; Future  -     Hemoglobin A1C; Future  -     Lipid Panel; Future  -     T4; Future  -     TSH; Future    Stage 3b chronic kidney disease ([facility] [address]  -     CBC with Auto Differential; Future  -     Comprehensive Metabolic Panel; Future  -     Hemoglobin A1C; Future  -     Lipid Panel; Future  -     T4; Future  -     TSH; Future    Type 2 diabetes mellitus without complication, without long-term current use of insulin (HCC)  -     CBC with Auto Differential; Future  -     Comprehensive Metabolic Panel; Future  -     Hemoglobin A1C; Future  -     Lipid Panel; Future  -     T4; Future  -     TSH; Future    Type 2 diabetes mellitus with stage 3b chronic kidney disease, without long-term current use of insulin (HCC)    Other orders  -     solifenacin (VESICARE) 5 MG tablet; Take 1 tablet by mouth daily            Return in about 3 months (around 8/10/2022). Sonia Tidwell DO    Note was generated with the assistance of voice recognition software. Document was reviewed however may contain grammatical errors.

## 2022-05-23 RX ORDER — LEVOTHYROXINE SODIUM 88 UG/1
TABLET ORAL
Qty: 90 TABLET | Refills: 3 | Status: SHIPPED | OUTPATIENT
Start: 2022-05-23

## 2022-05-23 RX ORDER — HYDRALAZINE HYDROCHLORIDE 25 MG/1
TABLET, FILM COATED ORAL
Qty: 360 TABLET | Refills: 1 | Status: SHIPPED | OUTPATIENT
Start: 2022-05-23

## 2022-05-23 RX ORDER — SPIRONOLACTONE 25 MG/1
TABLET ORAL
Qty: 90 TABLET | Refills: 3 | Status: SHIPPED | OUTPATIENT
Start: 2022-05-23

## 2022-05-23 RX ORDER — CITALOPRAM 20 MG/1
20 TABLET ORAL DAILY
Qty: 90 TABLET | Refills: 3 | Status: SHIPPED | OUTPATIENT
Start: 2022-05-23

## 2022-05-23 RX ORDER — NITROGLYCERIN 40 MG/1
PATCH TRANSDERMAL
Qty: 90 PATCH | Refills: 3 | Status: SHIPPED | OUTPATIENT
Start: 2022-05-23

## 2022-06-07 RX ORDER — SOLIFENACIN SUCCINATE 5 MG/1
5 TABLET, FILM COATED ORAL DAILY
Qty: 90 TABLET | Refills: 1 | Status: SHIPPED
Start: 2022-06-07 | End: 2022-07-12

## 2022-07-12 RX ORDER — SOLIFENACIN SUCCINATE 5 MG/1
5 TABLET, FILM COATED ORAL DAILY
Qty: 30 TABLET | Refills: 1 | Status: SHIPPED
Start: 2022-07-12 | End: 2022-10-18 | Stop reason: SDUPTHER

## 2022-08-04 RX ORDER — CLONIDINE HYDROCHLORIDE 0.1 MG/1
TABLET ORAL
Qty: 90 TABLET | Refills: 3 | Status: SHIPPED | OUTPATIENT
Start: 2022-08-04

## 2022-08-09 ENCOUNTER — OFFICE VISIT (OUTPATIENT)
Dept: PRIMARY CARE CLINIC | Age: 77
End: 2022-08-09
Payer: MEDICARE

## 2022-08-09 VITALS
HEART RATE: 61 BPM | SYSTOLIC BLOOD PRESSURE: 124 MMHG | HEIGHT: 63 IN | BODY MASS INDEX: 28.7 KG/M2 | TEMPERATURE: 97.4 F | DIASTOLIC BLOOD PRESSURE: 76 MMHG | OXYGEN SATURATION: 96 % | WEIGHT: 162 LBS

## 2022-08-09 DIAGNOSIS — E03.9 HYPOTHYROIDISM, UNSPECIFIED TYPE: ICD-10-CM

## 2022-08-09 DIAGNOSIS — N18.32 STAGE 3B CHRONIC KIDNEY DISEASE (HCC): ICD-10-CM

## 2022-08-09 DIAGNOSIS — E11.9 TYPE 2 DIABETES MELLITUS WITHOUT COMPLICATION, WITHOUT LONG-TERM CURRENT USE OF INSULIN (HCC): ICD-10-CM

## 2022-08-09 DIAGNOSIS — E11.9 TYPE 2 DIABETES MELLITUS WITHOUT COMPLICATION, WITHOUT LONG-TERM CURRENT USE OF INSULIN (HCC): Chronic | ICD-10-CM

## 2022-08-09 DIAGNOSIS — R26.9 GAIT DISTURBANCE: ICD-10-CM

## 2022-08-09 DIAGNOSIS — I10 ESSENTIAL HYPERTENSION: Primary | Chronic | ICD-10-CM

## 2022-08-09 DIAGNOSIS — I25.10 CORONARY ARTERY DISEASE INVOLVING NATIVE CORONARY ARTERY OF NATIVE HEART WITHOUT ANGINA PECTORIS: ICD-10-CM

## 2022-08-09 DIAGNOSIS — I10 ESSENTIAL HYPERTENSION: Chronic | ICD-10-CM

## 2022-08-09 LAB
ALBUMIN SERPL-MCNC: 4.7 G/DL (ref 3.5–5.2)
ALP BLD-CCNC: 57 U/L (ref 35–104)
ALT SERPL-CCNC: 13 U/L (ref 0–32)
ANION GAP SERPL CALCULATED.3IONS-SCNC: 15 MMOL/L (ref 7–16)
AST SERPL-CCNC: 20 U/L (ref 0–31)
BASOPHILS ABSOLUTE: 0.03 E9/L (ref 0–0.2)
BASOPHILS RELATIVE PERCENT: 0.8 % (ref 0–2)
BILIRUB SERPL-MCNC: 0.7 MG/DL (ref 0–1.2)
BUN BLDV-MCNC: 41 MG/DL (ref 6–23)
CALCIUM SERPL-MCNC: 9.9 MG/DL (ref 8.6–10.2)
CHLORIDE BLD-SCNC: 103 MMOL/L (ref 98–107)
CHOLESTEROL, TOTAL: 211 MG/DL (ref 0–199)
CO2: 21 MMOL/L (ref 22–29)
CREAT SERPL-MCNC: 1.9 MG/DL (ref 0.5–1)
EOSINOPHILS ABSOLUTE: 0.13 E9/L (ref 0.05–0.5)
EOSINOPHILS RELATIVE PERCENT: 3.5 % (ref 0–6)
GFR AFRICAN AMERICAN: 31
GFR NON-AFRICAN AMERICAN: 26 ML/MIN/1.73
GLUCOSE BLD-MCNC: 84 MG/DL (ref 74–99)
HBA1C MFR BLD: 5.8 % (ref 4–5.6)
HCT VFR BLD CALC: 42.6 % (ref 34–48)
HDLC SERPL-MCNC: 45 MG/DL
HEMOGLOBIN: 13.7 G/DL (ref 11.5–15.5)
IMMATURE GRANULOCYTES #: 0.02 E9/L
IMMATURE GRANULOCYTES %: 0.5 % (ref 0–5)
LDL CHOLESTEROL CALCULATED: 136 MG/DL (ref 0–99)
LYMPHOCYTES ABSOLUTE: 0.98 E9/L (ref 1.5–4)
LYMPHOCYTES RELATIVE PERCENT: 26.1 % (ref 20–42)
MCH RBC QN AUTO: 30.5 PG (ref 26–35)
MCHC RBC AUTO-ENTMCNC: 32.2 % (ref 32–34.5)
MCV RBC AUTO: 94.9 FL (ref 80–99.9)
MONOCYTES ABSOLUTE: 0.4 E9/L (ref 0.1–0.95)
MONOCYTES RELATIVE PERCENT: 10.6 % (ref 2–12)
NEUTROPHILS ABSOLUTE: 2.2 E9/L (ref 1.8–7.3)
NEUTROPHILS RELATIVE PERCENT: 58.5 % (ref 43–80)
PDW BLD-RTO: 13.4 FL (ref 11.5–15)
PLATELET # BLD: 151 E9/L (ref 130–450)
PMV BLD AUTO: 10.8 FL (ref 7–12)
POTASSIUM SERPL-SCNC: 5.1 MMOL/L (ref 3.5–5)
RBC # BLD: 4.49 E12/L (ref 3.5–5.5)
SODIUM BLD-SCNC: 139 MMOL/L (ref 132–146)
T4 TOTAL: 8.6 MCG/DL (ref 4.5–11.7)
TOTAL PROTEIN: 7.8 G/DL (ref 6.4–8.3)
TRIGL SERPL-MCNC: 151 MG/DL (ref 0–149)
TSH SERPL DL<=0.05 MIU/L-ACNC: 2.89 UIU/ML (ref 0.27–4.2)
VLDLC SERPL CALC-MCNC: 30 MG/DL
WBC # BLD: 3.8 E9/L (ref 4.5–11.5)

## 2022-08-09 PROCEDURE — G8400 PT W/DXA NO RESULTS DOC: HCPCS | Performed by: FAMILY MEDICINE

## 2022-08-09 PROCEDURE — 3044F HG A1C LEVEL LT 7.0%: CPT | Performed by: FAMILY MEDICINE

## 2022-08-09 PROCEDURE — 1123F ACP DISCUSS/DSCN MKR DOCD: CPT | Performed by: FAMILY MEDICINE

## 2022-08-09 PROCEDURE — 1036F TOBACCO NON-USER: CPT | Performed by: FAMILY MEDICINE

## 2022-08-09 PROCEDURE — G8417 CALC BMI ABV UP PARAM F/U: HCPCS | Performed by: FAMILY MEDICINE

## 2022-08-09 PROCEDURE — 99214 OFFICE O/P EST MOD 30 MIN: CPT | Performed by: FAMILY MEDICINE

## 2022-08-09 PROCEDURE — 1090F PRES/ABSN URINE INCON ASSESS: CPT | Performed by: FAMILY MEDICINE

## 2022-08-09 PROCEDURE — G8427 DOCREV CUR MEDS BY ELIG CLIN: HCPCS | Performed by: FAMILY MEDICINE

## 2022-08-09 ASSESSMENT — PATIENT HEALTH QUESTIONNAIRE - PHQ9
SUM OF ALL RESPONSES TO PHQ QUESTIONS 1-9: 0
SUM OF ALL RESPONSES TO PHQ9 QUESTIONS 1 & 2: 0
1. LITTLE INTEREST OR PLEASURE IN DOING THINGS: 0
SUM OF ALL RESPONSES TO PHQ QUESTIONS 1-9: 0
SUM OF ALL RESPONSES TO PHQ QUESTIONS 1-9: 0
2. FEELING DOWN, DEPRESSED OR HOPELESS: 0
SUM OF ALL RESPONSES TO PHQ QUESTIONS 1-9: 0

## 2022-08-09 NOTE — PROGRESS NOTES
22     Rogers Yepez    : 1945 Sex: female   Age: 69 y.o. Chief Complaint   Patient presents with    Diabetes    Hypertension    Referral - General     Would like referral for PT         Prior to Admission medications    Medication Sig Start Date End Date Taking? Authorizing Provider   cloNIDine (CATAPRES) 0.1 MG tablet TAKE 1 TABLET BY MOUTH 3 x day 22  Yes Tatyanameaghan Benjamin Traikoff, DO   solifenacin (VESICARE) 5 MG tablet TAKE 1 TABLET BY MOUTH DAILY 22 Yes Marylene Sickles Danielikoff, DO   levothyroxine (SYNTHROID) 88 MCG tablet TAKE 1 TABLET BY MOUTH EVERY DAY 22  Yes Marylene Sicklealeks Hatfieldoff, DO   nitroGLYCERIN (NITRODUR) 0.2 MG/HR APPLY 1 PATCH TOPICALLY TO THE SKIN DAILY 22  Yes Tatyanameaghan Hatfieldoff, DO   citalopram (CELEXA) 20 MG tablet TAKE 1 TABLET BY MOUTH DAILY 22  Yes Tatyanabradjermaine Hinesaleks Hatfieldoff, DO   hydrALAZINE (APRESOLINE) 25 MG tablet TAKE 1 TABLET BY MOUTH FOUR TIMES DAILY 22  Yes Marylene Sickles Traikoff, DO   spironolactone (ALDACTONE) 25 MG tablet TAKE 1 TABLET BY MOUTH EVERY DAY 22  Yes Marylene Sicklealeks Sanchezikoff, DO   labetalol (NORMODYNE) 100 MG tablet TAKE 1 TABLET BY MOUTH EVERY 12 HOURS 5/10/22  Yes Marylene Sickles Liuoff, DO   levothyroxine (SYNTHROID) 50 MCG tablet Take 50 mcg by mouth Daily   Yes Historical Provider, MD   aspirin EC 81 MG EC tablet 1 qd 22  Yes Mar Runner, DO          HPI: Patient evaluated today with hypertension diabetes hypothyroid chronic kidney disease gait disturbance. Primary concern at this point is altered gait and concerns for falls. Physical therapy recommended and then will reassess next month. All medications reviewed seem to be well-tolerated continue as prescribed. Blood pressures controlled. Review of Systems   Constitutional: Negative. HENT: Negative. Eyes: Negative. Respiratory: Negative. Gastrointestinal: Negative. Endocrine: Negative. Genitourinary: Negative. Musculoskeletal: Negative.     Skin: sounds. Abdominal:      General: Bowel sounds are normal.      Palpations: Abdomen is soft. Musculoskeletal:         General: Normal range of motion. Cervical back: Normal range of motion and neck supple. Skin:     General: Skin is warm and dry. Neurological:      Mental Status: She is alert and oriented to person, place, and time. Psychiatric:         Behavior: Behavior normal.      Today's vitals physical examination stable. Medications will continue as prescribed. Follow-up visit with me 4 weeks and sooner if problems. Plan Per Assessment:  Que Alvarado was seen today for diabetes, hypertension and referral - general.    Diagnoses and all orders for this visit:    Essential hypertension    Type 2 diabetes mellitus without complication, without long-term current use of insulin (HCC)    Hypothyroidism, unspecified type    Stage 3b chronic kidney disease ([facility] [address]    Gait disturbance  -     External Referral To Physical Therapy          Return in about 4 weeks (around 9/6/2022). Mirian Neal DO    Note was generated with the assistance of voice recognition software. Document was reviewed however may contain grammatical errors.

## 2022-09-06 ENCOUNTER — OFFICE VISIT (OUTPATIENT)
Dept: PRIMARY CARE CLINIC | Age: 77
End: 2022-09-06
Payer: MEDICARE

## 2022-09-06 VITALS
OXYGEN SATURATION: 96 % | SYSTOLIC BLOOD PRESSURE: 122 MMHG | TEMPERATURE: 98.1 F | DIASTOLIC BLOOD PRESSURE: 72 MMHG | HEART RATE: 69 BPM

## 2022-09-06 DIAGNOSIS — I10 ESSENTIAL HYPERTENSION: Primary | Chronic | ICD-10-CM

## 2022-09-06 DIAGNOSIS — I25.10 CORONARY ARTERY DISEASE INVOLVING NATIVE CORONARY ARTERY OF NATIVE HEART WITHOUT ANGINA PECTORIS: ICD-10-CM

## 2022-09-06 DIAGNOSIS — I73.9 PVD (PERIPHERAL VASCULAR DISEASE) (HCC): ICD-10-CM

## 2022-09-06 DIAGNOSIS — Z86.79 HISTORY OF CEREBRAL HEMORRHAGE: ICD-10-CM

## 2022-09-06 DIAGNOSIS — E03.9 HYPOTHYROIDISM, UNSPECIFIED TYPE: ICD-10-CM

## 2022-09-06 DIAGNOSIS — E78.2 MIXED HYPERLIPIDEMIA: ICD-10-CM

## 2022-09-06 DIAGNOSIS — N18.32 STAGE 3B CHRONIC KIDNEY DISEASE (HCC): ICD-10-CM

## 2022-09-06 DIAGNOSIS — E11.9 TYPE 2 DIABETES MELLITUS WITHOUT COMPLICATION, WITHOUT LONG-TERM CURRENT USE OF INSULIN (HCC): Chronic | ICD-10-CM

## 2022-09-06 DIAGNOSIS — R26.9 GAIT DISTURBANCE: ICD-10-CM

## 2022-09-06 PROCEDURE — G8427 DOCREV CUR MEDS BY ELIG CLIN: HCPCS | Performed by: FAMILY MEDICINE

## 2022-09-06 PROCEDURE — 1036F TOBACCO NON-USER: CPT | Performed by: FAMILY MEDICINE

## 2022-09-06 PROCEDURE — 1123F ACP DISCUSS/DSCN MKR DOCD: CPT | Performed by: FAMILY MEDICINE

## 2022-09-06 PROCEDURE — G8417 CALC BMI ABV UP PARAM F/U: HCPCS | Performed by: FAMILY MEDICINE

## 2022-09-06 PROCEDURE — G8400 PT W/DXA NO RESULTS DOC: HCPCS | Performed by: FAMILY MEDICINE

## 2022-09-06 PROCEDURE — 3044F HG A1C LEVEL LT 7.0%: CPT | Performed by: FAMILY MEDICINE

## 2022-09-06 PROCEDURE — 99214 OFFICE O/P EST MOD 30 MIN: CPT | Performed by: FAMILY MEDICINE

## 2022-09-06 PROCEDURE — 1090F PRES/ABSN URINE INCON ASSESS: CPT | Performed by: FAMILY MEDICINE

## 2022-09-06 NOTE — PROGRESS NOTES
22     Loida Huang    : 1945 Sex: female   Age: 69 y.o. Chief Complaint   Patient presents with    Hypertension    Diabetes       Prior to Admission medications    Medication Sig Start Date End Date Taking? Authorizing Provider   cloNIDine (CATAPRES) 0.1 MG tablet TAKE 1 TABLET BY MOUTH 3 x day 22  Yes Kristian Nicholson DO   solifenacin (VESICARE) 5 MG tablet TAKE 1 TABLET BY MOUTH DAILY 22 Yes Kristian Nicholson DO   levothyroxine (SYNTHROID) 88 MCG tablet TAKE 1 TABLET BY MOUTH EVERY DAY 22  Yes Kristian Nicholson DO   nitroGLYCERIN (NITRODUR) 0.2 MG/HR APPLY 1 PATCH TOPICALLY TO THE SKIN DAILY 22  Yes Kristian Nicholson DO   citalopram (CELEXA) 20 MG tablet TAKE 1 TABLET BY MOUTH DAILY 22  Yes Kristian Nicholson DO   hydrALAZINE (APRESOLINE) 25 MG tablet TAKE 1 TABLET BY MOUTH FOUR TIMES DAILY 22  Yes Kristian Nicholson DO   spironolactone (ALDACTONE) 25 MG tablet TAKE 1 TABLET BY MOUTH EVERY DAY 22  Yes Kristian Nicholson DO   labetalol (NORMODYNE) 100 MG tablet TAKE 1 TABLET BY MOUTH EVERY 12 HOURS 5/10/22  Yes Kristian Nicholson DO   levothyroxine (SYNTHROID) 50 MCG tablet Take 50 mcg by mouth Daily   Yes Historical Provider, MD   aspirin EC 81 MG EC tablet 1 qd 22  Yes Marie Nguyen DO          HPI: Patient evaluated today multiple medical issues of hypertension coronary artery disease peripheral vascular disease hypothyroid diabetes chronic kidney disease hyperlipidemia gait disturbance related to previous cerebral hemorrhage. CVA. Some problems as of late with weakness on the right side and gait disturbance. We will try some physical therapy again for strengthening and balance training. Reassess next month. Review of Systems   Constitutional: Negative. HENT: Negative. Eyes: Negative. Respiratory: Negative. Gastrointestinal: Negative. Endocrine: Negative. Genitourinary: Negative. Musculoskeletal: Negative. Skin: Negative. Allergic/Immunologic: Negative. Neurological:  Positive for weakness. Hematological: Negative. Psychiatric/Behavioral: Negative. Systems review all stable aside from the weakness and difficulty with ambulation.         Current Outpatient Medications:     cloNIDine (CATAPRES) 0.1 MG tablet, TAKE 1 TABLET BY MOUTH 3 x day, Disp: 90 tablet, Rfl: 3    solifenacin (VESICARE) 5 MG tablet, TAKE 1 TABLET BY MOUTH DAILY, Disp: 30 tablet, Rfl: 1    levothyroxine (SYNTHROID) 88 MCG tablet, TAKE 1 TABLET BY MOUTH EVERY DAY, Disp: 90 tablet, Rfl: 3    nitroGLYCERIN (NITRODUR) 0.2 MG/HR, APPLY 1 PATCH TOPICALLY TO THE SKIN DAILY, Disp: 90 patch, Rfl: 3    citalopram (CELEXA) 20 MG tablet, TAKE 1 TABLET BY MOUTH DAILY, Disp: 90 tablet, Rfl: 3    hydrALAZINE (APRESOLINE) 25 MG tablet, TAKE 1 TABLET BY MOUTH FOUR TIMES DAILY, Disp: 360 tablet, Rfl: 1    spironolactone (ALDACTONE) 25 MG tablet, TAKE 1 TABLET BY MOUTH EVERY DAY, Disp: 90 tablet, Rfl: 3    labetalol (NORMODYNE) 100 MG tablet, TAKE 1 TABLET BY MOUTH EVERY 12 HOURS, Disp: 60 tablet, Rfl: 3    levothyroxine (SYNTHROID) 50 MCG tablet, Take 50 mcg by mouth Daily, Disp: , Rfl:     aspirin EC 81 MG EC tablet, 1 qd, Disp: 90 tablet, Rfl: 3    Allergies   Allergen Reactions    Latex      Reaction unknown / may have been swelling    Norvasc [Amlodipine Besylate] Anaphylaxis    Dye [Iodides]      Reaction unknown       Social History     Tobacco Use    Smoking status: Former     Packs/day: 1.50     Years: 8.00     Pack years: 12.00     Types: Cigarettes     Quit date: 1976     Years since quittin.7    Smokeless tobacco: Never   Vaping Use    Vaping Use: Never used   Substance Use Topics    Alcohol use: No    Drug use: Never      Past Surgical History:   Procedure Laterality Date     SECTION      1    COLONOSCOPY  2014    CORONARY ANGIOPLASTY WITH STENT PLACEMENT  2004    CYST REMOVAL   ENDOSCOPY, COLON, DIAGNOSTIC      GASTROSTOMY TUBE PLACEMENT  09/24/2014    [place]. Viviane Lashaun and later removed    HEEL SPUR SURGERY Left years ago    JOINT REPLACEMENT Left late 1990's    OTHER SURGICAL HISTORY      scraping of trach x 2 to treat stenosis / last one 2016    POLYPECTOMY  07/24/2013    internal hemorrhoids - michelle    TONSILLECTOMY      TOTAL KNEE ARTHROPLASTY Right 8/28/2020    RIGHT KNEE TOTAL ARTHROPLASTY    ++RAYMOND++   ++PNB++     ++LATEX ALLERGY++   ++IODINE ALLERGY++ performed by Beverly Stuart MD at [facility]  9/24/14    [place]    UPPER GASTROINTESTINAL ENDOSCOPY      gastritis w. superficial antral ulerations - Michelle     No family history on file. Past Medical History:   Diagnosis Date    Acute loss of vision, left 2/24/2022    Arthritis     CAD (coronary artery disease)     follows with Dr. Jair Jasso    CKD (chronic kidney disease)     stage 3 / follows with Dr. Precious Chaves    Depression     Hypertension     Left carotid stenosis 2/24/2022    Respiratory failure ([facility] [address]     history of / 2014  when had stroke, trach  x 2 weeks    Thyroid disease     Tracheal stenosis     Unspecified cerebral artery occlusion with cerebral infarction 9/26/2014    right side flacid, hemorrhagic stroke dt elevated BP/residual s/s is at right side loss of fine motor skills and some weakness, drags right leg, slight memory loss and aphasia when she is tired       Vitals:    09/06/22 1311   BP: 122/72   Pulse: 69   Temp: 98.1 °F (36.7 °C)   SpO2: 96%     BP Readings from Last 3 Encounters:   09/06/22 122/72   08/09/22 124/76   05/10/22 126/68        Physical Exam  Vitals and nursing note reviewed. Constitutional:       Appearance: She is well-developed. HENT:      Head: Normocephalic. Right Ear: External ear normal.      Left Ear: External ear normal.      Nose: Nose normal.   Eyes:      Conjunctiva/sclera: Conjunctivae normal.      Pupils: Pupils are equal, round, and reactive to light.    Cardiovascular: Rate and Rhythm: Normal rate. Pulmonary:      Breath sounds: Normal breath sounds. Abdominal:      General: Bowel sounds are normal.      Palpations: Abdomen is soft. Musculoskeletal:         General: Normal range of motion. Cervical back: Normal range of motion and neck supple. Skin:     General: Skin is warm and dry. Neurological:      Mental Status: She is alert and oriented to person, place, and time. Psychiatric:         Behavior: Behavior normal.      Today's vitals physical examination stable. Medications as prescribed. Follow-up visit 1 month and sooner if problems.     Lab Results   Component Value Date    TSH 2.890 08/09/2022    TSH 2.050 10/04/2021    B2XDHCN 8.6 08/09/2022    A9ZRAKR 7.9 10/04/2021     Lab Results   Component Value Date    CHOL 211 (H) 08/09/2022    CHOL 180 02/02/2022     Lab Results   Component Value Date    TRIG 151 (H) 08/09/2022    TRIG 52 02/02/2022     Lab Results   Component Value Date    HDL 45 08/09/2022    HDL 53 02/02/2022     No results found for: [facility]  Lab Results   Component Value Date    LABVLDL 30 08/09/2022    LABVLDL 11 10/04/2021     Lab Results   Component Value Date    CHOLHDLRATIO 3 02/02/2022     Lab Results   Component Value Date    WBC 3.8 (L) 08/09/2022    HGB 13.7 08/09/2022    HCT 42.6 08/09/2022    MCV 94.9 08/09/2022     08/09/2022    LYMPHOPCT 26.1 08/09/2022    RBC 4.49 08/09/2022    MCH 30.5 08/09/2022    MCHC 32.2 08/09/2022    RDW 13.4 08/09/2022     Lab Results   Component Value Date     08/09/2022    K 5.1 (H) 08/09/2022     08/09/2022    CO2 21 (L) 08/09/2022    BUN 41 (H) 08/09/2022    CREATININE 1.9 (H) 08/09/2022    GLUCOSE 84 08/09/2022    CALCIUM 9.9 08/09/2022    PROT 7.8 08/09/2022    LABALBU 4.7 08/09/2022    BILITOT 0.7 08/09/2022    ALKPHOS 57 08/09/2022    AST 20 08/09/2022    ALT 13 08/09/2022    LABGLOM 26 08/09/2022    GFRAA 31 08/09/2022      No results found for: PSA, PSADIA   Lab

## 2022-10-18 RX ORDER — SOLIFENACIN SUCCINATE 5 MG/1
5 TABLET, FILM COATED ORAL DAILY
Qty: 30 TABLET | Refills: 1 | Status: SHIPPED | OUTPATIENT
Start: 2022-10-18 | End: 2023-10-18

## 2022-11-04 ENCOUNTER — TELEPHONE (OUTPATIENT)
Dept: PRIMARY CARE CLINIC | Age: 77
End: 2022-11-04

## 2022-11-04 DIAGNOSIS — E11.9 TYPE 2 DIABETES MELLITUS WITHOUT COMPLICATION, WITHOUT LONG-TERM CURRENT USE OF INSULIN (HCC): ICD-10-CM

## 2022-11-04 DIAGNOSIS — I10 ESSENTIAL HYPERTENSION: Primary | ICD-10-CM

## 2022-11-04 DIAGNOSIS — E03.9 HYPOTHYROIDISM, UNSPECIFIED TYPE: ICD-10-CM

## 2022-11-04 DIAGNOSIS — I10 ESSENTIAL HYPERTENSION: ICD-10-CM

## 2022-11-04 LAB
ALBUMIN SERPL-MCNC: 4.5 G/DL (ref 3.5–5.2)
ALP BLD-CCNC: 53 U/L (ref 35–104)
ALT SERPL-CCNC: 9 U/L (ref 0–32)
ANION GAP SERPL CALCULATED.3IONS-SCNC: 13 MMOL/L (ref 7–16)
AST SERPL-CCNC: 17 U/L (ref 0–31)
BASOPHILS ABSOLUTE: 0.05 E9/L (ref 0–0.2)
BASOPHILS RELATIVE PERCENT: 1.4 % (ref 0–2)
BILIRUB SERPL-MCNC: 0.5 MG/DL (ref 0–1.2)
BUN BLDV-MCNC: 29 MG/DL (ref 6–23)
CALCIUM SERPL-MCNC: 9.8 MG/DL (ref 8.6–10.2)
CHLORIDE BLD-SCNC: 106 MMOL/L (ref 98–107)
CHOLESTEROL, TOTAL: 171 MG/DL (ref 0–199)
CO2: 20 MMOL/L (ref 22–29)
CREAT SERPL-MCNC: 1.7 MG/DL (ref 0.5–1)
EOSINOPHILS ABSOLUTE: 0.12 E9/L (ref 0.05–0.5)
EOSINOPHILS RELATIVE PERCENT: 3.3 % (ref 0–6)
GFR SERPL CREATININE-BSD FRML MDRD: 31 ML/MIN/1.73
GLUCOSE BLD-MCNC: 96 MG/DL (ref 74–99)
HCT VFR BLD CALC: 41.4 % (ref 34–48)
HDLC SERPL-MCNC: 41 MG/DL
HEMOGLOBIN: 13.8 G/DL (ref 11.5–15.5)
IMMATURE GRANULOCYTES #: 0.02 E9/L
IMMATURE GRANULOCYTES %: 0.5 % (ref 0–5)
LDL CHOLESTEROL CALCULATED: 111 MG/DL (ref 0–99)
LYMPHOCYTES ABSOLUTE: 1.02 E9/L (ref 1.5–4)
LYMPHOCYTES RELATIVE PERCENT: 28 % (ref 20–42)
MCH RBC QN AUTO: 31 PG (ref 26–35)
MCHC RBC AUTO-ENTMCNC: 33.3 % (ref 32–34.5)
MCV RBC AUTO: 93 FL (ref 80–99.9)
MONOCYTES ABSOLUTE: 0.38 E9/L (ref 0.1–0.95)
MONOCYTES RELATIVE PERCENT: 10.4 % (ref 2–12)
NEUTROPHILS ABSOLUTE: 2.05 E9/L (ref 1.8–7.3)
NEUTROPHILS RELATIVE PERCENT: 56.4 % (ref 43–80)
PDW BLD-RTO: 13.2 FL (ref 11.5–15)
PLATELET # BLD: 157 E9/L (ref 130–450)
PMV BLD AUTO: 10.6 FL (ref 7–12)
POTASSIUM SERPL-SCNC: 5.1 MMOL/L (ref 3.5–5)
RBC # BLD: 4.45 E12/L (ref 3.5–5.5)
SODIUM BLD-SCNC: 139 MMOL/L (ref 132–146)
T4 TOTAL: 8.3 MCG/DL (ref 4.5–11.7)
TOTAL PROTEIN: 7.4 G/DL (ref 6.4–8.3)
TRIGL SERPL-MCNC: 93 MG/DL (ref 0–149)
TSH SERPL DL<=0.05 MIU/L-ACNC: 1.91 UIU/ML (ref 0.27–4.2)
VLDLC SERPL CALC-MCNC: 19 MG/DL
WBC # BLD: 3.6 E9/L (ref 4.5–11.5)

## 2022-11-04 NOTE — TELEPHONE ENCOUNTER
Pt is upstairs @ lab and no orders in chart. She has a f/u appt w/you on Monday. Please add orders.     thanks

## 2022-11-07 ENCOUNTER — OFFICE VISIT (OUTPATIENT)
Dept: PRIMARY CARE CLINIC | Age: 77
End: 2022-11-07
Payer: MEDICARE

## 2022-11-07 VITALS
DIASTOLIC BLOOD PRESSURE: 72 MMHG | HEART RATE: 65 BPM | TEMPERATURE: 97.8 F | OXYGEN SATURATION: 96 % | WEIGHT: 156 LBS | BODY MASS INDEX: 27.63 KG/M2 | SYSTOLIC BLOOD PRESSURE: 120 MMHG

## 2022-11-07 VITALS
DIASTOLIC BLOOD PRESSURE: 72 MMHG | BODY MASS INDEX: 27.64 KG/M2 | TEMPERATURE: 97.8 F | WEIGHT: 156 LBS | HEIGHT: 63 IN | SYSTOLIC BLOOD PRESSURE: 120 MMHG

## 2022-11-07 DIAGNOSIS — Z00.00 MEDICARE ANNUAL WELLNESS VISIT, SUBSEQUENT: Primary | ICD-10-CM

## 2022-11-07 DIAGNOSIS — E03.9 HYPOTHYROIDISM, UNSPECIFIED TYPE: ICD-10-CM

## 2022-11-07 DIAGNOSIS — E11.9 TYPE 2 DIABETES MELLITUS WITHOUT COMPLICATION, WITHOUT LONG-TERM CURRENT USE OF INSULIN (HCC): Chronic | ICD-10-CM

## 2022-11-07 DIAGNOSIS — N18.32 STAGE 3B CHRONIC KIDNEY DISEASE (HCC): ICD-10-CM

## 2022-11-07 DIAGNOSIS — I25.10 CORONARY ARTERY DISEASE INVOLVING NATIVE CORONARY ARTERY OF NATIVE HEART WITHOUT ANGINA PECTORIS: ICD-10-CM

## 2022-11-07 DIAGNOSIS — I10 ESSENTIAL HYPERTENSION: Primary | Chronic | ICD-10-CM

## 2022-11-07 PROBLEM — N18.4 CKD (CHRONIC KIDNEY DISEASE) STAGE 4, GFR 15-29 ML/MIN (HCC): Status: ACTIVE | Noted: 2022-11-07

## 2022-11-07 PROCEDURE — 99214 OFFICE O/P EST MOD 30 MIN: CPT | Performed by: FAMILY MEDICINE

## 2022-11-07 PROCEDURE — 1036F TOBACCO NON-USER: CPT | Performed by: FAMILY MEDICINE

## 2022-11-07 PROCEDURE — G8417 CALC BMI ABV UP PARAM F/U: HCPCS | Performed by: FAMILY MEDICINE

## 2022-11-07 PROCEDURE — 3044F HG A1C LEVEL LT 7.0%: CPT | Performed by: FAMILY MEDICINE

## 2022-11-07 PROCEDURE — G8484 FLU IMMUNIZE NO ADMIN: HCPCS | Performed by: FAMILY MEDICINE

## 2022-11-07 PROCEDURE — 3078F DIAST BP <80 MM HG: CPT | Performed by: FAMILY MEDICINE

## 2022-11-07 PROCEDURE — 1123F ACP DISCUSS/DSCN MKR DOCD: CPT | Performed by: FAMILY MEDICINE

## 2022-11-07 PROCEDURE — 3074F SYST BP LT 130 MM HG: CPT | Performed by: FAMILY MEDICINE

## 2022-11-07 PROCEDURE — G8427 DOCREV CUR MEDS BY ELIG CLIN: HCPCS | Performed by: FAMILY MEDICINE

## 2022-11-07 PROCEDURE — G8400 PT W/DXA NO RESULTS DOC: HCPCS | Performed by: FAMILY MEDICINE

## 2022-11-07 PROCEDURE — 1090F PRES/ABSN URINE INCON ASSESS: CPT | Performed by: FAMILY MEDICINE

## 2022-11-07 PROCEDURE — G0439 PPPS, SUBSEQ VISIT: HCPCS | Performed by: FAMILY MEDICINE

## 2022-11-07 ASSESSMENT — PATIENT HEALTH QUESTIONNAIRE - PHQ9
SUM OF ALL RESPONSES TO PHQ QUESTIONS 1-9: 0
2. FEELING DOWN, DEPRESSED OR HOPELESS: 0
1. LITTLE INTEREST OR PLEASURE IN DOING THINGS: 0
SUM OF ALL RESPONSES TO PHQ QUESTIONS 1-9: 0
SUM OF ALL RESPONSES TO PHQ9 QUESTIONS 1 & 2: 0

## 2022-11-07 ASSESSMENT — LIFESTYLE VARIABLES
HOW OFTEN DO YOU HAVE A DRINK CONTAINING ALCOHOL: NEVER
HOW MANY STANDARD DRINKS CONTAINING ALCOHOL DO YOU HAVE ON A TYPICAL DAY: PATIENT DOES NOT DRINK

## 2022-11-07 NOTE — PATIENT INSTRUCTIONS
Advance Directives: Care Instructions  Overview  An advance directive is a legal way to state your wishes at the end of your life. It tells your family and your doctor what to do if you can't say what you want. There are two main types of advance directives. You can change them any time your wishes change. Living will. This form tells your family and your doctor your wishes about life support and other treatment. The form is also called a declaration. Medical power of . This form lets you name a person to make treatment decisions for you when you can't speak for yourself. This person is called a health care agent (health care proxy, health care surrogate). The form is also called a durable power of  for health care. If you do not have an advance directive, decisions about your medical care may be made by a family member, or by a doctor or a  who doesn't know you. It may help to think of an advance directive as a gift to the people who care for you. If you have one, they won't have to make tough decisions by themselves. Follow-up care is a key part of your treatment and safety. Be sure to make and go to all appointments, and call your doctor if you are having problems. It's also a good idea to know your test results and keep a list of the medicines you take. What should you include in an advance directive? Many states have a unique advance directive form. (It may ask you to address specific issues.) Or you might use a universal form that's approved by many states. If your form doesn't tell you what to address, it may be hard to know what to include in your advance directive. Use the questions below to help you get started. Who do you want to make decisions about your medical care if you are not able to? What life-support measures do you want if you have a serious illness that gets worse over time or can't be cured? What are you most afraid of that might happen?  (Maybe you're afraid of having pain, losing your independence, or being kept alive by machines.)  Where would you prefer to die? (Your home? A hospital? A nursing home?)  Do you want to donate your organs when you die? Do you want certain Yazdanism practices performed before you die? When should you call for help? Be sure to contact your doctor if you have any questions. Where can you learn more? Go to [email or web address] and sign in to your [employer] account. Enter R264 in the GetMeMedia box to learn more about \"Advance Directives: Care Instructions. \"     If you do not have an account, please click on the \"Sign Up Now\" link. Current as of: June 16, 2022               Content Version: 13.4  © 4909-5427 [employer]. Care instructions adapted under license by [facility] ([facility]). If you have questions about a medical condition or this instruction, always ask your healthcare professional. [place] [address] any warranty or liability for your use of this information. Personalized Preventive Plan for Davis Rand - 11/7/2022  Medicare offers a range of preventive health benefits. Some of the tests and screenings are paid in full while other may be subject to a deductible, co-insurance, and/or copay. Some of these benefits include a comprehensive review of your medical history including lifestyle, illnesses that may run in your family, and various assessments and screenings as appropriate. After reviewing your medical record and screening and assessments performed today your provider may have ordered immunizations, labs, imaging, and/or referrals for you. A list of these orders (if applicable) as well as your Preventive Care list are included within your After Visit Summary for your review.     Other Preventive Recommendations:    A preventive eye exam performed by an eye specialist is recommended every 1-2 years to screen for glaucoma; cataracts, macular degeneration, and other eye disorders. A preventive dental visit is recommended every 6 months. Try to get at least 150 minutes of exercise per week or 10,000 steps per day on a pedometer . Order or download the FREE \"Exercise & Physical Activity: Your Everyday Guide\" from [employer]. Call 7-917.218.5819 or search [employer] online. You need 1303-5472 mg of calcium and 9510-0075 IU of vitamin D per day. It is possible to meet your calcium requirement with diet alone, but a vitamin D supplement is usually necessary to meet this goal.  When exposed to the sun, use a sunscreen that protects against both UVA and UVB radiation with an SPF of 30 or greater. Reapply every 2 to 3 hours or after sweating, drying off with a towel, or swimming. Always wear a seat belt when traveling in a car. Always wear a helmet when riding a bicycle or motorcycle.

## 2022-11-07 NOTE — PROGRESS NOTES
Medicare Annual Wellness Visit    Paulina Valverde is here for Medicare AWV    Assessment & Plan   Medicare annual wellness visit, subsequent    Recommendations for Preventive Services Due: see orders and patient instructions/AVS.  Recommended screening schedule for the next 5-10 years is provided to the patient in written form: see Patient Instructions/AVS.     Return for Medicare Annual Wellness Visit in 1 year. Subjective       Patient's complete Health Risk Assessment and screening values have been reviewed and are found in Flowsheets. The following problems were reviewed today and where indicated follow up appointments were made and/or referrals ordered. Positive Risk Factor Screenings with Interventions:     Cognitive: Words recalled: 0 Words Recalled  Clock Drawing Test (CDT): Normal  Total Score Interpretation: Abnormal Mini-Cog  Cognitive Impairment Interventions:  stable at this time. ADLs:  In the past 7 days, did you need help from others to perform any of the following everyday activities: Eating, dressing, grooming, bathing, toileting, or walking/balance?: No  In the past 7 days, did you need help from others to take care of any of the following: Laundry, housekeeping, banking/finances, shopping, telephone use, food preparation, transportation, or taking medications?: (!) Yes  Select all that apply: [employer], Transportation, Taking Medications, Shopping, Laundry  ADL Interventions:  Reviewed today. Objective   Vitals:    11/07/22 1159   BP: 120/72   Temp: 97.8 °F (36.6 °C)   Weight: 156 lb (70.8 kg)   Height: 5' 3\" (1.6 m)      Body mass index is 27.63 kg/m². Allergies   Allergen Reactions    Latex      Reaction unknown / may have been swelling    Norvasc [Amlodipine Besylate] Anaphylaxis    Dye [Iodides]      Reaction unknown     Prior to Visit Medications    Medication Sig Taking?  Authorizing Provider   solifenacin (VESICARE) 5 MG tablet Take 1 tablet by mouth daily  Luis Barnard DO   cloNIDine (CATAPRES) 0.1 MG tablet TAKE 1 TABLET BY MOUTH 3 x day  Luis Barnard DO   levothyroxine (SYNTHROID) 88 MCG tablet TAKE 1 TABLET BY MOUTH EVERY DAY  Elli Nicholson DO   nitroGLYCERIN (NITRODUR) 0.2 MG/HR APPLY 1 PATCH TOPICALLY TO THE SKIN DAILY  Elli Nicholson DO   citalopram (CELEXA) 20 MG tablet TAKE 1 TABLET BY MOUTH DAILY  Elli Nicholson DO   hydrALAZINE (APRESOLINE) 25 MG tablet TAKE 1 TABLET BY MOUTH FOUR TIMES DAILY  Elli Nicholson DO   spironolactone (ALDACTONE) 25 MG tablet TAKE 1 TABLET BY MOUTH EVERY DAY  Angel Nicholson DO   labetalol (NORMODYNE) 100 MG tablet TAKE 1 TABLET BY MOUTH EVERY 12 HOURS  Angel Nicholson DO   levothyroxine (SYNTHROID) 50 MCG tablet Take 50 mcg by mouth Daily  Historical Provider, MD   aspirin EC 81 MG EC tablet 1 qd  DO Jenaro VivasTesharmin (Including outside providers/suppliers regularly involved in providing care):   Patient Care Team:  Luis Barnard DO as PCP - General (Family Medicine)  Luis Barnard DO as PCP - [facility] Empaneled Provider     Reviewed and updated this visit:              Cardiovascular Disease Risk Counseling: Assessed the patient's risk to develop cardiovascular disease and reviewed main risk factors. Reviewed steps to reduce disease risk including:   Quitting tobacco use, reducing amount smoked, or not starting the habit  Making healthy food choices  Being physically active and gradualy increasing activity levels   Reduce weight and determine a healthy BMI goal  Monitor blood pressure and treat if higher than 140/90 mmHg  Maintain blood total cholesterol levels under 5 mmol/l or 190 mg/dl  Maintain LDL cholesterol levels under 3.0 mmol/l or 115 mg/dl   Control blood glucose levels  Consider taking aspirin (75 mg daily), once blood pressure is controlled   Provided a follow up plan.   Time spent (minutes):

## 2022-11-07 NOTE — PROGRESS NOTES
22     [facility]    : 1945 Sex: female   Age: 69 y.o. Chief Complaint   Patient presents with    Discuss Labs    Hypertension       Prior to Admission medications    Medication Sig Start Date End Date Taking? Authorizing Provider   solifenacin (VESICARE) 5 MG tablet Take 1 tablet by mouth daily 10/18/22 10/18/23 Yes Angel Nicholson DO   cloNIDine (CATAPRES) 0.1 MG tablet TAKE 1 TABLET BY MOUTH 3 x day 22  Yes Lianna Nicholson DO   levothyroxine (SYNTHROID) 88 MCG tablet TAKE 1 TABLET BY MOUTH EVERY DAY 22  Yes Lianna Nicholson DO   nitroGLYCERIN (NITRODUR) 0.2 MG/HR APPLY 1 PATCH TOPICALLY TO THE SKIN DAILY 22  Yes Lianna Nicholson DO   citalopram (CELEXA) 20 MG tablet TAKE 1 TABLET BY MOUTH DAILY 22  Yes Lianna Nicholson DO   hydrALAZINE (APRESOLINE) 25 MG tablet TAKE 1 TABLET BY MOUTH FOUR TIMES DAILY 22  Yes Lianna Nicholson DO   spironolactone (ALDACTONE) 25 MG tablet TAKE 1 TABLET BY MOUTH EVERY DAY 22  Yes Lianna Nicholson DO   labetalol (NORMODYNE) 100 MG tablet TAKE 1 TABLET BY MOUTH EVERY 12 HOURS 5/10/22  Yes Lianna Nicholson DO   levothyroxine (SYNTHROID) 50 MCG tablet Take 50 mcg by mouth Daily   Yes Historical Provider, MD   aspirin EC 81 MG EC tablet 1 qd 22  Yes Romero Landa DO          HPI: Patient evaluated today with hypertension coronary artery disease diabetes hypothyroid chronic kidney disease all of which has been stable. Meds reviewed today well-tolerated. Feeling wellbeing stable. Systems review stable. Review of Systems   Constitutional: Negative. HENT: Negative. Eyes: Negative. Respiratory: Negative. Gastrointestinal: Negative. Endocrine: Negative. Genitourinary: Negative. Musculoskeletal: Negative. Skin: Negative. Allergic/Immunologic: Negative. Neurological: Negative. Hematological: Negative. Psychiatric/Behavioral: Negative.               Current Outpatient Medications:     solifenacin (VESICARE) 5 MG tablet, Take 1 tablet by mouth daily, Disp: 30 tablet, Rfl: 1    cloNIDine (CATAPRES) 0.1 MG tablet, TAKE 1 TABLET BY MOUTH 3 x day, Disp: 90 tablet, Rfl: 3    levothyroxine (SYNTHROID) 88 MCG tablet, TAKE 1 TABLET BY MOUTH EVERY DAY, Disp: 90 tablet, Rfl: 3    nitroGLYCERIN (NITRODUR) 0.2 MG/HR, APPLY 1 PATCH TOPICALLY TO THE SKIN DAILY, Disp: 90 patch, Rfl: 3    citalopram (CELEXA) 20 MG tablet, TAKE 1 TABLET BY MOUTH DAILY, Disp: 90 tablet, Rfl: 3    hydrALAZINE (APRESOLINE) 25 MG tablet, TAKE 1 TABLET BY MOUTH FOUR TIMES DAILY, Disp: 360 tablet, Rfl: 1    spironolactone (ALDACTONE) 25 MG tablet, TAKE 1 TABLET BY MOUTH EVERY DAY, Disp: 90 tablet, Rfl: 3    labetalol (NORMODYNE) 100 MG tablet, TAKE 1 TABLET BY MOUTH EVERY 12 HOURS, Disp: 60 tablet, Rfl: 3    levothyroxine (SYNTHROID) 50 MCG tablet, Take 50 mcg by mouth Daily, Disp: , Rfl:     aspirin EC 81 MG EC tablet, 1 qd, Disp: 90 tablet, Rfl: 3    Allergies   Allergen Reactions    Latex      Reaction unknown / may have been swelling    Norvasc [Amlodipine Besylate] Anaphylaxis    Dye [Iodides]      Reaction unknown       Social History     Tobacco Use    Smoking status: Former     Packs/day: 1.50     Years: 8.00     Pack years: 12.00     Types: Cigarettes     Quit date: 1976     Years since quittin.8    Smokeless tobacco: Never   Vaping Use    Vaping Use: Never used   Substance Use Topics    Alcohol use: No    Drug use: Never      Past Surgical History:   Procedure Laterality Date     SECTION      1    COLONOSCOPY  2014    CORONARY ANGIOPLASTY WITH STENT PLACEMENT  2004    CYST REMOVAL  1970's    ENDOSCOPY, COLON, DIAGNOSTIC      GASTROSTOMY TUBE PLACEMENT  2014    Mikayla. Teresa Block  and later removed    HEEL SPUR SURGERY Left years ago    JOINT REPLACEMENT Left late 's    OTHER SURGICAL HISTORY      scraping of trach x 2 to treat stenosis / last one 2016    POLYPECTOMY  2013 internal hemorrhoids - michelle    TONSILLECTOMY      TOTAL KNEE ARTHROPLASTY Right 8/28/2020    RIGHT KNEE TOTAL ARTHROPLASTY    ++RAYMOND++   ++PNB++     ++LATEX ALLERGY++   ++IODINE ALLERGY++ performed by Coretta Ward MD at [facility]  9/24/14    [place]    UPPER GASTROINTESTINAL ENDOSCOPY      gastritis w. superficial antral ulerations - Michelle     No family history on file. Past Medical History:   Diagnosis Date    Acute loss of vision, left 2/24/2022    Arthritis     CAD (coronary artery disease)     follows with Dr. Andrade Torres    CKD (chronic kidney disease)     stage 3 / follows with Dr. Tremaine Kim    Depression     Hypertension     Left carotid stenosis 2/24/2022    Respiratory failure ([facility] [address]     history of / 2014  when had stroke, trach  x 2 weeks    Thyroid disease     Tracheal stenosis     Unspecified cerebral artery occlusion with cerebral infarction 9/26/2014    right side flacid, hemorrhagic stroke dt elevated BP/residual s/s is at right side loss of fine motor skills and some weakness, drags right leg, slight memory loss and aphasia when she is tired       Vitals:    11/07/22 1151   BP: 120/72   Pulse: 65   Temp: 97.8 °F (36.6 °C)   SpO2: 96%   Weight: 156 lb (70.8 kg)     BP Readings from Last 3 Encounters:   11/07/22 120/72   11/07/22 120/72   09/06/22 122/72    110/66    Physical Exam  Vitals and nursing note reviewed. Constitutional:       Appearance: She is well-developed. HENT:      Head: Normocephalic. Right Ear: External ear normal.      Left Ear: External ear normal.      Nose: Nose normal.   Eyes:      Conjunctiva/sclera: Conjunctivae normal.      Pupils: Pupils are equal, round, and reactive to light. Cardiovascular:      Rate and Rhythm: Normal rate. Pulmonary:      Breath sounds: Normal breath sounds. Abdominal:      General: Bowel sounds are normal.      Palpations: Abdomen is soft. Musculoskeletal:         General: Normal range of motion.       Cervical back: Normal range of motion and neck supple. Skin:     General: Skin is warm and dry. Neurological:      Mental Status: She is alert and oriented to person, place, and time. Psychiatric:         Behavior: Behavior normal.      Today's vitals physical exam stable. I will sit tight with current meds and care. Follow-up visit 3 months and sooner if problems. Labs reviewed and very stable. Repeat with next visit.   Lab Results   Component Value Date    TSH 1.910 11/04/2022    TSH 2.890 08/09/2022    E9PATNU 8.3 11/04/2022    P3EUDRC 8.6 08/09/2022     Lab Results   Component Value Date    CHOL 171 11/04/2022    CHOL 211 (H) 08/09/2022     Lab Results   Component Value Date    TRIG 93 11/04/2022    TRIG 151 (H) 08/09/2022     Lab Results   Component Value Date    HDL 41 11/04/2022    HDL 45 08/09/2022     No results found for: [facility]  Lab Results   Component Value Date    LABVLDL 19 11/04/2022    LABVLDL 30 08/09/2022     Lab Results   Component Value Date    CHOLHDLRATIO 3 02/02/2022     Lab Results   Component Value Date    WBC 3.6 (L) 11/04/2022    HGB 13.8 11/04/2022    HCT 41.4 11/04/2022    MCV 93.0 11/04/2022     11/04/2022    LYMPHOPCT 28.0 11/04/2022    RBC 4.45 11/04/2022    MCH 31.0 11/04/2022    MCHC 33.3 11/04/2022    RDW 13.2 11/04/2022     Lab Results   Component Value Date     11/04/2022    K 5.1 (H) 11/04/2022     11/04/2022    CO2 20 (L) 11/04/2022    BUN 29 (H) 11/04/2022    CREATININE 1.7 (H) 11/04/2022    GLUCOSE 96 11/04/2022    CALCIUM 9.8 11/04/2022    PROT 7.4 11/04/2022    LABALBU 4.5 11/04/2022    BILITOT 0.5 11/04/2022    ALKPHOS 53 11/04/2022    AST 17 11/04/2022    ALT 9 11/04/2022    LABGLOM 31 11/04/2022    GFRAA 31 08/09/2022      No results found for: PSA, PSADIA   Lab Results   Component Value Date    LABA1C 5.8 (H) 08/09/2022    LABA1C 5.7 02/02/2022    LABA1C 5.7 (H) 10/04/2021     No results found for: EAG          Plan Per Assessment:  Kyler Economy was seen today for discuss labs and hypertension. Diagnoses and all orders for this visit:    Essential hypertension  -     CBC with Auto Differential; Future  -     Comprehensive Metabolic Panel; Future  -     Lipid Panel; Future  -     T4; Future  -     TSH; Future  -     Hemoglobin A1C; Future    Coronary artery disease involving native coronary artery of native heart without angina pectoris    Type 2 diabetes mellitus without complication, without long-term current use of insulin (HCC)  -     CBC with Auto Differential; Future  -     Comprehensive Metabolic Panel; Future  -     Lipid Panel; Future  -     T4; Future  -     TSH; Future  -     Hemoglobin A1C; Future    Hypothyroidism, unspecified type  -     CBC with Auto Differential; Future  -     Comprehensive Metabolic Panel; Future  -     Lipid Panel; Future  -     T4; Future  -     TSH; Future  -     Hemoglobin A1C; Future    Stage 3b chronic kidney disease (HCC)  -     CBC with Auto Differential; Future  -     Comprehensive Metabolic Panel; Future  -     Lipid Panel; Future  -     T4; Future  -     TSH; Future  -     Hemoglobin A1C; Future          Return in about 3 months (around 2/7/2023). Stan Perkins DO    Note was generated with the assistance of voice recognition software. Document was reviewed however may contain grammatical errors.

## 2022-11-21 RX ORDER — CLONIDINE HYDROCHLORIDE 0.1 MG/1
TABLET ORAL
Qty: 90 TABLET | Refills: 3 | Status: SHIPPED | OUTPATIENT
Start: 2022-11-21

## 2022-11-21 RX ORDER — LABETALOL 100 MG/1
TABLET, FILM COATED ORAL
Qty: 60 TABLET | Refills: 3 | Status: SHIPPED | OUTPATIENT
Start: 2022-11-21

## 2022-12-05 RX ORDER — SOLIFENACIN SUCCINATE 5 MG/1
5 TABLET, FILM COATED ORAL DAILY
Qty: 30 TABLET | Refills: 1 | Status: SHIPPED | OUTPATIENT
Start: 2022-12-05 | End: 2023-12-05

## 2023-02-06 DIAGNOSIS — E11.9 TYPE 2 DIABETES MELLITUS WITHOUT COMPLICATION, WITHOUT LONG-TERM CURRENT USE OF INSULIN (HCC): Chronic | ICD-10-CM

## 2023-02-06 DIAGNOSIS — E03.9 HYPOTHYROIDISM, UNSPECIFIED TYPE: ICD-10-CM

## 2023-02-06 DIAGNOSIS — I10 ESSENTIAL HYPERTENSION: Chronic | ICD-10-CM

## 2023-02-06 DIAGNOSIS — N18.32 STAGE 3B CHRONIC KIDNEY DISEASE (HCC): ICD-10-CM

## 2023-02-06 LAB
ALBUMIN SERPL-MCNC: 4.6 G/DL (ref 3.5–5.2)
ALP BLD-CCNC: 66 U/L (ref 35–104)
ALT SERPL-CCNC: 11 U/L (ref 0–32)
ANION GAP SERPL CALCULATED.3IONS-SCNC: 10 MMOL/L (ref 7–16)
AST SERPL-CCNC: 17 U/L (ref 0–31)
BASOPHILS ABSOLUTE: 0.04 E9/L (ref 0–0.2)
BASOPHILS RELATIVE PERCENT: 1.1 % (ref 0–2)
BILIRUB SERPL-MCNC: 0.5 MG/DL (ref 0–1.2)
BUN BLDV-MCNC: 39 MG/DL (ref 6–23)
CALCIUM SERPL-MCNC: 9.6 MG/DL (ref 8.6–10.2)
CHLORIDE BLD-SCNC: 107 MMOL/L (ref 98–107)
CHOLESTEROL, TOTAL: 194 MG/DL (ref 0–199)
CO2: 22 MMOL/L (ref 22–29)
CREAT SERPL-MCNC: 1.9 MG/DL (ref 0.5–1)
EOSINOPHILS ABSOLUTE: 0.09 E9/L (ref 0.05–0.5)
EOSINOPHILS RELATIVE PERCENT: 2.4 % (ref 0–6)
GFR SERPL CREATININE-BSD FRML MDRD: 27 ML/MIN/1.73
GLUCOSE BLD-MCNC: 95 MG/DL (ref 74–99)
HCT VFR BLD CALC: 41.4 % (ref 34–48)
HDLC SERPL-MCNC: 46 MG/DL
HEMOGLOBIN: 13.2 G/DL (ref 11.5–15.5)
IMMATURE GRANULOCYTES #: 0.01 E9/L
IMMATURE GRANULOCYTES %: 0.3 % (ref 0–5)
LDL CHOLESTEROL CALCULATED: 133 MG/DL (ref 0–99)
LYMPHOCYTES ABSOLUTE: 0.93 E9/L (ref 1.5–4)
LYMPHOCYTES RELATIVE PERCENT: 25.3 % (ref 20–42)
MCH RBC QN AUTO: 30.2 PG (ref 26–35)
MCHC RBC AUTO-ENTMCNC: 31.9 % (ref 32–34.5)
MCV RBC AUTO: 94.7 FL (ref 80–99.9)
MONOCYTES ABSOLUTE: 0.43 E9/L (ref 0.1–0.95)
MONOCYTES RELATIVE PERCENT: 11.7 % (ref 2–12)
NEUTROPHILS ABSOLUTE: 2.18 E9/L (ref 1.8–7.3)
NEUTROPHILS RELATIVE PERCENT: 59.2 % (ref 43–80)
PDW BLD-RTO: 12.8 FL (ref 11.5–15)
PLATELET # BLD: 138 E9/L (ref 130–450)
PMV BLD AUTO: 10.8 FL (ref 7–12)
POTASSIUM SERPL-SCNC: 5.3 MMOL/L (ref 3.5–5)
RBC # BLD: 4.37 E12/L (ref 3.5–5.5)
SODIUM BLD-SCNC: 139 MMOL/L (ref 132–146)
T4 TOTAL: 9 MCG/DL (ref 4.5–11.7)
TOTAL PROTEIN: 7.7 G/DL (ref 6.4–8.3)
TRIGL SERPL-MCNC: 77 MG/DL (ref 0–149)
TSH SERPL DL<=0.05 MIU/L-ACNC: 2.79 UIU/ML (ref 0.27–4.2)
VLDLC SERPL CALC-MCNC: 15 MG/DL
WBC # BLD: 3.7 E9/L (ref 4.5–11.5)

## 2023-02-07 ENCOUNTER — OFFICE VISIT (OUTPATIENT)
Dept: PRIMARY CARE CLINIC | Age: 78
End: 2023-02-07
Payer: MEDICARE

## 2023-02-07 VITALS
HEIGHT: 63 IN | HEART RATE: 65 BPM | OXYGEN SATURATION: 97 % | SYSTOLIC BLOOD PRESSURE: 108 MMHG | BODY MASS INDEX: 25.69 KG/M2 | WEIGHT: 145 LBS | DIASTOLIC BLOOD PRESSURE: 70 MMHG | TEMPERATURE: 97.9 F

## 2023-02-07 DIAGNOSIS — I10 ESSENTIAL HYPERTENSION: Primary | Chronic | ICD-10-CM

## 2023-02-07 DIAGNOSIS — E03.9 HYPOTHYROIDISM, UNSPECIFIED TYPE: ICD-10-CM

## 2023-02-07 DIAGNOSIS — I25.10 CORONARY ARTERY DISEASE INVOLVING NATIVE CORONARY ARTERY OF NATIVE HEART WITHOUT ANGINA PECTORIS: ICD-10-CM

## 2023-02-07 DIAGNOSIS — E78.2 MIXED HYPERLIPIDEMIA: ICD-10-CM

## 2023-02-07 DIAGNOSIS — I73.9 PVD (PERIPHERAL VASCULAR DISEASE) (HCC): ICD-10-CM

## 2023-02-07 DIAGNOSIS — D69.6 THROMBOCYTOPENIA, UNSPECIFIED (HCC): ICD-10-CM

## 2023-02-07 DIAGNOSIS — N18.32 STAGE 3B CHRONIC KIDNEY DISEASE (HCC): ICD-10-CM

## 2023-02-07 LAB — HBA1C MFR BLD: 5.4 % (ref 4–5.6)

## 2023-02-07 PROCEDURE — 3074F SYST BP LT 130 MM HG: CPT | Performed by: FAMILY MEDICINE

## 2023-02-07 PROCEDURE — 1036F TOBACCO NON-USER: CPT | Performed by: FAMILY MEDICINE

## 2023-02-07 PROCEDURE — 99214 OFFICE O/P EST MOD 30 MIN: CPT | Performed by: FAMILY MEDICINE

## 2023-02-07 PROCEDURE — G8417 CALC BMI ABV UP PARAM F/U: HCPCS | Performed by: FAMILY MEDICINE

## 2023-02-07 PROCEDURE — G8400 PT W/DXA NO RESULTS DOC: HCPCS | Performed by: FAMILY MEDICINE

## 2023-02-07 PROCEDURE — G8484 FLU IMMUNIZE NO ADMIN: HCPCS | Performed by: FAMILY MEDICINE

## 2023-02-07 PROCEDURE — 1090F PRES/ABSN URINE INCON ASSESS: CPT | Performed by: FAMILY MEDICINE

## 2023-02-07 PROCEDURE — 1123F ACP DISCUSS/DSCN MKR DOCD: CPT | Performed by: FAMILY MEDICINE

## 2023-02-07 PROCEDURE — 3078F DIAST BP <80 MM HG: CPT | Performed by: FAMILY MEDICINE

## 2023-02-07 PROCEDURE — G8427 DOCREV CUR MEDS BY ELIG CLIN: HCPCS | Performed by: FAMILY MEDICINE

## 2023-02-07 RX ORDER — CITALOPRAM 20 MG/1
20 TABLET ORAL DAILY
Qty: 90 TABLET | Refills: 3 | Status: SHIPPED | OUTPATIENT
Start: 2023-02-07

## 2023-02-07 RX ORDER — CLONIDINE HYDROCHLORIDE 0.1 MG/1
TABLET ORAL
Qty: 90 TABLET | Refills: 3 | Status: SHIPPED | OUTPATIENT
Start: 2023-02-07

## 2023-02-07 RX ORDER — HYDRALAZINE HYDROCHLORIDE 25 MG/1
TABLET, FILM COATED ORAL
Qty: 360 TABLET | Refills: 1 | Status: SHIPPED | OUTPATIENT
Start: 2023-02-07

## 2023-02-07 RX ORDER — SOLIFENACIN SUCCINATE 5 MG/1
5 TABLET, FILM COATED ORAL DAILY
Qty: 30 TABLET | Refills: 1 | Status: SHIPPED | OUTPATIENT
Start: 2023-02-07 | End: 2024-02-07

## 2023-02-07 RX ORDER — NITROGLYCERIN 40 MG/1
PATCH TRANSDERMAL
Qty: 90 PATCH | Refills: 3 | Status: SHIPPED | OUTPATIENT
Start: 2023-02-07

## 2023-02-07 RX ORDER — SPIRONOLACTONE 25 MG/1
25 TABLET ORAL DAILY
Qty: 90 TABLET | Refills: 3 | Status: SHIPPED | OUTPATIENT
Start: 2023-02-07

## 2023-02-07 RX ORDER — LEVOTHYROXINE SODIUM 88 UG/1
88 TABLET ORAL DAILY
Qty: 90 TABLET | Refills: 3 | Status: SHIPPED | OUTPATIENT
Start: 2023-02-07

## 2023-02-07 RX ORDER — LABETALOL 100 MG/1
100 TABLET, FILM COATED ORAL 2 TIMES DAILY
Qty: 60 TABLET | Refills: 3 | Status: SHIPPED | OUTPATIENT
Start: 2023-02-07

## 2023-02-07 SDOH — ECONOMIC STABILITY: INCOME INSECURITY: HOW HARD IS IT FOR YOU TO PAY FOR THE VERY BASICS LIKE FOOD, HOUSING, MEDICAL CARE, AND HEATING?: NOT HARD AT ALL

## 2023-02-07 SDOH — ECONOMIC STABILITY: FOOD INSECURITY: WITHIN THE PAST 12 MONTHS, YOU WORRIED THAT YOUR FOOD WOULD RUN OUT BEFORE YOU GOT MONEY TO BUY MORE.: NEVER TRUE

## 2023-02-07 SDOH — ECONOMIC STABILITY: FOOD INSECURITY: WITHIN THE PAST 12 MONTHS, THE FOOD YOU BOUGHT JUST DIDN'T LAST AND YOU DIDN'T HAVE MONEY TO GET MORE.: NEVER TRUE

## 2023-02-07 SDOH — ECONOMIC STABILITY: HOUSING INSECURITY
IN THE LAST 12 MONTHS, WAS THERE A TIME WHEN YOU DID NOT HAVE A STEADY PLACE TO SLEEP OR SLEPT IN A SHELTER (INCLUDING NOW)?: NO

## 2023-02-07 ASSESSMENT — PATIENT HEALTH QUESTIONNAIRE - PHQ9
SUM OF ALL RESPONSES TO PHQ9 QUESTIONS 1 & 2: 0
2. FEELING DOWN, DEPRESSED OR HOPELESS: 0
SUM OF ALL RESPONSES TO PHQ QUESTIONS 1-9: 0
SUM OF ALL RESPONSES TO PHQ QUESTIONS 1-9: 0
1. LITTLE INTEREST OR PLEASURE IN DOING THINGS: 0
SUM OF ALL RESPONSES TO PHQ QUESTIONS 1-9: 0
SUM OF ALL RESPONSES TO PHQ QUESTIONS 1-9: 0

## 2023-02-07 NOTE — PROGRESS NOTES
23     Linden Neil    : 1945 Sex: female   Age: 69 y.o. Chief Complaint   Patient presents with    Medication Refill     Pt here for check up and refills. Handicap placard needs to say permanently. Hypertension       Prior to Admission medications    Medication Sig Start Date End Date Taking? Authorizing Provider   citalopram (CELEXA) 20 MG tablet Take 1 tablet by mouth daily 23  Yes Ana Nicholson DO   hydrALAZINE (APRESOLINE) 25 MG tablet TAKE 1 TABLET BY MOUTH FOUR TIMES DAILY 23  Yes Ana Nicholson DO   labetalol (NORMODYNE) 100 MG tablet Take 1 tablet by mouth 2 times daily 23  Yes Ana Nicholson DO   levothyroxine (SYNTHROID) 88 MCG tablet Take 1 tablet by mouth Daily 23  Yes Ana Nicholson DO   spironolactone (ALDACTONE) 25 MG tablet Take 1 tablet by mouth daily 23  Yes Sangeeta Rock DO   solifenacin (VESICARE) 5 MG tablet Take 1 tablet by mouth daily 23 Yes Angel Nicholson DO   nitroGLYCERIN (NITRODUR) 0.2 MG/HR APPLY 1 PATCH TOPICALLY TO THE SKIN DAILY 23  Yes Ana Nicholson DO   cloNIDine (CATAPRES) 0.1 MG tablet TAKE 1 TABLET BY MOUTH THREE TIMES DAILY 23  Yes Sangeeta Rock DO   Handicap Placard MISC by Does not apply route Permanently 23  Yes Ana Nicholson DO   levothyroxine (SYNTHROID) 50 MCG tablet Take 50 mcg by mouth Daily   Yes Historical Provider, MD   aspirin EC 81 MG EC tablet 1 qd 22  Yes Sangeeta Rock DO          HPI: Patient evaluated with hypertension coronary artery disease peripheral vascular disease hypothyroid chronic kidney disease stage III and hyper lipidemia. Overall medically she seems to be doing well at this time. Medications well-tolerated. Blood pressure very well controlled. Systems review is stable as noted. Review of Systems   Constitutional: Negative. HENT: Negative. Eyes: Negative. Respiratory: Negative.      Gastrointestinal: Negative. Endocrine: Negative. Genitourinary: Negative. Musculoskeletal: Negative. Skin: Negative. Allergic/Immunologic: Negative. Neurological: Negative. Hematological: Negative. Psychiatric/Behavioral: Negative.               Current Outpatient Medications:     citalopram (CELEXA) 20 MG tablet, Take 1 tablet by mouth daily, Disp: 90 tablet, Rfl: 3    hydrALAZINE (APRESOLINE) 25 MG tablet, TAKE 1 TABLET BY MOUTH FOUR TIMES DAILY, Disp: 360 tablet, Rfl: 1    labetalol (NORMODYNE) 100 MG tablet, Take 1 tablet by mouth 2 times daily, Disp: 60 tablet, Rfl: 3    levothyroxine (SYNTHROID) 88 MCG tablet, Take 1 tablet by mouth Daily, Disp: 90 tablet, Rfl: 3    spironolactone (ALDACTONE) 25 MG tablet, Take 1 tablet by mouth daily, Disp: 90 tablet, Rfl: 3    solifenacin (VESICARE) 5 MG tablet, Take 1 tablet by mouth daily, Disp: 30 tablet, Rfl: 1    nitroGLYCERIN (NITRODUR) 0.2 MG/HR, APPLY 1 PATCH TOPICALLY TO THE SKIN DAILY, Disp: 90 patch, Rfl: 3    cloNIDine (CATAPRES) 0.1 MG tablet, TAKE 1 TABLET BY MOUTH THREE TIMES DAILY, Disp: 90 tablet, Rfl: 3    Handicap Placard MISC, by Does not apply route Permanently, Disp: 1 each, Rfl: 0    levothyroxine (SYNTHROID) 50 MCG tablet, Take 50 mcg by mouth Daily, Disp: , Rfl:     aspirin EC 81 MG EC tablet, 1 qd, Disp: 90 tablet, Rfl: 3    Allergies   Allergen Reactions    Latex      Reaction unknown / may have been swelling    Norvasc [Amlodipine Besylate] Anaphylaxis    Dye [Iodides]      Reaction unknown       Social History     Tobacco Use    Smoking status: Former     Packs/day: 1.50     Years: 8.00     Pack years: 12.00     Types: Cigarettes     Quit date: 1976     Years since quittin.1    Smokeless tobacco: Never   Vaping Use    Vaping Use: Never used   Substance Use Topics    Alcohol use: No    Drug use: Never      Past Surgical History:   Procedure Laterality Date     SECTION      1    2014    CORONARY ANGIOPLASTY WITH STENT PLACEMENT  2004    CYST REMOVAL  1970's    ENDOSCOPY, COLON, DIAGNOSTIC      GASTROSTOMY TUBE PLACEMENT  09/24/2014    Dorion. Jasiel Call and later removed    HEEL SPUR SURGERY Left years ago    JOINT REPLACEMENT Left late 1990's    OTHER SURGICAL HISTORY      scraping of trach x 2 to treat stenosis / last one 2016    POLYPECTOMY  07/24/2013    internal hemorrhoids - michelle    TONSILLECTOMY      TOTAL KNEE ARTHROPLASTY Right 8/28/2020    RIGHT KNEE TOTAL ARTHROPLASTY    ++RAYMOND++   ++PNB++     ++LATEX ALLERGY++   ++IODINE ALLERGY++ performed by Kasey Chiu MD at [facility]  9/24/14    [place]    UPPER GASTROINTESTINAL ENDOSCOPY      gastritis w. superficial antral ulerations - Michelle     No family history on file. Past Medical History:   Diagnosis Date    Acute loss of vision, left 2/24/2022    Arthritis     CAD (coronary artery disease)     follows with Dr. Lulú Alberto    CKD (chronic kidney disease)     stage 3 / follows with Dr. Patricio Tabares    Depression     Hypertension     Left carotid stenosis 2/24/2022    Respiratory failure ([facility] [address]     history of / 2014  when had stroke, trach  x 2 weeks    Thyroid disease     Tracheal stenosis     Unspecified cerebral artery occlusion with cerebral infarction 9/26/2014    right side flacid, hemorrhagic stroke dt elevated BP/residual s/s is at right side loss of fine motor skills and some weakness, drags right leg, slight memory loss and aphasia when she is tired       Vitals:    02/07/23 1132   BP: 108/70   Pulse: 65   Temp: 97.9 °F (36.6 °C)   SpO2: 97%   Weight: 145 lb (65.8 kg)   Height: 5' 3\" (1.6 m)     BP Readings from Last 3 Encounters:   02/07/23 108/70   11/07/22 120/72   11/07/22 120/72    110/72    Physical Exam  Vitals and nursing note reviewed. Constitutional:       Appearance: She is well-developed. HENT:      Head: Normocephalic.       Right Ear: External ear normal.      Left Ear: External ear normal.      Nose: Nose normal.   Eyes: Conjunctiva/sclera: Conjunctivae normal.      Pupils: Pupils are equal, round, and reactive to light. Cardiovascular:      Rate and Rhythm: Normal rate. Pulmonary:      Breath sounds: Normal breath sounds. Abdominal:      General: Bowel sounds are normal.      Palpations: Abdomen is soft. Musculoskeletal:         General: Normal range of motion. Cervical back: Normal range of motion and neck supple. Skin:     General: Skin is warm and dry. Neurological:      Mental Status: She is alert and oriented to person, place, and time. Psychiatric:         Behavior: Behavior normal.   Today's physical exam findings are stable. I did review labs and stable. We will sit tight with current meds and care and then plan a reassessment 3 months and sooner if problems. Blood work again at that time. Potassium slightly elevated 5.  3 and reassess with next visit.   Lab Results   Component Value Date    TSH 2.790 02/06/2023    TSH 1.910 11/04/2022    O5PRTPQ 9.0 02/06/2023    M2IHFOB 8.3 11/04/2022     Lab Results   Component Value Date    CHOL 194 02/06/2023    CHOL 171 11/04/2022     Lab Results   Component Value Date    TRIG 77 02/06/2023    TRIG 93 11/04/2022     Lab Results   Component Value Date    HDL 46 02/06/2023    HDL 41 11/04/2022     No results found for: [facility]  Lab Results   Component Value Date    LABVLDL 15 02/06/2023    LABVLDL 19 11/04/2022     Lab Results   Component Value Date    CHOLHDLRATIO 3 02/02/2022     Lab Results   Component Value Date    WBC 3.7 (L) 02/06/2023    HGB 13.2 02/06/2023    HCT 41.4 02/06/2023    MCV 94.7 02/06/2023     02/06/2023    LYMPHOPCT 25.3 02/06/2023    RBC 4.37 02/06/2023    MCH 30.2 02/06/2023    MCHC 31.9 (L) 02/06/2023    RDW 12.8 02/06/2023     Lab Results   Component Value Date     02/06/2023    K 5.3 (H) 02/06/2023     02/06/2023    CO2 22 02/06/2023    BUN 39 (H) 02/06/2023    CREATININE 1.9 (H) 02/06/2023    GLUCOSE 95 02/06/2023    CALCIUM 9.6 02/06/2023    PROT 7.7 02/06/2023    LABALBU 4.6 02/06/2023    BILITOT 0.5 02/06/2023    ALKPHOS 66 02/06/2023    AST 17 02/06/2023    ALT 11 02/06/2023    LABGLOM 27 02/06/2023    GFRAA 31 08/09/2022      No results found for: PSA, PSADIA   Lab Results   Component Value Date    LABA1C 5.4 02/06/2023    LABA1C 5.8 (H) 08/09/2022    LABA1C 5.7 02/02/2022     No results found for: EAG            Plan Per Assessment:  There are no diagnoses linked to this encounter. No follow-ups on file. Samantha Jack DO    Note was generated with the assistance of voice recognition software. Document was reviewed however may contain grammatical errors.

## 2023-04-28 ENCOUNTER — OFFICE VISIT (OUTPATIENT)
Dept: FAMILY MEDICINE CLINIC | Age: 78
End: 2023-04-28
Payer: MEDICARE

## 2023-04-28 VITALS
HEIGHT: 63 IN | OXYGEN SATURATION: 96 % | DIASTOLIC BLOOD PRESSURE: 64 MMHG | SYSTOLIC BLOOD PRESSURE: 116 MMHG | BODY MASS INDEX: 25.69 KG/M2 | TEMPERATURE: 98 F | HEART RATE: 98 BPM

## 2023-04-28 DIAGNOSIS — S81.831A PUNCTURE WOUND OF RIGHT LOWER LEG, INITIAL ENCOUNTER: Primary | ICD-10-CM

## 2023-04-28 DIAGNOSIS — I10 ESSENTIAL HYPERTENSION: Chronic | ICD-10-CM

## 2023-04-28 DIAGNOSIS — N18.32 STAGE 3B CHRONIC KIDNEY DISEASE (HCC): ICD-10-CM

## 2023-04-28 DIAGNOSIS — E78.2 MIXED HYPERLIPIDEMIA: ICD-10-CM

## 2023-04-28 DIAGNOSIS — I25.10 CORONARY ARTERY DISEASE INVOLVING NATIVE CORONARY ARTERY OF NATIVE HEART WITHOUT ANGINA PECTORIS: ICD-10-CM

## 2023-04-28 DIAGNOSIS — I73.9 PVD (PERIPHERAL VASCULAR DISEASE) (HCC): ICD-10-CM

## 2023-04-28 DIAGNOSIS — L03.115 CELLULITIS OF RIGHT LEG: ICD-10-CM

## 2023-04-28 DIAGNOSIS — E03.9 HYPOTHYROIDISM, UNSPECIFIED TYPE: ICD-10-CM

## 2023-04-28 LAB
ALBUMIN SERPL-MCNC: 4.2 G/DL (ref 3.5–5.2)
ALP SERPL-CCNC: 57 U/L (ref 35–104)
ALT SERPL-CCNC: 10 U/L (ref 0–32)
ANION GAP SERPL CALCULATED.3IONS-SCNC: 16 MMOL/L (ref 7–16)
AST SERPL-CCNC: 17 U/L (ref 0–31)
BASOPHILS # BLD: 0.03 E9/L (ref 0–0.2)
BASOPHILS NFR BLD: 0.7 % (ref 0–2)
BILIRUB SERPL-MCNC: 0.4 MG/DL (ref 0–1.2)
BUN SERPL-MCNC: 35 MG/DL (ref 6–23)
CALCIUM SERPL-MCNC: 9.7 MG/DL (ref 8.6–10.2)
CHLORIDE SERPL-SCNC: 104 MMOL/L (ref 98–107)
CHOLESTEROL, TOTAL: 180 MG/DL (ref 0–199)
CO2 SERPL-SCNC: 22 MMOL/L (ref 22–29)
CREAT SERPL-MCNC: 1.7 MG/DL (ref 0.5–1)
EOSINOPHIL # BLD: 0.1 E9/L (ref 0.05–0.5)
EOSINOPHIL NFR BLD: 2.3 % (ref 0–6)
ERYTHROCYTE [DISTWIDTH] IN BLOOD BY AUTOMATED COUNT: 12.7 FL (ref 11.5–15)
GLUCOSE SERPL-MCNC: 106 MG/DL (ref 74–99)
HBA1C MFR BLD: 5.6 % (ref 4–5.6)
HCT VFR BLD AUTO: 41.2 % (ref 34–48)
HDLC SERPL-MCNC: 40 MG/DL
HGB BLD-MCNC: 13.1 G/DL (ref 11.5–15.5)
IMM GRANULOCYTES # BLD: 0.01 E9/L
IMM GRANULOCYTES NFR BLD: 0.2 % (ref 0–5)
LDLC SERPL CALC-MCNC: 117 MG/DL (ref 0–99)
LYMPHOCYTES # BLD: 0.95 E9/L (ref 1.5–4)
LYMPHOCYTES NFR BLD: 22.2 % (ref 20–42)
MCH RBC QN AUTO: 29.9 PG (ref 26–35)
MCHC RBC AUTO-ENTMCNC: 31.8 % (ref 32–34.5)
MCV RBC AUTO: 94.1 FL (ref 80–99.9)
MONOCYTES # BLD: 0.46 E9/L (ref 0.1–0.95)
MONOCYTES NFR BLD: 10.8 % (ref 2–12)
NEUTROPHILS # BLD: 2.72 E9/L (ref 1.8–7.3)
NEUTS SEG NFR BLD: 63.8 % (ref 43–80)
PLATELET # BLD AUTO: 156 E9/L (ref 130–450)
PMV BLD AUTO: 10.9 FL (ref 7–12)
POTASSIUM SERPL-SCNC: 4.9 MMOL/L (ref 3.5–5)
PROT SERPL-MCNC: 7 G/DL (ref 6.4–8.3)
RBC # BLD AUTO: 4.38 E12/L (ref 3.5–5.5)
SODIUM SERPL-SCNC: 142 MMOL/L (ref 132–146)
T4 SERPL-MCNC: 8.8 MCG/DL (ref 4.5–11.7)
TRIGL SERPL-MCNC: 113 MG/DL (ref 0–149)
TSH SERPL-MCNC: 4.09 UIU/ML (ref 0.27–4.2)
VLDLC SERPL CALC-MCNC: 23 MG/DL
WBC # BLD: 4.3 E9/L (ref 4.5–11.5)

## 2023-04-28 PROCEDURE — 3074F SYST BP LT 130 MM HG: CPT | Performed by: PHYSICIAN ASSISTANT

## 2023-04-28 PROCEDURE — 1090F PRES/ABSN URINE INCON ASSESS: CPT | Performed by: PHYSICIAN ASSISTANT

## 2023-04-28 PROCEDURE — 3078F DIAST BP <80 MM HG: CPT | Performed by: PHYSICIAN ASSISTANT

## 2023-04-28 PROCEDURE — G8400 PT W/DXA NO RESULTS DOC: HCPCS | Performed by: PHYSICIAN ASSISTANT

## 2023-04-28 PROCEDURE — 99204 OFFICE O/P NEW MOD 45 MIN: CPT | Performed by: PHYSICIAN ASSISTANT

## 2023-04-28 PROCEDURE — 1123F ACP DISCUSS/DSCN MKR DOCD: CPT | Performed by: PHYSICIAN ASSISTANT

## 2023-04-28 PROCEDURE — G8427 DOCREV CUR MEDS BY ELIG CLIN: HCPCS | Performed by: PHYSICIAN ASSISTANT

## 2023-04-28 PROCEDURE — G8417 CALC BMI ABV UP PARAM F/U: HCPCS | Performed by: PHYSICIAN ASSISTANT

## 2023-04-28 PROCEDURE — 1036F TOBACCO NON-USER: CPT | Performed by: PHYSICIAN ASSISTANT

## 2023-04-28 RX ORDER — CEPHALEXIN 500 MG/1
500 CAPSULE ORAL 2 TIMES DAILY
Qty: 20 CAPSULE | Refills: 0 | Status: SHIPPED | OUTPATIENT
Start: 2023-04-28 | End: 2023-05-08

## 2023-04-28 RX ORDER — DOXYCYCLINE HYCLATE 100 MG
100 TABLET ORAL 2 TIMES DAILY
Qty: 20 TABLET | Refills: 0 | Status: SHIPPED | OUTPATIENT
Start: 2023-04-28 | End: 2023-05-08

## 2023-04-28 NOTE — PROGRESS NOTES
23  Alba Walton : 1945 Sex: female  Age 69 y.o. Subjective:  Chief Complaint   Patient presents with    Leg Injury     Pt doesn't know how it happened noticed it on Saturday right lower leg injury thinks there is an infection very painful hard to walk. HPI:   Alba Walton , 69 y.o. female presents to express care for evaluation of right leg wound, soreness    HPI  15-year-old female presents to express care for evaluation of right leg wound. The patient unsure specifically which she hit her leg on. The patient is having quite a bit of soreness to the right leg. The patient has some erythema and what appears to be almost a puncture type wound noted to the mid anterior aspect of the right leg. The patient believes that her tetanus is up-to-date. The patient is not having any numbness or tingling. The patient does have a remote history of diabetes but states that she is no longer diabetic. ROS:   Unless otherwise stated in this report the patient's positive and negative responses for review of systems for constitutional, eyes, ENT, cardiovascular, respiratory, gastrointestinal, neurological, , musculoskeletal, and integument systems and related systems to the presenting problem are either stated in the history of present illness or were not pertinent or were negative for the symptoms and/or complaints related to the presenting medical problem. Positives and pertinent negatives as per HPI. All others reviewed and are negative.       PMH:     Past Medical History:   Diagnosis Date    Acute loss of vision, left 2022    Arthritis     CAD (coronary artery disease)     follows with Dr. Joseph Cardenas    CKD (chronic kidney disease)     stage 3 / follows with Dr. Janis Allen    Depression     Hypertension     Left carotid stenosis 2022    Respiratory failure ([facility] [address]     history of 2014  when had stroke, trach  x 2 weeks    Thyroid disease     Tracheal stenosis

## 2023-05-08 ENCOUNTER — OFFICE VISIT (OUTPATIENT)
Dept: PRIMARY CARE CLINIC | Age: 78
End: 2023-05-08
Payer: MEDICARE

## 2023-05-08 VITALS
BODY MASS INDEX: 29.59 KG/M2 | HEIGHT: 63 IN | OXYGEN SATURATION: 96 % | SYSTOLIC BLOOD PRESSURE: 120 MMHG | DIASTOLIC BLOOD PRESSURE: 64 MMHG | HEART RATE: 61 BPM | TEMPERATURE: 97.3 F | WEIGHT: 167 LBS

## 2023-05-08 DIAGNOSIS — E03.9 HYPOTHYROIDISM, UNSPECIFIED TYPE: ICD-10-CM

## 2023-05-08 DIAGNOSIS — I10 ESSENTIAL HYPERTENSION: Chronic | ICD-10-CM

## 2023-05-08 DIAGNOSIS — E78.2 MIXED HYPERLIPIDEMIA: ICD-10-CM

## 2023-05-08 DIAGNOSIS — N18.32 STAGE 3B CHRONIC KIDNEY DISEASE (HCC): ICD-10-CM

## 2023-05-08 DIAGNOSIS — N18.32 TYPE 2 DIABETES MELLITUS WITH STAGE 3B CHRONIC KIDNEY DISEASE, WITHOUT LONG-TERM CURRENT USE OF INSULIN (HCC): Primary | ICD-10-CM

## 2023-05-08 DIAGNOSIS — E11.22 TYPE 2 DIABETES MELLITUS WITH STAGE 3B CHRONIC KIDNEY DISEASE, WITHOUT LONG-TERM CURRENT USE OF INSULIN (HCC): Primary | ICD-10-CM

## 2023-05-08 PROCEDURE — 3078F DIAST BP <80 MM HG: CPT | Performed by: FAMILY MEDICINE

## 2023-05-08 PROCEDURE — 3074F SYST BP LT 130 MM HG: CPT | Performed by: FAMILY MEDICINE

## 2023-05-08 PROCEDURE — G8417 CALC BMI ABV UP PARAM F/U: HCPCS | Performed by: FAMILY MEDICINE

## 2023-05-08 PROCEDURE — 99214 OFFICE O/P EST MOD 30 MIN: CPT | Performed by: FAMILY MEDICINE

## 2023-05-08 PROCEDURE — 1036F TOBACCO NON-USER: CPT | Performed by: FAMILY MEDICINE

## 2023-05-08 PROCEDURE — 1123F ACP DISCUSS/DSCN MKR DOCD: CPT | Performed by: FAMILY MEDICINE

## 2023-05-08 PROCEDURE — G8427 DOCREV CUR MEDS BY ELIG CLIN: HCPCS | Performed by: FAMILY MEDICINE

## 2023-05-08 PROCEDURE — 1090F PRES/ABSN URINE INCON ASSESS: CPT | Performed by: FAMILY MEDICINE

## 2023-05-08 PROCEDURE — 3044F HG A1C LEVEL LT 7.0%: CPT | Performed by: FAMILY MEDICINE

## 2023-05-08 PROCEDURE — G8400 PT W/DXA NO RESULTS DOC: HCPCS | Performed by: FAMILY MEDICINE

## 2023-05-08 ASSESSMENT — ENCOUNTER SYMPTOMS
ALLERGIC/IMMUNOLOGIC NEGATIVE: 1
EYES NEGATIVE: 1
GASTROINTESTINAL NEGATIVE: 1
RESPIRATORY NEGATIVE: 1

## 2023-05-08 NOTE — PROGRESS NOTES
Tobacco Use    Smoking status: Former     Packs/day: 1.50     Years: 8.00     Pack years: 12.00     Types: Cigarettes     Quit date: 1976     Years since quittin.3    Smokeless tobacco: Never   Vaping Use    Vaping Use: Never used   Substance Use Topics    Alcohol use: No    Drug use: Never      Past Surgical History:   Procedure Laterality Date     SECTION      1    COLONOSCOPY      CORONARY ANGIOPLASTY WITH STENT PLACEMENT  2004    CYST REMOVAL  1970's    ENDOSCOPY, COLON, DIAGNOSTIC      GASTROSTOMY TUBE PLACEMENT  2014    Dorion. Miri Beny and later removed    HEEL SPUR SURGERY Left years ago    JOINT REPLACEMENT Left late     OTHER SURGICAL HISTORY      scraping of trach x 2 to treat stenosis / last one     POLYPECTOMY  2013    internal hemorrhoids - michelle    TONSILLECTOMY      TOTAL KNEE ARTHROPLASTY Right 2020    RIGHT KNEE TOTAL ARTHROPLASTY    ++RAYMOND++   ++PNB++     ++LATEX ALLERGY++   ++IODINE ALLERGY++ performed by Edwina Burns MD at [facility]  14    Dorion    UPPER GASTROINTESTINAL ENDOSCOPY      gastritis w. superficial antral ulerations - Michelle     No family history on file.   Past Medical History:   Diagnosis Date    Acute loss of vision, left 2022    Arthritis     CAD (coronary artery disease)     follows with Dr. Tacho Tellez    CKD (chronic kidney disease)     stage 3 / follows with Dr. Jeff Bowden    Depression     Hypertension     Left carotid stenosis 2022    Respiratory failure ([facility] [address]     history of 2014  when had stroke, trach  x 2 weeks    Thyroid disease     Tracheal stenosis     Unspecified cerebral artery occlusion with cerebral infarction 2014    right side flacid, hemorrhagic stroke dt elevated BP/residual s/s is at right side loss of fine motor skills and some weakness, drags right leg, slight memory loss and aphasia when she is tired       Vitals:    23 1037   BP: 120/64   Pulse: 61   Temp: 97.3 °F (36.3

## 2023-05-15 DIAGNOSIS — I25.10 CORONARY ARTERY DISEASE INVOLVING NATIVE CORONARY ARTERY OF NATIVE HEART WITHOUT ANGINA PECTORIS: ICD-10-CM

## 2023-05-15 DIAGNOSIS — I10 ESSENTIAL HYPERTENSION: Chronic | ICD-10-CM

## 2023-05-15 DIAGNOSIS — E78.2 MIXED HYPERLIPIDEMIA: ICD-10-CM

## 2023-05-15 DIAGNOSIS — N18.32 STAGE 3B CHRONIC KIDNEY DISEASE (HCC): ICD-10-CM

## 2023-05-15 DIAGNOSIS — I73.9 PVD (PERIPHERAL VASCULAR DISEASE) (HCC): ICD-10-CM

## 2023-05-15 RX ORDER — SOLIFENACIN SUCCINATE 5 MG/1
5 TABLET, FILM COATED ORAL DAILY
Qty: 90 TABLET | Refills: 1 | Status: SHIPPED | OUTPATIENT
Start: 2023-05-15 | End: 2024-05-14

## 2023-08-03 DIAGNOSIS — I10 ESSENTIAL HYPERTENSION: Chronic | ICD-10-CM

## 2023-08-03 DIAGNOSIS — N18.32 TYPE 2 DIABETES MELLITUS WITH STAGE 3B CHRONIC KIDNEY DISEASE, WITHOUT LONG-TERM CURRENT USE OF INSULIN (HCC): ICD-10-CM

## 2023-08-03 DIAGNOSIS — E11.22 TYPE 2 DIABETES MELLITUS WITH STAGE 3B CHRONIC KIDNEY DISEASE, WITHOUT LONG-TERM CURRENT USE OF INSULIN (HCC): ICD-10-CM

## 2023-08-03 DIAGNOSIS — E78.2 MIXED HYPERLIPIDEMIA: ICD-10-CM

## 2023-08-03 DIAGNOSIS — E03.9 HYPOTHYROIDISM, UNSPECIFIED TYPE: ICD-10-CM

## 2023-08-03 LAB
ABSOLUTE IMMATURE GRANULOCYTE: <0.03 K/UL (ref 0–0.58)
BASOPHILS ABSOLUTE: 0.03 K/UL (ref 0–0.2)
BASOPHILS RELATIVE PERCENT: 1 % (ref 0–2)
EOSINOPHILS ABSOLUTE: 0.11 K/UL (ref 0.05–0.5)
EOSINOPHILS RELATIVE PERCENT: 3 % (ref 0–6)
HBA1C MFR BLD: 5.6 % (ref 4–5.6)
HCT VFR BLD CALC: 37.2 % (ref 34–48)
HEMOGLOBIN: 11.8 G/DL (ref 11.5–15.5)
IMMATURE GRANULOCYTES: 0 % (ref 0–5)
LYMPHOCYTES ABSOLUTE: 0.82 K/UL (ref 1.5–4)
LYMPHOCYTES RELATIVE PERCENT: 23 % (ref 20–42)
MCH RBC QN AUTO: 29.6 PG (ref 26–35)
MCHC RBC AUTO-ENTMCNC: 31.7 G/DL (ref 32–34.5)
MCV RBC AUTO: 93.5 FL (ref 80–99.9)
MONOCYTES ABSOLUTE: 0.38 K/UL (ref 0.1–0.95)
MONOCYTES RELATIVE PERCENT: 11 % (ref 2–12)
NEUTROPHILS ABSOLUTE: 2.23 K/UL (ref 1.8–7.3)
NEUTROPHILS RELATIVE PERCENT: 62 % (ref 43–80)
PDW BLD-RTO: 13.3 % (ref 11.5–15)
PLATELET # BLD: 139 K/UL (ref 130–450)
PMV BLD AUTO: 10.9 FL (ref 7–12)
RBC # BLD: 3.98 M/UL (ref 3.5–5.5)
WBC # BLD: 3.6 K/UL (ref 4.5–11.5)

## 2023-08-04 LAB
ALBUMIN SERPL-MCNC: 4.3 G/DL (ref 3.5–5.2)
ALP BLD-CCNC: 51 U/L (ref 35–104)
ALT SERPL-CCNC: 11 U/L (ref 0–32)
ANION GAP SERPL CALCULATED.3IONS-SCNC: 17 MMOL/L (ref 7–16)
AST SERPL-CCNC: 19 U/L (ref 0–31)
BILIRUB SERPL-MCNC: 0.4 MG/DL (ref 0–1.2)
BUN BLDV-MCNC: 38 MG/DL (ref 6–23)
CALCIUM SERPL-MCNC: 9 MG/DL (ref 8.6–10.2)
CHLORIDE BLD-SCNC: 107 MMOL/L (ref 98–107)
CHOLESTEROL: 161 MG/DL
CO2: 17 MMOL/L (ref 22–29)
CREAT SERPL-MCNC: 2.4 MG/DL (ref 0.5–1)
GFR SERPL CREATININE-BSD FRML MDRD: 20 ML/MIN/1.73M2
GLUCOSE BLD-MCNC: 90 MG/DL (ref 74–99)
HDLC SERPL-MCNC: 44 MG/DL
LDL CHOLESTEROL: 102 MG/DL
POTASSIUM SERPL-SCNC: 5.1 MMOL/L (ref 3.5–5)
SODIUM BLD-SCNC: 141 MMOL/L (ref 132–146)
T4 TOTAL: 7.9 UG/DL (ref 4.5–11.7)
TOTAL PROTEIN: 7 G/DL (ref 6.4–8.3)
TRIGL SERPL-MCNC: 76 MG/DL
TSH SERPL DL<=0.05 MIU/L-ACNC: 3.42 UIU/ML (ref 0.27–4.2)
VLDLC SERPL CALC-MCNC: 15 MG/DL

## 2023-08-08 ENCOUNTER — OFFICE VISIT (OUTPATIENT)
Dept: PRIMARY CARE CLINIC | Age: 78
End: 2023-08-08
Payer: MEDICARE

## 2023-08-08 VITALS
HEART RATE: 69 BPM | SYSTOLIC BLOOD PRESSURE: 150 MMHG | OXYGEN SATURATION: 96 % | BODY MASS INDEX: 29.59 KG/M2 | DIASTOLIC BLOOD PRESSURE: 70 MMHG | TEMPERATURE: 98 F | HEIGHT: 63 IN | WEIGHT: 167 LBS

## 2023-08-08 DIAGNOSIS — E78.2 MIXED HYPERLIPIDEMIA: ICD-10-CM

## 2023-08-08 DIAGNOSIS — I25.10 CORONARY ARTERY DISEASE INVOLVING NATIVE CORONARY ARTERY OF NATIVE HEART WITHOUT ANGINA PECTORIS: ICD-10-CM

## 2023-08-08 DIAGNOSIS — I73.9 PVD (PERIPHERAL VASCULAR DISEASE) (HCC): ICD-10-CM

## 2023-08-08 DIAGNOSIS — I10 ESSENTIAL HYPERTENSION: Chronic | ICD-10-CM

## 2023-08-08 DIAGNOSIS — N18.32 STAGE 3B CHRONIC KIDNEY DISEASE (HCC): ICD-10-CM

## 2023-08-08 PROCEDURE — G8428 CUR MEDS NOT DOCUMENT: HCPCS | Performed by: FAMILY MEDICINE

## 2023-08-08 PROCEDURE — G8400 PT W/DXA NO RESULTS DOC: HCPCS | Performed by: FAMILY MEDICINE

## 2023-08-08 PROCEDURE — 99214 OFFICE O/P EST MOD 30 MIN: CPT | Performed by: FAMILY MEDICINE

## 2023-08-08 PROCEDURE — 1036F TOBACCO NON-USER: CPT | Performed by: FAMILY MEDICINE

## 2023-08-08 PROCEDURE — 1123F ACP DISCUSS/DSCN MKR DOCD: CPT | Performed by: FAMILY MEDICINE

## 2023-08-08 PROCEDURE — 3077F SYST BP >= 140 MM HG: CPT | Performed by: FAMILY MEDICINE

## 2023-08-08 PROCEDURE — 3078F DIAST BP <80 MM HG: CPT | Performed by: FAMILY MEDICINE

## 2023-08-08 PROCEDURE — 1090F PRES/ABSN URINE INCON ASSESS: CPT | Performed by: FAMILY MEDICINE

## 2023-08-08 PROCEDURE — G8417 CALC BMI ABV UP PARAM F/U: HCPCS | Performed by: FAMILY MEDICINE

## 2023-08-08 RX ORDER — SOLIFENACIN SUCCINATE 5 MG/1
5 TABLET, FILM COATED ORAL DAILY
Qty: 90 TABLET | Refills: 1 | Status: SHIPPED | OUTPATIENT
Start: 2023-08-08 | End: 2024-08-07

## 2023-08-08 RX ORDER — HYDRALAZINE HYDROCHLORIDE 25 MG/1
TABLET, FILM COATED ORAL
Qty: 360 TABLET | Refills: 1 | Status: SHIPPED | OUTPATIENT
Start: 2023-08-08

## 2023-08-08 ASSESSMENT — ENCOUNTER SYMPTOMS
RESPIRATORY NEGATIVE: 1
EYES NEGATIVE: 1
GASTROINTESTINAL NEGATIVE: 1
ALLERGIC/IMMUNOLOGIC NEGATIVE: 1

## 2023-08-08 NOTE — PROGRESS NOTES
23     Estela Harris    : 1945 Sex: female   Age: 69 y.o. Chief Complaint   Patient presents with    Hypertension    Diabetes       Prior to Admission medications    Medication Sig Start Date End Date Taking? Authorizing Provider   hydrALAZINE (APRESOLINE) 25 MG tablet TAKE 1 TABLET BY MOUTH FOUR TIMES DAILY 23  Yes Laxmi Hadley,    solifenacin (VESICARE) 5 MG tablet Take 1 tablet by mouth daily 23 Yes Angel Nicholson, DO   mupirocin (BACTROBAN) 2 % ointment Apply topically 3 times daily. 23   SUZY Garcia III   labetalol (NORMODYNE) 100 MG tablet TAKE 1 TABLET BY MOUTH TWICE DAILY 4/10/23   Laxmi Hadley, DO   cloNIDine (CATAPRES) 0.1 MG tablet TAKE 1 TABLET BY MOUTH THREE TIMES DAILY 4/10/23   Laxmi Hadley, DO   aspirin (ASPIRIN LOW DOSE) 81 MG EC tablet TAKE 1 TABLET BY MOUTH EVERY DAY 4/10/23   Laxmi Hadley, DO   citalopram (CELEXA) 20 MG tablet Take 1 tablet by mouth daily 23   Laxmi Hadley, DO   levothyroxine (SYNTHROID) 88 MCG tablet Take 1 tablet by mouth Daily 23   Valentino Engman Traikoff,    spironolactone (ALDACTONE) 25 MG tablet Take 1 tablet by mouth daily 23   Valentino Engman Traikoff, DO   nitroGLYCERIN (NITRODUR) 0.2 MG/HR APPLY 1 PATCH TOPICALLY TO THE SKIN DAILY 23   Laxmi Hadley,           HPI: Sherine Valdovinos seen today with hypertension coronary artery disease peripheral vascular disease chronic kidney disease and hyperlipidemia. Labs today are concerned with GFR further reduced and I am going to further adjust on her medication. Potassium was slightly elevated as well. Back off on spironolactone to half a tablet daily and then have asked that she bring all meds in for my review at next visit so we can make further adjustments as needed.   She was recently evaluated by nephrology and felt to be stable at that time but if we see continued decline in renal function I would recommend further follow-up with

## 2023-08-10 LAB
ANION GAP SERPL CALCULATED.3IONS-SCNC: 12 MMOL/L (ref 7–16)
BUN SERPL-MCNC: 45 MG/DL (ref 6–23)
CALCIUM SERPL-MCNC: 9.7 MG/DL (ref 8.6–10.2)
CHLORIDE SERPL-SCNC: 103 MMOL/L (ref 98–107)
CO2 SERPL-SCNC: 25 MMOL/L (ref 22–29)
CREAT SERPL-MCNC: 2.4 MG/DL (ref 0.5–1)
GFR SERPL CREATININE-BSD FRML MDRD: 21 ML/MIN/1.73M2
GLUCOSE SERPL-MCNC: 112 MG/DL (ref 74–99)
POTASSIUM SERPL-SCNC: 5.1 MMOL/L (ref 3.5–5)
SODIUM SERPL-SCNC: 140 MMOL/L (ref 132–146)

## 2023-08-30 DIAGNOSIS — I10 ESSENTIAL HYPERTENSION: Chronic | ICD-10-CM

## 2023-08-30 DIAGNOSIS — E78.2 MIXED HYPERLIPIDEMIA: ICD-10-CM

## 2023-08-30 DIAGNOSIS — N18.32 STAGE 3B CHRONIC KIDNEY DISEASE (HCC): ICD-10-CM

## 2023-08-30 LAB
ALBUMIN SERPL-MCNC: 4 G/DL (ref 3.5–5.2)
ALP BLD-CCNC: 50 U/L (ref 35–104)
ALT SERPL-CCNC: 22 U/L (ref 0–32)
ANION GAP SERPL CALCULATED.3IONS-SCNC: 15 MMOL/L (ref 7–16)
AST SERPL-CCNC: 32 U/L (ref 0–31)
BILIRUB SERPL-MCNC: 0.5 MG/DL (ref 0–1.2)
BUN BLDV-MCNC: 74 MG/DL (ref 6–23)
CALCIUM SERPL-MCNC: 8.9 MG/DL (ref 8.6–10.2)
CHLORIDE BLD-SCNC: 100 MMOL/L (ref 98–107)
CO2: 21 MMOL/L (ref 22–29)
CREAT SERPL-MCNC: 5.3 MG/DL (ref 0.5–1)
GFR SERPL CREATININE-BSD FRML MDRD: 8 ML/MIN/1.73M2
GLUCOSE BLD-MCNC: 88 MG/DL (ref 74–99)
POTASSIUM SERPL-SCNC: 5.1 MMOL/L (ref 3.5–5)
SODIUM BLD-SCNC: 136 MMOL/L (ref 132–146)
TOTAL PROTEIN: 7 G/DL (ref 6.4–8.3)

## 2023-08-31 ENCOUNTER — APPOINTMENT (OUTPATIENT)
Dept: ULTRASOUND IMAGING | Age: 78
DRG: 674 | End: 2023-08-31
Payer: MEDICARE

## 2023-08-31 ENCOUNTER — HOSPITAL ENCOUNTER (INPATIENT)
Age: 78
LOS: 18 days | Discharge: HOME | DRG: 674 | End: 2023-09-18
Attending: STUDENT IN AN ORGANIZED HEALTH CARE EDUCATION/TRAINING PROGRAM | Admitting: INTERNAL MEDICINE
Payer: MEDICARE

## 2023-08-31 DIAGNOSIS — N17.9 AKI (ACUTE KIDNEY INJURY) (HCC): Primary | ICD-10-CM

## 2023-08-31 DIAGNOSIS — E87.20 METABOLIC ACIDOSIS: ICD-10-CM

## 2023-08-31 LAB
ABSOLUTE IMMATURE GRANULOCYTE: <0.03 K/UL (ref 0–0.58)
ALBUMIN SERPL-MCNC: 3.9 G/DL (ref 3.5–5.2)
ALP SERPL-CCNC: 53 U/L (ref 35–104)
ALT SERPL-CCNC: 25 U/L (ref 0–32)
ANION GAP SERPL CALCULATED.3IONS-SCNC: 14 MMOL/L (ref 7–16)
AST SERPL-CCNC: 33 U/L (ref 0–31)
ATYPICAL LYMPHOCYTE ABSOLUTE COUNT: 0.02 K/UL (ref 0–0.46)
ATYPICAL LYMPHOCYTES: 1 % (ref 0–4)
BASOPHILS # BLD: 0.02 K/UL (ref 0–0.2)
BASOPHILS ABSOLUTE: 0.02 K/UL (ref 0–0.2)
BASOPHILS NFR BLD: 1 % (ref 0–2)
BASOPHILS RELATIVE PERCENT: 1 % (ref 0–2)
BILIRUB SERPL-MCNC: 0.4 MG/DL (ref 0–1.2)
BUN SERPL-MCNC: 71 MG/DL (ref 6–23)
CALCIUM SERPL-MCNC: 8.7 MG/DL (ref 8.6–10.2)
CHLORIDE SERPL-SCNC: 101 MMOL/L (ref 98–107)
CO2 SERPL-SCNC: 20 MMOL/L (ref 22–29)
CREAT SERPL-MCNC: 5.4 MG/DL (ref 0.5–1)
EKG ATRIAL RATE: 57 BPM
EKG P AXIS: -19 DEGREES
EKG P-R INTERVAL: 114 MS
EKG Q-T INTERVAL: 526 MS
EKG QRS DURATION: 146 MS
EKG QTC CALCULATION (BAZETT): 511 MS
EKG R AXIS: 96 DEGREES
EKG T AXIS: 51 DEGREES
EKG VENTRICULAR RATE: 57 BPM
EOSINOPHIL # BLD: 0.1 K/UL (ref 0.05–0.5)
EOSINOPHILS ABSOLUTE: 0.04 K/UL (ref 0.05–0.5)
EOSINOPHILS RELATIVE PERCENT: 1 % (ref 0–6)
EOSINOPHILS RELATIVE PERCENT: 4 % (ref 0–6)
ERYTHROCYTE [DISTWIDTH] IN BLOOD BY AUTOMATED COUNT: 13.4 % (ref 11.5–15)
FERRITIN SERPL-MCNC: 875 NG/ML
FOLATE SERPL-MCNC: 11 NG/ML (ref 4.8–24.2)
GFR SERPL CREATININE-BSD FRML MDRD: 8 ML/MIN/1.73M2
GLUCOSE SERPL-MCNC: 101 MG/DL (ref 74–99)
HCT VFR BLD AUTO: 33.1 % (ref 34–48)
HCT VFR BLD CALC: 33.9 % (ref 34–48)
HEMOGLOBIN: 11.1 G/DL (ref 11.5–15.5)
HGB BLD-MCNC: 10.8 G/DL (ref 11.5–15.5)
IMMATURE GRANULOCYTES: 0 % (ref 0–5)
IRON SATN MFR SERPL: 24 % (ref 15–50)
IRON SERPL-MCNC: 60 UG/DL (ref 37–145)
LYMPHOCYTES ABSOLUTE: 1.11 K/UL (ref 1.5–4)
LYMPHOCYTES NFR BLD: 0.74 K/UL (ref 1.5–4)
LYMPHOCYTES RELATIVE PERCENT: 26 % (ref 20–42)
LYMPHOCYTES RELATIVE PERCENT: 39 % (ref 20–42)
MCH RBC QN AUTO: 29.7 PG (ref 26–35)
MCH RBC QN AUTO: 29.8 PG (ref 26–35)
MCHC RBC AUTO-ENTMCNC: 32.6 G/DL (ref 32–34.5)
MCHC RBC AUTO-ENTMCNC: 32.7 G/DL (ref 32–34.5)
MCV RBC AUTO: 90.9 FL (ref 80–99.9)
MCV RBC AUTO: 91.1 FL (ref 80–99.9)
MONOCYTES ABSOLUTE: 0.33 K/UL (ref 0.1–0.95)
MONOCYTES NFR BLD: 0.25 K/UL (ref 0.1–0.95)
MONOCYTES NFR BLD: 9 % (ref 2–12)
MONOCYTES RELATIVE PERCENT: 12 % (ref 2–12)
NEUTROPHILS ABSOLUTE: 1.31 K/UL (ref 1.8–7.3)
NEUTROPHILS NFR BLD: 60 % (ref 43–80)
NEUTROPHILS RELATIVE PERCENT: 46 % (ref 43–80)
NEUTS SEG NFR BLD: 1.67 K/UL (ref 1.8–7.3)
PDW BLD-RTO: 13.3 % (ref 11.5–15)
PLATELET # BLD AUTO: 114 K/UL (ref 130–450)
PLATELET # BLD: 98 K/UL (ref 130–450)
PLATELET CONFIRMATION: NORMAL
PMV BLD AUTO: 10.4 FL (ref 7–12)
PMV BLD AUTO: 11.7 FL (ref 7–12)
POTASSIUM SERPL-SCNC: 4.5 MMOL/L (ref 3.5–5)
PROT SERPL-MCNC: 6.7 G/DL (ref 6.4–8.3)
RBC # BLD AUTO: 3.64 M/UL (ref 3.5–5.5)
RBC # BLD: 3.72 M/UL (ref 3.5–5.5)
RBC # BLD: ABNORMAL 10*6/UL
SODIUM SERPL-SCNC: 135 MMOL/L (ref 132–146)
TIBC SERPL-MCNC: 255 UG/DL (ref 250–450)
TROPONIN I SERPL HS-MCNC: 35 NG/L (ref 0–9)
TROPONIN I SERPL HS-MCNC: 36 NG/L (ref 0–9)
VIT B12 SERPL-MCNC: 919 PG/ML (ref 211–946)
WBC # BLD: 2.8 K/UL (ref 4.5–11.5)
WBC OTHER # BLD: 2.8 K/UL (ref 4.5–11.5)

## 2023-08-31 PROCEDURE — 82607 VITAMIN B-12: CPT

## 2023-08-31 PROCEDURE — 82728 ASSAY OF FERRITIN: CPT

## 2023-08-31 PROCEDURE — 83550 IRON BINDING TEST: CPT

## 2023-08-31 PROCEDURE — 2580000003 HC RX 258: Performed by: STUDENT IN AN ORGANIZED HEALTH CARE EDUCATION/TRAINING PROGRAM

## 2023-08-31 PROCEDURE — 82746 ASSAY OF FOLIC ACID SERUM: CPT

## 2023-08-31 PROCEDURE — 83540 ASSAY OF IRON: CPT

## 2023-08-31 PROCEDURE — 80053 COMPREHEN METABOLIC PANEL: CPT

## 2023-08-31 PROCEDURE — 93010 ELECTROCARDIOGRAM REPORT: CPT | Performed by: INTERNAL MEDICINE

## 2023-08-31 PROCEDURE — 6370000000 HC RX 637 (ALT 250 FOR IP): Performed by: INTERNAL MEDICINE

## 2023-08-31 PROCEDURE — 99285 EMERGENCY DEPT VISIT HI MDM: CPT

## 2023-08-31 PROCEDURE — 93005 ELECTROCARDIOGRAM TRACING: CPT | Performed by: STUDENT IN AN ORGANIZED HEALTH CARE EDUCATION/TRAINING PROGRAM

## 2023-08-31 PROCEDURE — 84484 ASSAY OF TROPONIN QUANT: CPT

## 2023-08-31 PROCEDURE — 6370000000 HC RX 637 (ALT 250 FOR IP)

## 2023-08-31 PROCEDURE — 76770 US EXAM ABDO BACK WALL COMP: CPT

## 2023-08-31 PROCEDURE — 2140000000 HC CCU INTERMEDIATE R&B

## 2023-08-31 PROCEDURE — 85025 COMPLETE CBC W/AUTO DIFF WBC: CPT

## 2023-08-31 RX ORDER — HYDRALAZINE HYDROCHLORIDE 25 MG/1
25 TABLET, FILM COATED ORAL EVERY 8 HOURS SCHEDULED
Status: DISCONTINUED | OUTPATIENT
Start: 2023-08-31 | End: 2023-09-08

## 2023-08-31 RX ORDER — NITROGLYCERIN 40 MG/1
1 PATCH TRANSDERMAL DAILY
Status: DISCONTINUED | OUTPATIENT
Start: 2023-08-31 | End: 2023-09-18 | Stop reason: HOSPADM

## 2023-08-31 RX ORDER — PROCHLORPERAZINE EDISYLATE 5 MG/ML
10 INJECTION INTRAMUSCULAR; INTRAVENOUS EVERY 6 HOURS PRN
Status: DISCONTINUED | OUTPATIENT
Start: 2023-08-31 | End: 2023-09-18 | Stop reason: HOSPADM

## 2023-08-31 RX ORDER — ASPIRIN 81 MG/1
81 TABLET ORAL DAILY
Status: DISCONTINUED | OUTPATIENT
Start: 2023-08-31 | End: 2023-09-18 | Stop reason: HOSPADM

## 2023-08-31 RX ORDER — SODIUM CHLORIDE, SODIUM LACTATE, POTASSIUM CHLORIDE, CALCIUM CHLORIDE 600; 310; 30; 20 MG/100ML; MG/100ML; MG/100ML; MG/100ML
INJECTION, SOLUTION INTRAVENOUS CONTINUOUS
Status: DISCONTINUED | OUTPATIENT
Start: 2023-08-31 | End: 2023-09-05

## 2023-08-31 RX ORDER — SODIUM BICARBONATE 650 MG/1
650 TABLET ORAL 2 TIMES DAILY
Status: DISCONTINUED | OUTPATIENT
Start: 2023-08-31 | End: 2023-09-14

## 2023-08-31 RX ORDER — CLONIDINE HYDROCHLORIDE 0.1 MG/1
0.1 TABLET ORAL 3 TIMES DAILY
Status: DISCONTINUED | OUTPATIENT
Start: 2023-08-31 | End: 2023-09-10

## 2023-08-31 RX ORDER — CITALOPRAM 20 MG/1
20 TABLET ORAL DAILY
Status: DISCONTINUED | OUTPATIENT
Start: 2023-08-31 | End: 2023-08-31

## 2023-08-31 RX ORDER — ACETAMINOPHEN 500 MG
500 TABLET ORAL EVERY 4 HOURS PRN
Status: DISCONTINUED | OUTPATIENT
Start: 2023-08-31 | End: 2023-09-18 | Stop reason: HOSPADM

## 2023-08-31 RX ORDER — LEVOTHYROXINE SODIUM 88 UG/1
88 TABLET ORAL DAILY
Status: DISCONTINUED | OUTPATIENT
Start: 2023-08-31 | End: 2023-09-18 | Stop reason: HOSPADM

## 2023-08-31 RX ORDER — LABETALOL 100 MG/1
100 TABLET, FILM COATED ORAL 2 TIMES DAILY
Status: DISCONTINUED | OUTPATIENT
Start: 2023-08-31 | End: 2023-09-08

## 2023-08-31 RX ORDER — ESCITALOPRAM OXALATE 10 MG/1
10 TABLET ORAL DAILY
Status: DISCONTINUED | OUTPATIENT
Start: 2023-09-01 | End: 2023-09-18 | Stop reason: HOSPADM

## 2023-08-31 RX ORDER — LANOLIN ALCOHOL/MO/W.PET/CERES
3 CREAM (GRAM) TOPICAL NIGHTLY PRN
Status: DISCONTINUED | OUTPATIENT
Start: 2023-08-31 | End: 2023-09-14

## 2023-08-31 RX ADMIN — CLONIDINE HYDROCHLORIDE 0.1 MG: 0.1 TABLET ORAL at 22:33

## 2023-08-31 RX ADMIN — SODIUM CHLORIDE, POTASSIUM CHLORIDE, SODIUM LACTATE AND CALCIUM CHLORIDE: 600; 310; 30; 20 INJECTION, SOLUTION INTRAVENOUS at 22:41

## 2023-08-31 RX ADMIN — LABETALOL HYDROCHLORIDE 100 MG: 100 TABLET, FILM COATED ORAL at 22:33

## 2023-08-31 RX ADMIN — SODIUM BICARBONATE 650 MG: 650 TABLET ORAL at 22:33

## 2023-08-31 RX ADMIN — HYDRALAZINE HYDROCHLORIDE 25 MG: 25 TABLET, FILM COATED ORAL at 22:33

## 2023-08-31 RX ADMIN — SODIUM CHLORIDE, POTASSIUM CHLORIDE, SODIUM LACTATE AND CALCIUM CHLORIDE: 600; 310; 30; 20 INJECTION, SOLUTION INTRAVENOUS at 13:04

## 2023-08-31 ASSESSMENT — ENCOUNTER SYMPTOMS
SHORTNESS OF BREATH: 0
ABDOMINAL PAIN: 0
NAUSEA: 0
COUGH: 0
VOMITING: 0
PHOTOPHOBIA: 0
DIARRHEA: 0
ABDOMINAL DISTENTION: 0
CHEST TIGHTNESS: 0

## 2023-08-31 NOTE — ED PROVIDER NOTES
Sharyne Homans is a 67-year-old female presented emergency department with concern for abnormal labs. Patient was sent in by nephrology office for concern for abnormal labs. Patient states she was sent in because she had a low blood count. After chart review was determined the patient was sent in for acute on chronic renal failure patient follows with Dr Trinidad for nephrology. Patient has had increasing fatigue and diffuse weakness. Patient denies nausea, vomiting, abdominal pain, chest pain, shortness of breath patient is not currently on dialysis. The history is provided by the patient, medical records and a relative. Review of Systems   Constitutional:  Positive for fatigue. Negative for chills, diaphoresis and fever. Eyes:  Negative for photophobia and visual disturbance. Respiratory:  Negative for cough, chest tightness and shortness of breath. Cardiovascular:  Negative for chest pain, palpitations and leg swelling. Gastrointestinal:  Negative for abdominal distention, abdominal pain, diarrhea, nausea and vomiting. Genitourinary:  Negative for dysuria. Musculoskeletal:  Negative for neck pain and neck stiffness. Skin:  Negative for pallor and rash. Neurological:  Positive for weakness (diffuse weakness). Negative for headaches. Psychiatric/Behavioral:  Negative for confusion. Physical Exam  Vitals and nursing note reviewed. Constitutional:       General: She is not in acute distress. Appearance: She is ill-appearing. HENT:      Head: Normocephalic and atraumatic. Eyes:      General: No scleral icterus. Conjunctiva/sclera: Conjunctivae normal.      Pupils: Pupils are equal, round, and reactive to light. Cardiovascular:      Rate and Rhythm: Normal rate and regular rhythm. Pulmonary:      Effort: Pulmonary effort is normal.      Breath sounds: Normal breath sounds. Abdominal:      General: Bowel sounds are normal. There is no distension.

## 2023-08-31 NOTE — ED NOTES
The following labs were labeled with appropriate pt sticker and tubed to lab:     [] Blue     [x] Lavender   [] on ice  [x] Green/yellow  [] Green/black [] on ice  [] Joen Hermelindo  [] on ice  [x] Yellow  [] Red  [] Type/ Screen  [] ABG  [] VBG    [] COVID-19 swab    [] Rapid  [] PCR  [] Flu swab  [] Peds Viral Panel     [] Urine Sample  [] Fecal Sample  [] Pelvic Cultures  [] Blood Cultures  [] X 2  [] STREP Cultures\     Viral Jackson RN  08/31/23 0704

## 2023-08-31 NOTE — CONSULTS
08/30/23  1240 08/31/23  0950   LABALBU 4.0 3.9       Ferritin   Date Value Ref Range Status   08/31/2023 875 ng/mL Final     Comment:           FERRITIN Reference Ranges:  Adult Males   20 - 60 years:    30 - 400 ng/mL  Adult females 16 - 60 years:    15 - 150 ng/mL  Adults greater than 60 years:   no established reference range  Pediatrics:  no established reference range       Iron   Date Value Ref Range Status   08/31/2023 60 37 - 145 ug/dL Final     TIBC   Date Value Ref Range Status   08/31/2023 255 250 - 450 ug/dL Final       Vitamin B-12   Date Value Ref Range Status   08/31/2023 919 211 - 946 pg/mL Final       Folate   Date Value Ref Range Status   08/31/2023 11.0 4.8 - 24.2 ng/mL Final       Lab Results   Component Value Date/Time    COLORU Yellow 10/30/2019 02:38 PM    COLORU Yellow 09/13/2014 08:30 AM    NITRU Negative 09/13/2014 08:30 AM    GLUCOSEU Negative 10/30/2019 02:38 PM    GLUCOSEU Negative 09/13/2014 08:30 AM    KETUA Negative 10/30/2019 02:38 PM    KETUA Negative 09/13/2014 08:30 AM    UROBILINOGEN 0.2 09/13/2014 08:30 AM    BILIRUBINUR Negative 10/30/2019 02:38 PM       No results found for: AZIZA, CREURRAN, OSMOU    No components found for: URIC    No results found for: LIPIDPAN    Assessment and Plans:    MANAN stage III on CKD 3B  Presumed in the setting of decreased effective renal perfusion with decreased oral intake prior to admission; exacerbated by outpatient spironolactone  Baseline creatinine 1.6-1.7  Creatinine peaked at 5.4 with BUN 71 on 8/31  Retroperitoneal US   check urine studies  Bladder scan  Avoid nephrotoxins/NSAIDs  Strict I&O, daily weights  IV fluids   monitor labs    2. Anemia of CKD  Hemoglobin target 10-12  Hemoglobin 10.8-at target  B12 and folate-WNL on 8/31  Iron 60, iron saturation 24, ferritin 875 on 8/31  Monitor H&H    3. Hypertension in CKD I-IV  BP goal<130/80  BP at goal  Monitor BPs    4.   Metabolic acidosis  Secondary to MANAN/CKD  Sodium bicarbonate 650 mg

## 2023-09-01 LAB
25(OH)D3 SERPL-MCNC: 40.8 NG/ML (ref 30–100)
ALBUMIN SERPL-MCNC: 3.6 G/DL (ref 3.5–5.2)
ALP SERPL-CCNC: 45 U/L (ref 35–104)
ALT SERPL-CCNC: 23 U/L (ref 0–32)
ANION GAP SERPL CALCULATED.3IONS-SCNC: 16 MMOL/L (ref 7–16)
AST SERPL-CCNC: 28 U/L (ref 0–31)
BACTERIA URNS QL MICRO: ABNORMAL
BASOPHILS # BLD: 0.01 K/UL (ref 0–0.2)
BASOPHILS NFR BLD: 0 % (ref 0–2)
BILIRUB SERPL-MCNC: 0.3 MG/DL (ref 0–1.2)
BILIRUB UR QL STRIP: NEGATIVE
BUN SERPL-MCNC: 61 MG/DL (ref 6–23)
CALCIUM SERPL-MCNC: 8.5 MG/DL (ref 8.6–10.2)
CHLORIDE SERPL-SCNC: 106 MMOL/L (ref 98–107)
CLARITY UR: CLEAR
CO2 SERPL-SCNC: 19 MMOL/L (ref 22–29)
COLOR UR: YELLOW
CREAT SERPL-MCNC: 4.9 MG/DL (ref 0.5–1)
CREAT UR-MCNC: 60.8 MG/DL (ref 29–226)
EOSINOPHIL # BLD: 0.09 K/UL (ref 0.05–0.5)
EOSINOPHILS RELATIVE PERCENT: 3 % (ref 0–6)
EPI CELLS #/AREA URNS HPF: ABNORMAL /HPF
ERYTHROCYTE [DISTWIDTH] IN BLOOD BY AUTOMATED COUNT: 13.4 % (ref 11.5–15)
GFR SERPL CREATININE-BSD FRML MDRD: 9 ML/MIN/1.73M2
GLUCOSE SERPL-MCNC: 93 MG/DL (ref 74–99)
GLUCOSE UR STRIP-MCNC: NEGATIVE MG/DL
HCT VFR BLD AUTO: 30.4 % (ref 34–48)
HGB BLD-MCNC: 9.9 G/DL (ref 11.5–15.5)
HGB UR QL STRIP.AUTO: ABNORMAL
IMM GRANULOCYTES # BLD AUTO: <0.03 K/UL (ref 0–0.58)
IMM GRANULOCYTES NFR BLD: 1 % (ref 0–5)
KETONES UR STRIP-MCNC: NEGATIVE MG/DL
LEUKOCYTE ESTERASE UR QL STRIP: ABNORMAL
LYMPHOCYTES NFR BLD: 0.93 K/UL (ref 1.5–4)
LYMPHOCYTES RELATIVE PERCENT: 33 % (ref 20–42)
MAGNESIUM SERPL-MCNC: 2.2 MG/DL (ref 1.6–2.6)
MCH RBC QN AUTO: 29.6 PG (ref 26–35)
MCHC RBC AUTO-ENTMCNC: 32.6 G/DL (ref 32–34.5)
MCV RBC AUTO: 90.7 FL (ref 80–99.9)
MONOCYTES NFR BLD: 0.4 K/UL (ref 0.1–0.95)
MONOCYTES NFR BLD: 14 % (ref 2–12)
NEUTROPHILS NFR BLD: 48 % (ref 43–80)
NEUTS SEG NFR BLD: 1.36 K/UL (ref 1.8–7.3)
NITRITE UR QL STRIP: NEGATIVE
PH UR STRIP: 5.5 [PH] (ref 5–9)
PHOSPHATE SERPL-MCNC: 4.2 MG/DL (ref 2.5–4.5)
PLATELET # BLD AUTO: 120 K/UL (ref 130–450)
PMV BLD AUTO: 10.7 FL (ref 7–12)
POTASSIUM SERPL-SCNC: 4.5 MMOL/L (ref 3.5–5)
PROT SERPL-MCNC: 6 G/DL (ref 6.4–8.3)
PROT UR STRIP-MCNC: ABNORMAL MG/DL
RBC # BLD AUTO: 3.35 M/UL (ref 3.5–5.5)
RBC #/AREA URNS HPF: ABNORMAL /HPF
SODIUM SERPL-SCNC: 141 MMOL/L (ref 132–146)
SODIUM UR-SCNC: 76 MMOL/L
SP GR UR STRIP: 1.01 (ref 1–1.03)
UROBILINOGEN UR STRIP-ACNC: 0.2 EU/DL (ref 0–1)
WBC #/AREA URNS HPF: ABNORMAL /HPF
WBC OTHER # BLD: 2.8 K/UL (ref 4.5–11.5)

## 2023-09-01 PROCEDURE — 82570 ASSAY OF URINE CREATININE: CPT

## 2023-09-01 PROCEDURE — 84100 ASSAY OF PHOSPHORUS: CPT

## 2023-09-01 PROCEDURE — 80053 COMPREHEN METABOLIC PANEL: CPT

## 2023-09-01 PROCEDURE — 6360000002 HC RX W HCPCS: Performed by: INTERNAL MEDICINE

## 2023-09-01 PROCEDURE — 6370000000 HC RX 637 (ALT 250 FOR IP)

## 2023-09-01 PROCEDURE — 6370000000 HC RX 637 (ALT 250 FOR IP): Performed by: INTERNAL MEDICINE

## 2023-09-01 PROCEDURE — 81001 URINALYSIS AUTO W/SCOPE: CPT

## 2023-09-01 PROCEDURE — 2580000003 HC RX 258: Performed by: STUDENT IN AN ORGANIZED HEALTH CARE EDUCATION/TRAINING PROGRAM

## 2023-09-01 PROCEDURE — 82306 VITAMIN D 25 HYDROXY: CPT

## 2023-09-01 PROCEDURE — 36415 COLL VENOUS BLD VENIPUNCTURE: CPT

## 2023-09-01 PROCEDURE — 2140000000 HC CCU INTERMEDIATE R&B

## 2023-09-01 PROCEDURE — 85025 COMPLETE CBC W/AUTO DIFF WBC: CPT

## 2023-09-01 PROCEDURE — 84300 ASSAY OF URINE SODIUM: CPT

## 2023-09-01 PROCEDURE — 83735 ASSAY OF MAGNESIUM: CPT

## 2023-09-01 RX ORDER — HYDRALAZINE HYDROCHLORIDE 20 MG/ML
25 INJECTION INTRAMUSCULAR; INTRAVENOUS EVERY 4 HOURS PRN
Status: DISCONTINUED | OUTPATIENT
Start: 2023-09-01 | End: 2023-09-08

## 2023-09-01 RX ADMIN — SODIUM BICARBONATE 650 MG: 650 TABLET ORAL at 09:11

## 2023-09-01 RX ADMIN — SODIUM BICARBONATE 650 MG: 650 TABLET ORAL at 21:15

## 2023-09-01 RX ADMIN — ESCITALOPRAM OXALATE 10 MG: 10 TABLET ORAL at 09:10

## 2023-09-01 RX ADMIN — CLONIDINE HYDROCHLORIDE 0.1 MG: 0.1 TABLET ORAL at 22:00

## 2023-09-01 RX ADMIN — SODIUM CHLORIDE, POTASSIUM CHLORIDE, SODIUM LACTATE AND CALCIUM CHLORIDE: 600; 310; 30; 20 INJECTION, SOLUTION INTRAVENOUS at 05:30

## 2023-09-01 RX ADMIN — HYDRALAZINE HYDROCHLORIDE 25 MG: 25 TABLET, FILM COATED ORAL at 22:00

## 2023-09-01 RX ADMIN — HYDRALAZINE HYDROCHLORIDE 25 MG: 25 TABLET, FILM COATED ORAL at 05:34

## 2023-09-01 RX ADMIN — HYDRALAZINE HYDROCHLORIDE 25 MG: 20 INJECTION, SOLUTION INTRAMUSCULAR; INTRAVENOUS at 23:37

## 2023-09-01 RX ADMIN — LABETALOL HYDROCHLORIDE 100 MG: 100 TABLET, FILM COATED ORAL at 09:10

## 2023-09-01 RX ADMIN — SODIUM CHLORIDE, POTASSIUM CHLORIDE, SODIUM LACTATE AND CALCIUM CHLORIDE: 600; 310; 30; 20 INJECTION, SOLUTION INTRAVENOUS at 12:57

## 2023-09-01 RX ADMIN — CLONIDINE HYDROCHLORIDE 0.1 MG: 0.1 TABLET ORAL at 09:10

## 2023-09-01 RX ADMIN — CLONIDINE HYDROCHLORIDE 0.1 MG: 0.1 TABLET ORAL at 16:08

## 2023-09-01 RX ADMIN — LABETALOL HYDROCHLORIDE 100 MG: 100 TABLET, FILM COATED ORAL at 21:15

## 2023-09-01 RX ADMIN — LEVOTHYROXINE SODIUM 88 MCG: 0.09 TABLET ORAL at 05:34

## 2023-09-01 RX ADMIN — ASPIRIN 81 MG: 81 TABLET, COATED ORAL at 09:10

## 2023-09-01 RX ADMIN — HYDRALAZINE HYDROCHLORIDE 25 MG: 25 TABLET, FILM COATED ORAL at 15:23

## 2023-09-01 RX ADMIN — SODIUM CHLORIDE, POTASSIUM CHLORIDE, SODIUM LACTATE AND CALCIUM CHLORIDE: 600; 310; 30; 20 INJECTION, SOLUTION INTRAVENOUS at 22:02

## 2023-09-01 ASSESSMENT — PAIN SCALES - GENERAL
PAINLEVEL_OUTOF10: 0
PAINLEVEL_OUTOF10: 0

## 2023-09-01 NOTE — PLAN OF CARE
Problem: Chronic Conditions and Co-morbidities  Goal: Patient's chronic conditions and co-morbidity symptoms are monitored and maintained or improved  Outcome: Progressing  Flowsheets (Taken 8/31/2023 2030)  Care Plan - Patient's Chronic Conditions and Co-Morbidity Symptoms are Monitored and Maintained or Improved: Monitor and assess patient's chronic conditions and comorbid symptoms for stability, deterioration, or improvement     Problem: Discharge Planning  Goal: Discharge to home or other facility with appropriate resources  Outcome: Progressing  Flowsheets (Taken 8/31/2023 2030)  Discharge to home or other facility with appropriate resources: Identify barriers to discharge with patient and caregiver     Problem: Safety - Adult  Goal: Free from fall injury  Outcome: Progressing  Flowsheets  Taken 9/1/2023 0130  Free From Fall Injury: Instruct family/caregiver on patient safety  Taken 8/31/2023 2000  Free From Fall Injury: Instruct family/caregiver on patient safety

## 2023-09-01 NOTE — PLAN OF CARE
Problem: Chronic Conditions and Co-morbidities  Goal: Patient's chronic conditions and co-morbidity symptoms are monitored and maintained or improved  9/1/2023 0759 by Glenys Cabrera RN  Outcome: Progressing  9/1/2023 0759 by Glenys Cabrera RN  Outcome: Progressing  Flowsheets (Taken 8/31/2023 2030)  Care Plan - Patient's Chronic Conditions and Co-Morbidity Symptoms are Monitored and Maintained or Improved: Monitor and assess patient's chronic conditions and comorbid symptoms for stability, deterioration, or improvement     Problem: Discharge Planning  Goal: Discharge to home or other facility with appropriate resources  9/1/2023 0759 by Glenys Cabrera RN  Outcome: Progressing  9/1/2023 0759 by Glenys Cabrera RN  Outcome: Progressing  Flowsheets (Taken 8/31/2023 2030)  Discharge to home or other facility with appropriate resources: Identify barriers to discharge with patient and caregiver     Problem: Safety - Adult  Goal: Free from fall injury  9/1/2023 0759 by Glenys Cabrera RN  Outcome: Progressing  9/1/2023 0759 by Glenys Cabrera RN  Outcome: Progressing  Flowsheets  Taken 9/1/2023 0130  Free From Fall Injury: Instruct family/caregiver on patient safety  Taken 8/31/2023 2000  Free From Fall Injury: Instruct family/caregiver on patient safety

## 2023-09-01 NOTE — ACP (ADVANCE CARE PLANNING)
Advance Care Planning   Healthcare Decision Maker:    Primary Decision Maker: Yolanda Parham - Child - [phone]    Secondary Decision Maker: Leann Lagunas - Child - [phone]    Supplemental (Other) Decision Maker: Evette Naranjo - Child - [phone]    Supplemental (Other) Decision Maker: Randy Wang - Child - [phone]    Supplemental (Other) Decision Maker: Roxana Hdez - Child - [phone]    Click here to complete Healthcare Decision Makers including selection of the Healthcare Decision Maker Relationship (ie \"Primary\").

## 2023-09-01 NOTE — CARE COORDINATION
9/1/23  Attempted to speak with Patient regarding transition of care but patient sound asleep in her room. Patient Dtr / Mike Borges present and did help with cm assessment. Patient admitted for MANAN. Patient is being followed by nephrology. Daughter states patient lives at home with a male friend. Patient has a history of a CVA and knee replacement. Daughter states mom is able to manage her own bathing and dressing and simple cooking. Patient uses a walker and has a wheelchair for distances. Patients family helps with all shopping and transport. PCP is Dr. Daisy Sharp and pharmacy choice is [facility] [address]. Discharge goal is home when medically stable. SW/CM to follow for any post discharge needs once course of treatment and therapy assessments are complete. Electronically signed by ADRI Clark on 9/1/2023 at 11:22 AM     Case Management Assessment  Initial Evaluation    Date/Time of Evaluation: 9/1/2023 11:22 AM  Assessment Completed by: ADRI Clark    If patient is discharged prior to next notation, then this note serves as note for discharge by case management. Patient Name: Karli Sommers                   YOB: 1945  Diagnosis: MANAN (acute kidney injury) [facility]) [N17.9]                   Date / Time: 8/31/2023 11:38 AM    Patient Admission Status: Inpatient   Readmission Risk (Low < 19, Mod (19-27), High > 27): Readmission Risk Score: 17.9    Current PCP: Maritza Brown, DO  PCP verified by CM? Yes    Chart Reviewed: Yes      History Provided by: Child/Family  Patient Orientation: Alert and Oriented, Person, Place, Situation    Patient Cognition: Alert    Hospitalization in the last 30 days (Readmission):  No    If yes, Readmission Assessment in CM Navigator will be completed.     Advance Directives:      Code Status: Prior   Patient's Primary Decision Maker is:      Primary Decision Maker: Henny Elizondo - Child - [phone]    Discharge

## 2023-09-01 NOTE — H&P
Rebeka Dangelo MD [address] [place] [address]  Internal medicine   History and Physical      CHIEF COMPLAINT: Abnormal labs      HISTORY OF PRESENT ILLNESS:    2023  Patient was seen on the floor earlier this morning at the main campus of [facility] with the ER physician at time of admission  Data reviewed in detail with the daughter at bedside  73-year-old woman with known history of chronic kidney disease  Sent in for hospitalization by PCP due to abnormal creatinine of over 4  This apparently occurred within a few days  She denied significant symptoms such as would occur with uremia  Currently on IV hydration  Feels fairly well    Past Medical History:    Past Medical History:   Diagnosis Date    Acute loss of vision, left 2022    Arthritis     CAD (coronary artery disease)     follows with Dr. Noe Strickland    CKD (chronic kidney disease)     stage 3 / follows with Dr. Alonso Young    Depression     Hypertension     Left carotid stenosis 2022    Respiratory failure ([address])     history of 2014  when had stroke, trach  x 2 weeks    Thyroid disease     Tracheal stenosis     Unspecified cerebral artery occlusion with cerebral infarction 2014    right side flacid, hemorrhagic stroke dt elevated BP/residual s/s is at right side loss of fine motor skills and some weakness, drags right leg, slight memory loss and aphasia when she is tired       Past Surgical History:    Past Surgical History:   Procedure Laterality Date     SECTION      1    COLONOSCOPY      CORONARY ANGIOPLASTY WITH STENT PLACEMENT      CYST REMOVAL  's    ENDOSCOPY, COLON, DIAGNOSTIC      GASTROSTOMY TUBE PLACEMENT  2014    Mikayla. Lyndon Morris  and later removed    HEEL SPUR SURGERY Left years ago    JOINT REPLACEMENT Left late 's    OTHER SURGICAL HISTORY      scraping of trach x 2 to treat stenosis / last one 2016    POLYPECTOMY  2013    internal hemorrhoids - michelle    TONSILLECTOMY      TOTAL KNEE ARTHROPLASTY Right 2020

## 2023-09-01 NOTE — PLAN OF CARE
Problem: Chronic Conditions and Co-morbidities  Goal: Patient's chronic conditions and co-morbidity symptoms are monitored and maintained or improved  9/1/2023 0759 by Bob Gold RN  Outcome: Progressing  9/1/2023 0759 by Bob Gold RN  Outcome: Progressing  Flowsheets (Taken 8/31/2023 2030)  Care Plan - Patient's Chronic Conditions and Co-Morbidity Symptoms are Monitored and Maintained or Improved: Monitor and assess patient's chronic conditions and comorbid symptoms for stability, deterioration, or improvement     Problem: Discharge Planning  Goal: Discharge to home or other facility with appropriate resources  9/1/2023 1130 by Connor Nunez RN  Outcome: Progressing  9/1/2023 0759 by Bob Gold RN  Outcome: Progressing  9/1/2023 0759 by Bob Gold RN  Outcome: Progressing  Flowsheets (Taken 8/31/2023 2030)  Discharge to home or other facility with appropriate resources: Identify barriers to discharge with patient and caregiver     Problem: Safety - Adult  Goal: Free from fall injury  9/1/2023 1130 by Connor Nunez RN  Outcome: Progressing  9/1/2023 0759 by Bob Gold RN  Outcome: Progressing  9/1/2023 0759 by Bob Gold RN  Outcome: Progressing  Flowsheets  Taken 9/1/2023 0130  Free From Fall Injury: Instruct family/caregiver on patient safety  Taken 8/31/2023 2000  Free From Fall Injury: Instruct family/caregiver on patient safety

## 2023-09-02 LAB
ALBUMIN SERPL-MCNC: 3.3 G/DL (ref 3.5–5.2)
ALP SERPL-CCNC: 46 U/L (ref 35–104)
ALT SERPL-CCNC: 20 U/L (ref 0–32)
ANION GAP SERPL CALCULATED.3IONS-SCNC: 12 MMOL/L (ref 7–16)
AST SERPL-CCNC: 21 U/L (ref 0–31)
BASOPHILS # BLD: 0.01 K/UL (ref 0–0.2)
BASOPHILS NFR BLD: 0 % (ref 0–2)
BILIRUB SERPL-MCNC: 0.3 MG/DL (ref 0–1.2)
BUN SERPL-MCNC: 54 MG/DL (ref 6–23)
CALCIUM SERPL-MCNC: 8.6 MG/DL (ref 8.6–10.2)
CHLORIDE SERPL-SCNC: 107 MMOL/L (ref 98–107)
CO2 SERPL-SCNC: 22 MMOL/L (ref 22–29)
CREAT SERPL-MCNC: 4.6 MG/DL (ref 0.5–1)
CREAT UR-MCNC: 60.1 MG/DL (ref 29–226)
EOSINOPHIL # BLD: 0.07 K/UL (ref 0.05–0.5)
EOSINOPHILS RELATIVE PERCENT: 2 % (ref 0–6)
ERYTHROCYTE [DISTWIDTH] IN BLOOD BY AUTOMATED COUNT: 13.3 % (ref 11.5–15)
GFR SERPL CREATININE-BSD FRML MDRD: 9 ML/MIN/1.73M2
GLUCOSE SERPL-MCNC: 99 MG/DL (ref 74–99)
HCT VFR BLD AUTO: 30.6 % (ref 34–48)
HGB BLD-MCNC: 9.9 G/DL (ref 11.5–15.5)
IMM GRANULOCYTES # BLD AUTO: 0.03 K/UL (ref 0–0.58)
IMM GRANULOCYTES NFR BLD: 1 % (ref 0–5)
LYMPHOCYTES NFR BLD: 0.81 K/UL (ref 1.5–4)
LYMPHOCYTES RELATIVE PERCENT: 23 % (ref 20–42)
MAGNESIUM SERPL-MCNC: 1.8 MG/DL (ref 1.6–2.6)
MCH RBC QN AUTO: 29.6 PG (ref 26–35)
MCHC RBC AUTO-ENTMCNC: 32.4 G/DL (ref 32–34.5)
MCV RBC AUTO: 91.3 FL (ref 80–99.9)
MONOCYTES NFR BLD: 0.48 K/UL (ref 0.1–0.95)
MONOCYTES NFR BLD: 14 % (ref 2–12)
NEUTROPHILS NFR BLD: 60 % (ref 43–80)
NEUTS SEG NFR BLD: 2.11 K/UL (ref 1.8–7.3)
PHOSPHATE SERPL-MCNC: 3.9 MG/DL (ref 2.5–4.5)
PLATELET # BLD AUTO: 139 K/UL (ref 130–450)
PMV BLD AUTO: 10.3 FL (ref 7–12)
POTASSIUM SERPL-SCNC: 4.5 MMOL/L (ref 3.5–5)
PROT SERPL-MCNC: 5.9 G/DL (ref 6.4–8.3)
RBC # BLD AUTO: 3.35 M/UL (ref 3.5–5.5)
SODIUM SERPL-SCNC: 141 MMOL/L (ref 132–146)
TOTAL PROTEIN, URINE: 18 MG/DL (ref 0–12)
URINE TOTAL PROTEIN CREATININE RATIO: 0.3 (ref 0–0.2)
WBC OTHER # BLD: 3.5 K/UL (ref 4.5–11.5)

## 2023-09-02 PROCEDURE — 83735 ASSAY OF MAGNESIUM: CPT

## 2023-09-02 PROCEDURE — 6360000002 HC RX W HCPCS: Performed by: INTERNAL MEDICINE

## 2023-09-02 PROCEDURE — 36415 COLL VENOUS BLD VENIPUNCTURE: CPT

## 2023-09-02 PROCEDURE — 80053 COMPREHEN METABOLIC PANEL: CPT

## 2023-09-02 PROCEDURE — 6370000000 HC RX 637 (ALT 250 FOR IP)

## 2023-09-02 PROCEDURE — 82570 ASSAY OF URINE CREATININE: CPT

## 2023-09-02 PROCEDURE — 2140000000 HC CCU INTERMEDIATE R&B

## 2023-09-02 PROCEDURE — 85025 COMPLETE CBC W/AUTO DIFF WBC: CPT

## 2023-09-02 PROCEDURE — 84156 ASSAY OF PROTEIN URINE: CPT

## 2023-09-02 PROCEDURE — 2580000003 HC RX 258: Performed by: STUDENT IN AN ORGANIZED HEALTH CARE EDUCATION/TRAINING PROGRAM

## 2023-09-02 PROCEDURE — 84100 ASSAY OF PHOSPHORUS: CPT

## 2023-09-02 PROCEDURE — 6370000000 HC RX 637 (ALT 250 FOR IP): Performed by: INTERNAL MEDICINE

## 2023-09-02 RX ADMIN — LEVOTHYROXINE SODIUM 88 MCG: 0.09 TABLET ORAL at 05:46

## 2023-09-02 RX ADMIN — CLONIDINE HYDROCHLORIDE 0.1 MG: 0.1 TABLET ORAL at 16:48

## 2023-09-02 RX ADMIN — HYDRALAZINE HYDROCHLORIDE 25 MG: 20 INJECTION, SOLUTION INTRAMUSCULAR; INTRAVENOUS at 03:44

## 2023-09-02 RX ADMIN — LABETALOL HYDROCHLORIDE 100 MG: 100 TABLET, FILM COATED ORAL at 09:04

## 2023-09-02 RX ADMIN — SODIUM CHLORIDE, POTASSIUM CHLORIDE, SODIUM LACTATE AND CALCIUM CHLORIDE: 600; 310; 30; 20 INJECTION, SOLUTION INTRAVENOUS at 06:04

## 2023-09-02 RX ADMIN — HYDRALAZINE HYDROCHLORIDE 25 MG: 20 INJECTION, SOLUTION INTRAMUSCULAR; INTRAVENOUS at 11:20

## 2023-09-02 RX ADMIN — ESCITALOPRAM OXALATE 10 MG: 10 TABLET ORAL at 08:57

## 2023-09-02 RX ADMIN — CLONIDINE HYDROCHLORIDE 0.1 MG: 0.1 TABLET ORAL at 08:57

## 2023-09-02 RX ADMIN — LABETALOL HYDROCHLORIDE 100 MG: 100 TABLET, FILM COATED ORAL at 22:08

## 2023-09-02 RX ADMIN — HYDRALAZINE HYDROCHLORIDE 25 MG: 25 TABLET, FILM COATED ORAL at 05:46

## 2023-09-02 RX ADMIN — HYDRALAZINE HYDROCHLORIDE 25 MG: 25 TABLET, FILM COATED ORAL at 13:23

## 2023-09-02 RX ADMIN — HYDRALAZINE HYDROCHLORIDE 25 MG: 25 TABLET, FILM COATED ORAL at 20:58

## 2023-09-02 RX ADMIN — SODIUM BICARBONATE 650 MG: 650 TABLET ORAL at 20:58

## 2023-09-02 RX ADMIN — CLONIDINE HYDROCHLORIDE 0.1 MG: 0.1 TABLET ORAL at 20:58

## 2023-09-02 RX ADMIN — ASPIRIN 81 MG: 81 TABLET, COATED ORAL at 08:57

## 2023-09-02 RX ADMIN — SODIUM CHLORIDE, POTASSIUM CHLORIDE, SODIUM LACTATE AND CALCIUM CHLORIDE: 600; 310; 30; 20 INJECTION, SOLUTION INTRAVENOUS at 23:37

## 2023-09-02 RX ADMIN — MELATONIN 3 MG ORAL TABLET 3 MG: 3 TABLET ORAL at 23:44

## 2023-09-02 RX ADMIN — HYDRALAZINE HYDROCHLORIDE 25 MG: 20 INJECTION, SOLUTION INTRAMUSCULAR; INTRAVENOUS at 23:36

## 2023-09-02 RX ADMIN — SODIUM BICARBONATE 650 MG: 650 TABLET ORAL at 08:57

## 2023-09-02 ASSESSMENT — PAIN SCALES - GENERAL
PAINLEVEL_OUTOF10: 0
PAINLEVEL_OUTOF10: 2
PAINLEVEL_OUTOF10: 0

## 2023-09-02 ASSESSMENT — PAIN DESCRIPTION - ORIENTATION: ORIENTATION: RIGHT

## 2023-09-02 ASSESSMENT — PAIN DESCRIPTION - DESCRIPTORS: DESCRIPTORS: NAGGING;DULL;DISCOMFORT

## 2023-09-02 ASSESSMENT — PAIN DESCRIPTION - LOCATION: LOCATION: NECK

## 2023-09-02 ASSESSMENT — PAIN DESCRIPTION - PAIN TYPE: TYPE: ACUTE PAIN

## 2023-09-02 NOTE — PLAN OF CARE
Problem: Chronic Conditions and Co-morbidities  Goal: Patient's chronic conditions and co-morbidity symptoms are monitored and maintained or improved  9/2/2023 1019 by Humble Sheets RN  Outcome: Progressing    Problem: Discharge Planning  Goal: Discharge to home or other facility with appropriate resources  9/2/2023 1019 by Humble Sheets RN  Outcome: Progressing         Problem: Safety - Adult  Goal: Free from fall injury  9/2/2023 1019 by Humble Sheets RN  Outcome: Progressing       Problem: ABCDS Injury Assessment  Goal: Absence of physical injury  9/2/2023 1019 by Humble Sheets RN  Outcome: Progressing

## 2023-09-02 NOTE — PLAN OF CARE
Problem: Chronic Conditions and Co-morbidities  Goal: Patient's chronic conditions and co-morbidity symptoms are monitored and maintained or improved  9/1/2023 2136 by Jb Gillis RN  Outcome: Progressing  Flowsheets (Taken 9/1/2023 1952)  Care Plan - Patient's Chronic Conditions and Co-Morbidity Symptoms are Monitored and Maintained or Improved: Monitor and assess patient's chronic conditions and comorbid symptoms for stability, deterioration, or improvement  9/1/2023 0759 by Luz Herrera RN  Outcome: Progressing  9/1/2023 0759 by Luz Herrera RN  Outcome: Progressing  Flowsheets (Taken 8/31/2023 2030)  Care Plan - Patient's Chronic Conditions and Co-Morbidity Symptoms are Monitored and Maintained or Improved: Monitor and assess patient's chronic conditions and comorbid symptoms for stability, deterioration, or improvement     Problem: Discharge Planning  Goal: Discharge to home or other facility with appropriate resources  9/1/2023 2136 by Jb Gillis RN  Outcome: Progressing  Flowsheets (Taken 9/1/2023 1952)  Discharge to home or other facility with appropriate resources: Identify barriers to discharge with patient and caregiver  9/1/2023 1130 by Celestina Crespo RN  Outcome: Progressing  9/1/2023 0759 by Luz Herrera RN  Outcome: Progressing  9/1/2023 0759 by Luz Herrera RN  Outcome: Progressing  Flowsheets (Taken 8/31/2023 2030)  Discharge to home or other facility with appropriate resources: Identify barriers to discharge with patient and caregiver     Problem: Safety - Adult  Goal: Free from fall injury  9/1/2023 2136 by Jb Gillis RN  Outcome: Progressing  9/1/2023 1130 by Celestina Crespo RN  Outcome: Progressing  9/1/2023 0759 by Luz Herrera RN  Outcome: Progressing  9/1/2023 0759 by Luz Herrera RN  Outcome: Progressing  Flowsheets  Taken 9/1/2023 0130  Free From Fall Injury: Instruct family/caregiver on patient safety  Taken 8/31/2023 2000  Free From Fall Injury:

## 2023-09-03 LAB
ALBUMIN SERPL-MCNC: 3.5 G/DL (ref 3.5–5.2)
ALP SERPL-CCNC: 45 U/L (ref 35–104)
ALT SERPL-CCNC: 18 U/L (ref 0–32)
ANION GAP SERPL CALCULATED.3IONS-SCNC: 11 MMOL/L (ref 7–16)
AST SERPL-CCNC: 21 U/L (ref 0–31)
BASOPHILS # BLD: 0.02 K/UL (ref 0–0.2)
BASOPHILS NFR BLD: 1 % (ref 0–2)
BILIRUB SERPL-MCNC: 0.4 MG/DL (ref 0–1.2)
BUN SERPL-MCNC: 43 MG/DL (ref 6–23)
CALCIUM SERPL-MCNC: 8.6 MG/DL (ref 8.6–10.2)
CHLORIDE SERPL-SCNC: 108 MMOL/L (ref 98–107)
CO2 SERPL-SCNC: 24 MMOL/L (ref 22–29)
CREAT SERPL-MCNC: 4.2 MG/DL (ref 0.5–1)
EOSINOPHIL # BLD: 0.1 K/UL (ref 0.05–0.5)
EOSINOPHILS RELATIVE PERCENT: 3 % (ref 0–6)
ERYTHROCYTE [DISTWIDTH] IN BLOOD BY AUTOMATED COUNT: 13.5 % (ref 11.5–15)
GFR SERPL CREATININE-BSD FRML MDRD: 10 ML/MIN/1.73M2
GLUCOSE SERPL-MCNC: 98 MG/DL (ref 74–99)
HCT VFR BLD AUTO: 30.1 % (ref 34–48)
HGB BLD-MCNC: 9.8 G/DL (ref 11.5–15.5)
IMM GRANULOCYTES # BLD AUTO: 0.04 K/UL (ref 0–0.58)
IMM GRANULOCYTES NFR BLD: 1 % (ref 0–5)
LYMPHOCYTES NFR BLD: 0.71 K/UL (ref 1.5–4)
LYMPHOCYTES RELATIVE PERCENT: 20 % (ref 20–42)
MAGNESIUM SERPL-MCNC: 1.6 MG/DL (ref 1.6–2.6)
MCH RBC QN AUTO: 30 PG (ref 26–35)
MCHC RBC AUTO-ENTMCNC: 32.6 G/DL (ref 32–34.5)
MCV RBC AUTO: 92 FL (ref 80–99.9)
MONOCYTES NFR BLD: 0.46 K/UL (ref 0.1–0.95)
MONOCYTES NFR BLD: 13 % (ref 2–12)
NEUTROPHILS NFR BLD: 63 % (ref 43–80)
NEUTS SEG NFR BLD: 2.23 K/UL (ref 1.8–7.3)
PHOSPHATE SERPL-MCNC: 3.8 MG/DL (ref 2.5–4.5)
PLATELET # BLD AUTO: 143 K/UL (ref 130–450)
PMV BLD AUTO: 9.9 FL (ref 7–12)
POTASSIUM SERPL-SCNC: 4.8 MMOL/L (ref 3.5–5)
PROT SERPL-MCNC: 6 G/DL (ref 6.4–8.3)
RBC # BLD AUTO: 3.27 M/UL (ref 3.5–5.5)
SODIUM SERPL-SCNC: 143 MMOL/L (ref 132–146)
WBC OTHER # BLD: 3.6 K/UL (ref 4.5–11.5)

## 2023-09-03 PROCEDURE — 6370000000 HC RX 637 (ALT 250 FOR IP)

## 2023-09-03 PROCEDURE — 80053 COMPREHEN METABOLIC PANEL: CPT

## 2023-09-03 PROCEDURE — 85025 COMPLETE CBC W/AUTO DIFF WBC: CPT

## 2023-09-03 PROCEDURE — 83735 ASSAY OF MAGNESIUM: CPT

## 2023-09-03 PROCEDURE — 36415 COLL VENOUS BLD VENIPUNCTURE: CPT

## 2023-09-03 PROCEDURE — 2140000000 HC CCU INTERMEDIATE R&B

## 2023-09-03 PROCEDURE — 84100 ASSAY OF PHOSPHORUS: CPT

## 2023-09-03 PROCEDURE — 2580000003 HC RX 258: Performed by: STUDENT IN AN ORGANIZED HEALTH CARE EDUCATION/TRAINING PROGRAM

## 2023-09-03 PROCEDURE — 6370000000 HC RX 637 (ALT 250 FOR IP): Performed by: INTERNAL MEDICINE

## 2023-09-03 RX ADMIN — HYDRALAZINE HYDROCHLORIDE 25 MG: 25 TABLET, FILM COATED ORAL at 05:19

## 2023-09-03 RX ADMIN — HYDRALAZINE HYDROCHLORIDE 25 MG: 25 TABLET, FILM COATED ORAL at 21:06

## 2023-09-03 RX ADMIN — LABETALOL HYDROCHLORIDE 100 MG: 100 TABLET, FILM COATED ORAL at 21:06

## 2023-09-03 RX ADMIN — ASPIRIN 81 MG: 81 TABLET, COATED ORAL at 11:02

## 2023-09-03 RX ADMIN — CLONIDINE HYDROCHLORIDE 0.1 MG: 0.1 TABLET ORAL at 16:07

## 2023-09-03 RX ADMIN — ESCITALOPRAM OXALATE 10 MG: 10 TABLET ORAL at 11:01

## 2023-09-03 RX ADMIN — SODIUM CHLORIDE, POTASSIUM CHLORIDE, SODIUM LACTATE AND CALCIUM CHLORIDE: 600; 310; 30; 20 INJECTION, SOLUTION INTRAVENOUS at 21:05

## 2023-09-03 RX ADMIN — SODIUM BICARBONATE 650 MG: 650 TABLET ORAL at 21:06

## 2023-09-03 RX ADMIN — CLONIDINE HYDROCHLORIDE 0.1 MG: 0.1 TABLET ORAL at 21:06

## 2023-09-03 RX ADMIN — HYDRALAZINE HYDROCHLORIDE 25 MG: 25 TABLET, FILM COATED ORAL at 16:07

## 2023-09-03 RX ADMIN — LABETALOL HYDROCHLORIDE 100 MG: 100 TABLET, FILM COATED ORAL at 11:01

## 2023-09-03 RX ADMIN — SODIUM BICARBONATE 650 MG: 650 TABLET ORAL at 11:01

## 2023-09-03 RX ADMIN — LEVOTHYROXINE SODIUM 88 MCG: 0.09 TABLET ORAL at 05:19

## 2023-09-03 RX ADMIN — CLONIDINE HYDROCHLORIDE 0.1 MG: 0.1 TABLET ORAL at 11:01

## 2023-09-04 LAB
ALBUMIN SERPL-MCNC: 3.3 G/DL (ref 3.5–5.2)
ALP SERPL-CCNC: 43 U/L (ref 35–104)
ALT SERPL-CCNC: 16 U/L (ref 0–32)
ANION GAP SERPL CALCULATED.3IONS-SCNC: 9 MMOL/L (ref 7–16)
AST SERPL-CCNC: 19 U/L (ref 0–31)
BASOPHILS # BLD: 0.02 K/UL (ref 0–0.2)
BASOPHILS NFR BLD: 1 % (ref 0–2)
BILIRUB SERPL-MCNC: 0.5 MG/DL (ref 0–1.2)
BUN SERPL-MCNC: 42 MG/DL (ref 6–23)
CALCIUM SERPL-MCNC: 8.6 MG/DL (ref 8.6–10.2)
CHLORIDE SERPL-SCNC: 107 MMOL/L (ref 98–107)
CO2 SERPL-SCNC: 26 MMOL/L (ref 22–29)
CREAT SERPL-MCNC: 4.1 MG/DL (ref 0.5–1)
EOSINOPHIL # BLD: 0.1 K/UL (ref 0.05–0.5)
EOSINOPHILS RELATIVE PERCENT: 3 % (ref 0–6)
ERYTHROCYTE [DISTWIDTH] IN BLOOD BY AUTOMATED COUNT: 13.3 % (ref 11.5–15)
GFR SERPL CREATININE-BSD FRML MDRD: 11 ML/MIN/1.73M2
GLUCOSE SERPL-MCNC: 100 MG/DL (ref 74–99)
HCT VFR BLD AUTO: 29.9 % (ref 34–48)
HGB BLD-MCNC: 9.7 G/DL (ref 11.5–15.5)
IMM GRANULOCYTES # BLD AUTO: <0.03 K/UL (ref 0–0.58)
IMM GRANULOCYTES NFR BLD: 1 % (ref 0–5)
LYMPHOCYTES NFR BLD: 0.72 K/UL (ref 1.5–4)
LYMPHOCYTES RELATIVE PERCENT: 21 % (ref 20–42)
MAGNESIUM SERPL-MCNC: 1.6 MG/DL (ref 1.6–2.6)
MCH RBC QN AUTO: 29.8 PG (ref 26–35)
MCHC RBC AUTO-ENTMCNC: 32.4 G/DL (ref 32–34.5)
MCV RBC AUTO: 92 FL (ref 80–99.9)
MONOCYTES NFR BLD: 0.41 K/UL (ref 0.1–0.95)
MONOCYTES NFR BLD: 12 % (ref 2–12)
NEUTROPHILS NFR BLD: 63 % (ref 43–80)
NEUTS SEG NFR BLD: 2.13 K/UL (ref 1.8–7.3)
PHOSPHATE SERPL-MCNC: 3.6 MG/DL (ref 2.5–4.5)
PLATELET # BLD AUTO: 138 K/UL (ref 130–450)
PMV BLD AUTO: 10.1 FL (ref 7–12)
POTASSIUM SERPL-SCNC: 4.4 MMOL/L (ref 3.5–5)
PROT SERPL-MCNC: 5.9 G/DL (ref 6.4–8.3)
RBC # BLD AUTO: 3.25 M/UL (ref 3.5–5.5)
SODIUM SERPL-SCNC: 142 MMOL/L (ref 132–146)
WBC OTHER # BLD: 3.4 K/UL (ref 4.5–11.5)

## 2023-09-04 PROCEDURE — 6370000000 HC RX 637 (ALT 250 FOR IP): Performed by: INTERNAL MEDICINE

## 2023-09-04 PROCEDURE — 80053 COMPREHEN METABOLIC PANEL: CPT

## 2023-09-04 PROCEDURE — 83735 ASSAY OF MAGNESIUM: CPT

## 2023-09-04 PROCEDURE — 2140000000 HC CCU INTERMEDIATE R&B

## 2023-09-04 PROCEDURE — 36415 COLL VENOUS BLD VENIPUNCTURE: CPT

## 2023-09-04 PROCEDURE — 6360000002 HC RX W HCPCS: Performed by: INTERNAL MEDICINE

## 2023-09-04 PROCEDURE — 2580000003 HC RX 258: Performed by: STUDENT IN AN ORGANIZED HEALTH CARE EDUCATION/TRAINING PROGRAM

## 2023-09-04 PROCEDURE — 84100 ASSAY OF PHOSPHORUS: CPT

## 2023-09-04 PROCEDURE — 6370000000 HC RX 637 (ALT 250 FOR IP)

## 2023-09-04 PROCEDURE — 85025 COMPLETE CBC W/AUTO DIFF WBC: CPT

## 2023-09-04 RX ADMIN — CLONIDINE HYDROCHLORIDE 0.1 MG: 0.1 TABLET ORAL at 16:20

## 2023-09-04 RX ADMIN — ACETAMINOPHEN 500 MG: 500 TABLET ORAL at 18:42

## 2023-09-04 RX ADMIN — HYDRALAZINE HYDROCHLORIDE 25 MG: 20 INJECTION, SOLUTION INTRAMUSCULAR; INTRAVENOUS at 18:20

## 2023-09-04 RX ADMIN — SODIUM CHLORIDE, POTASSIUM CHLORIDE, SODIUM LACTATE AND CALCIUM CHLORIDE: 600; 310; 30; 20 INJECTION, SOLUTION INTRAVENOUS at 04:12

## 2023-09-04 RX ADMIN — LABETALOL HYDROCHLORIDE 100 MG: 100 TABLET, FILM COATED ORAL at 22:05

## 2023-09-04 RX ADMIN — ASPIRIN 81 MG: 81 TABLET, COATED ORAL at 08:43

## 2023-09-04 RX ADMIN — HYDRALAZINE HYDROCHLORIDE 25 MG: 25 TABLET, FILM COATED ORAL at 22:05

## 2023-09-04 RX ADMIN — CLONIDINE HYDROCHLORIDE 0.1 MG: 0.1 TABLET ORAL at 22:05

## 2023-09-04 RX ADMIN — LEVOTHYROXINE SODIUM 88 MCG: 0.09 TABLET ORAL at 08:43

## 2023-09-04 RX ADMIN — ESCITALOPRAM OXALATE 10 MG: 10 TABLET ORAL at 08:43

## 2023-09-04 RX ADMIN — SODIUM BICARBONATE 650 MG: 650 TABLET ORAL at 08:44

## 2023-09-04 RX ADMIN — LABETALOL HYDROCHLORIDE 100 MG: 100 TABLET, FILM COATED ORAL at 08:44

## 2023-09-04 RX ADMIN — HYDRALAZINE HYDROCHLORIDE 25 MG: 20 INJECTION, SOLUTION INTRAMUSCULAR; INTRAVENOUS at 04:15

## 2023-09-04 RX ADMIN — HYDRALAZINE HYDROCHLORIDE 25 MG: 25 TABLET, FILM COATED ORAL at 08:42

## 2023-09-04 RX ADMIN — SODIUM BICARBONATE 650 MG: 650 TABLET ORAL at 22:05

## 2023-09-04 RX ADMIN — SODIUM CHLORIDE, POTASSIUM CHLORIDE, SODIUM LACTATE AND CALCIUM CHLORIDE: 600; 310; 30; 20 INJECTION, SOLUTION INTRAVENOUS at 12:37

## 2023-09-04 RX ADMIN — CLONIDINE HYDROCHLORIDE 0.1 MG: 0.1 TABLET ORAL at 08:43

## 2023-09-04 RX ADMIN — HYDRALAZINE HYDROCHLORIDE 25 MG: 25 TABLET, FILM COATED ORAL at 16:20

## 2023-09-04 RX ADMIN — HYDRALAZINE HYDROCHLORIDE 25 MG: 20 INJECTION, SOLUTION INTRAMUSCULAR; INTRAVENOUS at 23:56

## 2023-09-04 ASSESSMENT — PAIN SCALES - GENERAL
PAINLEVEL_OUTOF10: 0
PAINLEVEL_OUTOF10: 0
PAINLEVEL_OUTOF10: 7
PAINLEVEL_OUTOF10: 0
PAINLEVEL_OUTOF10: 0

## 2023-09-04 ASSESSMENT — PAIN DESCRIPTION - DESCRIPTORS: DESCRIPTORS: ACHING;DISCOMFORT;SHARP

## 2023-09-04 ASSESSMENT — PAIN DESCRIPTION - LOCATION: LOCATION: HEAD

## 2023-09-04 NOTE — PLAN OF CARE
Problem: Chronic Conditions and Co-morbidities  Goal: Patient's chronic conditions and co-morbidity symptoms are monitored and maintained or improved  Outcome: Progressing  Flowsheets (Taken 9/3/2023 0752 by Celeste Reed RN)  Care Plan - Patient's Chronic Conditions and Co-Morbidity Symptoms are Monitored and Maintained or Improved: Monitor and assess patient's chronic conditions and comorbid symptoms for stability, deterioration, or improvement     Problem: Discharge Planning  Goal: Discharge to home or other facility with appropriate resources  Outcome: Progressing  Flowsheets (Taken 9/3/2023 0752 by Celeste Reed RN)  Discharge to home or other facility with appropriate resources: Identify barriers to discharge with patient and caregiver

## 2023-09-04 NOTE — PLAN OF CARE
Problem: Chronic Conditions and Co-morbidities  Goal: Patient's chronic conditions and co-morbidity symptoms are monitored and maintained or improved  9/4/2023 1015 by Osmin Garza RN  Outcome: Progressing     Problem: Discharge Planning  Goal: Discharge to home or other facility with appropriate resources  9/4/2023 1015 by Osmin Garza RN  Outcome: Progressing     Problem: Safety - Adult  Goal: Free from fall injury  Outcome: Progressing     Problem: ABCDS Injury Assessment  Goal: Absence of physical injury  Outcome: Progressing     Problem: Pain  Goal: Verbalizes/displays adequate comfort level or baseline comfort level  Outcome: Progressing not examined detailed exam None known

## 2023-09-05 LAB
ALBUMIN SERPL-MCNC: 3.3 G/DL (ref 3.5–5.2)
ALP SERPL-CCNC: 42 U/L (ref 35–104)
ALT SERPL-CCNC: 13 U/L (ref 0–32)
ANION GAP SERPL CALCULATED.3IONS-SCNC: 13 MMOL/L (ref 7–16)
AST SERPL-CCNC: 16 U/L (ref 0–31)
BASOPHILS # BLD: 0.02 K/UL (ref 0–0.2)
BASOPHILS NFR BLD: 1 % (ref 0–2)
BILIRUB SERPL-MCNC: 0.5 MG/DL (ref 0–1.2)
BUN SERPL-MCNC: 41 MG/DL (ref 6–23)
CALCIUM SERPL-MCNC: 8.5 MG/DL (ref 8.6–10.2)
CHLORIDE SERPL-SCNC: 104 MMOL/L (ref 98–107)
CO2 SERPL-SCNC: 24 MMOL/L (ref 22–29)
CREAT SERPL-MCNC: 4.4 MG/DL (ref 0.5–1)
EOSINOPHIL # BLD: 0.07 K/UL (ref 0.05–0.5)
EOSINOPHILS RELATIVE PERCENT: 2 % (ref 0–6)
ERYTHROCYTE [DISTWIDTH] IN BLOOD BY AUTOMATED COUNT: 13.3 % (ref 11.5–15)
GFR SERPL CREATININE-BSD FRML MDRD: 10 ML/MIN/1.73M2
GLUCOSE SERPL-MCNC: 94 MG/DL (ref 74–99)
HCT VFR BLD AUTO: 28.7 % (ref 34–48)
HGB BLD-MCNC: 9.1 G/DL (ref 11.5–15.5)
IMM GRANULOCYTES # BLD AUTO: 0.03 K/UL (ref 0–0.58)
IMM GRANULOCYTES NFR BLD: 1 % (ref 0–5)
LYMPHOCYTES NFR BLD: 0.86 K/UL (ref 1.5–4)
LYMPHOCYTES RELATIVE PERCENT: 23 % (ref 20–42)
MAGNESIUM SERPL-MCNC: 1.5 MG/DL (ref 1.6–2.6)
MCH RBC QN AUTO: 29.5 PG (ref 26–35)
MCHC RBC AUTO-ENTMCNC: 31.7 G/DL (ref 32–34.5)
MCV RBC AUTO: 93.2 FL (ref 80–99.9)
MONOCYTES NFR BLD: 0.53 K/UL (ref 0.1–0.95)
MONOCYTES NFR BLD: 14 % (ref 2–12)
NEUTROPHILS NFR BLD: 59 % (ref 43–80)
NEUTS SEG NFR BLD: 2.17 K/UL (ref 1.8–7.3)
PHOSPHATE SERPL-MCNC: 3.9 MG/DL (ref 2.5–4.5)
PLATELET # BLD AUTO: 134 K/UL (ref 130–450)
PMV BLD AUTO: 10.4 FL (ref 7–12)
POTASSIUM SERPL-SCNC: 4.4 MMOL/L (ref 3.5–5)
PROT SERPL-MCNC: 5.9 G/DL (ref 6.4–8.3)
RBC # BLD AUTO: 3.08 M/UL (ref 3.5–5.5)
SODIUM SERPL-SCNC: 141 MMOL/L (ref 132–146)
WBC OTHER # BLD: 3.7 K/UL (ref 4.5–11.5)

## 2023-09-05 PROCEDURE — 6370000000 HC RX 637 (ALT 250 FOR IP)

## 2023-09-05 PROCEDURE — 83735 ASSAY OF MAGNESIUM: CPT

## 2023-09-05 PROCEDURE — 2140000000 HC CCU INTERMEDIATE R&B

## 2023-09-05 PROCEDURE — 80053 COMPREHEN METABOLIC PANEL: CPT

## 2023-09-05 PROCEDURE — 84100 ASSAY OF PHOSPHORUS: CPT

## 2023-09-05 PROCEDURE — 36415 COLL VENOUS BLD VENIPUNCTURE: CPT

## 2023-09-05 PROCEDURE — 97535 SELF CARE MNGMENT TRAINING: CPT

## 2023-09-05 PROCEDURE — 6360000002 HC RX W HCPCS: Performed by: INTERNAL MEDICINE

## 2023-09-05 PROCEDURE — 6360000002 HC RX W HCPCS: Performed by: NURSE PRACTITIONER

## 2023-09-05 PROCEDURE — 6370000000 HC RX 637 (ALT 250 FOR IP): Performed by: INTERNAL MEDICINE

## 2023-09-05 PROCEDURE — 97165 OT EVAL LOW COMPLEX 30 MIN: CPT

## 2023-09-05 PROCEDURE — 2580000003 HC RX 258: Performed by: INTERNAL MEDICINE

## 2023-09-05 PROCEDURE — 97161 PT EVAL LOW COMPLEX 20 MIN: CPT

## 2023-09-05 PROCEDURE — 97530 THERAPEUTIC ACTIVITIES: CPT

## 2023-09-05 PROCEDURE — 85025 COMPLETE CBC W/AUTO DIFF WBC: CPT

## 2023-09-05 RX ORDER — SODIUM CHLORIDE 0.9 % (FLUSH) 0.9 %
10 SYRINGE (ML) INJECTION PRN
Status: DISCONTINUED | OUTPATIENT
Start: 2023-09-05 | End: 2023-09-18 | Stop reason: HOSPADM

## 2023-09-05 RX ORDER — SODIUM CHLORIDE 0.9 % (FLUSH) 0.9 %
10 SYRINGE (ML) INJECTION 2 TIMES DAILY
Status: DISCONTINUED | OUTPATIENT
Start: 2023-09-05 | End: 2023-09-18 | Stop reason: HOSPADM

## 2023-09-05 RX ORDER — MAGNESIUM SULFATE IN WATER 40 MG/ML
2000 INJECTION, SOLUTION INTRAVENOUS ONCE
Status: COMPLETED | OUTPATIENT
Start: 2023-09-05 | End: 2023-09-05

## 2023-09-05 RX ADMIN — LABETALOL HYDROCHLORIDE 100 MG: 100 TABLET, FILM COATED ORAL at 08:55

## 2023-09-05 RX ADMIN — SODIUM BICARBONATE 650 MG: 650 TABLET ORAL at 22:07

## 2023-09-05 RX ADMIN — SODIUM BICARBONATE 650 MG: 650 TABLET ORAL at 08:55

## 2023-09-05 RX ADMIN — HYDRALAZINE HYDROCHLORIDE 25 MG: 25 TABLET, FILM COATED ORAL at 08:55

## 2023-09-05 RX ADMIN — LEVOTHYROXINE SODIUM 88 MCG: 0.09 TABLET ORAL at 08:55

## 2023-09-05 RX ADMIN — HYDRALAZINE HYDROCHLORIDE 25 MG: 25 TABLET, FILM COATED ORAL at 16:06

## 2023-09-05 RX ADMIN — CLONIDINE HYDROCHLORIDE 0.1 MG: 0.1 TABLET ORAL at 16:06

## 2023-09-05 RX ADMIN — MAGNESIUM SULFATE HEPTAHYDRATE 2000 MG: 40 INJECTION, SOLUTION INTRAVENOUS at 13:11

## 2023-09-05 RX ADMIN — MAGNESIUM HYDROXIDE 30 ML: 400 SUSPENSION ORAL at 10:07

## 2023-09-05 RX ADMIN — HYDRALAZINE HYDROCHLORIDE 25 MG: 25 TABLET, FILM COATED ORAL at 22:08

## 2023-09-05 RX ADMIN — HYDRALAZINE HYDROCHLORIDE 25 MG: 20 INJECTION, SOLUTION INTRAMUSCULAR; INTRAVENOUS at 23:16

## 2023-09-05 RX ADMIN — CLONIDINE HYDROCHLORIDE 0.1 MG: 0.1 TABLET ORAL at 22:08

## 2023-09-05 RX ADMIN — ESCITALOPRAM OXALATE 10 MG: 10 TABLET ORAL at 08:55

## 2023-09-05 RX ADMIN — Medication 10 ML: at 22:09

## 2023-09-05 RX ADMIN — LABETALOL HYDROCHLORIDE 100 MG: 100 TABLET, FILM COATED ORAL at 22:10

## 2023-09-05 RX ADMIN — ASPIRIN 81 MG: 81 TABLET, COATED ORAL at 08:55

## 2023-09-05 RX ADMIN — CLONIDINE HYDROCHLORIDE 0.1 MG: 0.1 TABLET ORAL at 08:55

## 2023-09-05 RX ADMIN — EPOETIN ALFA-EPBX 3000 UNITS: 3000 INJECTION, SOLUTION INTRAVENOUS; SUBCUTANEOUS at 22:08

## 2023-09-05 ASSESSMENT — PAIN SCALES - GENERAL
PAINLEVEL_OUTOF10: 0
PAINLEVEL_OUTOF10: 0

## 2023-09-05 NOTE — PLAN OF CARE
Problem: Chronic Conditions and Co-morbidities  Goal: Patient's chronic conditions and co-morbidity symptoms are monitored and maintained or improved  9/4/2023 2107 by Kasia Sandoval RN  Outcome: Progressing  9/4/2023 1015 by Jamaal Ghosh RN  Outcome: Progressing     Problem: Discharge Planning  Goal: Discharge to home or other facility with appropriate resources  9/4/2023 2107 by Kasia Sandoval RN  Outcome: Progressing  9/4/2023 1015 by Jamaal Ghosh RN  Outcome: Progressing     Problem: Safety - Adult  Goal: Free from fall injury  9/4/2023 2107 by Kasia Sandoval RN  Outcome: Progressing  9/4/2023 1015 by Jamaal Ghosh RN  Outcome: Progressing     Problem: ABCDS Injury Assessment  Goal: Absence of physical injury  9/4/2023 2107 by Kasia Sandoval RN  Outcome: Progressing  9/4/2023 1015 by Jamaal Ghosh RN  Outcome: Progressing     Problem: Pain  Goal: Verbalizes/displays adequate comfort level or baseline comfort level  9/4/2023 2107 by Kasai Sandoval RN  Outcome: Progressing  9/4/2023 1015 by Jamaal Ghosh RN  Outcome: Progressing

## 2023-09-05 NOTE — PLAN OF CARE
Patient's  plan is to return home with family. Patient encouraged to sit up and cough and deep breath.     Patient denies any pain

## 2023-09-05 NOTE — PATIENT CARE CONFERENCE
P Quality Flow/Interdisciplinary Rounds Progress Note        Quality Flow Rounds held on September 5, 2023    Disciplines Attending:  Bedside Nurse, , , and Nursing Unit Leadership    Jhon Greene was admitted on 8/31/2023 11:38 AM    Anticipated Discharge Date:       Disposition:    Man Score:  Man Scale Score: 19    Readmission Risk              Risk of Unplanned Readmission:  16           Discussed patient goal for the day, patient clinical progression, and barriers to discharge.   The following Goal(s) of the Day/Commitment(s) have been identified:  discharge [address] Becca Solitario RN  September 5, 2023

## 2023-09-05 NOTE — CARE COORDINATION
9/5/23  Transition of Care Update. Patient admitted for MANAN. Patient is being followed by Nephrology. Patient for possible KRT and or kidney biopsy. CRE noted to be 4.4 today. Patient noted by nephrology to have poor po intake and discussed wishes regarding a tube feed and nutritional  supplements which were both declined. Occupational therapy evaluated with AM-PAC score of 16/24. Physical therapy eval is still pending. Discharge goal is home when medically stable. SW/Cm to await Physical therapy evaluation as well as Nephrology's plan to assist with any post discharge needs.     Electronically signed by ADRI Glynn on 9/5/2023 at 12:12 PM

## 2023-09-06 ENCOUNTER — APPOINTMENT (OUTPATIENT)
Dept: CT IMAGING | Age: 78
DRG: 674 | End: 2023-09-06
Payer: MEDICARE

## 2023-09-06 LAB
ALBUMIN SERPL-MCNC: 3.3 G/DL (ref 3.5–5.2)
ALP SERPL-CCNC: 45 U/L (ref 35–104)
ALT SERPL-CCNC: 12 U/L (ref 0–32)
ANION GAP SERPL CALCULATED.3IONS-SCNC: 11 MMOL/L (ref 7–16)
AST SERPL-CCNC: 15 U/L (ref 0–31)
BASOPHILS # BLD: 0.02 K/UL (ref 0–0.2)
BASOPHILS NFR BLD: 1 % (ref 0–2)
BILIRUB SERPL-MCNC: 0.5 MG/DL (ref 0–1.2)
BUN SERPL-MCNC: 46 MG/DL (ref 6–23)
CALCIUM SERPL-MCNC: 8.7 MG/DL (ref 8.6–10.2)
CHLORIDE SERPL-SCNC: 102 MMOL/L (ref 98–107)
CO2 SERPL-SCNC: 26 MMOL/L (ref 22–29)
CREAT SERPL-MCNC: 4.8 MG/DL (ref 0.5–1)
EOSINOPHIL # BLD: 0.06 K/UL (ref 0.05–0.5)
EOSINOPHILS RELATIVE PERCENT: 2 % (ref 0–6)
ERYTHROCYTE [DISTWIDTH] IN BLOOD BY AUTOMATED COUNT: 13.3 % (ref 11.5–15)
GFR SERPL CREATININE-BSD FRML MDRD: 9 ML/MIN/1.73M2
GLUCOSE SERPL-MCNC: 97 MG/DL (ref 74–99)
HCT VFR BLD AUTO: 28.2 % (ref 34–48)
HGB BLD-MCNC: 9.2 G/DL (ref 11.5–15.5)
IMM GRANULOCYTES # BLD AUTO: <0.03 K/UL (ref 0–0.58)
IMM GRANULOCYTES NFR BLD: 1 % (ref 0–5)
INR PPP: 1.2
LYMPHOCYTES NFR BLD: 0.68 K/UL (ref 1.5–4)
LYMPHOCYTES RELATIVE PERCENT: 22 % (ref 20–42)
MAGNESIUM SERPL-MCNC: 2.4 MG/DL (ref 1.6–2.6)
MCH RBC QN AUTO: 30 PG (ref 26–35)
MCHC RBC AUTO-ENTMCNC: 32.6 G/DL (ref 32–34.5)
MCV RBC AUTO: 91.9 FL (ref 80–99.9)
MONOCYTES NFR BLD: 0.43 K/UL (ref 0.1–0.95)
MONOCYTES NFR BLD: 14 % (ref 2–12)
NEUTROPHILS NFR BLD: 61 % (ref 43–80)
NEUTS SEG NFR BLD: 1.86 K/UL (ref 1.8–7.3)
PHOSPHATE SERPL-MCNC: 4 MG/DL (ref 2.5–4.5)
PLATELET # BLD AUTO: 127 K/UL (ref 130–450)
PMV BLD AUTO: 10 FL (ref 7–12)
POTASSIUM SERPL-SCNC: 4.4 MMOL/L (ref 3.5–5)
PROT SERPL-MCNC: 6 G/DL (ref 6.4–8.3)
PROTHROMBIN TIME: 12.8 SEC (ref 9.3–12.4)
RBC # BLD AUTO: 3.07 M/UL (ref 3.5–5.5)
SODIUM SERPL-SCNC: 139 MMOL/L (ref 132–146)
WBC OTHER # BLD: 3.1 K/UL (ref 4.5–11.5)

## 2023-09-06 PROCEDURE — 80053 COMPREHEN METABOLIC PANEL: CPT

## 2023-09-06 PROCEDURE — 2580000003 HC RX 258: Performed by: INTERNAL MEDICINE

## 2023-09-06 PROCEDURE — 2500000003 HC RX 250 WO HCPCS: Performed by: RADIOLOGY

## 2023-09-06 PROCEDURE — 85025 COMPLETE CBC W/AUTO DIFF WBC: CPT

## 2023-09-06 PROCEDURE — 0TB03ZX EXCISION OF RIGHT KIDNEY, PERCUTANEOUS APPROACH, DIAGNOSTIC: ICD-10-PCS | Performed by: RADIOLOGY

## 2023-09-06 PROCEDURE — 6370000000 HC RX 637 (ALT 250 FOR IP): Performed by: INTERNAL MEDICINE

## 2023-09-06 PROCEDURE — 50200 RENAL BIOPSY PERQ: CPT

## 2023-09-06 PROCEDURE — 6370000000 HC RX 637 (ALT 250 FOR IP)

## 2023-09-06 PROCEDURE — 2709999900 CT BIOPSY RENAL

## 2023-09-06 PROCEDURE — 2140000000 HC CCU INTERMEDIATE R&B

## 2023-09-06 PROCEDURE — 77012 CT SCAN FOR NEEDLE BIOPSY: CPT

## 2023-09-06 PROCEDURE — 85610 PROTHROMBIN TIME: CPT

## 2023-09-06 PROCEDURE — 36415 COLL VENOUS BLD VENIPUNCTURE: CPT

## 2023-09-06 PROCEDURE — 83735 ASSAY OF MAGNESIUM: CPT

## 2023-09-06 PROCEDURE — 6360000002 HC RX W HCPCS: Performed by: INTERNAL MEDICINE

## 2023-09-06 PROCEDURE — 6360000002 HC RX W HCPCS: Performed by: RADIOLOGY

## 2023-09-06 PROCEDURE — 84100 ASSAY OF PHOSPHORUS: CPT

## 2023-09-06 PROCEDURE — 2700000000 HC OXYGEN THERAPY PER DAY

## 2023-09-06 RX ORDER — HYDRALAZINE HYDROCHLORIDE 20 MG/ML
INJECTION INTRAMUSCULAR; INTRAVENOUS PRN
Status: COMPLETED | OUTPATIENT
Start: 2023-09-06 | End: 2023-09-06

## 2023-09-06 RX ORDER — DIPHENHYDRAMINE HYDROCHLORIDE 50 MG/ML
INJECTION INTRAMUSCULAR; INTRAVENOUS PRN
Status: COMPLETED | OUTPATIENT
Start: 2023-09-06 | End: 2023-09-06

## 2023-09-06 RX ORDER — FENTANYL CITRATE 50 UG/ML
INJECTION, SOLUTION INTRAMUSCULAR; INTRAVENOUS PRN
Status: COMPLETED | OUTPATIENT
Start: 2023-09-06 | End: 2023-09-06

## 2023-09-06 RX ORDER — LIDOCAINE HYDROCHLORIDE 20 MG/ML
INJECTION, SOLUTION INFILTRATION; PERINEURAL PRN
Status: COMPLETED | OUTPATIENT
Start: 2023-09-06 | End: 2023-09-06

## 2023-09-06 RX ORDER — MIDAZOLAM HYDROCHLORIDE 2 MG/2ML
INJECTION, SOLUTION INTRAMUSCULAR; INTRAVENOUS PRN
Status: COMPLETED | OUTPATIENT
Start: 2023-09-06 | End: 2023-09-06

## 2023-09-06 RX ORDER — LIDOCAINE HYDROCHLORIDE AND EPINEPHRINE BITARTRATE 20; .01 MG/ML; MG/ML
INJECTION, SOLUTION SUBCUTANEOUS PRN
Status: COMPLETED | OUTPATIENT
Start: 2023-09-06 | End: 2023-09-06

## 2023-09-06 RX ADMIN — LIDOCAINE HYDROCHLORIDE,EPINEPHRINE BITARTRATE 4 ML: 20; .01 INJECTION, SOLUTION INFILTRATION; PERINEURAL at 08:57

## 2023-09-06 RX ADMIN — CLONIDINE HYDROCHLORIDE 0.1 MG: 0.1 TABLET ORAL at 08:09

## 2023-09-06 RX ADMIN — HYDRALAZINE HYDROCHLORIDE 25 MG: 25 TABLET, FILM COATED ORAL at 16:20

## 2023-09-06 RX ADMIN — CLONIDINE HYDROCHLORIDE 0.1 MG: 0.1 TABLET ORAL at 21:33

## 2023-09-06 RX ADMIN — Medication 10 ML: at 21:33

## 2023-09-06 RX ADMIN — HYDRALAZINE HYDROCHLORIDE 25 MG: 25 TABLET, FILM COATED ORAL at 07:53

## 2023-09-06 RX ADMIN — ESCITALOPRAM OXALATE 10 MG: 10 TABLET ORAL at 11:12

## 2023-09-06 RX ADMIN — LABETALOL HYDROCHLORIDE 100 MG: 100 TABLET, FILM COATED ORAL at 21:33

## 2023-09-06 RX ADMIN — LEVOTHYROXINE SODIUM 88 MCG: 0.09 TABLET ORAL at 11:12

## 2023-09-06 RX ADMIN — LABETALOL HYDROCHLORIDE 100 MG: 100 TABLET, FILM COATED ORAL at 07:53

## 2023-09-06 RX ADMIN — HYDRALAZINE HYDROCHLORIDE 25 MG: 25 TABLET, FILM COATED ORAL at 21:33

## 2023-09-06 RX ADMIN — HYDRALAZINE HYDROCHLORIDE 25 MG: 20 INJECTION, SOLUTION INTRAMUSCULAR; INTRAVENOUS at 03:26

## 2023-09-06 RX ADMIN — HYDRALAZINE HYDROCHLORIDE 5 MG: 20 INJECTION INTRAMUSCULAR; INTRAVENOUS at 09:03

## 2023-09-06 RX ADMIN — SODIUM BICARBONATE 650 MG: 650 TABLET ORAL at 11:12

## 2023-09-06 RX ADMIN — HYDRALAZINE HYDROCHLORIDE 5 MG: 20 INJECTION INTRAMUSCULAR; INTRAVENOUS at 08:39

## 2023-09-06 RX ADMIN — MIDAZOLAM HYDROCHLORIDE 0.5 MG: 1 INJECTION, SOLUTION INTRAMUSCULAR; INTRAVENOUS at 09:00

## 2023-09-06 RX ADMIN — FENTANYL CITRATE 25 MCG: 50 INJECTION, SOLUTION INTRAMUSCULAR; INTRAVENOUS at 09:00

## 2023-09-06 RX ADMIN — CLONIDINE HYDROCHLORIDE 0.1 MG: 0.1 TABLET ORAL at 16:19

## 2023-09-06 RX ADMIN — SODIUM BICARBONATE 650 MG: 650 TABLET ORAL at 21:33

## 2023-09-06 RX ADMIN — FENTANYL CITRATE 25 MCG: 50 INJECTION, SOLUTION INTRAMUSCULAR; INTRAVENOUS at 08:41

## 2023-09-06 RX ADMIN — Medication 1 KIT: at 08:57

## 2023-09-06 RX ADMIN — MIDAZOLAM HYDROCHLORIDE 0.5 MG: 1 INJECTION, SOLUTION INTRAMUSCULAR; INTRAVENOUS at 08:40

## 2023-09-06 RX ADMIN — LIDOCAINE HYDROCHLORIDE 7 ML: 20 INJECTION, SOLUTION INFILTRATION; PERINEURAL at 08:44

## 2023-09-06 RX ADMIN — LIDOCAINE HYDROCHLORIDE 3 ML: 20 INJECTION, SOLUTION INFILTRATION; PERINEURAL at 08:55

## 2023-09-06 RX ADMIN — HYDRALAZINE HYDROCHLORIDE 25 MG: 20 INJECTION, SOLUTION INTRAMUSCULAR; INTRAVENOUS at 12:31

## 2023-09-06 RX ADMIN — LIDOCAINE HYDROCHLORIDE,EPINEPHRINE BITARTRATE 6 ML: 20; .01 INJECTION, SOLUTION INFILTRATION; PERINEURAL at 08:45

## 2023-09-06 RX ADMIN — HYDRALAZINE HYDROCHLORIDE 5 MG: 20 INJECTION INTRAMUSCULAR; INTRAVENOUS at 08:46

## 2023-09-06 RX ADMIN — DIPHENHYDRAMINE HYDROCHLORIDE 25 MG: 50 INJECTION, SOLUTION INTRAMUSCULAR; INTRAVENOUS at 08:41

## 2023-09-06 RX ADMIN — HYDRALAZINE HYDROCHLORIDE 5 MG: 20 INJECTION INTRAMUSCULAR; INTRAVENOUS at 08:58

## 2023-09-06 ASSESSMENT — PAIN SCALES - GENERAL: PAINLEVEL_OUTOF10: 0

## 2023-09-06 NOTE — BRIEF OP NOTE
Brief Postoperative Note      Patient: Becky Rashid  YOB: 1945  MRN: [id number]    Date of Procedure: 9/6/2023  Renal hypertension/ failure    Post-Op Diagnosis: Same       CT biopsy    DUNCAN Treviño    Assistant:  * No surgical staff found *    Anesthesia: * Moderate sedation    Estimated Blood Loss (mL): Minimal    Complications: None    Specimens:   Right kidney    Implants:  * No implants in log *      Drains: * No LDAs found *    Findings: small shrunken kidneys      Electronically signed by Naina Neil MD on 9/6/2023 at 9:16 AM

## 2023-09-06 NOTE — PLAN OF CARE
Problem: Chronic Conditions and Co-morbidities  Goal: Patient's chronic conditions and co-morbidity symptoms are monitored and maintained or improved  9/5/2023 2252 by Steven Taylor RN  Outcome: Progressing  Flowsheets (Taken 9/5/2023 2000)  Care Plan - Patient's Chronic Conditions and Co-Morbidity Symptoms are Monitored and Maintained or Improved:   Monitor and assess patient's chronic conditions and comorbid symptoms for stability, deterioration, or improvement   Collaborate with multidisciplinary team to address chronic and comorbid conditions and prevent exacerbation or deterioration  9/5/2023 1110 by Charlie Carrington RN  Outcome: Progressing     Problem: Discharge Planning  Goal: Discharge to home or other facility with appropriate resources  9/5/2023 2252 by Steven Taylor RN  Outcome: Progressing  [address] (Taken 9/5/2023 2000)  Discharge to home or other facility with appropriate resources: Identify barriers to discharge with patient and caregiver  9/5/2023 1110 by Charlie Carrington RN  Outcome: Progressing     Problem: Safety - Adult  Goal: Free from fall injury  9/5/2023 2252 by Steven Taylor RN  Outcome: Progressing  9/5/2023 1110 by Charlie Carrington RN  Outcome: Progressing     Problem: ABCDS Injury Assessment  Goal: Absence of physical injury  Outcome: Progressing     Problem: Pain  Goal: Verbalizes/displays adequate comfort level or baseline comfort level  9/5/2023 2252 by Steven Taylor RN  Outcome: Progressing  9/5/2023 1110 by Charlie Carrington RN  Outcome: Progressing     Problem: Chronic Conditions and Co-morbidities  Goal: Patient's chronic conditions and co-morbidity symptoms are monitored and maintained or improved  9/5/2023 2252 by Steven Taylor RN  Outcome: Progressing  Flowsheets (Taken 9/5/2023 2000)  Care Plan - Patient's Chronic Conditions and Co-Morbidity Symptoms are Monitored and Maintained or Improved:   Monitor and assess patient's chronic conditions and

## 2023-09-06 NOTE — CARE COORDINATION
Care Coordination:  Patient underwent kidney biopsy today. Nephrology following  CRE 4.8 today. PT OT [facility] both 16. Patient with hx of CVA in past and used a walker and at times a wheelchair at home. Met with patient who states she feels she is at baseline. Discharge goal remains home. Await nephrology plan . Patient denies needs at this time. Shanika Faye

## 2023-09-06 NOTE — PROCEDURES
7857 Patient returned from procedure. Bed rest supine . Dressing right posterior flank checked, clean, dry, and intact. Patient stable. Towel roll under right posterior flank for extra support. No s/s of complications noted or reported. Vitals will be checked q 15min, see flow sheets. Report given to 6504 RN. Site was checked with every set of vitals. Site clean dry and intact. Patient taken by transport via cart. Patient is hemodynamically stable stable condition, no s/s of complications noted or reported.

## 2023-09-06 NOTE — PATIENT CARE CONFERENCE
P Quality Flow/Interdisciplinary Rounds Progress Note        Quality Flow Rounds held on September 6, 2023    Disciplines Attending:  Bedside Nurse, , , and Nursing Unit Leadership    Arely Joseph was admitted on 8/31/2023 11:38 AM    Anticipated Discharge Date:       Disposition:    Man Score:  Man Scale Score: 19    Readmission Risk              Risk of Unplanned Readmission:  19           Discussed patient goal for the day, patient clinical progression, and barriers to discharge.   The following Goal(s) of the Day/Commitment(s) have been identified:  discharge 1941 Becca Solitario RN  September 6, 2023

## 2023-09-07 LAB
ALBUMIN SERPL-MCNC: 3.4 G/DL (ref 3.5–5.2)
ALP SERPL-CCNC: 44 U/L (ref 35–104)
ALT SERPL-CCNC: 10 U/L (ref 0–32)
ANION GAP SERPL CALCULATED.3IONS-SCNC: 11 MMOL/L (ref 7–16)
AST SERPL-CCNC: 13 U/L (ref 0–31)
BASOPHILS # BLD: 0.02 K/UL (ref 0–0.2)
BASOPHILS NFR BLD: 1 % (ref 0–2)
BILIRUB SERPL-MCNC: 0.6 MG/DL (ref 0–1.2)
BUN SERPL-MCNC: 46 MG/DL (ref 6–23)
CALCIUM SERPL-MCNC: 8.4 MG/DL (ref 8.6–10.2)
CHLORIDE SERPL-SCNC: 100 MMOL/L (ref 98–107)
CO2 SERPL-SCNC: 26 MMOL/L (ref 22–29)
CREAT SERPL-MCNC: 5 MG/DL (ref 0.5–1)
EOSINOPHIL # BLD: 0.03 K/UL (ref 0.05–0.5)
EOSINOPHILS RELATIVE PERCENT: 1 % (ref 0–6)
ERYTHROCYTE [DISTWIDTH] IN BLOOD BY AUTOMATED COUNT: 13.3 % (ref 11.5–15)
ERYTHROCYTE [SEDIMENTATION RATE] IN BLOOD BY WESTERGREN METHOD: 34 MM/HR (ref 0–20)
GFR SERPL CREATININE-BSD FRML MDRD: 8 ML/MIN/1.73M2
GLUCOSE SERPL-MCNC: 106 MG/DL (ref 74–99)
HCT VFR BLD AUTO: 28.4 % (ref 34–48)
HGB BLD-MCNC: 9 G/DL (ref 11.5–15.5)
IMM GRANULOCYTES # BLD AUTO: <0.03 K/UL (ref 0–0.58)
IMM GRANULOCYTES NFR BLD: 0 % (ref 0–5)
LYMPHOCYTES NFR BLD: 0.74 K/UL (ref 1.5–4)
LYMPHOCYTES RELATIVE PERCENT: 23 % (ref 20–42)
MAGNESIUM SERPL-MCNC: 2.3 MG/DL (ref 1.6–2.6)
MCH RBC QN AUTO: 29.3 PG (ref 26–35)
MCHC RBC AUTO-ENTMCNC: 31.7 G/DL (ref 32–34.5)
MCV RBC AUTO: 92.5 FL (ref 80–99.9)
MONOCYTES NFR BLD: 0.5 K/UL (ref 0.1–0.95)
MONOCYTES NFR BLD: 15 % (ref 2–12)
NEUTROPHILS NFR BLD: 60 % (ref 43–80)
NEUTS SEG NFR BLD: 1.98 K/UL (ref 1.8–7.3)
PHOSPHATE SERPL-MCNC: 4.4 MG/DL (ref 2.5–4.5)
PLATELET # BLD AUTO: 120 K/UL (ref 130–450)
PMV BLD AUTO: 9.8 FL (ref 7–12)
POTASSIUM SERPL-SCNC: 4.2 MMOL/L (ref 3.5–5)
PROT SERPL-MCNC: 5.9 G/DL (ref 6.4–8.3)
RBC # BLD AUTO: 3.07 M/UL (ref 3.5–5.5)
SODIUM SERPL-SCNC: 137 MMOL/L (ref 132–146)
WBC OTHER # BLD: 3.3 K/UL (ref 4.5–11.5)

## 2023-09-07 PROCEDURE — 2140000000 HC CCU INTERMEDIATE R&B

## 2023-09-07 PROCEDURE — 6370000000 HC RX 637 (ALT 250 FOR IP): Performed by: INTERNAL MEDICINE

## 2023-09-07 PROCEDURE — 85652 RBC SED RATE AUTOMATED: CPT

## 2023-09-07 PROCEDURE — 6360000002 HC RX W HCPCS: Performed by: INTERNAL MEDICINE

## 2023-09-07 PROCEDURE — 2580000003 HC RX 258: Performed by: INTERNAL MEDICINE

## 2023-09-07 PROCEDURE — 85025 COMPLETE CBC W/AUTO DIFF WBC: CPT

## 2023-09-07 PROCEDURE — 6370000000 HC RX 637 (ALT 250 FOR IP)

## 2023-09-07 PROCEDURE — 87086 URINE CULTURE/COLONY COUNT: CPT

## 2023-09-07 PROCEDURE — 84100 ASSAY OF PHOSPHORUS: CPT

## 2023-09-07 PROCEDURE — 83735 ASSAY OF MAGNESIUM: CPT

## 2023-09-07 PROCEDURE — 36415 COLL VENOUS BLD VENIPUNCTURE: CPT

## 2023-09-07 PROCEDURE — 80053 COMPREHEN METABOLIC PANEL: CPT

## 2023-09-07 RX ORDER — SODIUM CHLORIDE 9 MG/ML
INJECTION, SOLUTION INTRAVENOUS CONTINUOUS
Status: DISCONTINUED | OUTPATIENT
Start: 2023-09-07 | End: 2023-09-08

## 2023-09-07 RX ADMIN — HYDRALAZINE HYDROCHLORIDE 25 MG: 25 TABLET, FILM COATED ORAL at 07:51

## 2023-09-07 RX ADMIN — ASPIRIN 81 MG: 81 TABLET, COATED ORAL at 07:50

## 2023-09-07 RX ADMIN — ESCITALOPRAM OXALATE 10 MG: 10 TABLET ORAL at 07:51

## 2023-09-07 RX ADMIN — HYDRALAZINE HYDROCHLORIDE 25 MG: 20 INJECTION, SOLUTION INTRAMUSCULAR; INTRAVENOUS at 12:43

## 2023-09-07 RX ADMIN — CLONIDINE HYDROCHLORIDE 0.1 MG: 0.1 TABLET ORAL at 22:00

## 2023-09-07 RX ADMIN — SODIUM CHLORIDE: 9 INJECTION, SOLUTION INTRAVENOUS at 12:41

## 2023-09-07 RX ADMIN — LABETALOL HYDROCHLORIDE 100 MG: 100 TABLET, FILM COATED ORAL at 22:00

## 2023-09-07 RX ADMIN — HYDRALAZINE HYDROCHLORIDE 25 MG: 20 INJECTION, SOLUTION INTRAMUSCULAR; INTRAVENOUS at 23:01

## 2023-09-07 RX ADMIN — Medication 10 ML: at 22:10

## 2023-09-07 RX ADMIN — HYDRALAZINE HYDROCHLORIDE 25 MG: 20 INJECTION, SOLUTION INTRAMUSCULAR; INTRAVENOUS at 03:02

## 2023-09-07 RX ADMIN — Medication 10 ML: at 07:53

## 2023-09-07 RX ADMIN — LABETALOL HYDROCHLORIDE 100 MG: 100 TABLET, FILM COATED ORAL at 07:51

## 2023-09-07 RX ADMIN — LEVOTHYROXINE SODIUM 88 MCG: 0.09 TABLET ORAL at 07:51

## 2023-09-07 RX ADMIN — SODIUM BICARBONATE 650 MG: 650 TABLET ORAL at 07:49

## 2023-09-07 RX ADMIN — HYDRALAZINE HYDROCHLORIDE 25 MG: 25 TABLET, FILM COATED ORAL at 15:45

## 2023-09-07 RX ADMIN — CLONIDINE HYDROCHLORIDE 0.1 MG: 0.1 TABLET ORAL at 07:49

## 2023-09-07 RX ADMIN — SODIUM BICARBONATE 650 MG: 650 TABLET ORAL at 22:00

## 2023-09-07 RX ADMIN — CLONIDINE HYDROCHLORIDE 0.1 MG: 0.1 TABLET ORAL at 15:45

## 2023-09-07 NOTE — PATIENT CARE CONFERENCE
P Quality Flow/Interdisciplinary Rounds Progress Note        Quality Flow Rounds held on September 7, 2023    Disciplines Attending:  Bedside Nurse, , , and Nursing Unit Leadership    Denver Santana was admitted on 8/31/2023 11:38 AM    Anticipated Discharge Date:       Disposition:    Man Score:  Man Scale Score: 19    Readmission Risk              Risk of Unplanned Readmission:  17           Discussed patient goal for the day, patient clinical progression, and barriers to discharge.   The following Goal(s) of the Day/Commitment(s) have been identified:  Diagnostics - Report Results      Wang Jack RN  September 7, 2023

## 2023-09-07 NOTE — PLAN OF CARE
Problem: Chronic Conditions and Co-morbidities  Goal: Patient's chronic conditions and co-morbidity symptoms are monitored and maintained or improved  9/7/2023 0021 by Bernice Cuellar RN  Outcome: Progressing     Problem: Discharge Planning  Goal: Discharge to home or other facility with appropriate resources  9/7/2023 0021 by Bernice Cuellar RN  Outcome: Progressing     Problem: Safety - Adult  Goal: Free from fall injury  9/7/2023 0021 by Bernice Cuellar RN  Outcome: Progressing     Problem: ABCDS Injury Assessment  Goal: Absence of physical injury  9/7/2023 0021 by Bernice Cuellar RN  Outcome: Progressing     Problem: Pain  Goal: Verbalizes/displays adequate comfort level or baseline comfort level  9/7/2023 0021 by Bernice Cuellar RN  Outcome: Progressing

## 2023-09-07 NOTE — PLAN OF CARE
Problem: Chronic Conditions and Co-morbidities  Goal: Patient's chronic conditions and co-morbidity symptoms are monitored and maintained or improved  9/7/2023 0806 by Brenda Colby RN  Outcome: Progressing  9/7/2023 0021 by Ronel Waters RN  Outcome: Progressing     Problem: Discharge Planning  Goal: Discharge to home or other facility with appropriate resources  9/7/2023 0806 by Brenda Colby RN  Outcome: Progressing  9/7/2023 0021 by Ronel Waters RN  Outcome: Progressing     Problem: Safety - Adult  Goal: Free from fall injury  9/7/2023 0806 by Brenda Colby RN  Outcome: Progressing  9/7/2023 0021 by Ronel Waters RN  Outcome: Progressing     Problem: Pain  Goal: Verbalizes/displays adequate comfort level or baseline comfort level  9/7/2023 0806 by Brenda Colby RN  Outcome: Progressing  9/7/2023 0021 by Ronel Waters RN  Outcome: Progressing

## 2023-09-08 LAB
ANION GAP SERPL CALCULATED.3IONS-SCNC: 16 MMOL/L (ref 7–16)
BUN SERPL-MCNC: 46 MG/DL (ref 6–23)
C3 SERPL-MCNC: 65 MG/DL (ref 90–180)
C4 SERPL-MCNC: 2 MG/DL (ref 10–40)
CALCIUM SERPL-MCNC: 8.7 MG/DL (ref 8.6–10.2)
CHLORIDE SERPL-SCNC: 101 MMOL/L (ref 98–107)
CO2 SERPL-SCNC: 22 MMOL/L (ref 22–29)
CREAT SERPL-MCNC: 5 MG/DL (ref 0.5–1)
ERYTHROCYTE [DISTWIDTH] IN BLOOD BY AUTOMATED COUNT: 13.2 % (ref 11.5–15)
GFR SERPL CREATININE-BSD FRML MDRD: 8 ML/MIN/1.73M2
GLUCOSE SERPL-MCNC: 102 MG/DL (ref 74–99)
HCT VFR BLD AUTO: 28 % (ref 34–48)
HGB BLD-MCNC: 8.9 G/DL (ref 11.5–15.5)
MAGNESIUM SERPL-MCNC: 2.4 MG/DL (ref 1.6–2.6)
MCH RBC QN AUTO: 29.4 PG (ref 26–35)
MCHC RBC AUTO-ENTMCNC: 31.8 G/DL (ref 32–34.5)
MCV RBC AUTO: 92.4 FL (ref 80–99.9)
PHOSPHATE SERPL-MCNC: 4.8 MG/DL (ref 2.5–4.5)
PLATELET, FLUORESCENCE: 121 K/UL (ref 130–450)
PMV BLD AUTO: 10.2 FL (ref 7–12)
POTASSIUM SERPL-SCNC: 4 MMOL/L (ref 3.5–5)
RBC # BLD AUTO: 3.03 M/UL (ref 3.5–5.5)
SODIUM SERPL-SCNC: 139 MMOL/L (ref 132–146)
WBC OTHER # BLD: 3.2 K/UL (ref 4.5–11.5)

## 2023-09-08 PROCEDURE — 6370000000 HC RX 637 (ALT 250 FOR IP): Performed by: INTERNAL MEDICINE

## 2023-09-08 PROCEDURE — 80048 BASIC METABOLIC PNL TOTAL CA: CPT

## 2023-09-08 PROCEDURE — 86160 COMPLEMENT ANTIGEN: CPT

## 2023-09-08 PROCEDURE — 6360000002 HC RX W HCPCS: Performed by: INTERNAL MEDICINE

## 2023-09-08 PROCEDURE — 84100 ASSAY OF PHOSPHORUS: CPT

## 2023-09-08 PROCEDURE — 83735 ASSAY OF MAGNESIUM: CPT

## 2023-09-08 PROCEDURE — 85027 COMPLETE CBC AUTOMATED: CPT

## 2023-09-08 PROCEDURE — 86039 ANTINUCLEAR ANTIBODIES (ANA): CPT

## 2023-09-08 PROCEDURE — 86038 ANTINUCLEAR ANTIBODIES: CPT

## 2023-09-08 PROCEDURE — 2140000000 HC CCU INTERMEDIATE R&B

## 2023-09-08 PROCEDURE — 83516 IMMUNOASSAY NONANTIBODY: CPT

## 2023-09-08 PROCEDURE — 2580000003 HC RX 258: Performed by: INTERNAL MEDICINE

## 2023-09-08 PROCEDURE — 6370000000 HC RX 637 (ALT 250 FOR IP)

## 2023-09-08 PROCEDURE — 36415 COLL VENOUS BLD VENIPUNCTURE: CPT

## 2023-09-08 RX ORDER — LABETALOL 200 MG/1
200 TABLET, FILM COATED ORAL 2 TIMES DAILY
Status: DISCONTINUED | OUTPATIENT
Start: 2023-09-08 | End: 2023-09-18 | Stop reason: HOSPADM

## 2023-09-08 RX ORDER — DIPHENHYDRAMINE HCL 25 MG
12.5 TABLET ORAL EVERY 8 HOURS PRN
Status: DISCONTINUED | OUTPATIENT
Start: 2023-09-08 | End: 2023-09-18 | Stop reason: HOSPADM

## 2023-09-08 RX ORDER — FUROSEMIDE 10 MG/ML
20 INJECTION INTRAMUSCULAR; INTRAVENOUS ONCE
Status: COMPLETED | OUTPATIENT
Start: 2023-09-08 | End: 2023-09-08

## 2023-09-08 RX ORDER — DIPHENHYDRAMINE HCL 25 MG
12.5 TABLET ORAL NIGHTLY PRN
Status: DISCONTINUED | OUTPATIENT
Start: 2023-09-08 | End: 2023-09-08

## 2023-09-08 RX ORDER — HYDROXYZINE PAMOATE 25 MG/1
25 CAPSULE ORAL 3 TIMES DAILY PRN
Status: DISCONTINUED | OUTPATIENT
Start: 2023-09-08 | End: 2023-09-18 | Stop reason: HOSPADM

## 2023-09-08 RX ADMIN — FUROSEMIDE 20 MG: 10 INJECTION, SOLUTION INTRAMUSCULAR; INTRAVENOUS at 15:07

## 2023-09-08 RX ADMIN — HYDROXYZINE PAMOATE 25 MG: 25 CAPSULE ORAL at 21:59

## 2023-09-08 RX ADMIN — SODIUM BICARBONATE 650 MG: 650 TABLET ORAL at 09:13

## 2023-09-08 RX ADMIN — LABETALOL HYDROCHLORIDE 100 MG: 100 TABLET, FILM COATED ORAL at 09:13

## 2023-09-08 RX ADMIN — Medication 10 ML: at 21:26

## 2023-09-08 RX ADMIN — SODIUM BICARBONATE 650 MG: 650 TABLET ORAL at 21:17

## 2023-09-08 RX ADMIN — LEVOTHYROXINE SODIUM 88 MCG: 0.09 TABLET ORAL at 09:10

## 2023-09-08 RX ADMIN — CLONIDINE HYDROCHLORIDE 0.1 MG: 0.1 TABLET ORAL at 15:58

## 2023-09-08 RX ADMIN — LABETALOL HYDROCHLORIDE 200 MG: 200 TABLET, FILM COATED ORAL at 21:56

## 2023-09-08 RX ADMIN — CLONIDINE HYDROCHLORIDE 0.1 MG: 0.1 TABLET ORAL at 21:17

## 2023-09-08 RX ADMIN — DIPHENHYDRAMINE HYDROCHLORIDE 12.5 MG: 25 TABLET ORAL at 15:11

## 2023-09-08 RX ADMIN — ASPIRIN 81 MG: 81 TABLET, COATED ORAL at 09:13

## 2023-09-08 RX ADMIN — ESCITALOPRAM OXALATE 10 MG: 10 TABLET ORAL at 09:13

## 2023-09-08 RX ADMIN — Medication 10 ML: at 09:09

## 2023-09-08 RX ADMIN — MELATONIN 3 MG ORAL TABLET 3 MG: 3 TABLET ORAL at 21:17

## 2023-09-08 RX ADMIN — CLONIDINE HYDROCHLORIDE 0.1 MG: 0.1 TABLET ORAL at 09:13

## 2023-09-08 ASSESSMENT — PAIN SCALES - GENERAL: PAINLEVEL_OUTOF10: 0

## 2023-09-08 NOTE — HOME CARE
[facility] intake following. SOC orders will need to be placed PRIOR to discharge. Thank you! Milly House LPN  Central Intake Clinical Liaison  [facility].

## 2023-09-08 NOTE — CARE COORDINATION
Care Coordination:  Following for discharge planning  SW met with patient and her daughter , son and grandson in  room  to discuss plan . Family stated plan will be to home. Patient has 5 children  and 3 adult grandchildren . They will rotate to assist patient and her live  in partner who is in good health but elderly. They would like home care. Discussed choice and offered list.  They prefer to use [facility]. Referral called to Sangeeta Thornton at [facility] who will follow. Will need orders. They have scheduled  open of Wednesday next week. .  We also talked about possible need for dialysis and today patient states she would  agree. Family to follow with nephrology. .  Their preference would be for [address] as is near her home. Will follow closely to start referral as needed. Labs from to  day have not yet returned. CRE was trending up yesterday. The Plan for Transition of Care is related to the following treatment goals: home health    The Patient and/or patient representative  was provided with a choice of provider and agrees   with the discharge plan. [x] Yes [] No    Freedom of choice list was provided with basic dialogue that supports the patient's individualized plan of care/goals, treatment preferences and shares the quality data associated with the providers.  [x] Yes [] No

## 2023-09-08 NOTE — PLAN OF CARE
Problem: Chronic Conditions and Co-morbidities  Goal: Patient's chronic conditions and co-morbidity symptoms are monitored and maintained or improved  9/7/2023 2244 by Joie Sheridan RN  Outcome: Progressing     Problem: Discharge Planning  Goal: Discharge to home or other facility with appropriate resources  9/8/2023 1037 by Sahra Priest RN  Outcome: Progressing  9/7/2023 2244 by Joie Sheridan RN  Outcome: Progressing     Problem: Safety - Adult  Goal: Free from fall injury  9/8/2023 1037 by Sahra Priest RN  Outcome: Progressing  9/7/2023 2244 by Joie Sheridan RN  Outcome: Progressing     Problem: ABCDS Injury Assessment  Goal: Absence of physical injury  9/7/2023 2244 by Joie Sheridan RN  Outcome: Progressing     Problem: Pain  Goal: Verbalizes/displays adequate comfort level or baseline comfort level  9/8/2023 1037 by Sahra Priest RN  Outcome: Progressing  9/7/2023 2244 by Joie Sheridan RN  Outcome: Progressing     Problem: Nutrition Deficit:  Goal: Optimize nutritional status  9/7/2023 2244 by Joie Sheridan RN  Outcome: Progressing

## 2023-09-08 NOTE — PLAN OF CARE
Problem: Chronic Conditions and Co-morbidities  Goal: Patient's chronic conditions and co-morbidity symptoms are monitored and maintained or improved  Outcome: Progressing     Problem: Discharge Planning  Goal: Discharge to home or other facility with appropriate resources  Outcome: Progressing     Problem: Safety - Adult  Goal: Free from fall injury  Outcome: Progressing     Problem: ABCDS Injury Assessment  Goal: Absence of physical injury  Outcome: Progressing     Problem: Pain  Goal: Verbalizes/displays adequate comfort level or baseline comfort level  Outcome: Progressing     Problem: Nutrition Deficit:  Goal: Optimize nutritional status  Outcome: Progressing

## 2023-09-09 LAB
ANION GAP SERPL CALCULATED.3IONS-SCNC: 16 MMOL/L (ref 7–16)
BUN SERPL-MCNC: 50 MG/DL (ref 6–23)
CALCIUM SERPL-MCNC: 8.5 MG/DL (ref 8.6–10.2)
CHLORIDE SERPL-SCNC: 100 MMOL/L (ref 98–107)
CO2 SERPL-SCNC: 25 MMOL/L (ref 22–29)
CREAT SERPL-MCNC: 5.1 MG/DL (ref 0.5–1)
ERYTHROCYTE [DISTWIDTH] IN BLOOD BY AUTOMATED COUNT: 13.2 % (ref 11.5–15)
GFR SERPL CREATININE-BSD FRML MDRD: 8 ML/MIN/1.73M2
GLUCOSE SERPL-MCNC: 94 MG/DL (ref 74–99)
HCT VFR BLD AUTO: 27.4 % (ref 34–48)
HGB BLD-MCNC: 8.7 G/DL (ref 11.5–15.5)
MAGNESIUM SERPL-MCNC: 2.3 MG/DL (ref 1.6–2.6)
MCH RBC QN AUTO: 29.5 PG (ref 26–35)
MCHC RBC AUTO-ENTMCNC: 31.8 G/DL (ref 32–34.5)
MCV RBC AUTO: 92.9 FL (ref 80–99.9)
MICROORGANISM SPEC CULT: ABNORMAL
PHOSPHATE SERPL-MCNC: 6 MG/DL (ref 2.5–4.5)
PLATELET # BLD AUTO: 103 K/UL (ref 130–450)
PMV BLD AUTO: 10.2 FL (ref 7–12)
POTASSIUM SERPL-SCNC: 3.9 MMOL/L (ref 3.5–5)
RBC # BLD AUTO: 2.95 M/UL (ref 3.5–5.5)
SODIUM SERPL-SCNC: 141 MMOL/L (ref 132–146)
SPECIMEN DESCRIPTION: ABNORMAL
WBC OTHER # BLD: 2.8 K/UL (ref 4.5–11.5)

## 2023-09-09 PROCEDURE — 2500000003 HC RX 250 WO HCPCS: Performed by: CLINICAL NURSE SPECIALIST

## 2023-09-09 PROCEDURE — 86225 DNA ANTIBODY NATIVE: CPT

## 2023-09-09 PROCEDURE — 6370000000 HC RX 637 (ALT 250 FOR IP): Performed by: INTERNAL MEDICINE

## 2023-09-09 PROCEDURE — 86255 FLUORESCENT ANTIBODY SCREEN: CPT

## 2023-09-09 PROCEDURE — 85027 COMPLETE CBC AUTOMATED: CPT

## 2023-09-09 PROCEDURE — 83516 IMMUNOASSAY NONANTIBODY: CPT

## 2023-09-09 PROCEDURE — 84100 ASSAY OF PHOSPHORUS: CPT

## 2023-09-09 PROCEDURE — 86256 FLUORESCENT ANTIBODY TITER: CPT

## 2023-09-09 PROCEDURE — 2580000003 HC RX 258: Performed by: INTERNAL MEDICINE

## 2023-09-09 PROCEDURE — 83735 ASSAY OF MAGNESIUM: CPT

## 2023-09-09 PROCEDURE — 80048 BASIC METABOLIC PNL TOTAL CA: CPT

## 2023-09-09 PROCEDURE — 36415 COLL VENOUS BLD VENIPUNCTURE: CPT

## 2023-09-09 PROCEDURE — 2140000000 HC CCU INTERMEDIATE R&B

## 2023-09-09 PROCEDURE — 6370000000 HC RX 637 (ALT 250 FOR IP)

## 2023-09-09 PROCEDURE — 86235 NUCLEAR ANTIGEN ANTIBODY: CPT

## 2023-09-09 RX ORDER — FERROUS SULFATE 325(65) MG
325 TABLET ORAL
Status: DISCONTINUED | OUTPATIENT
Start: 2023-09-10 | End: 2023-09-18 | Stop reason: HOSPADM

## 2023-09-09 RX ORDER — BUMETANIDE 0.25 MG/ML
1 INJECTION INTRAMUSCULAR; INTRAVENOUS ONCE
Status: COMPLETED | OUTPATIENT
Start: 2023-09-09 | End: 2023-09-09

## 2023-09-09 RX ADMIN — CLONIDINE HYDROCHLORIDE 0.1 MG: 0.1 TABLET ORAL at 08:13

## 2023-09-09 RX ADMIN — LEVOTHYROXINE SODIUM 88 MCG: 0.09 TABLET ORAL at 08:13

## 2023-09-09 RX ADMIN — SODIUM BICARBONATE 650 MG: 650 TABLET ORAL at 08:13

## 2023-09-09 RX ADMIN — Medication 10 ML: at 20:30

## 2023-09-09 RX ADMIN — CLONIDINE HYDROCHLORIDE 0.1 MG: 0.1 TABLET ORAL at 16:31

## 2023-09-09 RX ADMIN — Medication 10 ML: at 08:13

## 2023-09-09 RX ADMIN — ESCITALOPRAM OXALATE 10 MG: 10 TABLET ORAL at 08:13

## 2023-09-09 RX ADMIN — ASPIRIN 81 MG: 81 TABLET, COATED ORAL at 08:13

## 2023-09-09 RX ADMIN — SODIUM BICARBONATE 650 MG: 650 TABLET ORAL at 20:29

## 2023-09-09 RX ADMIN — LABETALOL HYDROCHLORIDE 200 MG: 200 TABLET, FILM COATED ORAL at 08:13

## 2023-09-09 RX ADMIN — HYDROXYZINE PAMOATE 25 MG: 25 CAPSULE ORAL at 20:30

## 2023-09-09 RX ADMIN — MELATONIN 3 MG ORAL TABLET 3 MG: 3 TABLET ORAL at 20:29

## 2023-09-09 RX ADMIN — DIPHENHYDRAMINE HYDROCHLORIDE 12.5 MG: 25 TABLET ORAL at 20:30

## 2023-09-09 RX ADMIN — CLONIDINE HYDROCHLORIDE 0.1 MG: 0.1 TABLET ORAL at 20:30

## 2023-09-09 RX ADMIN — BUMETANIDE 1 MG: 0.25 INJECTION INTRAMUSCULAR; INTRAVENOUS at 13:47

## 2023-09-09 RX ADMIN — LABETALOL HYDROCHLORIDE 200 MG: 200 TABLET, FILM COATED ORAL at 20:30

## 2023-09-09 ASSESSMENT — PAIN SCALES - GENERAL: PAINLEVEL_OUTOF10: 0

## 2023-09-09 NOTE — PATIENT CARE CONFERENCE
P Quality Flow/Interdisciplinary Rounds Progress Note        Quality Flow Rounds held on September 9, 2023    Disciplines Attending:  Bedside Nurse, , , and Nursing Unit Leadership    Gregory Turner was admitted on 8/31/2023 11:38 AM    Anticipated Discharge Date:       Disposition:    Man Score:  Man Scale Score: 19    Readmission Risk              Risk of Unplanned Readmission:  17           Discussed patient goal for the day, patient clinical progression, and barriers to discharge.   The following Goal(s) of the Day/Commitment(s) have been identified:  downgrade/discharge planning       Evangelina Alexis RN  September 9, 2023

## 2023-09-09 NOTE — PLAN OF CARE
Problem: Chronic Conditions and Co-morbidities  Goal: Patient's chronic conditions and co-morbidity symptoms are monitored and maintained or improved  Outcome: Progressing     Problem: Discharge Planning  Goal: Discharge to home or other facility with appropriate resources  9/8/2023 2351 by Eduardo Roberts RN  Outcome: Progressing  9/8/2023 1037 by Catrina Young RN  Outcome: Progressing     Problem: Safety - Adult  Goal: Free from fall injury  9/8/2023 2351 by Eduardo Roberts RN  Outcome: Progressing  9/8/2023 1037 by Catrina Young RN  Outcome: Progressing     Problem: ABCDS Injury Assessment  Goal: Absence of physical injury  Outcome: Progressing     Problem: Pain  Goal: Verbalizes/displays adequate comfort level or baseline comfort level  9/8/2023 2351 by Eduardo Roberts RN  Outcome: Progressing  9/8/2023 1037 by Catrina Young RN  Outcome: Progressing     Problem: Nutrition Deficit:  Goal: Optimize nutritional status  Outcome: Progressing

## 2023-09-09 NOTE — PLAN OF CARE
Problem: Chronic Conditions and Co-morbidities  Goal: Patient's chronic conditions and co-morbidity symptoms are monitored and maintained or improved  9/8/2023 2351 by Dixon Beckett RN  Outcome: Progressing     Problem: Discharge Planning  Goal: Discharge to home or other facility with appropriate resources  9/9/2023 0933 by Lauryn Alves RN  Outcome: Progressing  9/8/2023 2351 by Dixon Beckett RN  Outcome: Progressing     Problem: Safety - Adult  Goal: Free from fall injury  9/9/2023 0933 by Lauryn Alves RN  Outcome: Progressing  9/8/2023 2351 by Dixon Beckett RN  Outcome: Progressing     Problem: ABCDS Injury Assessment  Goal: Absence of physical injury  9/8/2023 2351 by Dixon Beckett RN  Outcome: Progressing     Problem: Pain  Goal: Verbalizes/displays adequate comfort level or baseline comfort level  9/9/2023 0933 by Lauryn Alves RN  Outcome: Progressing  9/8/2023 2351 by Dixon Beckett RN  Outcome: Progressing     Problem: Nutrition Deficit:  Goal: Optimize nutritional status  9/8/2023 2351 by Dixon Beckett RN  Outcome: Progressing

## 2023-09-10 LAB
ANION GAP SERPL CALCULATED.3IONS-SCNC: 17 MMOL/L (ref 7–16)
BUN SERPL-MCNC: 55 MG/DL (ref 6–23)
CALCIUM SERPL-MCNC: 8.5 MG/DL (ref 8.6–10.2)
CHLORIDE SERPL-SCNC: 98 MMOL/L (ref 98–107)
CO2 SERPL-SCNC: 25 MMOL/L (ref 22–29)
CREAT SERPL-MCNC: 5.5 MG/DL (ref 0.5–1)
ERYTHROCYTE [DISTWIDTH] IN BLOOD BY AUTOMATED COUNT: 13 % (ref 11.5–15)
GFR SERPL CREATININE-BSD FRML MDRD: 7 ML/MIN/1.73M2
GLUCOSE SERPL-MCNC: 92 MG/DL (ref 74–99)
HCT VFR BLD AUTO: 27.2 % (ref 34–48)
HGB BLD-MCNC: 8.7 G/DL (ref 11.5–15.5)
MAGNESIUM SERPL-MCNC: 2.3 MG/DL (ref 1.6–2.6)
MCH RBC QN AUTO: 29.5 PG (ref 26–35)
MCHC RBC AUTO-ENTMCNC: 32 G/DL (ref 32–34.5)
MCV RBC AUTO: 92.2 FL (ref 80–99.9)
PHOSPHATE SERPL-MCNC: 6.6 MG/DL (ref 2.5–4.5)
PLATELET # BLD AUTO: 104 K/UL (ref 130–450)
PMV BLD AUTO: 10.1 FL (ref 7–12)
POTASSIUM SERPL-SCNC: 3.6 MMOL/L (ref 3.5–5)
RBC # BLD AUTO: 2.95 M/UL (ref 3.5–5.5)
SODIUM SERPL-SCNC: 140 MMOL/L (ref 132–146)
WBC OTHER # BLD: 2.7 K/UL (ref 4.5–11.5)

## 2023-09-10 PROCEDURE — 83735 ASSAY OF MAGNESIUM: CPT

## 2023-09-10 PROCEDURE — 2580000003 HC RX 258: Performed by: INTERNAL MEDICINE

## 2023-09-10 PROCEDURE — 80048 BASIC METABOLIC PNL TOTAL CA: CPT

## 2023-09-10 PROCEDURE — 6370000000 HC RX 637 (ALT 250 FOR IP): Performed by: INTERNAL MEDICINE

## 2023-09-10 PROCEDURE — 85027 COMPLETE CBC AUTOMATED: CPT

## 2023-09-10 PROCEDURE — 6370000000 HC RX 637 (ALT 250 FOR IP)

## 2023-09-10 PROCEDURE — 6370000000 HC RX 637 (ALT 250 FOR IP): Performed by: CLINICAL NURSE SPECIALIST

## 2023-09-10 PROCEDURE — 84100 ASSAY OF PHOSPHORUS: CPT

## 2023-09-10 PROCEDURE — 36415 COLL VENOUS BLD VENIPUNCTURE: CPT

## 2023-09-10 PROCEDURE — 2500000003 HC RX 250 WO HCPCS: Performed by: CLINICAL NURSE SPECIALIST

## 2023-09-10 PROCEDURE — 2140000000 HC CCU INTERMEDIATE R&B

## 2023-09-10 RX ORDER — CALCIUM ACETATE 667 MG/1
1 CAPSULE ORAL
Status: DISCONTINUED | OUTPATIENT
Start: 2023-09-10 | End: 2023-09-18 | Stop reason: HOSPADM

## 2023-09-10 RX ORDER — CLONIDINE HYDROCHLORIDE 0.2 MG/1
0.2 TABLET ORAL 3 TIMES DAILY
Status: DISCONTINUED | OUTPATIENT
Start: 2023-09-10 | End: 2023-09-12

## 2023-09-10 RX ADMIN — CALCIUM ACETATE 667 MG: 667 CAPSULE ORAL at 12:24

## 2023-09-10 RX ADMIN — CALCIUM ACETATE 667 MG: 667 CAPSULE ORAL at 16:52

## 2023-09-10 RX ADMIN — Medication 10 ML: at 20:26

## 2023-09-10 RX ADMIN — SODIUM BICARBONATE 650 MG: 650 TABLET ORAL at 07:58

## 2023-09-10 RX ADMIN — MELATONIN 3 MG ORAL TABLET 3 MG: 3 TABLET ORAL at 20:25

## 2023-09-10 RX ADMIN — LABETALOL HYDROCHLORIDE 200 MG: 200 TABLET, FILM COATED ORAL at 07:58

## 2023-09-10 RX ADMIN — Medication 10 ML: at 07:59

## 2023-09-10 RX ADMIN — FERROUS SULFATE TAB 325 MG (65 MG ELEMENTAL FE) 325 MG: 325 (65 FE) TAB at 07:58

## 2023-09-10 RX ADMIN — ASPIRIN 81 MG: 81 TABLET, COATED ORAL at 07:58

## 2023-09-10 RX ADMIN — ESCITALOPRAM OXALATE 10 MG: 10 TABLET ORAL at 07:58

## 2023-09-10 RX ADMIN — DIPHENHYDRAMINE HYDROCHLORIDE 12.5 MG: 25 TABLET ORAL at 20:25

## 2023-09-10 RX ADMIN — CLONIDINE HYDROCHLORIDE 0.2 MG: 0.2 TABLET ORAL at 16:52

## 2023-09-10 RX ADMIN — SODIUM BICARBONATE 650 MG: 650 TABLET ORAL at 20:26

## 2023-09-10 RX ADMIN — CLONIDINE HYDROCHLORIDE 0.2 MG: 0.2 TABLET ORAL at 20:26

## 2023-09-10 RX ADMIN — LABETALOL HYDROCHLORIDE 200 MG: 200 TABLET, FILM COATED ORAL at 20:25

## 2023-09-10 RX ADMIN — CLONIDINE HYDROCHLORIDE 0.1 MG: 0.1 TABLET ORAL at 07:58

## 2023-09-10 RX ADMIN — LEVOTHYROXINE SODIUM 88 MCG: 0.09 TABLET ORAL at 07:58

## 2023-09-10 RX ADMIN — HYDROXYZINE PAMOATE 25 MG: 25 CAPSULE ORAL at 20:25

## 2023-09-10 NOTE — PLAN OF CARE
Problem: Chronic Conditions and Co-morbidities  Goal: Patient's chronic conditions and co-morbidity symptoms are monitored and maintained or improved  9/9/2023 2212 by Jazmín Quinones RN  Outcome: Progressing     Problem: Discharge Planning  Goal: Discharge to home or other facility with appropriate resources  9/10/2023 1147 by Desire Garcia RN  Outcome: Progressing  9/9/2023 2212 by Jazmín Quinones RN  Outcome: Progressing     Problem: Safety - Adult  Goal: Free from fall injury  9/10/2023 1147 by Desire Garcia RN  Outcome: Progressing  9/9/2023 2212 by Jazmín Quinones RN  Outcome: Progressing     Problem: Pain  Goal: Verbalizes/displays adequate comfort level or baseline comfort level  9/10/2023 1147 by Desire Garcia RN  Outcome: Progressing  9/9/2023 2212 by Jazmín Quinones RN  Outcome: Progressing     Problem: Nutrition Deficit:  Goal: Optimize nutritional status  9/9/2023 2212 by Jazmín Quinones RN  Outcome: Progressing

## 2023-09-10 NOTE — PLAN OF CARE
Problem: Chronic Conditions and Co-morbidities  Goal: Patient's chronic conditions and co-morbidity symptoms are monitored and maintained or improved  Outcome: Progressing     Problem: Discharge Planning  Goal: Discharge to home or other facility with appropriate resources  9/9/2023 2212 by Best Gant RN  Outcome: Progressing     Problem: Safety - Adult  Goal: Free from fall injury  9/9/2023 2212 by Best Gant RN  Outcome: Progressing     Problem: Pain  Goal: Verbalizes/displays adequate comfort level or baseline comfort level  9/9/2023 2212 by Best Gant RN  Outcome: Progressing     Problem: Nutrition Deficit:  Goal: Optimize nutritional status  Outcome: Progressing

## 2023-09-10 NOTE — PATIENT CARE CONFERENCE
P Quality Flow/Interdisciplinary Rounds Progress Note        Quality Flow Rounds held on September 10, 2023    Disciplines Attending:  Bedside Nurse, , , and Nursing Unit Leadership    Becky Rashid was admitted on 8/31/2023 11:38 AM    Anticipated Discharge Date:       Disposition:    Man Score:  Man Scale Score: 19    Readmission Risk              Risk of Unplanned Readmission:  20           Discussed patient goal for the day, patient clinical progression, and barriers to discharge.   The following Goal(s) of the Day/Commitment(s) have been identified:  discharge 1941 Becca Solitario RN  September 10, 2023

## 2023-09-11 LAB
ANA PAT SER IF-IMP: ABNORMAL
ANA SER QL IA: POSITIVE
ANA TITER: ABNORMAL
ANION GAP SERPL CALCULATED.3IONS-SCNC: 13 MMOL/L (ref 7–16)
ANTI DNA DOUBLE STRANDED: NEGATIVE
BUN SERPL-MCNC: 59 MG/DL (ref 6–23)
CALCIUM SERPL-MCNC: 8.8 MG/DL (ref 8.6–10.2)
CHLORIDE SERPL-SCNC: 99 MMOL/L (ref 98–107)
CO2 SERPL-SCNC: 28 MMOL/L (ref 22–29)
CREAT SERPL-MCNC: 5.5 MG/DL (ref 0.5–1)
ERYTHROCYTE [DISTWIDTH] IN BLOOD BY AUTOMATED COUNT: 12.9 % (ref 11.5–15)
GFR SERPL CREATININE-BSD FRML MDRD: 8 ML/MIN/1.73M2
GLUCOSE SERPL-MCNC: 93 MG/DL (ref 74–99)
HCT VFR BLD AUTO: 27.8 % (ref 34–48)
HGB BLD-MCNC: 8.7 G/DL (ref 11.5–15.5)
HISTONE ANTIBODY: 0.7 UNITS (ref 0–0.9)
MAGNESIUM SERPL-MCNC: 2.6 MG/DL (ref 1.6–2.6)
MCH RBC QN AUTO: 29 PG (ref 26–35)
MCHC RBC AUTO-ENTMCNC: 31.3 G/DL (ref 32–34.5)
MCV RBC AUTO: 92.7 FL (ref 80–99.9)
PHOSPHATE SERPL-MCNC: 6.4 MG/DL (ref 2.5–4.5)
PLATELET # BLD AUTO: 101 K/UL (ref 130–450)
PMV BLD AUTO: 10.8 FL (ref 7–12)
POTASSIUM SERPL-SCNC: 4.1 MMOL/L (ref 3.5–5)
RBC # BLD AUTO: 3 M/UL (ref 3.5–5.5)
SODIUM SERPL-SCNC: 140 MMOL/L (ref 132–146)
SURGICAL PATHOLOGY REPORT: NORMAL
WBC OTHER # BLD: 2.7 K/UL (ref 4.5–11.5)

## 2023-09-11 PROCEDURE — 84100 ASSAY OF PHOSPHORUS: CPT

## 2023-09-11 PROCEDURE — 36415 COLL VENOUS BLD VENIPUNCTURE: CPT

## 2023-09-11 PROCEDURE — 2500000003 HC RX 250 WO HCPCS: Performed by: CLINICAL NURSE SPECIALIST

## 2023-09-11 PROCEDURE — 80048 BASIC METABOLIC PNL TOTAL CA: CPT

## 2023-09-11 PROCEDURE — 2140000000 HC CCU INTERMEDIATE R&B

## 2023-09-11 PROCEDURE — 6370000000 HC RX 637 (ALT 250 FOR IP): Performed by: INTERNAL MEDICINE

## 2023-09-11 PROCEDURE — 85027 COMPLETE CBC AUTOMATED: CPT

## 2023-09-11 PROCEDURE — 2580000003 HC RX 258: Performed by: INTERNAL MEDICINE

## 2023-09-11 PROCEDURE — 6370000000 HC RX 637 (ALT 250 FOR IP): Performed by: CLINICAL NURSE SPECIALIST

## 2023-09-11 PROCEDURE — 83735 ASSAY OF MAGNESIUM: CPT

## 2023-09-11 PROCEDURE — 6370000000 HC RX 637 (ALT 250 FOR IP)

## 2023-09-11 RX ADMIN — CALCIUM ACETATE 667 MG: 667 CAPSULE ORAL at 12:57

## 2023-09-11 RX ADMIN — FERROUS SULFATE TAB 325 MG (65 MG ELEMENTAL FE) 325 MG: 325 (65 FE) TAB at 08:30

## 2023-09-11 RX ADMIN — ESCITALOPRAM OXALATE 10 MG: 10 TABLET ORAL at 08:30

## 2023-09-11 RX ADMIN — SODIUM BICARBONATE 650 MG: 650 TABLET ORAL at 21:15

## 2023-09-11 RX ADMIN — LABETALOL HYDROCHLORIDE 200 MG: 200 TABLET, FILM COATED ORAL at 21:16

## 2023-09-11 RX ADMIN — CLONIDINE HYDROCHLORIDE 0.2 MG: 0.2 TABLET ORAL at 08:30

## 2023-09-11 RX ADMIN — Medication 10 ML: at 21:15

## 2023-09-11 RX ADMIN — CLONIDINE HYDROCHLORIDE 0.2 MG: 0.2 TABLET ORAL at 16:44

## 2023-09-11 RX ADMIN — LEVOTHYROXINE SODIUM 88 MCG: 0.09 TABLET ORAL at 08:30

## 2023-09-11 RX ADMIN — SODIUM BICARBONATE 650 MG: 650 TABLET ORAL at 08:30

## 2023-09-11 RX ADMIN — CALCIUM ACETATE 667 MG: 667 CAPSULE ORAL at 08:30

## 2023-09-11 RX ADMIN — LABETALOL HYDROCHLORIDE 200 MG: 200 TABLET, FILM COATED ORAL at 08:29

## 2023-09-11 RX ADMIN — DIPHENHYDRAMINE HYDROCHLORIDE 12.5 MG: 25 TABLET ORAL at 21:13

## 2023-09-11 RX ADMIN — Medication 10 ML: at 08:31

## 2023-09-11 RX ADMIN — CALCIUM ACETATE 667 MG: 667 CAPSULE ORAL at 16:44

## 2023-09-11 RX ADMIN — ASPIRIN 81 MG: 81 TABLET, COATED ORAL at 08:29

## 2023-09-11 RX ADMIN — CLONIDINE HYDROCHLORIDE 0.2 MG: 0.2 TABLET ORAL at 21:15

## 2023-09-11 ASSESSMENT — PAIN SCALES - GENERAL
PAINLEVEL_OUTOF10: 0

## 2023-09-11 NOTE — CARE COORDINATION
9/11/23  Transition of Care Update. Patient admitted for MANAN. Patient is being followed by nephrology and internal medicine. Patient will not require hemodialysis at this point. Kidney biopsy was done and Findings include ATN with significant stage of fibrosis suggestive moderate chronicity. IV hydration is being discontinued. Discharge goal is home with [facility] home care. Start of care for [facility] is Wednesday 9/13/23. Home care orders will need placed prior to discharge for PT/OT and skilled nursing. Family to provide transport home when medically stable. SW/Cm to follow.     Electronically signed by ADRI Salguero on 9/11/2023 at 2:54 PM

## 2023-09-12 LAB
ANION GAP SERPL CALCULATED.3IONS-SCNC: 16 MMOL/L (ref 7–16)
BUN SERPL-MCNC: 70 MG/DL (ref 6–23)
CALCIUM SERPL-MCNC: 9 MG/DL (ref 8.6–10.2)
CHLORIDE SERPL-SCNC: 99 MMOL/L (ref 98–107)
CO2 SERPL-SCNC: 25 MMOL/L (ref 22–29)
CREAT SERPL-MCNC: 6 MG/DL (ref 0.5–1)
ERYTHROCYTE [DISTWIDTH] IN BLOOD BY AUTOMATED COUNT: 12.7 % (ref 11.5–15)
GFR SERPL CREATININE-BSD FRML MDRD: 7 ML/MIN/1.73M2
GLUCOSE SERPL-MCNC: 99 MG/DL (ref 74–99)
HAV IGM SERPL QL IA: NONREACTIVE
HBV CORE IGM SERPL QL IA: NONREACTIVE
HBV SURFACE AG SERPL QL IA: NONREACTIVE
HCT VFR BLD AUTO: 26.9 % (ref 34–48)
HCV AB SERPL QL IA: NONREACTIVE
HGB BLD-MCNC: 8.6 G/DL (ref 11.5–15.5)
MAGNESIUM SERPL-MCNC: 2.4 MG/DL (ref 1.6–2.6)
MCH RBC QN AUTO: 29.3 PG (ref 26–35)
MCHC RBC AUTO-ENTMCNC: 32 G/DL (ref 32–34.5)
MCV RBC AUTO: 91.5 FL (ref 80–99.9)
PHOSPHATE SERPL-MCNC: 6.2 MG/DL (ref 2.5–4.5)
PLATELET # BLD AUTO: 107 K/UL (ref 130–450)
PMV BLD AUTO: 10.6 FL (ref 7–12)
POTASSIUM SERPL-SCNC: 4.1 MMOL/L (ref 3.5–5)
RBC # BLD AUTO: 2.94 M/UL (ref 3.5–5.5)
SODIUM SERPL-SCNC: 140 MMOL/L (ref 132–146)
WBC OTHER # BLD: 2.6 K/UL (ref 4.5–11.5)

## 2023-09-12 PROCEDURE — 2580000003 HC RX 258: Performed by: INTERNAL MEDICINE

## 2023-09-12 PROCEDURE — 6370000000 HC RX 637 (ALT 250 FOR IP): Performed by: CLINICAL NURSE SPECIALIST

## 2023-09-12 PROCEDURE — 6370000000 HC RX 637 (ALT 250 FOR IP): Performed by: INTERNAL MEDICINE

## 2023-09-12 PROCEDURE — 83735 ASSAY OF MAGNESIUM: CPT

## 2023-09-12 PROCEDURE — 36415 COLL VENOUS BLD VENIPUNCTURE: CPT

## 2023-09-12 PROCEDURE — 6370000000 HC RX 637 (ALT 250 FOR IP)

## 2023-09-12 PROCEDURE — 2500000003 HC RX 250 WO HCPCS: Performed by: CLINICAL NURSE SPECIALIST

## 2023-09-12 PROCEDURE — 2140000000 HC CCU INTERMEDIATE R&B

## 2023-09-12 PROCEDURE — 80074 ACUTE HEPATITIS PANEL: CPT

## 2023-09-12 PROCEDURE — 80048 BASIC METABOLIC PNL TOTAL CA: CPT

## 2023-09-12 PROCEDURE — 6360000002 HC RX W HCPCS: Performed by: INTERNAL MEDICINE

## 2023-09-12 PROCEDURE — 84100 ASSAY OF PHOSPHORUS: CPT

## 2023-09-12 PROCEDURE — 6360000002 HC RX W HCPCS: Performed by: NURSE PRACTITIONER

## 2023-09-12 PROCEDURE — 85027 COMPLETE CBC AUTOMATED: CPT

## 2023-09-12 RX ORDER — LABETALOL HYDROCHLORIDE 5 MG/ML
10 INJECTION, SOLUTION INTRAVENOUS EVERY 4 HOURS PRN
Status: DISCONTINUED | OUTPATIENT
Start: 2023-09-12 | End: 2023-09-18 | Stop reason: HOSPADM

## 2023-09-12 RX ADMIN — CLONIDINE HYDROCHLORIDE 0.2 MG: 0.2 TABLET ORAL at 08:37

## 2023-09-12 RX ADMIN — LABETALOL HYDROCHLORIDE 10 MG: 5 INJECTION INTRAVENOUS at 23:17

## 2023-09-12 RX ADMIN — MELATONIN 3 MG ORAL TABLET 3 MG: 3 TABLET ORAL at 02:06

## 2023-09-12 RX ADMIN — EPOETIN ALFA-EPBX 3000 UNITS: 3000 INJECTION, SOLUTION INTRAVENOUS; SUBCUTANEOUS at 18:02

## 2023-09-12 RX ADMIN — CLONIDINE HYDROCHLORIDE 0.3 MG: 0.2 TABLET ORAL at 21:34

## 2023-09-12 RX ADMIN — Medication 10 ML: at 20:06

## 2023-09-12 RX ADMIN — LABETALOL HYDROCHLORIDE 200 MG: 200 TABLET, FILM COATED ORAL at 08:37

## 2023-09-12 RX ADMIN — CALCIUM ACETATE 667 MG: 667 CAPSULE ORAL at 18:02

## 2023-09-12 RX ADMIN — LEVOTHYROXINE SODIUM 88 MCG: 0.09 TABLET ORAL at 08:37

## 2023-09-12 RX ADMIN — CALCIUM ACETATE 667 MG: 667 CAPSULE ORAL at 11:50

## 2023-09-12 RX ADMIN — DIPHENHYDRAMINE HYDROCHLORIDE 12.5 MG: 25 TABLET ORAL at 20:05

## 2023-09-12 RX ADMIN — SODIUM BICARBONATE 650 MG: 650 TABLET ORAL at 08:37

## 2023-09-12 RX ADMIN — FERROUS SULFATE TAB 325 MG (65 MG ELEMENTAL FE) 325 MG: 325 (65 FE) TAB at 08:36

## 2023-09-12 RX ADMIN — SODIUM BICARBONATE 650 MG: 650 TABLET ORAL at 20:05

## 2023-09-12 RX ADMIN — CALCIUM ACETATE 667 MG: 667 CAPSULE ORAL at 08:37

## 2023-09-12 RX ADMIN — Medication 10 ML: at 08:37

## 2023-09-12 RX ADMIN — ASPIRIN 81 MG: 81 TABLET, COATED ORAL at 08:36

## 2023-09-12 RX ADMIN — ESCITALOPRAM OXALATE 10 MG: 10 TABLET ORAL at 08:37

## 2023-09-12 RX ADMIN — CLONIDINE HYDROCHLORIDE 0.3 MG: 0.2 TABLET ORAL at 16:00

## 2023-09-12 RX ADMIN — LABETALOL HYDROCHLORIDE 200 MG: 200 TABLET, FILM COATED ORAL at 20:05

## 2023-09-12 RX ADMIN — MELATONIN 3 MG ORAL TABLET 3 MG: 3 TABLET ORAL at 20:05

## 2023-09-12 ASSESSMENT — PAIN SCALES - GENERAL
PAINLEVEL_OUTOF10: 0

## 2023-09-12 NOTE — PLAN OF CARE
Problem: Chronic Conditions and Co-morbidities  Goal: Patient's chronic conditions and co-morbidity symptoms are monitored and maintained or improved  9/12/2023 1403 by Brown Lovelace RN  Outcome: Progressing     Problem: Discharge Planning  Goal: Discharge to home or other facility with appropriate resources  9/12/2023 1403 by Brown Lovelace RN  Outcome: Progressing     Problem: Safety - Adult  Goal: Free from fall injury  9/12/2023 1403 by Brown Lovelace RN  Outcome: Progressing     Problem: ABCDS Injury Assessment  Goal: Absence of physical injury  9/12/2023 1403 by Brown Lovelace RN  Outcome: Progressing     Problem: Pain  Goal: Verbalizes/displays adequate comfort level or baseline comfort level  9/12/2023 1403 by Brown Lovelace RN  Outcome: Progressing

## 2023-09-12 NOTE — CARE COORDINATION
9/12/23  Transition of Care Update. Patient admitted for MANAN. Patient is being followed by nephrology and internal medicine. Patient noted to have elevated BP yesterday evening despite clonidine and labetolol being given. Patients Creatinine up to 6.0 today. Nephrology spoke with patient and family. Patient agreeable to HD. IR referral was made for tunnel cath placement. Family would like Tigreon Lukas [phone]. Clinicals to be obtained and faxed in the am.  [facility] home care is following for home care support post discharge. Home care orders will need placed prior to discharge for PT/OT and skilled nursing. Family to provide transport home when medically stable. SW/Cm to follow.     Electronically signed by ADRI Romero on 9/12/2023 at 3:31 PM

## 2023-09-12 NOTE — PRE-PROCEDURE INSTRUCTIONS
Spoke with RN regarding patient's ordered HD catheter placement. Ordering provider will need to keep patient NPO after midnight, and hold all thinners for procedure. Patient is able to sign consent.

## 2023-09-13 ENCOUNTER — APPOINTMENT (OUTPATIENT)
Dept: INTERVENTIONAL RADIOLOGY/VASCULAR | Age: 78
DRG: 674 | End: 2023-09-13
Payer: MEDICARE

## 2023-09-13 LAB
ANION GAP SERPL CALCULATED.3IONS-SCNC: 20 MMOL/L (ref 7–16)
BUN SERPL-MCNC: 68 MG/DL (ref 6–23)
CALCIUM SERPL-MCNC: 9.4 MG/DL (ref 8.6–10.2)
CHLORIDE SERPL-SCNC: 98 MMOL/L (ref 98–107)
CO2 SERPL-SCNC: 24 MMOL/L (ref 22–29)
CREAT SERPL-MCNC: 6.1 MG/DL (ref 0.5–1)
ENA SM AB SER QL: NEGATIVE
ERYTHROCYTE [DISTWIDTH] IN BLOOD BY AUTOMATED COUNT: 12.7 % (ref 11.5–15)
GFR SERPL CREATININE-BSD FRML MDRD: 7 ML/MIN/1.73M2
GLUCOSE SERPL-MCNC: 102 MG/DL (ref 74–99)
HCT VFR BLD AUTO: 28.8 % (ref 34–48)
HGB BLD-MCNC: 9.2 G/DL (ref 11.5–15.5)
JO-1 ANTIBODY: NEGATIVE
MAGNESIUM SERPL-MCNC: 2.4 MG/DL (ref 1.6–2.6)
MCH RBC QN AUTO: 29.2 PG (ref 26–35)
MCHC RBC AUTO-ENTMCNC: 31.9 G/DL (ref 32–34.5)
MCV RBC AUTO: 91.4 FL (ref 80–99.9)
PHOSPHATE SERPL-MCNC: 6 MG/DL (ref 2.5–4.5)
PLATELET # BLD AUTO: 103 K/UL (ref 130–450)
PMV BLD AUTO: 10.1 FL (ref 7–12)
POTASSIUM SERPL-SCNC: 4.3 MMOL/L (ref 3.5–5)
RBC # BLD AUTO: 3.15 M/UL (ref 3.5–5.5)
SCLERODERMA (SCL-70) AB: NEGATIVE
SJOGREN'S ANTIBODIES (SSA): NEGATIVE
SJOGREN'S ANTIBODIES (SSB): NEGATIVE
SODIUM SERPL-SCNC: 142 MMOL/L (ref 132–146)
U1 SNRNP IGG SER-ACNC: NEGATIVE
WBC OTHER # BLD: 2.8 K/UL (ref 4.5–11.5)

## 2023-09-13 PROCEDURE — 6370000000 HC RX 637 (ALT 250 FOR IP): Performed by: INTERNAL MEDICINE

## 2023-09-13 PROCEDURE — 0JH63XZ INSERTION OF TUNNELED VASCULAR ACCESS DEVICE INTO CHEST SUBCUTANEOUS TISSUE AND FASCIA, PERCUTANEOUS APPROACH: ICD-10-PCS | Performed by: INTERNAL MEDICINE

## 2023-09-13 PROCEDURE — 36558 INSERT TUNNELED CV CATH: CPT

## 2023-09-13 PROCEDURE — 80048 BASIC METABOLIC PNL TOTAL CA: CPT

## 2023-09-13 PROCEDURE — 6360000002 HC RX W HCPCS: Performed by: RADIOLOGY

## 2023-09-13 PROCEDURE — 36415 COLL VENOUS BLD VENIPUNCTURE: CPT

## 2023-09-13 PROCEDURE — 6360000002 HC RX W HCPCS: Performed by: INTERNAL MEDICINE

## 2023-09-13 PROCEDURE — 2500000003 HC RX 250 WO HCPCS: Performed by: RADIOLOGY

## 2023-09-13 PROCEDURE — 77001 FLUOROGUIDE FOR VEIN DEVICE: CPT

## 2023-09-13 PROCEDURE — 6370000000 HC RX 637 (ALT 250 FOR IP)

## 2023-09-13 PROCEDURE — 85027 COMPLETE CBC AUTOMATED: CPT

## 2023-09-13 PROCEDURE — 76937 US GUIDE VASCULAR ACCESS: CPT

## 2023-09-13 PROCEDURE — 2500000003 HC RX 250 WO HCPCS: Performed by: CLINICAL NURSE SPECIALIST

## 2023-09-13 PROCEDURE — 2580000003 HC RX 258: Performed by: INTERNAL MEDICINE

## 2023-09-13 PROCEDURE — C1894 INTRO/SHEATH, NON-LASER: HCPCS

## 2023-09-13 PROCEDURE — 90935 HEMODIALYSIS ONE EVALUATION: CPT

## 2023-09-13 PROCEDURE — 83735 ASSAY OF MAGNESIUM: CPT

## 2023-09-13 PROCEDURE — 2140000000 HC CCU INTERMEDIATE R&B

## 2023-09-13 PROCEDURE — 84100 ASSAY OF PHOSPHORUS: CPT

## 2023-09-13 PROCEDURE — 02HV33Z INSERTION OF INFUSION DEVICE INTO SUPERIOR VENA CAVA, PERCUTANEOUS APPROACH: ICD-10-PCS | Performed by: INTERNAL MEDICINE

## 2023-09-13 RX ORDER — FENTANYL CITRATE 50 UG/ML
INJECTION, SOLUTION INTRAMUSCULAR; INTRAVENOUS PRN
Status: COMPLETED | OUTPATIENT
Start: 2023-09-13 | End: 2023-09-13

## 2023-09-13 RX ORDER — AMLODIPINE BESYLATE 10 MG/1
10 TABLET ORAL DAILY
Status: DISCONTINUED | OUTPATIENT
Start: 2023-09-13 | End: 2023-09-13

## 2023-09-13 RX ORDER — HYDRALAZINE HYDROCHLORIDE 25 MG/1
25 TABLET, FILM COATED ORAL EVERY 8 HOURS SCHEDULED
Status: DISCONTINUED | OUTPATIENT
Start: 2023-09-14 | End: 2023-09-15

## 2023-09-13 RX ORDER — HYDRALAZINE HYDROCHLORIDE 20 MG/ML
10 INJECTION INTRAMUSCULAR; INTRAVENOUS EVERY 4 HOURS PRN
Status: DISCONTINUED | OUTPATIENT
Start: 2023-09-13 | End: 2023-09-18 | Stop reason: HOSPADM

## 2023-09-13 RX ORDER — MIDAZOLAM HYDROCHLORIDE 2 MG/2ML
INJECTION, SOLUTION INTRAMUSCULAR; INTRAVENOUS PRN
Status: COMPLETED | OUTPATIENT
Start: 2023-09-13 | End: 2023-09-13

## 2023-09-13 RX ORDER — HEPARIN SODIUM 1000 [USP'U]/ML
INJECTION, SOLUTION INTRAVENOUS; SUBCUTANEOUS PRN
Status: COMPLETED | OUTPATIENT
Start: 2023-09-13 | End: 2023-09-13

## 2023-09-13 RX ADMIN — CLONIDINE HYDROCHLORIDE 0.3 MG: 0.2 TABLET ORAL at 20:15

## 2023-09-13 RX ADMIN — ESCITALOPRAM OXALATE 10 MG: 10 TABLET ORAL at 07:42

## 2023-09-13 RX ADMIN — CALCIUM ACETATE 667 MG: 667 CAPSULE ORAL at 11:30

## 2023-09-13 RX ADMIN — CALCIUM ACETATE 667 MG: 667 CAPSULE ORAL at 16:01

## 2023-09-13 RX ADMIN — LABETALOL HYDROCHLORIDE 200 MG: 200 TABLET, FILM COATED ORAL at 20:15

## 2023-09-13 RX ADMIN — Medication 10 ML: at 10:27

## 2023-09-13 RX ADMIN — CLONIDINE HYDROCHLORIDE 0.3 MG: 0.2 TABLET ORAL at 07:42

## 2023-09-13 RX ADMIN — LABETALOL HYDROCHLORIDE 200 MG: 200 TABLET, FILM COATED ORAL at 07:43

## 2023-09-13 RX ADMIN — LABETALOL HYDROCHLORIDE 10 MG: 5 INJECTION INTRAVENOUS at 17:11

## 2023-09-13 RX ADMIN — HYDRALAZINE HYDROCHLORIDE 10 MG: 20 INJECTION, SOLUTION INTRAMUSCULAR; INTRAVENOUS at 22:42

## 2023-09-13 RX ADMIN — CLONIDINE HYDROCHLORIDE 0.3 MG: 0.2 TABLET ORAL at 16:01

## 2023-09-13 RX ADMIN — LEVOTHYROXINE SODIUM 88 MCG: 0.09 TABLET ORAL at 11:38

## 2023-09-13 RX ADMIN — ASPIRIN 81 MG: 81 TABLET, COATED ORAL at 11:30

## 2023-09-13 RX ADMIN — MIDAZOLAM HYDROCHLORIDE 0.5 MG: 1 INJECTION, SOLUTION INTRAMUSCULAR; INTRAVENOUS at 10:32

## 2023-09-13 RX ADMIN — Medication 10 ML: at 20:17

## 2023-09-13 RX ADMIN — MIDAZOLAM HYDROCHLORIDE 0.5 MG: 1 INJECTION, SOLUTION INTRAMUSCULAR; INTRAVENOUS at 10:24

## 2023-09-13 RX ADMIN — HEPARIN SODIUM 2100 UNITS: 1000 INJECTION, SOLUTION INTRAVENOUS; SUBCUTANEOUS at 10:44

## 2023-09-13 RX ADMIN — SODIUM BICARBONATE 650 MG: 650 TABLET ORAL at 11:30

## 2023-09-13 RX ADMIN — FENTANYL CITRATE 50 MCG: 50 INJECTION, SOLUTION INTRAMUSCULAR; INTRAVENOUS at 10:24

## 2023-09-13 RX ADMIN — SODIUM CHLORIDE, PRESERVATIVE FREE 10 ML: 5 INJECTION INTRAVENOUS at 22:42

## 2023-09-13 RX ADMIN — HEPARIN SODIUM 2000 UNITS: 1000 INJECTION, SOLUTION INTRAVENOUS; SUBCUTANEOUS at 10:44

## 2023-09-13 RX ADMIN — SODIUM BICARBONATE 650 MG: 650 TABLET ORAL at 20:15

## 2023-09-13 ASSESSMENT — PAIN SCALES - GENERAL: PAINLEVEL_OUTOF10: 0

## 2023-09-13 ASSESSMENT — PAIN - FUNCTIONAL ASSESSMENT: PAIN_FUNCTIONAL_ASSESSMENT: NONE - DENIES PAIN

## 2023-09-13 NOTE — PROCEDURES
1100Patient returned from procedure. Dressing checked, clean, dry, and intact. Patient stable. No s/s of complications noted or reported. Vitals will be checked q 15min, see flow sheets. site was checked with every set of vitals. Site clean dry and intact. Report given to [facility] . Patient taken to floor via bed with transport. . Patient in stable condition, no s/s of complications noted or reported.

## 2023-09-13 NOTE — CARE COORDINATION
9/13/23  Transition of Care Update. Patient admitted for MANAN. Patient is being followed by IR for tunnel cath placement today. Family would like Victorino Lakhani [phone]. Clinicals to be obtained and faxed to [phone] and call placed to [facility] [phone] to update on referral for Victorino Lakhani. Victorino Lakhani also called to update on new referral.  Colette Reyes states availability is currently T-Th-Sat. Patient is also being followed by [facility] for post discharge support. Home care orders will need placed prior to discharge for PT/OT and skilled nursing. Family to provide transport home when medically stable. [facility] to follow.     Electronically signed by ADRI Nunez on 9/13/2023 at 9:43 AM

## 2023-09-13 NOTE — PRE SEDATION
Sedation Pre-Procedure Note    Patient Name: Magdalena Crowder   YOB: 1945  Room/Bed: 7042/4660-D  Medical Record Number: [id number]  Date: 2023   Time: 10:20 AM       Indication:  venous access    Consent: I have discussed with the patient and/or the patient representative the indication, alternatives, and the possible risks and/or complications of the planned procedure and the anesthesia methods. The patient and/or patient representative appear to understand and agree to proceed. Vital Signs:   Vitals:    23 1015   BP:    Pulse: 64   Resp:    Temp:    SpO2:        Past Medical History:   has a past medical history of Acute loss of vision, left, Arthritis, CAD (coronary artery disease), CKD (chronic kidney disease), Depression, Hypertension, Left carotid stenosis, Respiratory failure ([address]), Thyroid disease, Tracheal stenosis, and Unspecified cerebral artery occlusion with cerebral infarction. Past Surgical History:   has a past surgical history that includes Coronary angioplasty with stent (); cyst removal ();  section; Endoscopy, colon, diagnostic; Colonoscopy (); tracheostomy (14); Gastrostomy tube placement (2014); Upper gastrointestinal endoscopy; polypectomy (2013); other surgical history; joint replacement (Left, late ); Tonsillectomy; Heel spur surgery (Left, years ago); Total knee arthroplasty (Right, 2020); and CT BIOPSY RENAL (2023).     Medications:   Scheduled Meds:    cloNIDine  0.3 mg Oral TID    calcium acetate  1 capsule Oral TID WC    ferrous sulfate  325 mg Oral Daily with breakfast    labetalol  200 mg Oral BID    epoetin candis-epbx  3,000 Units SubCUTAneous Weekly    sodium chloride flush  10 mL IntraVENous BID    aspirin  81 mg Oral Daily    levothyroxine  88 mcg Oral Daily    nitroGLYCERIN  1 patch TransDERmal Daily    sodium bicarbonate  650 mg Oral BID    escitalopram  10 mg Oral Daily     Continuous
hydroxide, hydrALAZINE, acetaminophen, melatonin, prochlorperazine  Home Meds:   Prior to Admission medications    Medication Sig Start Date End Date Taking? Authorizing Provider   hydrALAZINE (APRESOLINE) 25 MG tablet TAKE 1 TABLET BY MOUTH FOUR TIMES DAILY 8/8/23   Areli Second, DO   solifenacin (VESICARE) 5 MG tablet Take 1 tablet by mouth daily 8/8/23 8/7/24  Sandra Nicholson, DO   mupirocin (BACTROBAN) 2 % ointment Apply topically 3 times daily. 4/28/23   SUZY Meeks III   labetalol (NORMODYNE) 100 MG tablet TAKE 1 TABLET BY MOUTH TWICE DAILY 4/10/23   Areli Second, DO   cloNIDine (CATAPRES) 0.1 MG tablet TAKE 1 TABLET BY MOUTH THREE TIMES DAILY 4/10/23   Areli Second, DO   aspirin (ASPIRIN LOW DOSE) 81 MG EC tablet TAKE 1 TABLET BY MOUTH EVERY DAY 4/10/23   Areli Second, DO   citalopram (CELEXA) 20 MG tablet Take 1 tablet by mouth daily 2/7/23   Areli Second, DO   levothyroxine (SYNTHROID) 88 MCG tablet Take 1 tablet by mouth Daily 2/7/23   Areli Second, DO   spironolactone (ALDACTONE) 25 MG tablet Take 1 tablet by mouth daily 2/7/23   Sandra Nicholson, DO   nitroGLYCERIN (NITRODUR) 0.2 MG/HR APPLY 1 PATCH TOPICALLY TO THE SKIN DAILY 2/7/23   Areli Second, DO     Coumadin Use Last 7 Days:  no  Antiplatelet drug therapy use last 7 days: no  Other anticoagulant use last 7 days: no  Additional Medication Information:  N/A      Pre-Sedation Documentation and Exam:   I have personally completed a history, physical exam & review of systems for this patient (see notes).     Mallampati Airway Assessment:  Mallampati Class II - (soft palate, fauces & uvula are visible)    Prior History of Anesthesia Complications:   none    ASA Classification:  Class 3 - A patient with severe systemic disease that limits activity but is not incapacitating    Sedation/ Anesthesia Plan:   intravenous sedation    Medications Planned:   fentanyl intravenously    Patient is an appropriate candidate

## 2023-09-13 NOTE — PLAN OF CARE
Problem: Chronic Conditions and Co-morbidities  Goal: Patient's chronic conditions and co-morbidity symptoms are monitored and maintained or improved  9/13/2023 0043 by Marilynn Hayes RN  Outcome: Progressing  9/12/2023 1403 by Flor Ruelas RN  Outcome: Progressing     Problem: Discharge Planning  Goal: Discharge to home or other facility with appropriate resources  9/13/2023 0043 by Marilynn Hayes RN  Outcome: Progressing  9/12/2023 1403 by Flor Ruelas RN  Outcome: Progressing     Problem: Safety - Adult  Goal: Free from fall injury  9/13/2023 0043 by Marilynn Hayes RN  Outcome: Progressing  9/12/2023 1403 by Flor Ruelas RN  Outcome: Progressing     Problem: ABCDS Injury Assessment  Goal: Absence of physical injury  9/13/2023 0043 by Marilynn Hayes RN  Outcome: Progressing  9/12/2023 1403 by Flor Ruelas RN  Outcome: Progressing     Problem: Pain  Goal: Verbalizes/displays adequate comfort level or baseline comfort level  9/13/2023 0043 by Marilynn Hayes RN  Outcome: Progressing  9/12/2023 1403 by Flor Ruelas RN  Outcome: Progressing     Problem: Nutrition Deficit:  Goal: Optimize nutritional status  Outcome: Progressing

## 2023-09-14 LAB
ANCA AB PATTERN SER IF-IMP: ABNORMAL
ANCA IGG TITR SER IF: ABNORMAL {TITER}
ANION GAP SERPL CALCULATED.3IONS-SCNC: 15 MMOL/L (ref 7–16)
BUN SERPL-MCNC: 49 MG/DL (ref 6–23)
CALCIUM SERPL-MCNC: 9.5 MG/DL (ref 8.6–10.2)
CHLORIDE SERPL-SCNC: 99 MMOL/L (ref 98–107)
CO2 SERPL-SCNC: 27 MMOL/L (ref 22–29)
CREAT SERPL-MCNC: 4.7 MG/DL (ref 0.5–1)
DEPRECATED S PNEUM 1 IGG SER-MCNC: 46 AU/ML (ref 0–19)
ERYTHROCYTE [DISTWIDTH] IN BLOOD BY AUTOMATED COUNT: 12.6 % (ref 11.5–15)
GFR SERPL CREATININE-BSD FRML MDRD: 9 ML/MIN/1.73M2
GLUCOSE SERPL-MCNC: 124 MG/DL (ref 74–99)
HCT VFR BLD AUTO: 28.9 % (ref 34–48)
HGB BLD-MCNC: 9.4 G/DL (ref 11.5–15.5)
MAGNESIUM SERPL-MCNC: 2.2 MG/DL (ref 1.6–2.6)
MCH RBC QN AUTO: 29.5 PG (ref 26–35)
MCHC RBC AUTO-ENTMCNC: 32.5 G/DL (ref 32–34.5)
MCV RBC AUTO: 90.6 FL (ref 80–99.9)
PHOSPHATE SERPL-MCNC: 4.8 MG/DL (ref 2.5–4.5)
PLATELET # BLD AUTO: 107 K/UL (ref 130–450)
PMV BLD AUTO: 10.3 FL (ref 7–12)
POTASSIUM SERPL-SCNC: 4.2 MMOL/L (ref 3.5–5)
RBC # BLD AUTO: 3.19 M/UL (ref 3.5–5.5)
SERINE PROTEASE 3, IGG: 1 AU/ML (ref 0–19)
SODIUM SERPL-SCNC: 141 MMOL/L (ref 132–146)
WBC OTHER # BLD: 2.8 K/UL (ref 4.5–11.5)

## 2023-09-14 PROCEDURE — 6370000000 HC RX 637 (ALT 250 FOR IP): Performed by: CLINICAL NURSE SPECIALIST

## 2023-09-14 PROCEDURE — 6370000000 HC RX 637 (ALT 250 FOR IP)

## 2023-09-14 PROCEDURE — 6360000002 HC RX W HCPCS: Performed by: INTERNAL MEDICINE

## 2023-09-14 PROCEDURE — 2140000000 HC CCU INTERMEDIATE R&B

## 2023-09-14 PROCEDURE — 2500000003 HC RX 250 WO HCPCS: Performed by: CLINICAL NURSE SPECIALIST

## 2023-09-14 PROCEDURE — 80048 BASIC METABOLIC PNL TOTAL CA: CPT

## 2023-09-14 PROCEDURE — 84100 ASSAY OF PHOSPHORUS: CPT

## 2023-09-14 PROCEDURE — 85027 COMPLETE CBC AUTOMATED: CPT

## 2023-09-14 PROCEDURE — 6370000000 HC RX 637 (ALT 250 FOR IP): Performed by: INTERNAL MEDICINE

## 2023-09-14 PROCEDURE — 97530 THERAPEUTIC ACTIVITIES: CPT

## 2023-09-14 PROCEDURE — 36415 COLL VENOUS BLD VENIPUNCTURE: CPT

## 2023-09-14 PROCEDURE — 97535 SELF CARE MNGMENT TRAINING: CPT

## 2023-09-14 PROCEDURE — 83735 ASSAY OF MAGNESIUM: CPT

## 2023-09-14 PROCEDURE — 90935 HEMODIALYSIS ONE EVALUATION: CPT

## 2023-09-14 PROCEDURE — 2580000003 HC RX 258: Performed by: INTERNAL MEDICINE

## 2023-09-14 RX ORDER — MECOBALAMIN 5000 MCG
5 TABLET,DISINTEGRATING ORAL NIGHTLY PRN
Status: DISCONTINUED | OUTPATIENT
Start: 2023-09-14 | End: 2023-09-18 | Stop reason: HOSPADM

## 2023-09-14 RX ADMIN — HYDRALAZINE HYDROCHLORIDE 25 MG: 25 TABLET, FILM COATED ORAL at 06:15

## 2023-09-14 RX ADMIN — Medication 5 MG: at 21:47

## 2023-09-14 RX ADMIN — Medication 10 ML: at 21:48

## 2023-09-14 RX ADMIN — CLONIDINE HYDROCHLORIDE 0.3 MG: 0.2 TABLET ORAL at 08:12

## 2023-09-14 RX ADMIN — Medication 10 ML: at 08:12

## 2023-09-14 RX ADMIN — MELATONIN 3 MG ORAL TABLET 3 MG: 3 TABLET ORAL at 01:19

## 2023-09-14 RX ADMIN — HYDRALAZINE HYDROCHLORIDE 10 MG: 20 INJECTION, SOLUTION INTRAMUSCULAR; INTRAVENOUS at 23:24

## 2023-09-14 RX ADMIN — CALCIUM ACETATE 667 MG: 667 CAPSULE ORAL at 16:46

## 2023-09-14 RX ADMIN — CLONIDINE HYDROCHLORIDE 0.3 MG: 0.2 TABLET ORAL at 16:46

## 2023-09-14 RX ADMIN — SODIUM BICARBONATE 650 MG: 650 TABLET ORAL at 08:11

## 2023-09-14 RX ADMIN — HYDRALAZINE HYDROCHLORIDE 10 MG: 20 INJECTION, SOLUTION INTRAMUSCULAR; INTRAVENOUS at 03:33

## 2023-09-14 RX ADMIN — ASPIRIN 81 MG: 81 TABLET, COATED ORAL at 08:11

## 2023-09-14 RX ADMIN — ESCITALOPRAM OXALATE 10 MG: 10 TABLET ORAL at 08:11

## 2023-09-14 RX ADMIN — LABETALOL HYDROCHLORIDE 200 MG: 200 TABLET, FILM COATED ORAL at 08:12

## 2023-09-14 RX ADMIN — FERROUS SULFATE TAB 325 MG (65 MG ELEMENTAL FE) 325 MG: 325 (65 FE) TAB at 08:12

## 2023-09-14 RX ADMIN — SODIUM CHLORIDE, PRESERVATIVE FREE 10 ML: 5 INJECTION INTRAVENOUS at 23:24

## 2023-09-14 RX ADMIN — CALCIUM ACETATE 667 MG: 667 CAPSULE ORAL at 11:48

## 2023-09-14 RX ADMIN — LEVOTHYROXINE SODIUM 88 MCG: 0.09 TABLET ORAL at 08:11

## 2023-09-14 RX ADMIN — SODIUM CHLORIDE, PRESERVATIVE FREE 10 ML: 5 INJECTION INTRAVENOUS at 03:34

## 2023-09-14 RX ADMIN — CLONIDINE HYDROCHLORIDE 0.3 MG: 0.2 TABLET ORAL at 21:47

## 2023-09-14 RX ADMIN — LABETALOL HYDROCHLORIDE 200 MG: 200 TABLET, FILM COATED ORAL at 21:47

## 2023-09-14 RX ADMIN — HYDRALAZINE HYDROCHLORIDE 25 MG: 25 TABLET, FILM COATED ORAL at 21:47

## 2023-09-14 RX ADMIN — CALCIUM ACETATE 667 MG: 667 CAPSULE ORAL at 08:11

## 2023-09-14 ASSESSMENT — PAIN SCALES - GENERAL: PAINLEVEL_OUTOF10: 0

## 2023-09-14 NOTE — PLAN OF CARE
Problem: Chronic Conditions and Co-morbidities  Goal: Patient's chronic conditions and co-morbidity symptoms are monitored and maintained or improved  9/14/2023 0325 by Michelle Crabtree RN  Outcome: Progressing  9/14/2023 0147 by Michelle Crabtree RN  Outcome: Progressing  9/13/2023 1449 by Ny Brand RN  Outcome: Progressing     Problem: Discharge Planning  Goal: Discharge to home or other facility with appropriate resources  9/14/2023 0325 by Michelle Crabtree RN  Outcome: Progressing  9/14/2023 0147 by Michelle Crabtree RN  Outcome: Progressing  9/13/2023 1449 by Ny Brand RN  Outcome: Progressing     Problem: Safety - Adult  Goal: Free from fall injury  9/14/2023 0325 by Michelle Crabtree RN  Outcome: Progressing  9/14/2023 0147 by Michelle Crabtree RN  Outcome: Progressing  9/13/2023 1449 by Ny Brand RN  Outcome: Progressing     Problem: ABCDS Injury Assessment  Goal: Absence of physical injury  9/14/2023 0325 by Michelle Crabtree RN  Outcome: Progressing  9/14/2023 0147 by Michelle Crabtree RN  Outcome: Progressing  9/13/2023 1449 by Ny Brand RN  Outcome: Progressing     Problem: Pain  Goal: Verbalizes/displays adequate comfort level or baseline comfort level  9/14/2023 0325 by Michelle Crabtree RN  Outcome: Progressing  9/14/2023 0147 by Michelle Crabtree RN  Outcome: Progressing  9/13/2023 1449 by Ny Brand RN  Outcome: Progressing     Problem: Nutrition Deficit:  Goal: Optimize nutritional status  9/14/2023 0325 by Michelle Crabtree RN  Outcome: Progressing  9/14/2023 0147 by Michelle Crabtree RN  Outcome: Progressing  9/13/2023 1449 by Ny Brand RN  Outcome: Progressing

## 2023-09-14 NOTE — PLAN OF CARE
Problem: Chronic Conditions and Co-morbidities  Goal: Patient's chronic conditions and co-morbidity symptoms are monitored and maintained or improved  9/14/2023 0325 by Elizabeth Jaeger RN  Outcome: Progressing  9/14/2023 0147 by Elizabeth Jaeger RN  Outcome: Progressing     Problem: Discharge Planning  Goal: Discharge to home or other facility with appropriate resources  9/14/2023 1046 by Little Yeager RN  Outcome: Progressing  9/14/2023 0325 by Elizabeth Jaeger RN  Outcome: Progressing  9/14/2023 0147 by Elizabeth Jaeger RN  Outcome: Progressing     Problem: Safety - Adult  Goal: Free from fall injury  9/14/2023 1046 by Little Yeager RN  Outcome: Progressing  9/14/2023 0325 by Elizabeth Jaeger RN  Outcome: Progressing  9/14/2023 0147 by Divya Grider RN  Outcome: Progressing     Problem: ABCDS Injury Assessment  Goal: Absence of physical injury  9/14/2023 0325 by Elizabeth Jaeger RN  Outcome: Progressing  9/14/2023 0147 by Divya Grider RN  Outcome: Progressing     Problem: Pain  Goal: Verbalizes/displays adequate comfort level or baseline comfort level  9/14/2023 1046 by Little Yeager RN  Outcome: Progressing  9/14/2023 0325 by Elizabeth Jaeger RN  Outcome: Progressing  9/14/2023 0147 by Divya Grider RN  Outcome: Progressing     Problem: Nutrition Deficit:  Goal: Optimize nutritional status  9/14/2023 0325 by Elizabeth Jaeger RN  Outcome: Progressing  9/14/2023 0147 by Divya Grider RN  Outcome: Progressing

## 2023-09-14 NOTE — CARE COORDINATION
9/14/23  Transition of Care update. Patient admitted for MANAN. Patient had her Tunnel cath placed yesterday and had her first dialysis treatment. Updated clinicals faxed to [facility]. Patient will be on a Tues, Thurs, Sat schedule and awaiting a chair time. Patient noted to have elevated BP and was started on hydralazine. CRE is 4.7 today. Updated therapy evals requested to assess functional status. Patient is being [facility] by [facility] for post discharge support. Home care orders will need placed prior to discharge for PT/OT and skilled nursing. Family to provide transport home when medically stable. SW/Cm to follow. 2:45pm  Chair time will be 10:35am T-Th-Sat. Patient to arrive at 10:00am for the first treatment. Family provided the name, address and contact number for Jessa Zhu.     Electronically signed by ADRI Glynn on 9/14/2023 at 11:13 AM

## 2023-09-14 NOTE — PATIENT CARE CONFERENCE
P Quality Flow/Interdisciplinary Rounds Progress Note        Quality Flow Rounds held on September 14, 2023    Disciplines Attending:  Bedside Nurse, , , and Nursing Unit Leadership    Malgorzata Hopson was admitted on 8/31/2023 11:38 AM    Anticipated Discharge Date:       Disposition:    Man Score:  Man Scale Score: 19    Readmission Risk              Risk of Unplanned Readmission:  19           Discussed patient goal for the day, patient clinical progression, and barriers to discharge.   The following Goal(s) of the Day/Commitment(s) have been identified:  discharge [address] Becca Solitario RN  September 14, 2023

## 2023-09-15 LAB
ANION GAP SERPL CALCULATED.3IONS-SCNC: 14 MMOL/L (ref 7–16)
BUN SERPL-MCNC: 30 MG/DL (ref 6–23)
CALCIUM SERPL-MCNC: 8.9 MG/DL (ref 8.6–10.2)
CHLORIDE SERPL-SCNC: 99 MMOL/L (ref 98–107)
CO2 SERPL-SCNC: 27 MMOL/L (ref 22–29)
CREAT SERPL-MCNC: 3.6 MG/DL (ref 0.5–1)
ERYTHROCYTE [DISTWIDTH] IN BLOOD BY AUTOMATED COUNT: 12.9 % (ref 11.5–15)
GFR SERPL CREATININE-BSD FRML MDRD: 13 ML/MIN/1.73M2
GLUCOSE SERPL-MCNC: 104 MG/DL (ref 74–99)
HCT VFR BLD AUTO: 28.4 % (ref 34–48)
HGB BLD-MCNC: 9.3 G/DL (ref 11.5–15.5)
MAGNESIUM SERPL-MCNC: 1.9 MG/DL (ref 1.6–2.6)
MCH RBC QN AUTO: 29.8 PG (ref 26–35)
MCHC RBC AUTO-ENTMCNC: 32.7 G/DL (ref 32–34.5)
MCV RBC AUTO: 91 FL (ref 80–99.9)
PHOSPHATE SERPL-MCNC: 3.6 MG/DL (ref 2.5–4.5)
PLATELET # BLD AUTO: 100 K/UL (ref 130–450)
PMV BLD AUTO: 10.3 FL (ref 7–12)
POTASSIUM SERPL-SCNC: 3.8 MMOL/L (ref 3.5–5)
RBC # BLD AUTO: 3.12 M/UL (ref 3.5–5.5)
SODIUM SERPL-SCNC: 140 MMOL/L (ref 132–146)
WBC OTHER # BLD: 2.5 K/UL (ref 4.5–11.5)

## 2023-09-15 PROCEDURE — 6370000000 HC RX 637 (ALT 250 FOR IP): Performed by: INTERNAL MEDICINE

## 2023-09-15 PROCEDURE — 2500000003 HC RX 250 WO HCPCS: Performed by: CLINICAL NURSE SPECIALIST

## 2023-09-15 PROCEDURE — 85027 COMPLETE CBC AUTOMATED: CPT

## 2023-09-15 PROCEDURE — 36415 COLL VENOUS BLD VENIPUNCTURE: CPT

## 2023-09-15 PROCEDURE — 90935 HEMODIALYSIS ONE EVALUATION: CPT

## 2023-09-15 PROCEDURE — 2580000003 HC RX 258: Performed by: INTERNAL MEDICINE

## 2023-09-15 PROCEDURE — 6370000000 HC RX 637 (ALT 250 FOR IP): Performed by: CLINICAL NURSE SPECIALIST

## 2023-09-15 PROCEDURE — 84100 ASSAY OF PHOSPHORUS: CPT

## 2023-09-15 PROCEDURE — 86215 DEOXYRIBONUCLEASE ANTIBODY: CPT

## 2023-09-15 PROCEDURE — 83735 ASSAY OF MAGNESIUM: CPT

## 2023-09-15 PROCEDURE — 97530 THERAPEUTIC ACTIVITIES: CPT

## 2023-09-15 PROCEDURE — 2140000000 HC CCU INTERMEDIATE R&B

## 2023-09-15 PROCEDURE — 80048 BASIC METABOLIC PNL TOTAL CA: CPT

## 2023-09-15 RX ORDER — ZOLPIDEM TARTRATE 5 MG/1
5 TABLET ORAL NIGHTLY
Status: DISCONTINUED | OUTPATIENT
Start: 2023-09-15 | End: 2023-09-17

## 2023-09-15 RX ORDER — POTASSIUM CHLORIDE 20 MEQ/1
20 TABLET, EXTENDED RELEASE ORAL 2 TIMES DAILY
Qty: 180 TABLET | Refills: 1 | DISCHARGE
Start: 2023-09-15 | End: 2023-09-15 | Stop reason: HOSPADM

## 2023-09-15 RX ORDER — HYDRALAZINE HYDROCHLORIDE 50 MG/1
50 TABLET, FILM COATED ORAL EVERY 8 HOURS SCHEDULED
Status: DISCONTINUED | OUTPATIENT
Start: 2023-09-15 | End: 2023-09-18 | Stop reason: HOSPADM

## 2023-09-15 RX ADMIN — CLONIDINE HYDROCHLORIDE 0.3 MG: 0.2 TABLET ORAL at 16:41

## 2023-09-15 RX ADMIN — ESCITALOPRAM OXALATE 10 MG: 10 TABLET ORAL at 08:12

## 2023-09-15 RX ADMIN — CLONIDINE HYDROCHLORIDE 0.3 MG: 0.2 TABLET ORAL at 21:56

## 2023-09-15 RX ADMIN — LABETALOL HYDROCHLORIDE 200 MG: 200 TABLET, FILM COATED ORAL at 21:57

## 2023-09-15 RX ADMIN — HYDRALAZINE HYDROCHLORIDE 50 MG: 50 TABLET, FILM COATED ORAL at 21:56

## 2023-09-15 RX ADMIN — HYDRALAZINE HYDROCHLORIDE 50 MG: 50 TABLET, FILM COATED ORAL at 16:41

## 2023-09-15 RX ADMIN — Medication 10 ML: at 08:17

## 2023-09-15 RX ADMIN — Medication 10 ML: at 21:57

## 2023-09-15 RX ADMIN — ASPIRIN 81 MG: 81 TABLET, COATED ORAL at 08:11

## 2023-09-15 RX ADMIN — LEVOTHYROXINE SODIUM 88 MCG: 0.09 TABLET ORAL at 08:13

## 2023-09-15 RX ADMIN — HYDRALAZINE HYDROCHLORIDE 25 MG: 25 TABLET, FILM COATED ORAL at 05:59

## 2023-09-15 RX ADMIN — CALCIUM ACETATE 667 MG: 667 CAPSULE ORAL at 12:48

## 2023-09-15 RX ADMIN — CALCIUM ACETATE 667 MG: 667 CAPSULE ORAL at 16:41

## 2023-09-15 RX ADMIN — LABETALOL HYDROCHLORIDE 200 MG: 200 TABLET, FILM COATED ORAL at 08:13

## 2023-09-15 RX ADMIN — ZOLPIDEM TARTRATE 5 MG: 5 TABLET ORAL at 21:57

## 2023-09-15 RX ADMIN — CALCIUM ACETATE 667 MG: 667 CAPSULE ORAL at 08:12

## 2023-09-15 RX ADMIN — FERROUS SULFATE TAB 325 MG (65 MG ELEMENTAL FE) 325 MG: 325 (65 FE) TAB at 08:12

## 2023-09-15 RX ADMIN — CLONIDINE HYDROCHLORIDE 0.3 MG: 0.2 TABLET ORAL at 08:11

## 2023-09-15 ASSESSMENT — PAIN SCALES - GENERAL: PAINLEVEL_OUTOF10: 0

## 2023-09-15 NOTE — PATIENT CARE CONFERENCE
P Quality Flow/Interdisciplinary Rounds Progress Note        Quality Flow Rounds held on September 15, 2023    Disciplines Attending:  Bedside Nurse, , , and Nursing Unit Leadership    Jennifer Osman was admitted on 8/31/2023 11:38 AM    Anticipated Discharge Date:       Disposition:    Man Score:  Man Scale Score: 19    Readmission Risk              Risk of Unplanned Readmission:  19           Discussed patient goal for the day, patient clinical progression, and barriers to discharge.   The following Goal(s) of the Day/Commitment(s) have been identified:  discharge [address] Becca Solitairo RN  September 15, 2023

## 2023-09-15 NOTE — PLAN OF CARE
Problem: Chronic Conditions and Co-morbidities  Goal: Patient's chronic conditions and co-morbidity symptoms are monitored and maintained or improved  9/15/2023 0629 by Darien Topete RN  Outcome: Progressing  9/14/2023 2328 by Ally Grider RN  Outcome: Progressing     Problem: Discharge Planning  Goal: Discharge to home or other facility with appropriate resources  9/15/2023 0629 by Darien Topete RN  Outcome: Progressing  9/14/2023 2328 by Brenda Khoury RN  Outcome: Progressing     Problem: Safety - Adult  Goal: Free from fall injury  9/15/2023 0629 by Darien Topete RN  Outcome: Progressing  9/14/2023 2328 by Ally Grider RN  Outcome: Progressing     Problem: ABCDS Injury Assessment  Goal: Absence of physical injury  9/15/2023 0629 by Darien Topete RN  Outcome: Progressing  9/14/2023 2328 by Ally Grider RN  Outcome: Progressing     Problem: Pain  Goal: Verbalizes/displays adequate comfort level or baseline comfort level  9/15/2023 0629 by Darien Topete RN  Outcome: Progressing  9/14/2023 2328 by Ally Grider RN  Outcome: Progressing     Problem: Nutrition Deficit:  Goal: Optimize nutritional status  9/15/2023 0629 by Darien Topete RN  Outcome: Progressing  9/14/2023 2328 by Ally Grider RN  Outcome: Progressing  Flowsheets (Taken 9/14/2023 1112 by Lucian Morales, OC, LD)  Nutrient intake appropriate for improving, restoring, or maintaining nutritional needs: Recommend appropriate diets, oral nutritional supplements, and vitamin/mineral supplements

## 2023-09-15 NOTE — PLAN OF CARE
Problem: Chronic Conditions and Co-morbidities  Goal: Patient's chronic conditions and co-morbidity symptoms are monitored and maintained or improved  Outcome: Progressing     Problem: Discharge Planning  Goal: Discharge to home or other facility with appropriate resources  9/14/2023 2328 by Lyn Teague RN  Outcome: Progressing  9/14/2023 1046 by Maria Ines Farooq RN  Outcome: Progressing     Problem: Safety - Adult  Goal: Free from fall injury  9/14/2023 2328 by Lyn Teague RN  Outcome: Progressing  9/14/2023 1046 by Maria Ines Farooq RN  Outcome: Progressing     Problem: ABCDS Injury Assessment  Goal: Absence of physical injury  Outcome: Progressing     Problem: Pain  Goal: Verbalizes/displays adequate comfort level or baseline comfort level  9/14/2023 2328 by Lyn Teague RN  Outcome: Progressing  9/14/2023 1046 by Maria Ines Farooq RN  Outcome: Progressing     Problem: Nutrition Deficit:  Goal: Optimize nutritional status  Outcome: Progressing  Flowsheets (Taken 9/14/2023 1112 by Derek Mi RD, LD)  Nutrient intake appropriate for improving, restoring, or maintaining nutritional needs: Recommend appropriate diets, oral nutritional supplements, and vitamin/mineral supplements

## 2023-09-16 LAB
ANION GAP SERPL CALCULATED.3IONS-SCNC: 14 MMOL/L (ref 7–16)
BUN SERPL-MCNC: 21 MG/DL (ref 6–23)
CALCIUM SERPL-MCNC: 9.1 MG/DL (ref 8.6–10.2)
CHLORIDE SERPL-SCNC: 99 MMOL/L (ref 98–107)
CO2 SERPL-SCNC: 26 MMOL/L (ref 22–29)
CREAT SERPL-MCNC: 3.1 MG/DL (ref 0.5–1)
ERYTHROCYTE [DISTWIDTH] IN BLOOD BY AUTOMATED COUNT: 13 % (ref 11.5–15)
GFR SERPL CREATININE-BSD FRML MDRD: 15 ML/MIN/1.73M2
GLUCOSE SERPL-MCNC: 100 MG/DL (ref 74–99)
HCT VFR BLD AUTO: 30.1 % (ref 34–48)
HGB BLD-MCNC: 9.7 G/DL (ref 11.5–15.5)
MAGNESIUM SERPL-MCNC: 1.9 MG/DL (ref 1.6–2.6)
MCH RBC QN AUTO: 29.7 PG (ref 26–35)
MCHC RBC AUTO-ENTMCNC: 32.2 G/DL (ref 32–34.5)
MCV RBC AUTO: 92 FL (ref 80–99.9)
PHOSPHATE SERPL-MCNC: 3.3 MG/DL (ref 2.5–4.5)
PLATELET # BLD AUTO: 86 K/UL (ref 130–450)
PLATELET CONFIRMATION: NORMAL
PMV BLD AUTO: 9.9 FL (ref 7–12)
POTASSIUM SERPL-SCNC: 3.5 MMOL/L (ref 3.5–5)
RBC # BLD AUTO: 3.27 M/UL (ref 3.5–5.5)
SODIUM SERPL-SCNC: 139 MMOL/L (ref 132–146)
WBC OTHER # BLD: 2.6 K/UL (ref 4.5–11.5)

## 2023-09-16 PROCEDURE — 6370000000 HC RX 637 (ALT 250 FOR IP): Performed by: INTERNAL MEDICINE

## 2023-09-16 PROCEDURE — 83735 ASSAY OF MAGNESIUM: CPT

## 2023-09-16 PROCEDURE — 2140000000 HC CCU INTERMEDIATE R&B

## 2023-09-16 PROCEDURE — 36415 COLL VENOUS BLD VENIPUNCTURE: CPT

## 2023-09-16 PROCEDURE — 6370000000 HC RX 637 (ALT 250 FOR IP): Performed by: CLINICAL NURSE SPECIALIST

## 2023-09-16 PROCEDURE — 6360000002 HC RX W HCPCS: Performed by: INTERNAL MEDICINE

## 2023-09-16 PROCEDURE — 84100 ASSAY OF PHOSPHORUS: CPT

## 2023-09-16 PROCEDURE — 2500000003 HC RX 250 WO HCPCS: Performed by: CLINICAL NURSE SPECIALIST

## 2023-09-16 PROCEDURE — 85027 COMPLETE CBC AUTOMATED: CPT

## 2023-09-16 PROCEDURE — 2580000003 HC RX 258: Performed by: INTERNAL MEDICINE

## 2023-09-16 PROCEDURE — 80048 BASIC METABOLIC PNL TOTAL CA: CPT

## 2023-09-16 RX ADMIN — LABETALOL HYDROCHLORIDE 200 MG: 200 TABLET, FILM COATED ORAL at 08:46

## 2023-09-16 RX ADMIN — CLONIDINE HYDROCHLORIDE 0.3 MG: 0.2 TABLET ORAL at 21:31

## 2023-09-16 RX ADMIN — FERROUS SULFATE TAB 325 MG (65 MG ELEMENTAL FE) 325 MG: 325 (65 FE) TAB at 08:45

## 2023-09-16 RX ADMIN — CALCIUM ACETATE 667 MG: 667 CAPSULE ORAL at 11:49

## 2023-09-16 RX ADMIN — LABETALOL HYDROCHLORIDE 200 MG: 200 TABLET, FILM COATED ORAL at 21:30

## 2023-09-16 RX ADMIN — CALCIUM ACETATE 667 MG: 667 CAPSULE ORAL at 08:42

## 2023-09-16 RX ADMIN — LEVOTHYROXINE SODIUM 88 MCG: 0.09 TABLET ORAL at 08:46

## 2023-09-16 RX ADMIN — ZOLPIDEM TARTRATE 5 MG: 5 TABLET ORAL at 21:31

## 2023-09-16 RX ADMIN — CLONIDINE HYDROCHLORIDE 0.3 MG: 0.2 TABLET ORAL at 08:42

## 2023-09-16 RX ADMIN — HYDRALAZINE HYDROCHLORIDE 50 MG: 50 TABLET, FILM COATED ORAL at 21:31

## 2023-09-16 RX ADMIN — HYDRALAZINE HYDROCHLORIDE 50 MG: 50 TABLET, FILM COATED ORAL at 14:22

## 2023-09-16 RX ADMIN — ASPIRIN 81 MG: 81 TABLET, COATED ORAL at 08:43

## 2023-09-16 RX ADMIN — CLONIDINE HYDROCHLORIDE 0.3 MG: 0.2 TABLET ORAL at 16:30

## 2023-09-16 RX ADMIN — ESCITALOPRAM OXALATE 10 MG: 10 TABLET ORAL at 08:43

## 2023-09-16 RX ADMIN — Medication 10 ML: at 21:31

## 2023-09-16 RX ADMIN — Medication 10 ML: at 08:46

## 2023-09-16 RX ADMIN — HYDRALAZINE HYDROCHLORIDE 50 MG: 50 TABLET, FILM COATED ORAL at 05:36

## 2023-09-16 RX ADMIN — HYDRALAZINE HYDROCHLORIDE 10 MG: 20 INJECTION, SOLUTION INTRAMUSCULAR; INTRAVENOUS at 23:37

## 2023-09-16 RX ADMIN — CALCIUM ACETATE 667 MG: 667 CAPSULE ORAL at 16:30

## 2023-09-16 ASSESSMENT — PAIN SCALES - GENERAL: PAINLEVEL_OUTOF10: 0

## 2023-09-16 NOTE — PLAN OF CARE
Problem: Chronic Conditions and Co-morbidities  Goal: Patient's chronic conditions and co-morbidity symptoms are monitored and maintained or improved  9/16/2023 1103 by Payal Ngo, RN  Outcome: Progressing     Problem: Discharge Planning  Goal: Discharge to home or other facility with appropriate resources  Outcome: Progressing     Problem: Safety - Adult  Goal: Free from fall injury  9/16/2023 1103 by Payal Ngo, RN  Outcome: Progressing     Problem: ABCDS Injury Assessment  Goal: Absence of physical injury  9/16/2023 1103 by Payal Ngo, RN  Outcome: Progressing

## 2023-09-16 NOTE — PLAN OF CARE
Problem: Chronic Conditions and Co-morbidities  Goal: Patient's chronic conditions and co-morbidity symptoms are monitored and maintained or improved  Outcome: Progressing     Problem: Safety - Adult  Goal: Free from fall injury  Outcome: Progressing     Problem: ABCDS Injury Assessment  Goal: Absence of physical injury  Outcome: Progressing     Problem: Pain  Goal: Verbalizes/displays adequate comfort level or baseline comfort level  Outcome: Progressing

## 2023-09-17 LAB
ANION GAP SERPL CALCULATED.3IONS-SCNC: 12 MMOL/L (ref 7–16)
BUN SERPL-MCNC: 35 MG/DL (ref 6–23)
CALCIUM SERPL-MCNC: 9.4 MG/DL (ref 8.6–10.2)
CHLORIDE SERPL-SCNC: 99 MMOL/L (ref 98–107)
CO2 SERPL-SCNC: 29 MMOL/L (ref 22–29)
CREAT SERPL-MCNC: 4.3 MG/DL (ref 0.5–1)
ERYTHROCYTE [DISTWIDTH] IN BLOOD BY AUTOMATED COUNT: 13 % (ref 11.5–15)
GFR SERPL CREATININE-BSD FRML MDRD: 10 ML/MIN/1.73M2
GLUCOSE SERPL-MCNC: 115 MG/DL (ref 74–99)
HCT VFR BLD AUTO: 30.4 % (ref 34–48)
HGB BLD-MCNC: 9.7 G/DL (ref 11.5–15.5)
MAGNESIUM SERPL-MCNC: 2 MG/DL (ref 1.6–2.6)
MCH RBC QN AUTO: 29.3 PG (ref 26–35)
MCHC RBC AUTO-ENTMCNC: 31.9 G/DL (ref 32–34.5)
MCV RBC AUTO: 91.8 FL (ref 80–99.9)
PHOSPHATE SERPL-MCNC: 5.5 MG/DL (ref 2.5–4.5)
PLATELET # BLD AUTO: 98 K/UL (ref 130–450)
PLATELET CONFIRMATION: NORMAL
PMV BLD AUTO: 10.2 FL (ref 7–12)
POTASSIUM SERPL-SCNC: 3.7 MMOL/L (ref 3.5–5)
RBC # BLD AUTO: 3.31 M/UL (ref 3.5–5.5)
SODIUM SERPL-SCNC: 140 MMOL/L (ref 132–146)
STREP DNASE B SER-ACNC: 104 U/ML
WBC OTHER # BLD: 3.2 K/UL (ref 4.5–11.5)

## 2023-09-17 PROCEDURE — 6370000000 HC RX 637 (ALT 250 FOR IP): Performed by: INTERNAL MEDICINE

## 2023-09-17 PROCEDURE — 2500000003 HC RX 250 WO HCPCS: Performed by: CLINICAL NURSE SPECIALIST

## 2023-09-17 PROCEDURE — 83735 ASSAY OF MAGNESIUM: CPT

## 2023-09-17 PROCEDURE — 84100 ASSAY OF PHOSPHORUS: CPT

## 2023-09-17 PROCEDURE — 2140000000 HC CCU INTERMEDIATE R&B

## 2023-09-17 PROCEDURE — 2580000003 HC RX 258: Performed by: INTERNAL MEDICINE

## 2023-09-17 PROCEDURE — 6370000000 HC RX 637 (ALT 250 FOR IP): Performed by: CLINICAL NURSE SPECIALIST

## 2023-09-17 PROCEDURE — 36415 COLL VENOUS BLD VENIPUNCTURE: CPT

## 2023-09-17 PROCEDURE — 85027 COMPLETE CBC AUTOMATED: CPT

## 2023-09-17 PROCEDURE — 80048 BASIC METABOLIC PNL TOTAL CA: CPT

## 2023-09-17 RX ORDER — MIRTAZAPINE 15 MG/1
15 TABLET, FILM COATED ORAL NIGHTLY
Status: DISCONTINUED | OUTPATIENT
Start: 2023-09-17 | End: 2023-09-18 | Stop reason: HOSPADM

## 2023-09-17 RX ORDER — ZOLPIDEM TARTRATE 5 MG/1
5 TABLET ORAL NIGHTLY PRN
Status: DISCONTINUED | OUTPATIENT
Start: 2023-09-17 | End: 2023-09-18 | Stop reason: HOSPADM

## 2023-09-17 RX ADMIN — ZOLPIDEM TARTRATE 5 MG: 5 TABLET ORAL at 22:47

## 2023-09-17 RX ADMIN — LABETALOL HYDROCHLORIDE 200 MG: 200 TABLET, FILM COATED ORAL at 10:56

## 2023-09-17 RX ADMIN — CLONIDINE HYDROCHLORIDE 0.3 MG: 0.2 TABLET ORAL at 08:46

## 2023-09-17 RX ADMIN — Medication 10 ML: at 08:45

## 2023-09-17 RX ADMIN — CALCIUM ACETATE 667 MG: 667 CAPSULE ORAL at 17:15

## 2023-09-17 RX ADMIN — CALCIUM ACETATE 667 MG: 667 CAPSULE ORAL at 12:41

## 2023-09-17 RX ADMIN — ESCITALOPRAM OXALATE 10 MG: 10 TABLET ORAL at 08:46

## 2023-09-17 RX ADMIN — Medication 10 ML: at 21:10

## 2023-09-17 RX ADMIN — MIRTAZAPINE 15 MG: 15 TABLET, FILM COATED ORAL at 22:47

## 2023-09-17 RX ADMIN — CLONIDINE HYDROCHLORIDE 0.3 MG: 0.2 TABLET ORAL at 22:48

## 2023-09-17 RX ADMIN — LEVOTHYROXINE SODIUM 88 MCG: 0.09 TABLET ORAL at 08:52

## 2023-09-17 RX ADMIN — ASPIRIN 81 MG: 81 TABLET, COATED ORAL at 08:46

## 2023-09-17 RX ADMIN — HYDRALAZINE HYDROCHLORIDE 50 MG: 50 TABLET, FILM COATED ORAL at 14:50

## 2023-09-17 RX ADMIN — Medication 5 MG: at 02:49

## 2023-09-17 RX ADMIN — FERROUS SULFATE TAB 325 MG (65 MG ELEMENTAL FE) 325 MG: 325 (65 FE) TAB at 08:46

## 2023-09-17 RX ADMIN — CALCIUM ACETATE 667 MG: 667 CAPSULE ORAL at 08:52

## 2023-09-17 RX ADMIN — HYDRALAZINE HYDROCHLORIDE 50 MG: 50 TABLET, FILM COATED ORAL at 22:48

## 2023-09-17 RX ADMIN — HYDRALAZINE HYDROCHLORIDE 50 MG: 50 TABLET, FILM COATED ORAL at 05:51

## 2023-09-17 ASSESSMENT — PAIN SCALES - GENERAL
PAINLEVEL_OUTOF10: 0

## 2023-09-17 NOTE — PLAN OF CARE
Problem: Chronic Conditions and Co-morbidities  Goal: Patient's chronic conditions and co-morbidity symptoms are monitored and maintained or improved  9/17/2023 1051 by Bobo Olivo RN  Outcome: Progressing     Problem: Discharge Planning  Goal: Discharge to home or other facility with appropriate resources  9/17/2023 1051 by Bobo Olivo RN  Outcome: Progressing     Problem: Safety - Adult  Goal: Free from fall injury  9/17/2023 1051 by Bobo Olivo RN  Outcome: Progressing     Problem: ABCDS Injury Assessment  Goal: Absence of physical injury  9/17/2023 1051 by Bobo Olivo RN  Outcome: Progressing

## 2023-09-17 NOTE — PLAN OF CARE
Problem: Chronic Conditions and Co-morbidities  Goal: Patient's chronic conditions and co-morbidity symptoms are monitored and maintained or improved  9/16/2023 2250 by Lc Love RN  Outcome: Progressing     Problem: Discharge Planning  Goal: Discharge to home or other facility with appropriate resources  9/16/2023 2250 by Lc Love RN  Outcome: Progressing     Problem: Safety - Adult  Goal: Free from fall injury  9/16/2023 2250 by Lc Love RN  Outcome: Progressing     Problem: ABCDS Injury Assessment  Goal: Absence of physical injury  9/16/2023 2250 by Lc Love RN  Outcome: Progressing     Problem: Pain  Goal: Verbalizes/displays adequate comfort level or baseline comfort level  Outcome: Progressing     Problem: Nutrition Deficit:  Goal: Optimize nutritional status  Outcome: Progressing

## 2023-09-17 NOTE — PLAN OF CARE
Problem: Chronic Conditions and Co-morbidities  Goal: Patient's chronic conditions and co-morbidity symptoms are monitored and maintained or improved  9/17/2023 1943 by Graciela Jacobo RN  Outcome: Progressing  9/17/2023 1051 by Blanca Paget, RN  Outcome: Progressing     Problem: Discharge Planning  Goal: Discharge to home or other facility with appropriate resources  9/17/2023 1943 by Graciela Jacobo RN  Outcome: Progressing  9/17/2023 1051 by Blanca Paget, RN  Outcome: Progressing     Problem: Safety - Adult  Goal: Free from fall injury  9/17/2023 1943 by Graciela Jacobo RN  Outcome: Progressing  9/17/2023 1051 by Blanca Paget, RN  Outcome: Progressing     Problem: ABCDS Injury Assessment  Goal: Absence of physical injury  9/17/2023 1943 by Graciela Jacobo RN  Outcome: Progressing  9/17/2023 1051 by Blanca Paget, RN  Outcome: Progressing     Problem: Pain  Goal: Verbalizes/displays adequate comfort level or baseline comfort level  9/17/2023 1943 by Graciela Jacobo RN  Outcome: Progressing  9/17/2023 1051 by Blanca Paget, RN  Outcome: Progressing     Problem: Nutrition Deficit:  Goal: Optimize nutritional status  Outcome: Progressing

## 2023-09-18 VITALS
WEIGHT: 158.8 LBS | BODY MASS INDEX: 28.14 KG/M2 | OXYGEN SATURATION: 96 % | HEART RATE: 63 BPM | RESPIRATION RATE: 18 BRPM | TEMPERATURE: 97.1 F | HEIGHT: 63 IN | DIASTOLIC BLOOD PRESSURE: 75 MMHG | SYSTOLIC BLOOD PRESSURE: 162 MMHG

## 2023-09-18 LAB
ANION GAP SERPL CALCULATED.3IONS-SCNC: 16 MMOL/L (ref 7–16)
BUN SERPL-MCNC: 49 MG/DL (ref 6–23)
CALCIUM SERPL-MCNC: 9.3 MG/DL (ref 8.6–10.2)
CHLORIDE SERPL-SCNC: 98 MMOL/L (ref 98–107)
CO2 SERPL-SCNC: 24 MMOL/L (ref 22–29)
CREAT SERPL-MCNC: 5.4 MG/DL (ref 0.5–1)
ERYTHROCYTE [DISTWIDTH] IN BLOOD BY AUTOMATED COUNT: 13.2 % (ref 11.5–15)
GFR SERPL CREATININE-BSD FRML MDRD: 8 ML/MIN/1.73M2
GLUCOSE SERPL-MCNC: 103 MG/DL (ref 74–99)
HCT VFR BLD AUTO: 28.5 % (ref 34–48)
HGB BLD-MCNC: 9.2 G/DL (ref 11.5–15.5)
MAGNESIUM SERPL-MCNC: 2.2 MG/DL (ref 1.6–2.6)
MCH RBC QN AUTO: 29.4 PG (ref 26–35)
MCHC RBC AUTO-ENTMCNC: 32.3 G/DL (ref 32–34.5)
MCV RBC AUTO: 91.1 FL (ref 80–99.9)
PHOSPHATE SERPL-MCNC: 6.4 MG/DL (ref 2.5–4.5)
PLATELET # BLD AUTO: 92 K/UL (ref 130–450)
PLATELET CONFIRMATION: NORMAL
PMV BLD AUTO: 9.9 FL (ref 7–12)
POTASSIUM SERPL-SCNC: 3.8 MMOL/L (ref 3.5–5)
RBC # BLD AUTO: 3.13 M/UL (ref 3.5–5.5)
SODIUM SERPL-SCNC: 138 MMOL/L (ref 132–146)
WBC OTHER # BLD: 2.8 K/UL (ref 4.5–11.5)

## 2023-09-18 PROCEDURE — 2500000003 HC RX 250 WO HCPCS: Performed by: CLINICAL NURSE SPECIALIST

## 2023-09-18 PROCEDURE — 83735 ASSAY OF MAGNESIUM: CPT

## 2023-09-18 PROCEDURE — 83516 IMMUNOASSAY NONANTIBODY: CPT

## 2023-09-18 PROCEDURE — 6370000000 HC RX 637 (ALT 250 FOR IP): Performed by: INTERNAL MEDICINE

## 2023-09-18 PROCEDURE — 2580000003 HC RX 258: Performed by: INTERNAL MEDICINE

## 2023-09-18 PROCEDURE — 6370000000 HC RX 637 (ALT 250 FOR IP): Performed by: CLINICAL NURSE SPECIALIST

## 2023-09-18 PROCEDURE — 80048 BASIC METABOLIC PNL TOTAL CA: CPT

## 2023-09-18 PROCEDURE — 85027 COMPLETE CBC AUTOMATED: CPT

## 2023-09-18 PROCEDURE — 90935 HEMODIALYSIS ONE EVALUATION: CPT

## 2023-09-18 PROCEDURE — 5A1D70Z PERFORMANCE OF URINARY FILTRATION, INTERMITTENT, LESS THAN 6 HOURS PER DAY: ICD-10-PCS | Performed by: INTERNAL MEDICINE

## 2023-09-18 PROCEDURE — 84100 ASSAY OF PHOSPHORUS: CPT

## 2023-09-18 PROCEDURE — 36415 COLL VENOUS BLD VENIPUNCTURE: CPT

## 2023-09-18 RX ORDER — CLONIDINE HYDROCHLORIDE 0.2 MG/1
0.2 TABLET ORAL 3 TIMES DAILY
Status: DISCONTINUED | OUTPATIENT
Start: 2023-09-18 | End: 2023-09-18 | Stop reason: HOSPADM

## 2023-09-18 RX ORDER — LABETALOL 200 MG/1
200 TABLET, FILM COATED ORAL 2 TIMES DAILY
Qty: 60 TABLET | Refills: 3 | Status: SHIPPED | OUTPATIENT
Start: 2023-09-18

## 2023-09-18 RX ORDER — CLONIDINE HYDROCHLORIDE 0.3 MG/1
0.3 TABLET ORAL 3 TIMES DAILY
Qty: 60 TABLET | Refills: 3 | Status: SHIPPED | OUTPATIENT
Start: 2023-09-18

## 2023-09-18 RX ORDER — HYDRALAZINE HYDROCHLORIDE 50 MG/1
50 TABLET, FILM COATED ORAL EVERY 8 HOURS SCHEDULED
Qty: 90 TABLET | Refills: 3 | Status: SHIPPED | OUTPATIENT
Start: 2023-09-18

## 2023-09-18 RX ORDER — CALCIUM ACETATE 667 MG/1
1 CAPSULE ORAL
Qty: 180 CAPSULE | Refills: 1 | Status: SHIPPED | OUTPATIENT
Start: 2023-09-18

## 2023-09-18 RX ORDER — MIRTAZAPINE 15 MG/1
15 TABLET, FILM COATED ORAL NIGHTLY
Qty: 30 TABLET | Refills: 3 | Status: SHIPPED | OUTPATIENT
Start: 2023-09-18

## 2023-09-18 RX ADMIN — CLONIDINE HYDROCHLORIDE 0.3 MG: 0.2 TABLET ORAL at 09:31

## 2023-09-18 RX ADMIN — ASPIRIN 81 MG: 81 TABLET, COATED ORAL at 09:31

## 2023-09-18 RX ADMIN — HYDRALAZINE HYDROCHLORIDE 50 MG: 50 TABLET, FILM COATED ORAL at 05:02

## 2023-09-18 RX ADMIN — ESCITALOPRAM OXALATE 10 MG: 10 TABLET ORAL at 09:31

## 2023-09-18 RX ADMIN — FERROUS SULFATE TAB 325 MG (65 MG ELEMENTAL FE) 325 MG: 325 (65 FE) TAB at 09:31

## 2023-09-18 RX ADMIN — CALCIUM ACETATE 667 MG: 667 CAPSULE ORAL at 11:57

## 2023-09-18 RX ADMIN — CALCIUM ACETATE 667 MG: 667 CAPSULE ORAL at 17:23

## 2023-09-18 RX ADMIN — CLONIDINE HYDROCHLORIDE 0.2 MG: 0.2 TABLET ORAL at 17:23

## 2023-09-18 RX ADMIN — Medication 10 ML: at 09:32

## 2023-09-18 RX ADMIN — LABETALOL HYDROCHLORIDE 200 MG: 200 TABLET, FILM COATED ORAL at 09:32

## 2023-09-18 RX ADMIN — LEVOTHYROXINE SODIUM 88 MCG: 0.09 TABLET ORAL at 09:31

## 2023-09-18 RX ADMIN — CALCIUM ACETATE 667 MG: 667 CAPSULE ORAL at 09:31

## 2023-09-18 ASSESSMENT — PAIN SCALES - GENERAL
PAINLEVEL_OUTOF10: 0

## 2023-09-18 NOTE — CARE COORDINATION
9/18/23  Transition of care Update. Patient admitted for MANAN. Blood pressure is improving. Patient had her Tunnel cath placed. Out Patient HD has been set up with [facility] Zhu with a 10:35 chair time T-Th-Sat with 10am arrival for her first treatment. Jessa will need called at [phone] to update when discharge date is determined. Patient is being followed by [facility] for post discharge support. Home care orders will need placed prior to discharge for PT/OT and skilled nursing. Family to provide transport home when medically stable. SW/Cm to follow. 3:15pm  Discharge order noted [facility] updated as well as [facility].     Electronically signed by ADRI Musa on 9/18/2023 at 11:52 AM

## 2023-09-18 NOTE — PATIENT CHOICE
P Quality Flow/Interdisciplinary Rounds Progress Note        Quality Flow Rounds held on September 18, 2023    Disciplines Attending:  Bedside Nurse, , , and Nursing Unit Leadership    Edinson Dean was admitted on 8/31/2023 11:38 AM    Anticipated Discharge Date:       Disposition:    Man Score:  Man Scale Score: 20    Readmission Risk              Risk of Unplanned Readmission:  20           Discussed patient goal for the day, patient clinical progression, and barriers to discharge.   The following Goal(s) of the Day/Commitment(s) have been identified:  downgrade/discharge planning      Missy Jones RN  September 18, 2023

## 2023-09-18 NOTE — PLAN OF CARE
Daughter at bedside, updated on current blood pressure.     No time yet for when they will do dialysis call out to Dr. Jimmie Villalobos  Patient hopes to go home soon

## 2023-09-19 NOTE — DISCHARGE SUMMARY
hemodialysis  She responded well to this  Tesio catheter was placed  Discharged to home in stable condition    Discharge Exam:  See progress note from today    Disposition: Home  Stable  Patient Instructions:   Discharge Medication List as of 9/18/2023  5:26 PM        START taking these medications    Details   mirtazapine (REMERON) 15 MG tablet Take 1 tablet by mouth nightly, Disp-30 tablet, R-3Normal      epoetin candis-epbx (RETACRIT) 3000 UNIT/ML SOLN injection Inject 1 mL into the skin Once a week at 5 PM, Disp-21.9 mL, R-1Normal      calcium acetate (PHOSLO) 667 MG CAPS capsule Take 1 capsule by mouth 3 times daily (with meals), Disp-180 capsule, R-1Normal           CONTINUE these medications which have CHANGED    Details   cloNIDine (CATAPRES) 0.3 MG tablet Take 1 tablet by mouth 3 times daily, Disp-60 tablet, R-3Normal      hydrALAZINE (APRESOLINE) 50 MG tablet Take 1 tablet by mouth every 8 hours, Disp-90 tablet, R-3Normal      labetalol (NORMODYNE) 200 MG tablet Take 1 tablet by mouth 2 times daily, Disp-60 tablet, R-3Normal           CONTINUE these medications which have NOT CHANGED    Details   mupirocin (BACTROBAN) 2 % ointment Apply topically 3 times daily. , Disp-30 g, R-0, Normal      aspirin (ASPIRIN LOW DOSE) 81 MG EC tablet TAKE 1 TABLET BY MOUTH EVERY DAY, Disp-90 tablet, R-3Normal      citalopram (CELEXA) 20 MG tablet Take 1 tablet by mouth daily, Disp-90 tablet, R-3Normal      levothyroxine (SYNTHROID) 88 MCG tablet Take 1 tablet by mouth Daily, Disp-90 tablet, R-3Normal      nitroGLYCERIN (NITRODUR) 0.2 MG/HR APPLY 1 PATCH TOPICALLY TO THE SKIN DAILY, Disp-90 patch, R-3Normal           STOP taking these medications       solifenacin (VESICARE) 5 MG tablet Comments:   Reason for Stopping:         spironolactone (ALDACTONE) 25 MG tablet Comments:   Reason for Stopping:             Activity: As tolerated  Diet: Renal    Follow-up with PCP and nephrology    Note that over 40 minutes was spent in

## 2023-09-20 ENCOUNTER — TELEPHONE (OUTPATIENT)
Dept: PRIMARY CARE CLINIC | Age: 78
End: 2023-09-20

## 2023-09-20 NOTE — TELEPHONE ENCOUNTER
Care Transitions Initial Follow Up Call    Outreach made within 2 business days of discharge: Yes    Patient: Osorio Rios Patient : 1945   MRN: [id number]  Reason for Admission: There are no discharge diagnoses documented for the most recent discharge. Discharge Date: 23       Spoke with: Selma Mcnamara daughter    Discharge department/facility: [address] [facility] Patient Contact:  Was patient able to fill all prescriptions: Yes  Was patient instructed to bring all medications to the follow-up visit: Yes  Is patient taking all medications as directed in the discharge summary? Yes  Does patient understand their discharge instructions: Yes  Does patient have questions or concerns that need addressed prior to 7-14 day follow up office visit: no    Also following with Dr Carli Hines Nephrologist.  They will be managing medications from now on due to dialysis.     Scheduled appointment with PCP within 7-14 days    Follow Up  Future Appointments   Date Time Provider [address]   10/2/2023  3:45 PM DO JORGE Caceres [place], [place]

## 2023-09-21 LAB — HISTONE ANTIBODY: 0.6 UNITS (ref 0–0.9)

## 2023-10-02 ENCOUNTER — OFFICE VISIT (OUTPATIENT)
Dept: PRIMARY CARE CLINIC | Age: 78
End: 2023-10-02
Payer: MEDICARE

## 2023-10-02 VITALS
HEART RATE: 76 BPM | RESPIRATION RATE: 17 BRPM | TEMPERATURE: 97.7 F | DIASTOLIC BLOOD PRESSURE: 84 MMHG | OXYGEN SATURATION: 95 % | SYSTOLIC BLOOD PRESSURE: 162 MMHG

## 2023-10-02 DIAGNOSIS — I10 ESSENTIAL HYPERTENSION: Primary | Chronic | ICD-10-CM

## 2023-10-02 DIAGNOSIS — Z23 NEED FOR INFLUENZA VACCINATION: ICD-10-CM

## 2023-10-02 DIAGNOSIS — F41.9 ANXIETY: ICD-10-CM

## 2023-10-02 DIAGNOSIS — E03.8 OTHER SPECIFIED HYPOTHYROIDISM: ICD-10-CM

## 2023-10-02 PROCEDURE — 1123F ACP DISCUSS/DSCN MKR DOCD: CPT | Performed by: FAMILY MEDICINE

## 2023-10-02 PROCEDURE — 3079F DIAST BP 80-89 MM HG: CPT | Performed by: FAMILY MEDICINE

## 2023-10-02 PROCEDURE — G8400 PT W/DXA NO RESULTS DOC: HCPCS | Performed by: FAMILY MEDICINE

## 2023-10-02 PROCEDURE — 90694 VACC AIIV4 NO PRSRV 0.5ML IM: CPT | Performed by: FAMILY MEDICINE

## 2023-10-02 PROCEDURE — G8417 CALC BMI ABV UP PARAM F/U: HCPCS | Performed by: FAMILY MEDICINE

## 2023-10-02 PROCEDURE — 1036F TOBACCO NON-USER: CPT | Performed by: FAMILY MEDICINE

## 2023-10-02 PROCEDURE — 3077F SYST BP >= 140 MM HG: CPT | Performed by: FAMILY MEDICINE

## 2023-10-02 PROCEDURE — 1090F PRES/ABSN URINE INCON ASSESS: CPT | Performed by: FAMILY MEDICINE

## 2023-10-02 PROCEDURE — G0008 ADMIN INFLUENZA VIRUS VAC: HCPCS | Performed by: FAMILY MEDICINE

## 2023-10-02 PROCEDURE — G8484 FLU IMMUNIZE NO ADMIN: HCPCS | Performed by: FAMILY MEDICINE

## 2023-10-02 PROCEDURE — 99214 OFFICE O/P EST MOD 30 MIN: CPT | Performed by: FAMILY MEDICINE

## 2023-10-02 PROCEDURE — G8427 DOCREV CUR MEDS BY ELIG CLIN: HCPCS | Performed by: FAMILY MEDICINE

## 2023-10-02 PROCEDURE — 1111F DSCHRG MED/CURRENT MED MERGE: CPT | Performed by: FAMILY MEDICINE

## 2023-10-02 ASSESSMENT — PATIENT HEALTH QUESTIONNAIRE - PHQ9
2. FEELING DOWN, DEPRESSED OR HOPELESS: 0
SUM OF ALL RESPONSES TO PHQ QUESTIONS 1-9: 0
SUM OF ALL RESPONSES TO PHQ QUESTIONS 1-9: 0
1. LITTLE INTEREST OR PLEASURE IN DOING THINGS: 0
SUM OF ALL RESPONSES TO PHQ QUESTIONS 1-9: 0
SUM OF ALL RESPONSES TO PHQ9 QUESTIONS 1 & 2: 0
SUM OF ALL RESPONSES TO PHQ QUESTIONS 1-9: 0

## 2023-10-02 NOTE — PROGRESS NOTES
10/2/23     [facility]    : 1945 Sex: female   Age: 69 y.o. Chief Complaint   Patient presents with    Dane Farris Follow up 23 SEY: abnormal labs, MANAN    Patient is now on dialysis: Dialysis port Right chest : HD Tuesday, Thursday & Saturday   Getting it done at [address]     Daughter has been keeping track of BP at home BEFORE BP medication        Prior to Admission medications    Medication Sig Start Date End Date Taking? Authorizing Provider   mirtazapine (REMERON) 15 MG tablet Take 1 tablet by mouth nightly 23  Yes Rafy Jaeger MD   cloNIDine (CATAPRES) 0.3 MG tablet Take 1 tablet by mouth 3 times daily  Patient taking differently: Take 0.1 mg by mouth 3 times daily 23  Yes Rafy Jaeger MD   hydrALAZINE (APRESOLINE) 50 MG tablet Take 1 tablet by mouth every 8 hours  Patient taking differently: Take 1 tablet by mouth 3 times daily 23  Yes Rafy Jaeger MD   labetalol (NORMODYNE) 200 MG tablet Take 1 tablet by mouth 2 times daily 23  Yes Rafy Jaeger MD   calcium acetate (PHOSLO) 667 MG CAPS capsule Take 1 capsule by mouth 3 times daily (with meals) 23  Yes Rafy Jaeger MD   aspirin (ASPIRIN LOW DOSE) 81 MG EC tablet TAKE 1 TABLET BY MOUTH EVERY DAY 4/10/23  Yes Kareem Nicholson DO   citalopram (CELEXA) 20 MG tablet Take 1 tablet by mouth daily 23  Yes Kareem Nicholson DO   levothyroxine (SYNTHROID) 88 MCG tablet Take 1 tablet by mouth Daily 23  Yes Kareem Nicholson DO   nitroGLYCERIN (NITRODUR) 0.2 MG/HR APPLY 1 PATCH TOPICALLY TO THE SKIN DAILY 23  Yes Kareem Nicholson DO   epoetin candis-epbx (RETACRIT) 3000 UNIT/ML SOLN injection Inject 1 mL into the skin Once a week at 5 PM  Patient not taking: Reported on 10/2/2023 9/19/23   Rafy Jaeger MD   mupirocin (BACTROBAN) 2 % ointment Apply topically 3 times daily.   Patient not taking: Reported on 10/2/2023 4/28/23   SUZY Arnold III          HPI: Patient

## 2023-10-15 DIAGNOSIS — I73.9 PVD (PERIPHERAL VASCULAR DISEASE) (HCC): ICD-10-CM

## 2023-10-15 DIAGNOSIS — N18.32 STAGE 3B CHRONIC KIDNEY DISEASE (HCC): ICD-10-CM

## 2023-10-15 DIAGNOSIS — E78.2 MIXED HYPERLIPIDEMIA: ICD-10-CM

## 2023-10-15 DIAGNOSIS — I10 ESSENTIAL HYPERTENSION: Chronic | ICD-10-CM

## 2023-10-15 DIAGNOSIS — I25.10 CORONARY ARTERY DISEASE INVOLVING NATIVE CORONARY ARTERY OF NATIVE HEART WITHOUT ANGINA PECTORIS: ICD-10-CM

## 2023-10-16 RX ORDER — LABETALOL 100 MG/1
TABLET, FILM COATED ORAL
Qty: 60 TABLET | Refills: 3 | OUTPATIENT
Start: 2023-10-16

## 2023-10-16 RX ORDER — CLONIDINE HYDROCHLORIDE 0.1 MG/1
TABLET ORAL
Qty: 90 TABLET | Refills: 3 | OUTPATIENT
Start: 2023-10-16

## 2023-10-20 LAB
BASOPHILS ABSOLUTE: ABNORMAL
BASOPHILS RELATIVE PERCENT: 0.8 %
EOSINOPHILS ABSOLUTE: ABNORMAL
EOSINOPHILS RELATIVE PERCENT: 3.2 %
HCT VFR BLD CALC: 26 % (ref 36–46)
HEMOGLOBIN: 9 G/DL (ref 12–16)
LYMPHOCYTES ABSOLUTE: ABNORMAL
LYMPHOCYTES RELATIVE PERCENT: 22.3 %
MCH RBC QN AUTO: 33.2 PG
MCHC RBC AUTO-ENTMCNC: 34.6 G/DL
MCV RBC AUTO: 96 FL
MONOCYTES ABSOLUTE: ABNORMAL
MONOCYTES RELATIVE PERCENT: 10.3 %
NEUTROPHILS ABSOLUTE: ABNORMAL
NEUTROPHILS RELATIVE PERCENT: 59.8 %
PDW BLD-RTO: 19.1 %
PLATELET # BLD: 152 K/ΜL
PMV BLD AUTO: ABNORMAL FL
RBC # BLD: 2.71 10^6/ΜL
WBC # BLD: 2.98 10^3/ML

## 2023-10-25 LAB
ENA SM AB SER QL: NEGATIVE
JO-1 ANTIBODY: NEGATIVE
SCLERODERMA (SCL-70) AB: NEGATIVE
SJOGREN'S ANTIBODIES (SSA): NEGATIVE
SJOGREN'S ANTIBODIES (SSB): NEGATIVE
U1 SNRNP IGG SER-ACNC: NEGATIVE

## 2023-10-27 LAB — HEMOGLOBIN: 9.2 G/DL (ref 12–16)

## 2023-10-30 ENCOUNTER — OFFICE VISIT (OUTPATIENT)
Dept: PRIMARY CARE CLINIC | Age: 78
End: 2023-10-30
Payer: MEDICARE

## 2023-10-30 VITALS
HEART RATE: 76 BPM | HEIGHT: 63 IN | DIASTOLIC BLOOD PRESSURE: 84 MMHG | OXYGEN SATURATION: 95 % | TEMPERATURE: 98.7 F | WEIGHT: 163 LBS | SYSTOLIC BLOOD PRESSURE: 154 MMHG | BODY MASS INDEX: 28.88 KG/M2

## 2023-10-30 DIAGNOSIS — Z99.2 ESRD (END STAGE RENAL DISEASE) ON DIALYSIS (HCC): Primary | ICD-10-CM

## 2023-10-30 DIAGNOSIS — I10 ESSENTIAL HYPERTENSION: Chronic | ICD-10-CM

## 2023-10-30 DIAGNOSIS — F41.9 ANXIETY: ICD-10-CM

## 2023-10-30 DIAGNOSIS — N18.6 ESRD (END STAGE RENAL DISEASE) ON DIALYSIS (HCC): Primary | ICD-10-CM

## 2023-10-30 DIAGNOSIS — E78.2 MIXED HYPERLIPIDEMIA: ICD-10-CM

## 2023-10-30 PROCEDURE — 99214 OFFICE O/P EST MOD 30 MIN: CPT | Performed by: FAMILY MEDICINE

## 2023-10-30 PROCEDURE — 3079F DIAST BP 80-89 MM HG: CPT | Performed by: FAMILY MEDICINE

## 2023-10-30 PROCEDURE — 1036F TOBACCO NON-USER: CPT | Performed by: FAMILY MEDICINE

## 2023-10-30 PROCEDURE — 1123F ACP DISCUSS/DSCN MKR DOCD: CPT | Performed by: FAMILY MEDICINE

## 2023-10-30 PROCEDURE — G8417 CALC BMI ABV UP PARAM F/U: HCPCS | Performed by: FAMILY MEDICINE

## 2023-10-30 PROCEDURE — 1090F PRES/ABSN URINE INCON ASSESS: CPT | Performed by: FAMILY MEDICINE

## 2023-10-30 PROCEDURE — G8400 PT W/DXA NO RESULTS DOC: HCPCS | Performed by: FAMILY MEDICINE

## 2023-10-30 PROCEDURE — G8427 DOCREV CUR MEDS BY ELIG CLIN: HCPCS | Performed by: FAMILY MEDICINE

## 2023-10-30 PROCEDURE — G8484 FLU IMMUNIZE NO ADMIN: HCPCS | Performed by: FAMILY MEDICINE

## 2023-10-30 PROCEDURE — 3077F SYST BP >= 140 MM HG: CPT | Performed by: FAMILY MEDICINE

## 2023-10-30 RX ORDER — AMOXICILLIN 500 MG/1
500 CAPSULE ORAL 4 TIMES DAILY
COMMUNITY

## 2023-10-30 NOTE — PROGRESS NOTES
10/30/23     [facility]    : 1945 Sex: female   Age: 69 y.o. Chief Complaint   Patient presents with    Hypertension       Prior to Admission medications    Medication Sig Start Date End Date Taking? Authorizing Provider   amoxicillin (AMOXIL) 500 MG capsule Take 1 capsule by mouth in the morning, at noon, in the evening, and at bedtime For dental appt. Yes ProviderAbel MD   mirtazapine (REMERON) 15 MG tablet Take 1 tablet by mouth nightly 23  Yes Kelsi Miguel MD   cloNIDine (CATAPRES) 0.3 MG tablet Take 1 tablet by mouth 3 times daily  Patient taking differently: Take 0.1 mg by mouth 3 times daily 23  Yes Kelsi Miguel MD   hydrALAZINE (APRESOLINE) 50 MG tablet Take 1 tablet by mouth every 8 hours  Patient taking differently: Take 1.5 tablets by mouth 3 times daily 23  Yes Kelsi Miguel MD   labetalol (NORMODYNE) 200 MG tablet Take 1 tablet by mouth 2 times daily 23  Yes Kelsi Miguel MD   calcium acetate (PHOSLO) 667 MG CAPS capsule Take 1 capsule by mouth 3 times daily (with meals) 23  Yes Kelsi Miguel MD   aspirin (ASPIRIN LOW DOSE) 81 MG EC tablet TAKE 1 TABLET BY MOUTH EVERY DAY 4/10/23  Yes Hector Stern DO   citalopram (CELEXA) 20 MG tablet Take 1 tablet by mouth daily 23  Yes Shavonne Nicholson DO   levothyroxine (SYNTHROID) 88 MCG tablet Take 1 tablet by mouth Daily 23  Yes Shavonne Nicholson DO   nitroGLYCERIN (NITRODUR) 0.2 MG/HR APPLY 1 PATCH TOPICALLY TO THE SKIN DAILY 23  Yes Hector Stern DO   epoetin candis-epbx (RETACRIT) 3000 UNIT/ML SOLN injection Inject 1 mL into the skin Once a week at 5 PM  Patient not taking: Reported on 10/2/2023 9/19/23   Kelsi Miguel MD   mupirocin (BACTROBAN) 2 % ointment Apply topically 3 times daily. Patient not taking: Reported on 10/2/2023 4/28/23   SUZY Fields          HPI: Evaluated today end-stage renal disease hypertension hyperlipidemia anxiety.   She

## 2023-11-06 RX ORDER — CLONIDINE HYDROCHLORIDE 0.1 MG/1
0.1 TABLET ORAL 3 TIMES DAILY
Qty: 90 TABLET | Refills: 0 | Status: SHIPPED | OUTPATIENT
Start: 2023-11-06

## 2023-11-06 RX ORDER — CLONIDINE HYDROCHLORIDE 0.1 MG/1
0.1 TABLET ORAL 3 TIMES DAILY
COMMUNITY
End: 2023-11-06 | Stop reason: SDUPTHER

## 2023-11-07 RX ORDER — CLONIDINE HYDROCHLORIDE 0.1 MG/1
0.1 TABLET ORAL 3 TIMES DAILY
Qty: 270 TABLET | OUTPATIENT
Start: 2023-11-07

## 2023-11-18 LAB
ALBUMIN SERPL-MCNC: 4.1 G/DL
ALP BLD-CCNC: NORMAL U/L
ALT SERPL-CCNC: NORMAL U/L
ANION GAP SERPL CALCULATED.3IONS-SCNC: NORMAL MMOL/L
AST SERPL-CCNC: NORMAL U/L
BASOPHILS ABSOLUTE: ABNORMAL
BASOPHILS RELATIVE PERCENT: 0.7 %
BILIRUB SERPL-MCNC: NORMAL MG/DL
BUN BLDV-MCNC: 75 MG/DL
CALCIUM SERPL-MCNC: 9.6 MG/DL
CHLORIDE BLD-SCNC: 97 MMOL/L
CO2: NORMAL
CREAT SERPL-MCNC: 8.6 MG/DL
EGFR: NORMAL
EOSINOPHILS ABSOLUTE: ABNORMAL
EOSINOPHILS RELATIVE PERCENT: 3 %
GLUCOSE BLD-MCNC: 159 MG/DL
HCT VFR BLD CALC: 28.3 % (ref 36–46)
HEMOGLOBIN: 9.3 G/DL (ref 12–16)
IRON: 76
IRON: 76
LYMPHOCYTES ABSOLUTE: ABNORMAL
LYMPHOCYTES RELATIVE PERCENT: 15.5 %
MAGNESIUM: 2.2 MG/DL
MCH RBC QN AUTO: 31.2 PG
MCHC RBC AUTO-ENTMCNC: 32.8 G/DL
MCV RBC AUTO: 95 FL
MONOCYTES ABSOLUTE: ABNORMAL
MONOCYTES RELATIVE PERCENT: 7.3 %
NEUTROPHILS ABSOLUTE: ABNORMAL
NEUTROPHILS RELATIVE PERCENT: 71.7 %
PDW BLD-RTO: 14.4 %
PLATELET # BLD: 104 K/ΜL
PMV BLD AUTO: ABNORMAL FL
POTASSIUM SERPL-SCNC: 4.6 MMOL/L
RBC # BLD: 2.98 10^6/ΜL
SODIUM BLD-SCNC: 139 MMOL/L
TOTAL IRON BINDING CAPACITY: 280
TOTAL PROTEIN: 6.8
WBC # BLD: 3.18 10^3/ML

## 2023-11-28 NOTE — CARE COORDINATION
Care Coordination:  Following for discharge planning. Patient had biopsy yesterday. Nephrology  following for MANAN stage III on CKD 3B Presumed in the setting of decreased effective renal perfusion with decreased oral intake prior to admission; exacerbated by outpatient spironolactone ,Baseline creatinine 1.6-1.7 Creatinine peaked at 5.4 with BUN 71 on 8/31. Cre today increased to 5.0. Notes indicated patient and family are denying HD. Nephro following. Her plan is to return home. Her boyfriend and family provide assist in IADLs. Patient uses a  walker and wheelchair due to previous CVA. Continue to monitor for possible need for skilled nursing home health at discharge and for renal plan re dialysis. Quality 130: Documentation Of Current Medications In The Medical Record: Current Medications Documented Detail Level: Detailed Quality 431: Preventive Care And Screening: Unhealthy Alcohol Use - Screening: Patient not identified as an unhealthy alcohol user when screened for unhealthy alcohol use using a systematic screening method Quality 226: Preventive Care And Screening: Tobacco Use: Screening And Cessation Intervention: Patient screened for tobacco use and is an ex/non-smoker Quality 111:Pneumonia Vaccination Status For Older Adults: Patient did not receive any pneumococcal conjugate or polysaccharide vaccine on or after their 60th birthday and before the end of the measurement period

## 2023-12-01 RX ORDER — CLONIDINE HYDROCHLORIDE 0.1 MG/1
0.1 TABLET ORAL 3 TIMES DAILY
Qty: 270 TABLET | Refills: 1 | Status: SHIPPED | OUTPATIENT
Start: 2023-12-01

## 2023-12-01 NOTE — TELEPHONE ENCOUNTER
Patients last appointment 10/30/2023.   Patients next scheduled appointment   Future Appointments   Date Time Provider [address]   12/11/2023  3:30 PM DO JORGE [facility]

## 2023-12-08 DIAGNOSIS — I10 ESSENTIAL HYPERTENSION: Chronic | ICD-10-CM

## 2023-12-08 DIAGNOSIS — Z99.2 ESRD (END STAGE RENAL DISEASE) ON DIALYSIS (HCC): ICD-10-CM

## 2023-12-08 DIAGNOSIS — E78.2 MIXED HYPERLIPIDEMIA: ICD-10-CM

## 2023-12-08 DIAGNOSIS — N18.6 ESRD (END STAGE RENAL DISEASE) ON DIALYSIS (HCC): ICD-10-CM

## 2023-12-08 LAB
ALBUMIN SERPL-MCNC: 4.3 G/DL (ref 3.5–5.2)
ALP BLD-CCNC: 58 U/L (ref 35–104)
ALT SERPL-CCNC: 5 U/L (ref 0–32)
ANION GAP SERPL CALCULATED.3IONS-SCNC: 20 MMOL/L (ref 7–16)
AST SERPL-CCNC: 14 U/L (ref 0–31)
BASOPHILS ABSOLUTE: 0.03 K/UL (ref 0–0.2)
BASOPHILS RELATIVE PERCENT: 1 % (ref 0–2)
BILIRUB SERPL-MCNC: 0.3 MG/DL (ref 0–1.2)
BUN BLDV-MCNC: 35 MG/DL (ref 6–23)
CALCIUM SERPL-MCNC: 9.6 MG/DL (ref 8.6–10.2)
CHLORIDE BLD-SCNC: 97 MMOL/L (ref 98–107)
CHOLESTEROL: 162 MG/DL
CO2: 23 MMOL/L (ref 22–29)
CREAT SERPL-MCNC: 5.7 MG/DL (ref 0.5–1)
EOSINOPHILS ABSOLUTE: 0 K/UL (ref 0.05–0.5)
EOSINOPHILS RELATIVE PERCENT: 0 % (ref 0–6)
GFR SERPL CREATININE-BSD FRML MDRD: 7 ML/MIN/1.73M2
GLUCOSE BLD-MCNC: 94 MG/DL (ref 74–99)
HBA1C MFR BLD: 4.6 % (ref 4–5.6)
HCT VFR BLD CALC: 31.1 % (ref 34–48)
HDLC SERPL-MCNC: 54 MG/DL
HEMOGLOBIN: 10 G/DL (ref 11.5–15.5)
LDL CHOLESTEROL: 87 MG/DL
LYMPHOCYTES ABSOLUTE: 0.59 K/UL (ref 1.5–4)
LYMPHOCYTES RELATIVE PERCENT: 16 % (ref 20–42)
MCH RBC QN AUTO: 31.3 PG (ref 26–35)
MCHC RBC AUTO-ENTMCNC: 32.2 G/DL (ref 32–34.5)
MCV RBC AUTO: 97.5 FL (ref 80–99.9)
MONOCYTES ABSOLUTE: 0.26 K/UL (ref 0.1–0.95)
MONOCYTES RELATIVE PERCENT: 7 % (ref 2–12)
NEUTROPHILS ABSOLUTE: 2.91 K/UL (ref 1.8–7.3)
NEUTROPHILS RELATIVE PERCENT: 77 % (ref 43–80)
PDW BLD-RTO: 13.9 % (ref 11.5–15)
PLATELET # BLD: 147 K/UL (ref 130–450)
PMV BLD AUTO: 10.8 FL (ref 7–12)
POTASSIUM SERPL-SCNC: 4.5 MMOL/L (ref 3.5–5)
RBC # BLD: 3.19 M/UL (ref 3.5–5.5)
RBC # BLD: NORMAL 10*6/UL
SODIUM BLD-SCNC: 140 MMOL/L (ref 132–146)
T4 TOTAL: 7.6 UG/DL (ref 4.5–11.7)
TOTAL PROTEIN: 7.3 G/DL (ref 6.4–8.3)
TRIGL SERPL-MCNC: 103 MG/DL
TSH SERPL DL<=0.05 MIU/L-ACNC: 4.97 UIU/ML (ref 0.27–4.2)
VLDLC SERPL CALC-MCNC: 21 MG/DL
WBC # BLD: 3.8 K/UL (ref 4.5–11.5)

## 2023-12-11 ENCOUNTER — OFFICE VISIT (OUTPATIENT)
Dept: PRIMARY CARE CLINIC | Age: 78
End: 2023-12-11
Payer: MEDICARE

## 2023-12-11 VITALS
RESPIRATION RATE: 17 BRPM | TEMPERATURE: 97.3 F | OXYGEN SATURATION: 95 % | WEIGHT: 162.5 LBS | HEART RATE: 63 BPM | DIASTOLIC BLOOD PRESSURE: 86 MMHG | SYSTOLIC BLOOD PRESSURE: 142 MMHG | BODY MASS INDEX: 28.79 KG/M2

## 2023-12-11 DIAGNOSIS — I25.10 CORONARY ARTERY DISEASE INVOLVING NATIVE CORONARY ARTERY OF NATIVE HEART WITHOUT ANGINA PECTORIS: ICD-10-CM

## 2023-12-11 DIAGNOSIS — I73.9 PVD (PERIPHERAL VASCULAR DISEASE) (HCC): ICD-10-CM

## 2023-12-11 DIAGNOSIS — F41.9 ANXIETY: ICD-10-CM

## 2023-12-11 DIAGNOSIS — N18.6 ESRD (END STAGE RENAL DISEASE) ON DIALYSIS (HCC): Primary | ICD-10-CM

## 2023-12-11 DIAGNOSIS — Z99.2 ESRD (END STAGE RENAL DISEASE) ON DIALYSIS (HCC): Primary | ICD-10-CM

## 2023-12-11 DIAGNOSIS — N18.32 STAGE 3B CHRONIC KIDNEY DISEASE (HCC): ICD-10-CM

## 2023-12-11 DIAGNOSIS — L98.9 SKIN LESION OF FACE: ICD-10-CM

## 2023-12-11 DIAGNOSIS — I10 ESSENTIAL HYPERTENSION: Chronic | ICD-10-CM

## 2023-12-11 DIAGNOSIS — E78.2 MIXED HYPERLIPIDEMIA: ICD-10-CM

## 2023-12-11 PROCEDURE — 3079F DIAST BP 80-89 MM HG: CPT | Performed by: FAMILY MEDICINE

## 2023-12-11 PROCEDURE — 1036F TOBACCO NON-USER: CPT | Performed by: FAMILY MEDICINE

## 2023-12-11 PROCEDURE — G8417 CALC BMI ABV UP PARAM F/U: HCPCS | Performed by: FAMILY MEDICINE

## 2023-12-11 PROCEDURE — 1090F PRES/ABSN URINE INCON ASSESS: CPT | Performed by: FAMILY MEDICINE

## 2023-12-11 PROCEDURE — 99214 OFFICE O/P EST MOD 30 MIN: CPT | Performed by: FAMILY MEDICINE

## 2023-12-11 PROCEDURE — G8427 DOCREV CUR MEDS BY ELIG CLIN: HCPCS | Performed by: FAMILY MEDICINE

## 2023-12-11 PROCEDURE — 1123F ACP DISCUSS/DSCN MKR DOCD: CPT | Performed by: FAMILY MEDICINE

## 2023-12-11 PROCEDURE — 3077F SYST BP >= 140 MM HG: CPT | Performed by: FAMILY MEDICINE

## 2023-12-11 PROCEDURE — G8400 PT W/DXA NO RESULTS DOC: HCPCS | Performed by: FAMILY MEDICINE

## 2023-12-11 PROCEDURE — G8484 FLU IMMUNIZE NO ADMIN: HCPCS | Performed by: FAMILY MEDICINE

## 2023-12-11 RX ORDER — LEVOTHYROXINE SODIUM 88 UG/1
88 TABLET ORAL DAILY
Qty: 90 TABLET | Refills: 3 | Status: SHIPPED | OUTPATIENT
Start: 2023-12-11

## 2023-12-11 RX ORDER — HYDRALAZINE HYDROCHLORIDE 25 MG/1
TABLET, FILM COATED ORAL
Qty: 90 TABLET | Refills: 3 | Status: SHIPPED | OUTPATIENT
Start: 2023-12-11

## 2023-12-11 RX ORDER — LABETALOL 200 MG/1
200 TABLET, FILM COATED ORAL 2 TIMES DAILY
Qty: 60 TABLET | Refills: 3 | Status: SHIPPED | OUTPATIENT
Start: 2023-12-11

## 2023-12-11 RX ORDER — CITALOPRAM 20 MG/1
20 TABLET ORAL DAILY
Qty: 90 TABLET | Refills: 3 | Status: SHIPPED | OUTPATIENT
Start: 2023-12-11

## 2023-12-11 RX ORDER — MIRTAZAPINE 15 MG/1
15 TABLET, FILM COATED ORAL NIGHTLY
Qty: 30 TABLET | Refills: 3 | Status: SHIPPED | OUTPATIENT
Start: 2023-12-11

## 2023-12-11 RX ORDER — CLONIDINE HYDROCHLORIDE 0.1 MG/1
0.1 TABLET ORAL 3 TIMES DAILY
Qty: 270 TABLET | Refills: 1 | Status: SHIPPED | OUTPATIENT
Start: 2023-12-11

## 2023-12-11 RX ORDER — HYDRALAZINE HYDROCHLORIDE 50 MG/1
TABLET, FILM COATED ORAL
Qty: 30 TABLET | Refills: 3 | Status: SHIPPED | OUTPATIENT
Start: 2023-12-11

## 2023-12-11 ASSESSMENT — PATIENT HEALTH QUESTIONNAIRE - PHQ9
SUM OF ALL RESPONSES TO PHQ QUESTIONS 1-9: 0
SUM OF ALL RESPONSES TO PHQ9 QUESTIONS 1 & 2: 0
2. FEELING DOWN, DEPRESSED OR HOPELESS: 0
1. LITTLE INTEREST OR PLEASURE IN DOING THINGS: 0
SUM OF ALL RESPONSES TO PHQ QUESTIONS 1-9: 0

## 2023-12-11 NOTE — PROGRESS NOTES
23     Fátima Escobedo    : 1945 Sex: female   Age: 69 y.o. Chief Complaint   Patient presents with    Follow-up     1 Month follow up     Discuss Labs     Review labs     Skin Problem     Started x6 months ago   Under right eye: red robyn, scabbed over   Comes and goes       Prior to Admission medications    Medication Sig Start Date End Date Taking? Authorizing Provider   mirtazapine (REMERON) 15 MG tablet Take 1 tablet by mouth nightly 23  Yes Karl Nicholson,    levothyroxine (SYNTHROID) 88 MCG tablet Take 1 tablet by mouth Daily 23  Yes Breanna Garcia DO   hydrALAZINE (APRESOLINE) 50 MG tablet 75mg tid   1 50mg qd     1 25mg qd 23  Yes Karl Nicholson DO   citalopram (CELEXA) 20 MG tablet Take 1 tablet by mouth daily 23  Yes Karl Nicholson DO   labetalol (NORMODYNE) 200 MG tablet Take 1 tablet by mouth 2 times daily 23  Yes Karl Nicholson DO   cloNIDine (CATAPRES) 0.1 MG tablet Take 1 tablet by mouth 3 times daily 23  Yes Karl Nicholson DO   mupirocin (BACTROBAN) 2 % ointment Apply topically 3 times daily. 23 Yes Breanna Garcia DO   hydrALAZINE (APRESOLINE) 25 MG tablet 1  tid 23  Yes Karl Nicholson DO   amoxicillin (AMOXIL) 500 MG capsule Take 1 capsule by mouth in the morning, at noon, in the evening, and at bedtime For dental appt. Yes Provider, MD Abel   cloNIDine (CATAPRES) 0.3 MG tablet Take 1 tablet by mouth 3 times daily  Patient taking differently: Take 0.1 mg by mouth 3 times daily 23  Yes Em Joshua MD   epoetin candis-epbx (RETACRIT) 3000 UNIT/ML SOLN injection Inject 1 mL into the skin Once a week at 5 PM 23  Yes Em Joshua MD   calcium acetate (PHOSLO) 667 MG CAPS capsule Take 1 capsule by mouth 3 times daily (with meals) 23  Yes Em Joshua MD   mupirocin (BACTROBAN) 2 % ointment Apply topically 3 times daily.  23  Yes Allyn Salinas,

## 2023-12-12 RX ORDER — HYDRALAZINE HYDROCHLORIDE 50 MG/1
TABLET, FILM COATED ORAL
Qty: 90 TABLET | OUTPATIENT
Start: 2023-12-12

## 2024-01-22 ENCOUNTER — OFFICE VISIT (OUTPATIENT)
Dept: PRIMARY CARE CLINIC | Age: 79
End: 2024-01-22
Payer: MEDICARE

## 2024-01-22 VITALS
SYSTOLIC BLOOD PRESSURE: 170 MMHG | OXYGEN SATURATION: 95 % | TEMPERATURE: 98.2 F | HEIGHT: 63 IN | WEIGHT: 157 LBS | HEART RATE: 85 BPM | BODY MASS INDEX: 27.82 KG/M2 | DIASTOLIC BLOOD PRESSURE: 80 MMHG

## 2024-01-22 DIAGNOSIS — N18.6 ESRD (END STAGE RENAL DISEASE) ON DIALYSIS (HCC): Primary | ICD-10-CM

## 2024-01-22 DIAGNOSIS — Z99.2 ESRD (END STAGE RENAL DISEASE) ON DIALYSIS (HCC): Primary | ICD-10-CM

## 2024-01-22 DIAGNOSIS — F41.9 ANXIETY: ICD-10-CM

## 2024-01-22 DIAGNOSIS — I10 ESSENTIAL HYPERTENSION: ICD-10-CM

## 2024-01-22 DIAGNOSIS — E78.2 MIXED HYPERLIPIDEMIA: ICD-10-CM

## 2024-01-22 PROBLEM — E11.22 TYPE 2 DIABETES MELLITUS WITH CHRONIC KIDNEY DISEASE (HCC): Status: RESOLVED | Noted: 2022-02-22 | Resolved: 2024-01-22

## 2024-01-22 PROCEDURE — G8484 FLU IMMUNIZE NO ADMIN: HCPCS | Performed by: FAMILY MEDICINE

## 2024-01-22 PROCEDURE — 1090F PRES/ABSN URINE INCON ASSESS: CPT | Performed by: FAMILY MEDICINE

## 2024-01-22 PROCEDURE — 3079F DIAST BP 80-89 MM HG: CPT | Performed by: FAMILY MEDICINE

## 2024-01-22 PROCEDURE — G8400 PT W/DXA NO RESULTS DOC: HCPCS | Performed by: FAMILY MEDICINE

## 2024-01-22 PROCEDURE — 99214 OFFICE O/P EST MOD 30 MIN: CPT | Performed by: FAMILY MEDICINE

## 2024-01-22 PROCEDURE — G8427 DOCREV CUR MEDS BY ELIG CLIN: HCPCS | Performed by: FAMILY MEDICINE

## 2024-01-22 PROCEDURE — 1123F ACP DISCUSS/DSCN MKR DOCD: CPT | Performed by: FAMILY MEDICINE

## 2024-01-22 PROCEDURE — 3077F SYST BP >= 140 MM HG: CPT | Performed by: FAMILY MEDICINE

## 2024-01-22 PROCEDURE — G8417 CALC BMI ABV UP PARAM F/U: HCPCS | Performed by: FAMILY MEDICINE

## 2024-01-22 PROCEDURE — 1036F TOBACCO NON-USER: CPT | Performed by: FAMILY MEDICINE

## 2024-01-22 RX ORDER — CALCIUM ACETATE 667 MG/1
1 CAPSULE ORAL
Qty: 180 CAPSULE | Refills: 1 | Status: SHIPPED | OUTPATIENT
Start: 2024-01-22

## 2024-01-22 ASSESSMENT — ENCOUNTER SYMPTOMS
RESPIRATORY NEGATIVE: 1
EYES NEGATIVE: 1
ALLERGIC/IMMUNOLOGIC NEGATIVE: 1
GASTROINTESTINAL NEGATIVE: 1

## 2024-01-22 NOTE — PROGRESS NOTES
scraping of trach x 2 to treat stenosis / last one 2016    POLYPECTOMY  07/24/2013    internal hemorrhoids - michelle    TONSILLECTOMY      TOTAL KNEE ARTHROPLASTY Right 8/28/2020    RIGHT KNEE TOTAL ARTHROPLASTY    ++RAYMOND++   ++PNB++     ++LATEX ALLERGY++   ++IODINE ALLERGY++ performed by Jostin Cavanaugh MD at [facility] OR    TRACHEOSTOMY  9/24/14    Dorion    UPPER GASTROINTESTINAL ENDOSCOPY      gastritis w. superficial antral ulerations - Michelle     No family history on file.  Past Medical History:   Diagnosis Date    Acute loss of vision, left 2/24/2022    Arthritis     CAD (coronary artery disease)     follows with Dr. Jansen    CKD (chronic kidney disease)     stage 3 / follows with Dr. Amos Soriano     Hypertension     Left carotid stenosis 2/24/2022    Respiratory failure (HCC)     history of / 2014  when had stroke, trach  x 2 weeks    Thyroid disease     Tracheal stenosis     Unspecified cerebral artery occlusion with cerebral infarction 9/26/2014    right side flacid, hemorrhagic stroke dt elevated BP/residual s/s is at right side loss of fine motor skills and some weakness, drags right leg, slight memory loss and aphasia when she is tired       Vitals:    01/22/24 1517   BP: (!) 170/80   Pulse: 85   Temp: 98.2 °F (36.8 °C)   SpO2: 95%   Weight: 71.2 kg (157 lb)   Height: 1.6 m (5' 3\")     BP Readings from Last 3 Encounters:   01/22/24 (!) 170/80   12/11/23 (!) 142/86   10/30/23 (!) 154/84        Physical Exam  Vitals and nursing note reviewed.   Constitutional:       Appearance: She is well-developed.   HENT:      Head: Normocephalic.      Right Ear: External ear normal.      Left Ear: External ear normal.      Nose: Nose normal.   Eyes:      Conjunctiva/sclera: Conjunctivae normal.      Pupils: Pupils are equal, round, and reactive to light.   Cardiovascular:      Rate and Rhythm: Normal rate.   Pulmonary:      Breath sounds: Normal breath sounds.   Abdominal:      General: Bowel sounds are

## 2024-01-23 RX ORDER — HYDRALAZINE HYDROCHLORIDE 25 MG/1
TABLET, FILM COATED ORAL
Qty: 270 TABLET | Refills: 1 | Status: SHIPPED | OUTPATIENT
Start: 2024-01-23

## 2024-01-23 RX ORDER — HYDRALAZINE HYDROCHLORIDE 50 MG/1
TABLET, FILM COATED ORAL
Qty: 90 TABLET | Refills: 1 | Status: SHIPPED | OUTPATIENT
Start: 2024-01-23

## 2024-01-23 NOTE — TELEPHONE ENCOUNTER
Daughter called for refill on medication. I updated directions on medication. Patient is taking a total of 75mg three times day. Daughter said patient is only taking a 25mg and a 50mg together to equal the 75mg, but needs to do that three times daily.     Medications pended.

## 2024-01-30 RX ORDER — HYDRALAZINE HYDROCHLORIDE 50 MG/1
TABLET, FILM COATED ORAL
Qty: 270 TABLET | OUTPATIENT
Start: 2024-01-30

## 2024-02-20 DIAGNOSIS — N18.32 STAGE 3B CHRONIC KIDNEY DISEASE (HCC): ICD-10-CM

## 2024-02-20 DIAGNOSIS — E78.2 MIXED HYPERLIPIDEMIA: ICD-10-CM

## 2024-02-20 DIAGNOSIS — I10 ESSENTIAL HYPERTENSION: Chronic | ICD-10-CM

## 2024-02-20 DIAGNOSIS — I25.10 CORONARY ARTERY DISEASE INVOLVING NATIVE CORONARY ARTERY OF NATIVE HEART WITHOUT ANGINA PECTORIS: ICD-10-CM

## 2024-02-20 DIAGNOSIS — I73.9 PVD (PERIPHERAL VASCULAR DISEASE) (HCC): ICD-10-CM

## 2024-02-20 RX ORDER — NITROGLYCERIN 40 MG/1
1 PATCH TRANSDERMAL DAILY
Qty: 90 PATCH | Refills: 1 | Status: SHIPPED | OUTPATIENT
Start: 2024-02-20

## 2024-02-23 LAB
ALBUMIN SERPL-MCNC: 4.2 G/DL
ALP BLD-CCNC: NORMAL U/L
ALT SERPL-CCNC: NORMAL U/L
ANION GAP SERPL CALCULATED.3IONS-SCNC: NORMAL MMOL/L
AST SERPL-CCNC: NORMAL U/L
BASOPHILS ABSOLUTE: ABNORMAL
BASOPHILS RELATIVE PERCENT: 0.8 %
BILIRUB SERPL-MCNC: NORMAL MG/DL
BUN BLDV-MCNC: 73 MG/DL
CALCIUM SERPL-MCNC: 9.2 MG/DL
CHLORIDE BLD-SCNC: 96 MMOL/L
CO2: NORMAL
CREAT SERPL-MCNC: 9.28 MG/DL
EGFR: NORMAL
EOSINOPHILS ABSOLUTE: ABNORMAL
EOSINOPHILS RELATIVE PERCENT: 5 %
GLUCOSE BLD-MCNC: 95 MG/DL
HCT VFR BLD CALC: 31.9 % (ref 36–46)
HEMOGLOBIN: 10.8 G/DL (ref 12–16)
IRON: 70
LYMPHOCYTES ABSOLUTE: ABNORMAL
LYMPHOCYTES RELATIVE PERCENT: 13.4 %
MAGNESIUM: 2.3 MG/DL
MCH RBC QN AUTO: 33.3 PG
MCHC RBC AUTO-ENTMCNC: 34 G/DL
MCV RBC AUTO: 98 FL
MONOCYTES ABSOLUTE: ABNORMAL
MONOCYTES RELATIVE PERCENT: 5.6 %
NEUTROPHILS ABSOLUTE: ABNORMAL
NEUTROPHILS RELATIVE PERCENT: 73.5 %
PDW BLD-RTO: 14.1 %
PLATELET # BLD: 120 K/ΜL
PMV BLD AUTO: ABNORMAL FL
POTASSIUM SERPL-SCNC: 4.8 MMOL/L
RBC # BLD: 3.25 10^6/ΜL
SODIUM BLD-SCNC: 140 MMOL/L
TOTAL IRON BINDING CAPACITY: 260
TOTAL PROTEIN: 6.8
WBC # BLD: 4.58 10^3/ML

## 2024-02-28 ENCOUNTER — PREP FOR PROCEDURE (OUTPATIENT)
Dept: VASCULAR SURGERY | Age: 79
End: 2024-02-28

## 2024-02-28 ENCOUNTER — OFFICE VISIT (OUTPATIENT)
Dept: VASCULAR SURGERY | Age: 79
End: 2024-02-28
Payer: MEDICARE

## 2024-02-28 VITALS — SYSTOLIC BLOOD PRESSURE: 112 MMHG | HEART RATE: 68 BPM | DIASTOLIC BLOOD PRESSURE: 66 MMHG

## 2024-02-28 DIAGNOSIS — N18.6 ENCOUNTER REGARDING VASCULAR ACCESS FOR DIALYSIS FOR END-STAGE RENAL DISEASE (HCC): Primary | ICD-10-CM

## 2024-02-28 DIAGNOSIS — Z99.2 ENCOUNTER REGARDING VASCULAR ACCESS FOR DIALYSIS FOR END-STAGE RENAL DISEASE (HCC): Primary | ICD-10-CM

## 2024-02-28 DIAGNOSIS — N18.6 END STAGE RENAL DISEASE (HCC): ICD-10-CM

## 2024-02-28 PROCEDURE — G8427 DOCREV CUR MEDS BY ELIG CLIN: HCPCS | Performed by: NURSE PRACTITIONER

## 2024-02-28 PROCEDURE — 1036F TOBACCO NON-USER: CPT | Performed by: NURSE PRACTITIONER

## 2024-02-28 PROCEDURE — 1090F PRES/ABSN URINE INCON ASSESS: CPT | Performed by: NURSE PRACTITIONER

## 2024-02-28 PROCEDURE — G8417 CALC BMI ABV UP PARAM F/U: HCPCS | Performed by: NURSE PRACTITIONER

## 2024-02-28 PROCEDURE — 3078F DIAST BP <80 MM HG: CPT | Performed by: NURSE PRACTITIONER

## 2024-02-28 PROCEDURE — 3074F SYST BP LT 130 MM HG: CPT | Performed by: NURSE PRACTITIONER

## 2024-02-28 PROCEDURE — G8484 FLU IMMUNIZE NO ADMIN: HCPCS | Performed by: NURSE PRACTITIONER

## 2024-02-28 PROCEDURE — 99203 OFFICE O/P NEW LOW 30 MIN: CPT | Performed by: NURSE PRACTITIONER

## 2024-02-28 PROCEDURE — 1123F ACP DISCUSS/DSCN MKR DOCD: CPT | Performed by: NURSE PRACTITIONER

## 2024-02-28 PROCEDURE — G8400 PT W/DXA NO RESULTS DOC: HCPCS | Performed by: NURSE PRACTITIONER

## 2024-02-28 NOTE — PROGRESS NOTES
Vascular Surgery Outpatient Consultation      Chief Complaint   Patient presents with    Surgical Consult     Evaluate for AVF, dialysis days -TH-SAT, patient is right handed but has weakness in the arm following a CVA.       Reason for Consult:  dialysis access    Requesting Physician:  Dr. Wilson    HISTORY OF PRESENT ILLNESS:                The patient is a 78 y.o. female who is referred for evaluation of dialysis access. The patient has been on dialysis via right sided tunneled catheter since 2023. She has had it replaced once. The patient is ambidextrous but primarily uses her left hand since her stroke affected her right side nine years ago. Other than the tunneled catheter, she denies any other right sided pacemakers or axillary surgeries. The patient had vein mapping done that was reviewed for today's visit.     Past Medical History:        Diagnosis Date    Acute loss of vision, left 2022    Arthritis     CAD (coronary artery disease)     follows with Dr. Jansen    CKD (chronic kidney disease)     stage 3 / follows with Dr. Trinidad    Depression     Hypertension     Left carotid stenosis 2022    Respiratory failure (HCC)     history of 2014  when had stroke, trach  x 2 weeks    Thyroid disease     Tracheal stenosis     Unspecified cerebral artery occlusion with cerebral infarction 2014    right side flacid, hemorrhagic stroke dt elevated BP/residual s/s is at right side loss of fine motor skills and some weakness, drags right leg, slight memory loss and aphasia when she is tired     Past Surgical History:        Procedure Laterality Date     SECTION      1    COLONOSCOPY      CORONARY ANGIOPLASTY WITH STENT PLACEMENT      CT BIOPSY RENAL  2023    CT BIOPSY RENAL 2023 OSIEL Mason MD [facility]    CYST REMOVAL  's    ENDOSCOPY, COLON, DIAGNOSTIC      GASTROSTOMY TUBE PLACEMENT  2014    Dorion.. and later removed    HEEL SPUR SURGERY Left years ago    IR

## 2024-02-28 NOTE — H&P (VIEW-ONLY)
[]  Hematologic, lymphatic:   Anemia:   No [x]/Yes []              Bleeding or bruising:  No [x]/Yes []              Fevers or chills: No [x]/Yes []  Endocrine:             Temp intolerance:   No [x]/Yes []                       Polydipsia, polyuria:  No [x]/Yes []  Skin:              Rash:    No [x]/Yes []      Ulcers:   No [x]/Yes []              Abnorm pigment: No [x]/Yes []  :              Frequency/urgency:  No [x]/Yes []      Hematuria:    No [x]/Yes []                      Incontinence:    No [x]/Yes []    PHYSICAL EXAM:  Vitals:    02/28/24 0928   BP: 112/66   Pulse: 68     General Appearance: alert and oriented to person, place and time, well developed and well- nourished, in no acute distress  Skin: warm and dry, no rash or erythema  Head: normocephalic and atraumatic  Eyes: extraocular eye movements intact, conjunctivae normal  ENT: external ear and ear canal normal bilaterally, nose without deformity  Pulmonary/Chest: normal air movement, no respiratory distress  Cardiovascular: normal rate, regular rhythm, normal S1 and S2  Abdomen: soft, non-tender, non-distended  Musculoskeletal: normal range of motion, no joint swelling, deformity or tenderness  Neurologic: no cranial nerve deficit, gait, coordination and speech normal  Extremities: no leg edema bilaterally  L UE: cephalic vein at wrist measures 0.38 cm, similar size in upper arm    PULSE EXAM      Right      Left   Brachial 2 2   Radial 2 2   Femoral     Popliteal     Dorsalis Pedis     Posterior Tibial     (3=normal, 2=diminished, 1=barely palpable, 4=widened)    Problem List Items Addressed This Visit    None  Visit Diagnoses       Encounter regarding vascular access for dialysis for end-stage renal disease (HCC)    -  Primary          I reviewed with the patient that based on vein mapping as well as bedside exam, she appears to have adequate cephalic vein for creation of right arm AVF, radiocephalic vs brachiocephalic. The final determination

## 2024-03-02 ENCOUNTER — ANESTHESIA EVENT (OUTPATIENT)
Dept: OPERATING ROOM | Age: 79
End: 2024-03-02
Payer: MEDICARE

## 2024-03-04 ENCOUNTER — ANESTHESIA (OUTPATIENT)
Dept: OPERATING ROOM | Age: 79
End: 2024-03-04
Payer: MEDICARE

## 2024-03-04 ENCOUNTER — HOSPITAL ENCOUNTER (OUTPATIENT)
Age: 79
Setting detail: OUTPATIENT SURGERY
Discharge: HOME | End: 2024-03-04
Attending: SURGERY | Admitting: SURGERY
Payer: MEDICARE

## 2024-03-04 VITALS
WEIGHT: 150 LBS | TEMPERATURE: 97.5 F | HEIGHT: 63 IN | HEART RATE: 64 BPM | BODY MASS INDEX: 26.58 KG/M2 | OXYGEN SATURATION: 93 % | DIASTOLIC BLOOD PRESSURE: 66 MMHG | RESPIRATION RATE: 20 BRPM | SYSTOLIC BLOOD PRESSURE: 143 MMHG

## 2024-03-04 DIAGNOSIS — N18.6 ESRD (END STAGE RENAL DISEASE) ON DIALYSIS (HCC): ICD-10-CM

## 2024-03-04 DIAGNOSIS — Z99.2 ESRD (END STAGE RENAL DISEASE) ON DIALYSIS (HCC): ICD-10-CM

## 2024-03-04 DIAGNOSIS — Z01.818 PRE-OP TESTING: Primary | ICD-10-CM

## 2024-03-04 LAB
ANION GAP SERPL CALCULATED.3IONS-SCNC: 19 MMOL/L (ref 7–16)
BUN SERPL-MCNC: 60 MG/DL (ref 6–23)
CALCIUM SERPL-MCNC: 9.4 MG/DL (ref 8.6–10.2)
CHLORIDE SERPL-SCNC: 94 MMOL/L (ref 98–107)
CO2 SERPL-SCNC: 23 MMOL/L (ref 22–29)
CREAT SERPL-MCNC: 7.3 MG/DL (ref 0.5–1)
EKG ATRIAL RATE: 62 BPM
EKG P AXIS: 25 DEGREES
EKG P-R INTERVAL: 138 MS
EKG Q-T INTERVAL: 486 MS
EKG QRS DURATION: 106 MS
EKG QTC CALCULATION (BAZETT): 493 MS
EKG R AXIS: 10 DEGREES
EKG T AXIS: 86 DEGREES
EKG VENTRICULAR RATE: 62 BPM
ERYTHROCYTE [DISTWIDTH] IN BLOOD BY AUTOMATED COUNT: 13.2 % (ref 11.5–15)
GFR SERPL CREATININE-BSD FRML MDRD: 5 ML/MIN/1.73M2
GLUCOSE SERPL-MCNC: 81 MG/DL (ref 74–99)
HCT VFR BLD AUTO: 34.4 % (ref 34–48)
HGB BLD-MCNC: 11.1 G/DL (ref 11.5–15.5)
MCH RBC QN AUTO: 31.8 PG (ref 26–35)
MCHC RBC AUTO-ENTMCNC: 32.3 G/DL (ref 32–34.5)
MCV RBC AUTO: 98.6 FL (ref 80–99.9)
PLATELET # BLD AUTO: 119 K/UL (ref 130–450)
PMV BLD AUTO: 10.1 FL (ref 7–12)
POTASSIUM SERPL-SCNC: 4.7 MMOL/L (ref 3.5–5)
RBC # BLD AUTO: 3.49 M/UL (ref 3.5–5.5)
SODIUM SERPL-SCNC: 136 MMOL/L (ref 132–146)
WBC OTHER # BLD: 5.1 K/UL (ref 4.5–11.5)

## 2024-03-04 PROCEDURE — 2500000003 HC RX 250 WO HCPCS: Performed by: SURGERY

## 2024-03-04 PROCEDURE — 80048 BASIC METABOLIC PNL TOTAL CA: CPT

## 2024-03-04 PROCEDURE — 93005 ELECTROCARDIOGRAM TRACING: CPT

## 2024-03-04 PROCEDURE — 36821 AV FUSION DIRECT ANY SITE: CPT | Performed by: SURGERY

## 2024-03-04 PROCEDURE — 2580000003 HC RX 258: Performed by: NURSE ANESTHETIST, CERTIFIED REGISTERED

## 2024-03-04 PROCEDURE — 2580000003 HC RX 258: Performed by: NURSE PRACTITIONER

## 2024-03-04 PROCEDURE — 2500000003 HC RX 250 WO HCPCS: Performed by: ANESTHESIOLOGY

## 2024-03-04 PROCEDURE — 6360000002 HC RX W HCPCS: Performed by: NURSE PRACTITIONER

## 2024-03-04 PROCEDURE — A4217 STERILE WATER/SALINE, 500 ML: HCPCS | Performed by: SURGERY

## 2024-03-04 PROCEDURE — 3600000017 HC SURGERY HYBRID ADDL 15MIN: Performed by: SURGERY

## 2024-03-04 PROCEDURE — 3700000000 HC ANESTHESIA ATTENDED CARE: Performed by: SURGERY

## 2024-03-04 PROCEDURE — 85027 COMPLETE CBC AUTOMATED: CPT

## 2024-03-04 PROCEDURE — 6360000002 HC RX W HCPCS: Performed by: SURGERY

## 2024-03-04 PROCEDURE — 2709999900 HC NON-CHARGEABLE SUPPLY: Performed by: SURGERY

## 2024-03-04 PROCEDURE — 7100000010 HC PHASE II RECOVERY - FIRST 15 MIN: Performed by: SURGERY

## 2024-03-04 PROCEDURE — 2500000003 HC RX 250 WO HCPCS

## 2024-03-04 PROCEDURE — 7100000011 HC PHASE II RECOVERY - ADDTL 15 MIN: Performed by: SURGERY

## 2024-03-04 PROCEDURE — 3600000007 HC SURGERY HYBRID BASE: Performed by: SURGERY

## 2024-03-04 PROCEDURE — 6360000002 HC RX W HCPCS: Performed by: ANESTHESIOLOGY

## 2024-03-04 PROCEDURE — 6360000002 HC RX W HCPCS: Performed by: NURSE ANESTHETIST, CERTIFIED REGISTERED

## 2024-03-04 PROCEDURE — 3700000001 HC ADD 15 MINUTES (ANESTHESIA): Performed by: SURGERY

## 2024-03-04 PROCEDURE — 2580000003 HC RX 258: Performed by: SURGERY

## 2024-03-04 PROCEDURE — 64415 NJX AA&/STRD BRCH PLXS IMG: CPT | Performed by: ANESTHESIOLOGY

## 2024-03-04 RX ORDER — SODIUM CHLORIDE 0.9 % (FLUSH) 0.9 %
5-40 SYRINGE (ML) INJECTION EVERY 12 HOURS SCHEDULED
Status: DISCONTINUED | OUTPATIENT
Start: 2024-03-04 | End: 2024-03-04 | Stop reason: HOSPADM

## 2024-03-04 RX ORDER — SODIUM CHLORIDE 9 MG/ML
INJECTION, SOLUTION INTRAVENOUS PRN
Status: DISCONTINUED | OUTPATIENT
Start: 2024-03-04 | End: 2024-03-04 | Stop reason: HOSPADM

## 2024-03-04 RX ORDER — LIDOCAINE HYDROCHLORIDE 10 MG/ML
INJECTION, SOLUTION EPIDURAL; INFILTRATION; INTRACAUDAL; PERINEURAL
Status: COMPLETED
Start: 2024-03-04 | End: 2024-03-04

## 2024-03-04 RX ORDER — TRAMADOL HYDROCHLORIDE 50 MG/1
100 TABLET ORAL PRN
Status: DISCONTINUED | OUTPATIENT
Start: 2024-03-04 | End: 2024-03-04 | Stop reason: HOSPADM

## 2024-03-04 RX ORDER — OXYCODONE HYDROCHLORIDE 5 MG/1
5 TABLET ORAL EVERY 6 HOURS PRN
Qty: 12 TABLET | Refills: 0 | Status: SHIPPED | OUTPATIENT
Start: 2024-03-04 | End: 2024-03-07

## 2024-03-04 RX ORDER — SODIUM CHLORIDE 0.9 % (FLUSH) 0.9 %
5-40 SYRINGE (ML) INJECTION PRN
Status: DISCONTINUED | OUTPATIENT
Start: 2024-03-04 | End: 2024-03-04 | Stop reason: HOSPADM

## 2024-03-04 RX ORDER — MEPERIDINE HYDROCHLORIDE 25 MG/ML
12.5 INJECTION INTRAMUSCULAR; INTRAVENOUS; SUBCUTANEOUS EVERY 5 MIN PRN
Status: DISCONTINUED | OUTPATIENT
Start: 2024-03-04 | End: 2024-03-04 | Stop reason: HOSPADM

## 2024-03-04 RX ORDER — HYDROMORPHONE HYDROCHLORIDE 1 MG/ML
0.5 INJECTION, SOLUTION INTRAMUSCULAR; INTRAVENOUS; SUBCUTANEOUS EVERY 5 MIN PRN
Status: DISCONTINUED | OUTPATIENT
Start: 2024-03-04 | End: 2024-03-04 | Stop reason: HOSPADM

## 2024-03-04 RX ORDER — FENTANYL CITRATE 50 UG/ML
100 INJECTION, SOLUTION INTRAMUSCULAR; INTRAVENOUS ONCE
Status: DISCONTINUED | OUTPATIENT
Start: 2024-03-04 | End: 2024-03-04 | Stop reason: HOSPADM

## 2024-03-04 RX ORDER — ROPIVACAINE HYDROCHLORIDE 5 MG/ML
INJECTION, SOLUTION EPIDURAL; INFILTRATION; PERINEURAL PRN
Status: DISCONTINUED | OUTPATIENT
Start: 2024-03-04 | End: 2024-03-04 | Stop reason: SDUPTHER

## 2024-03-04 RX ORDER — HEPARIN SODIUM 1000 [USP'U]/ML
INJECTION, SOLUTION INTRAVENOUS; SUBCUTANEOUS PRN
Status: DISCONTINUED | OUTPATIENT
Start: 2024-03-04 | End: 2024-03-04 | Stop reason: SDUPTHER

## 2024-03-04 RX ORDER — HYDROMORPHONE HYDROCHLORIDE 1 MG/ML
0.25 INJECTION, SOLUTION INTRAMUSCULAR; INTRAVENOUS; SUBCUTANEOUS EVERY 5 MIN PRN
Status: DISCONTINUED | OUTPATIENT
Start: 2024-03-04 | End: 2024-03-04 | Stop reason: HOSPADM

## 2024-03-04 RX ORDER — FENTANYL CITRATE 50 UG/ML
INJECTION, SOLUTION INTRAMUSCULAR; INTRAVENOUS PRN
Status: DISCONTINUED | OUTPATIENT
Start: 2024-03-04 | End: 2024-03-04 | Stop reason: SDUPTHER

## 2024-03-04 RX ORDER — PROPOFOL 10 MG/ML
INJECTION, EMULSION INTRAVENOUS CONTINUOUS PRN
Status: DISCONTINUED | OUTPATIENT
Start: 2024-03-04 | End: 2024-03-04 | Stop reason: SDUPTHER

## 2024-03-04 RX ORDER — SODIUM CHLORIDE 9 MG/ML
INJECTION, SOLUTION INTRAVENOUS CONTINUOUS
Status: DISCONTINUED | OUTPATIENT
Start: 2024-03-04 | End: 2024-03-04 | Stop reason: HOSPADM

## 2024-03-04 RX ORDER — MIDAZOLAM HYDROCHLORIDE 2 MG/2ML
1 INJECTION, SOLUTION INTRAMUSCULAR; INTRAVENOUS EVERY 5 MIN PRN
Status: DISCONTINUED | OUTPATIENT
Start: 2024-03-04 | End: 2024-03-04 | Stop reason: HOSPADM

## 2024-03-04 RX ORDER — SODIUM CHLORIDE 9 MG/ML
INJECTION, SOLUTION INTRAVENOUS CONTINUOUS PRN
Status: DISCONTINUED | OUTPATIENT
Start: 2024-03-04 | End: 2024-03-04 | Stop reason: SDUPTHER

## 2024-03-04 RX ORDER — FENTANYL CITRATE 50 UG/ML
25 INJECTION, SOLUTION INTRAMUSCULAR; INTRAVENOUS ONCE
Status: DISCONTINUED | OUTPATIENT
Start: 2024-03-04 | End: 2024-03-04 | Stop reason: HOSPADM

## 2024-03-04 RX ORDER — LIDOCAINE HYDROCHLORIDE 10 MG/ML
10 INJECTION, SOLUTION INFILTRATION; PERINEURAL
Status: COMPLETED | OUTPATIENT
Start: 2024-03-04 | End: 2024-03-04

## 2024-03-04 RX ORDER — ALBUTEROL SULFATE 90 UG/1
2 AEROSOL, METERED RESPIRATORY (INHALATION) EVERY 6 HOURS PRN
Status: DISCONTINUED | OUTPATIENT
Start: 2024-03-04 | End: 2024-03-04 | Stop reason: HOSPADM

## 2024-03-04 RX ORDER — ROPIVACAINE HYDROCHLORIDE 5 MG/ML
20 INJECTION, SOLUTION EPIDURAL; INFILTRATION; PERINEURAL
Status: DISCONTINUED | OUTPATIENT
Start: 2024-03-04 | End: 2024-03-04 | Stop reason: HOSPADM

## 2024-03-04 RX ORDER — MIDAZOLAM HYDROCHLORIDE 2 MG/2ML
2 INJECTION, SOLUTION INTRAMUSCULAR; INTRAVENOUS EVERY 10 MIN PRN
Status: DISCONTINUED | OUTPATIENT
Start: 2024-03-04 | End: 2024-03-04 | Stop reason: HOSPADM

## 2024-03-04 RX ORDER — ROPIVACAINE HYDROCHLORIDE 5 MG/ML
30 INJECTION, SOLUTION EPIDURAL; INFILTRATION; PERINEURAL ONCE
Status: COMPLETED | OUTPATIENT
Start: 2024-03-04 | End: 2024-03-04

## 2024-03-04 RX ORDER — DEXAMETHASONE SODIUM PHOSPHATE 10 MG/ML
INJECTION, SOLUTION INTRAMUSCULAR; INTRAVENOUS
Status: DISCONTINUED
Start: 2024-03-04 | End: 2024-03-04 | Stop reason: WASHOUT

## 2024-03-04 RX ORDER — DIPHENHYDRAMINE HYDROCHLORIDE 50 MG/ML
12.5 INJECTION INTRAMUSCULAR; INTRAVENOUS
Status: DISCONTINUED | OUTPATIENT
Start: 2024-03-04 | End: 2024-03-04 | Stop reason: HOSPADM

## 2024-03-04 RX ORDER — TRAMADOL HYDROCHLORIDE 50 MG/1
50 TABLET ORAL PRN
Status: DISCONTINUED | OUTPATIENT
Start: 2024-03-04 | End: 2024-03-04 | Stop reason: HOSPADM

## 2024-03-04 RX ADMIN — LIDOCAINE HYDROCHLORIDE 10 ML: 10 INJECTION, SOLUTION EPIDURAL; INFILTRATION; INTRACAUDAL; PERINEURAL at 08:37

## 2024-03-04 RX ADMIN — PROPOFOL 50 MCG/KG/MIN: 10 INJECTION, EMULSION INTRAVENOUS at 09:42

## 2024-03-04 RX ADMIN — LIDOCAINE HYDROCHLORIDE 10 ML: 10 INJECTION, SOLUTION INFILTRATION; PERINEURAL at 08:37

## 2024-03-04 RX ADMIN — SODIUM CHLORIDE: 9 INJECTION, SOLUTION INTRAVENOUS at 07:53

## 2024-03-04 RX ADMIN — FENTANYL CITRATE 50 MCG: 50 INJECTION, SOLUTION INTRAMUSCULAR; INTRAVENOUS at 09:47

## 2024-03-04 RX ADMIN — LIDOCAINE HYDROCHLORIDE 1 ML: 10 INJECTION, SOLUTION INFILTRATION; PERINEURAL at 08:41

## 2024-03-04 RX ADMIN — ROPIVACAINE HYDROCHLORIDE 15 ML: 5 INJECTION EPIDURAL; INFILTRATION; PERINEURAL at 08:43

## 2024-03-04 RX ADMIN — SODIUM CHLORIDE: 9 INJECTION, SOLUTION INTRAVENOUS at 08:48

## 2024-03-04 RX ADMIN — CEFAZOLIN 2000 MG: 2 INJECTION, POWDER, FOR SOLUTION INTRAMUSCULAR; INTRAVENOUS at 09:46

## 2024-03-04 RX ADMIN — ROPIVACAINE HYDROCHLORIDE 15 ML: 5 INJECTION EPIDURAL; INFILTRATION; PERINEURAL at 08:38

## 2024-03-04 RX ADMIN — HEPARIN SODIUM 5000 UNITS: 1000 INJECTION INTRAVENOUS; SUBCUTANEOUS at 10:14

## 2024-03-04 RX ADMIN — MIDAZOLAM 2 MG: 1 INJECTION INTRAMUSCULAR; INTRAVENOUS at 08:35

## 2024-03-04 ASSESSMENT — LIFESTYLE VARIABLES: SMOKING_STATUS: 1

## 2024-03-04 ASSESSMENT — PAIN - FUNCTIONAL ASSESSMENT
PAIN_FUNCTIONAL_ASSESSMENT: 0-10
PAIN_FUNCTIONAL_ASSESSMENT: NONE - DENIES PAIN

## 2024-03-04 NOTE — DISCHARGE INSTRUCTIONS
[facility] AMBULATORY PROCEDURE DISCHARGE INSTRUCTIONS  You may be drowsy or lightheaded after receiving sedation or anesthesia.    A responsible person should be with you for the next 24 hours.    Please follow the instructions checked below:    DIET INSTRUCTIONS:  [x]Start with light diet and progress to your normal diet as you feel like eating. If you experience nausea or repeated episodes of vomiting which persist beyond 12-24 hours, notify your doctor.  []Other     ACTIVITY INSTRUCTIONS:  [x]Rest today. Increase activity as tolerated    [x]Elevate operative limb   [x]No heavy lifting or strenuous activity     [x]No driving for 2 days  []Other     WOUND/DRESSING INSTRUCTIONS:  Always ensure you and your care giver clean hands before and after caring for the wound.  [x]May shower         [x]Derma bond dressing-Do not apply lotion, gel, or liquid to wound while the derma bond is in place.   []Other         MEDICATION INSTRUCTIONS:    [x]Prescriptions sent with you.  Use as directed.  When taking pain medications, you may experience dizziness or drowsiness.  Do not drink alcohol or drive when taking these medications.  [x]You may take a non-prescription “headache remedy”, preferably one that does not contain aspirin.    Other Instructions:      FOLLOW-UP CARE:  [x]Call the office at [phone] for follow-up appointment in 2 weeks    Call physician if they or any other problems occur:  Fever over 101°    Redness, swelling, hardness or warmth at the operative site  Unrelieved nausea    Foul smelling or cloudy drainage at the operative site   Unrelieved pain    Blood soaked dressing. (Some oozing may be normal)

## 2024-03-04 NOTE — ANESTHESIA POSTPROCEDURE EVALUATION
Department of Anesthesiology  Postprocedure Note    Patient: Trina Wang  MRN: [id number]  YOB: 1945  Date of evaluation: 3/4/2024    Procedure Summary       Date: 03/04/24 Room / Location: [address]    Anesthesia Start: 0938 Anesthesia Stop: 1058    Procedure: creation AVF right arm (Right: Arm Lower) Diagnosis:       End stage renal disease (HCC)      (End stage renal disease (HCC) [N18.6])    Surgeons: Haley Hubbard MD Responsible Provider: Theo King MD    Anesthesia Type: regional ASA Status: 3            Anesthesia Type: No value filed.    Darin Phase I: Darin Score: 10    Darin Phase II: Darin Score: 10    Anesthesia Post Evaluation    Patient location during evaluation: PACU  Patient participation: complete - patient participated  Level of consciousness: awake and alert  Airway patency: patent  Nausea & Vomiting: no nausea and no vomiting  Cardiovascular status: hemodynamically stable  Respiratory status: acceptable  Hydration status: euvolemic  Multimodal analgesia pain management approach  Pain management: adequate    No notable events documented.

## 2024-03-04 NOTE — ANESTHESIA PRE PROCEDURE
Date of last solid food consumption: 03/03/24    BMI:   Wt Readings from Last 3 Encounters:   03/04/24 68 kg (150 lb)   01/22/24 71.2 kg (157 lb)   12/11/23 73.7 kg (162 lb 8 oz)     Body mass index is 26.58 kg/m².    CBC:   Lab Results   Component Value Date/Time    WBC 5.1 03/04/2024 07:51 AM    RBC 3.49 03/04/2024 07:51 AM    HGB 11.1 03/04/2024 07:51 AM    HCT 34.4 03/04/2024 07:51 AM    MCV 98.6 03/04/2024 07:51 AM    RDW 13.2 03/04/2024 07:51 AM     03/04/2024 07:51 AM       CMP:   Lab Results   Component Value Date/Time     12/08/2023 10:34 AM    K 4.5 12/08/2023 10:34 AM    CL 97 12/08/2023 10:34 AM    CO2 23 12/08/2023 10:34 AM    BUN 35 12/08/2023 10:34 AM    CREATININE 5.7 12/08/2023 10:34 AM    GFRAA 31 08/09/2022 05:17 PM    LABGLOM 7 12/08/2023 10:34 AM    GLUCOSE 94 12/08/2023 10:34 AM    PROT 7.3 12/08/2023 10:34 AM    CALCIUM 9.6 12/08/2023 10:34 AM    BILITOT 0.3 12/08/2023 10:34 AM    ALKPHOS 58 12/08/2023 10:34 AM    AST 14 12/08/2023 10:34 AM    ALT 5 12/08/2023 10:34 AM       POC Tests: No results for input(s): \"POCGLU\", \"POCNA\", \"POCK\", \"POCCL\", \"POCBUN\", \"POCHEMO\", \"POCHCT\" in the last 72 hours.    Coags:   Lab Results   Component Value Date/Time    PROTIME 12.8 09/06/2023 05:17 AM    INR 1.2 09/06/2023 05:17 AM    APTT 70.2 11/06/2014 09:17 AM       HCG (If Applicable): No results found for: \"PREGTESTUR\", \"PREGSERUM\", \"HCG\", \"HCGQUANT\"     ABGs: No results found for: \"PHART\", \"PO2ART\", \"DWD4OPR\", \"LLL0GDF\", \"BEART\", \"J2YEKXWD\"     Type & Screen (If Applicable):  No results found for: \"LABABO\", \"LABRH\"    Drug/Infectious Status (If Applicable):  Lab Results   Component Value Date/Time    HEPCAB NONREACTIVE 09/12/2023 05:37 AM       COVID-19 Screening (If Applicable):   Lab Results   Component Value Date/Time    COVID19 Not Detected 08/24/2020 12:00 PM         Anesthesia Evaluation  Patient summary reviewed and Nursing notes reviewed   no history of anesthetic complications:

## 2024-03-04 NOTE — ANESTHESIA PROCEDURE NOTES
Peripheral Block    Patient location during procedure: pre-op  Reason for block: post-op pain management and primary anesthetic  Start time: 3/4/2024 8:46 AM  Staffing  Performed: anesthesiologist   Anesthesiologist: Theo King MD  Performed by: Theo King MD  Authorized by: Theo King MD    Preanesthetic Checklist  Completed: patient identified, IV checked, site marked, risks and benefits discussed, surgical/procedural consents, equipment checked, pre-op evaluation, timeout performed, anesthesia consent given, oxygen available and monitors applied/VS acknowledged  Peripheral Block   Patient position: sitting  Prep: ChloraPrep  Provider prep: mask and sterile gloves  Patient monitoring: cardiac monitor, continuous pulse ox, frequent blood pressure checks and IV access  Block type: Brachial plexus  Supraclavicular  Laterality: right  Injection technique: single-shot  Guidance: ultrasound guided    Needle   Needle type: insulated echogenic nerve stimulator needle   Needle gauge: 22 G  Needle localization: ultrasound guidance  Needle length: 5 cm  Assessment   Injection assessment: negative aspiration for heme, no paresthesia on injection, local visualized surrounding nerve on ultrasound and no intravascular symptoms  Paresthesia pain: none  Slow fractionated injection: yes  Hemodynamics: stable  Outcomes: patient tolerated procedure well and uncomplicated

## 2024-03-04 NOTE — OP NOTE
Operative Note      Patient: Trina Wang  YOB: 1945  MRN: [id number]    Date of Procedure: 3/4/2024    Pre-Op Diagnosis Codes:     * End stage renal disease (HCC) [N18.6]    Post-Op Diagnosis: Same       Procedure(s):  creation AVF right arm    Surgeon(s):  Haley Hubbard MD    Assistant:   Resident: Blair Calixto DO    Anesthesia: Regional    Estimated Blood Loss (mL): Minimal    Complications: None    Specimens:   * No specimens in log *    Implants:  * No implants in log *      Drains: * No LDAs found *    Findings: RUE radiocephalic AVF        Detailed Description of Procedure:   The patient was identified and the procedure was confirmed. The right arm was prepped and draped in the usual sterile fashion. Lidocaine 1% mixed with 0.25% Marcaine was used for local anesthesia. A longitudinal skin incision was made over the lateral forearm and carried down through the subcutaneous tissue. The cephalic vein identified and dissected free from the surrounding tissues. The vein appeared to be good quality for the fistula. It was marked for orientation and branches were divided between silk ties. It was ligated distally and divided.  Medially, the radial artery was identified and freed from the surrounding tissues. It was surrounded proximally and distally with vessel loops for control. The patient was heparinized and the artery was clamped proximally and distally. A longitudinal arteriotomy measuring 5 mm was made and the vein was cut to the appropriate length and spatulated and anastomosed to the side of the artery using a running 6-0 Prolene suture. After completing the anastomosis, the clamps were released and a thrill was appreciated through the fistula. A radial pulse was appreciated distal in the wrist. Hemostasis was obtained and the incision was irrigated with saline solution. The incision was approximated with Vicryl suture and skin adhesive was applied to the incision in the

## 2024-03-04 NOTE — PROGRESS NOTES
Patient sitting up in bed tolerating PO.  Daughter bedside.   
Lobby by a Patient Access Representative. Please bring your photo ID and insurance card. A nurse will greet you in accordance to the time you are needed in the pre-op area to prepare you for surgery. Please do not be discouraged if you are not greeted in the order you arrive as there are many variables that are involved in patient preparation. Your patience is greatly appreciated as you wait for your nurse.   [x] Delays may occur with surgery and staff will make a sincere effort to keep you informed of delays. If any delays occur with your procedure, we apologize ahead of time for your inconvenience as we recognize the value of your time.

## 2024-03-04 NOTE — INTERVAL H&P NOTE
Update History & Physical    The patient's History and Physical of February 28, 24 was reviewed with the patient and I examined the patient. There was no change. The surgical site was confirmed by the patient and me.     Plan: The risks, benefits, expected outcome, and alternative to the recommended procedure have been discussed with the patient. Patient understands and wants to proceed with the procedure.     Electronically signed by Haley Hubbard MD on 3/4/2024 at 7:21 AM

## 2024-03-18 ENCOUNTER — OFFICE VISIT (OUTPATIENT)
Dept: PRIMARY CARE CLINIC | Age: 79
End: 2024-03-18
Payer: MEDICARE

## 2024-03-18 VITALS
TEMPERATURE: 97.7 F | BODY MASS INDEX: 26.75 KG/M2 | DIASTOLIC BLOOD PRESSURE: 60 MMHG | HEART RATE: 71 BPM | HEIGHT: 63 IN | OXYGEN SATURATION: 96 % | WEIGHT: 151 LBS | SYSTOLIC BLOOD PRESSURE: 128 MMHG

## 2024-03-18 DIAGNOSIS — R73.01 IMPAIRED FASTING GLUCOSE: ICD-10-CM

## 2024-03-18 DIAGNOSIS — E78.2 MIXED HYPERLIPIDEMIA: ICD-10-CM

## 2024-03-18 DIAGNOSIS — I10 ESSENTIAL HYPERTENSION: Primary | ICD-10-CM

## 2024-03-18 DIAGNOSIS — I73.9 PVD (PERIPHERAL VASCULAR DISEASE) (HCC): ICD-10-CM

## 2024-03-18 DIAGNOSIS — Z99.2 ESRD (END STAGE RENAL DISEASE) ON DIALYSIS (HCC): ICD-10-CM

## 2024-03-18 DIAGNOSIS — I25.10 CORONARY ARTERY DISEASE INVOLVING NATIVE CORONARY ARTERY OF NATIVE HEART WITHOUT ANGINA PECTORIS: ICD-10-CM

## 2024-03-18 DIAGNOSIS — N18.6 ESRD (END STAGE RENAL DISEASE) ON DIALYSIS (HCC): ICD-10-CM

## 2024-03-18 PROBLEM — E11.9 TYPE 2 DIABETES MELLITUS (HCC): Status: ACTIVE | Noted: 2024-03-18

## 2024-03-18 PROCEDURE — G8417 CALC BMI ABV UP PARAM F/U: HCPCS | Performed by: FAMILY MEDICINE

## 2024-03-18 PROCEDURE — 99214 OFFICE O/P EST MOD 30 MIN: CPT | Performed by: FAMILY MEDICINE

## 2024-03-18 PROCEDURE — 1123F ACP DISCUSS/DSCN MKR DOCD: CPT | Performed by: FAMILY MEDICINE

## 2024-03-18 PROCEDURE — 1090F PRES/ABSN URINE INCON ASSESS: CPT | Performed by: FAMILY MEDICINE

## 2024-03-18 PROCEDURE — G8400 PT W/DXA NO RESULTS DOC: HCPCS | Performed by: FAMILY MEDICINE

## 2024-03-18 PROCEDURE — 1036F TOBACCO NON-USER: CPT | Performed by: FAMILY MEDICINE

## 2024-03-18 PROCEDURE — G8427 DOCREV CUR MEDS BY ELIG CLIN: HCPCS | Performed by: FAMILY MEDICINE

## 2024-03-18 PROCEDURE — G8484 FLU IMMUNIZE NO ADMIN: HCPCS | Performed by: FAMILY MEDICINE

## 2024-03-18 PROCEDURE — 3074F SYST BP LT 130 MM HG: CPT | Performed by: FAMILY MEDICINE

## 2024-03-18 PROCEDURE — 3078F DIAST BP <80 MM HG: CPT | Performed by: FAMILY MEDICINE

## 2024-03-18 SDOH — ECONOMIC STABILITY: INCOME INSECURITY: HOW HARD IS IT FOR YOU TO PAY FOR THE VERY BASICS LIKE FOOD, HOUSING, MEDICAL CARE, AND HEATING?: NOT HARD AT ALL

## 2024-03-18 SDOH — ECONOMIC STABILITY: FOOD INSECURITY: WITHIN THE PAST 12 MONTHS, THE FOOD YOU BOUGHT JUST DIDN'T LAST AND YOU DIDN'T HAVE MONEY TO GET MORE.: NEVER TRUE

## 2024-03-18 SDOH — ECONOMIC STABILITY: FOOD INSECURITY: WITHIN THE PAST 12 MONTHS, YOU WORRIED THAT YOUR FOOD WOULD RUN OUT BEFORE YOU GOT MONEY TO BUY MORE.: NEVER TRUE

## 2024-03-18 ASSESSMENT — ENCOUNTER SYMPTOMS
GASTROINTESTINAL NEGATIVE: 1
RESPIRATORY NEGATIVE: 1
EYES NEGATIVE: 1
ALLERGIC/IMMUNOLOGIC NEGATIVE: 1

## 2024-03-18 ASSESSMENT — PATIENT HEALTH QUESTIONNAIRE - PHQ9
SUM OF ALL RESPONSES TO PHQ QUESTIONS 1-9: 0
SUM OF ALL RESPONSES TO PHQ9 QUESTIONS 1 & 2: 0
2. FEELING DOWN, DEPRESSED OR HOPELESS: NOT AT ALL
SUM OF ALL RESPONSES TO PHQ QUESTIONS 1-9: 0
1. LITTLE INTEREST OR PLEASURE IN DOING THINGS: NOT AT ALL
SUM OF ALL RESPONSES TO PHQ QUESTIONS 1-9: 0
SUM OF ALL RESPONSES TO PHQ QUESTIONS 1-9: 0

## 2024-03-18 NOTE — PROGRESS NOTES
3/18/24     Trina Wang    : 1945 Sex: female   Age: 78 y.o.      Chief Complaint   Patient presents with    Hypertension    Discuss Medications       Prior to Admission medications    Medication Sig Start Date End Date Taking? Authorizing Provider   nitroGLYCERIN (NITRODUR) 0.2 MG/HR Place 1 patch onto the skin daily 24  Yes Angel Nicholson DO   hydrALAZINE (APRESOLINE) 25 MG tablet Take 1 tablet three times daily with the 50mg. Patient should be taking a total of 75mg three times daily.  Patient taking differently: Take 1 tablet by mouth 3 times daily HOLD IF BP < 140 24  Yes Angel Nicholson DO   calcium acetate (PHOSLO) 667 MG CAPS capsule Take 1 capsule by mouth 3 times daily (with meals)  Patient taking differently: Take 2 capsules by mouth 3 times daily (with meals) 24  Yes Angel Nicholson DO   mirtazapine (REMERON) 15 MG tablet Take 1 tablet by mouth nightly 23  Yes Angel Nicholson DO   levothyroxine (SYNTHROID) 88 MCG tablet Take 1 tablet by mouth Daily 23  Yes Angel Nicholson DO   citalopram (CELEXA) 20 MG tablet Take 1 tablet by mouth daily 23  Yes Angel Nicholson DO   labetalol (NORMODYNE) 200 MG tablet Take 1 tablet by mouth 2 times daily  Patient taking differently: Take 0.5 tablets by mouth 2 times daily 23  Yes Angel Nicholson DO   amoxicillin (AMOXIL) 500 MG capsule Take 1 capsule by mouth in the morning, at noon, in the evening, and at bedtime For dental appt.   Yes Provider, MD Abel   mupirocin (BACTROBAN) 2 % ointment Apply topically 3 times daily.  Patient taking differently: Apply topically Apply topically 3 times daily. 23  Yes Edgar Reich III, PA   aspirin (ASPIRIN LOW DOSE) 81 MG EC tablet TAKE 1 TABLET BY MOUTH EVERY DAY 4/10/23  Yes Angel Nicholson DO          HPI: Patient seen today hypertension coronary artery disease peripheral vascular disease end-stage renal disease and

## 2024-03-20 ENCOUNTER — OFFICE VISIT (OUTPATIENT)
Dept: VASCULAR SURGERY | Age: 79
End: 2024-03-20

## 2024-03-20 DIAGNOSIS — N18.6 ENCOUNTER REGARDING VASCULAR ACCESS FOR DIALYSIS FOR END-STAGE RENAL DISEASE (HCC): Primary | ICD-10-CM

## 2024-03-20 DIAGNOSIS — Z99.2 ENCOUNTER REGARDING VASCULAR ACCESS FOR DIALYSIS FOR END-STAGE RENAL DISEASE (HCC): Primary | ICD-10-CM

## 2024-03-20 PROCEDURE — 99024 POSTOP FOLLOW-UP VISIT: CPT | Performed by: NURSE PRACTITIONER

## 2024-03-20 NOTE — PROGRESS NOTES
3/20/2024    Trina MONTEJO Kathleen  1945    Chief Complaint   Patient presents with    Post-Op Check     S/P right AVF       Patient returns for post operative evaluation status post creation of a right radiocephalic AVF.  The patient denies any unexpected problems since the procedure. She denies any right hand pain or weakness.         Procedure Laterality Date     SECTION      1    COLONOSCOPY      CORONARY ANGIOPLASTY WITH STENT PLACEMENT      CT BIOPSY RENAL  2023    CT BIOPSY RENAL 2023 OSIEL Mason MD [facility] CT    CYST REMOVAL      DIALYSIS FISTULA CREATION Right 3/4/2024    creation AVF right arm performed by Haley Hubbard MD at [facility] OR    ENDOSCOPY, COLON, DIAGNOSTIC      GASTROSTOMY TUBE PLACEMENT  2014    later removed.    HEEL SPUR SURGERY Left years ago    IR TUNNELED CATHETER PLACEMENT GREATER THAN 5 YEARS  2023    IR TUNNELED CATHETER PLACEMENT GREATER THAN 5 YEARS 2023 Carlin Panchal MD [facility] SPECIAL PROCEDURES    JOINT REPLACEMENT Left late     knee    OTHER SURGICAL HISTORY      scraping of trach x 2 to treat stenosis / last one 2016    POLYPECTOMY  2013    internal hemorrhoids - michelle    TONSILLECTOMY      TOTAL KNEE ARTHROPLASTY Right 2020    RIGHT KNEE TOTAL ARTHROPLASTY    ++RAYMOND++   ++PNB++     ++LATEX ALLERGY++   ++IODINE ALLERGY++ performed by Jostin Cavanaugh MD at [facility] OR    TRACHEOSTOMY  2014    Dorion    UPPER GASTROINTESTINAL ENDOSCOPY      gastritis w. superficial antral ulerations - [place]       Physical Exam:  The incision(s) are healing without evidence of infection.  Heart rhythm is regular. AVF + thrill. Small hematoma underlying incision. + ecchymosis of the forearm. Fistula measures approx 0.57 cm          Right      Left   Brachial     Radial 1    Femoral     Popliteal     Dorsalis Pedis     Posterior Tibial     (3=normal, 2=diminished, 1=barely palpable, 4=widened)    Assessment:

## 2024-04-08 RX ORDER — MIRTAZAPINE 15 MG/1
15 TABLET, FILM COATED ORAL NIGHTLY
Qty: 30 TABLET | Refills: 3 | Status: SHIPPED | OUTPATIENT
Start: 2024-04-08

## 2024-04-16 ENCOUNTER — TELEPHONE (OUTPATIENT)
Dept: PRIMARY CARE CLINIC | Age: 79
End: 2024-04-16

## 2024-04-16 NOTE — TELEPHONE ENCOUNTER
Meds as currently prescribed.  If continued problems with low pressure and then should be seen and we will adjust.  Thank you

## 2024-04-16 NOTE — TELEPHONE ENCOUNTER
Still struggling with Hypotension, would you want to change the labetalol? Or changing medications?   If you make changes please call Yolanda (daughter) to notifiy of changes also.

## 2024-04-17 ENCOUNTER — OFFICE VISIT (OUTPATIENT)
Dept: VASCULAR SURGERY | Age: 79
End: 2024-04-17

## 2024-04-17 VITALS — BODY MASS INDEX: 26.58 KG/M2 | WEIGHT: 150 LBS

## 2024-04-17 DIAGNOSIS — N18.6 ENCOUNTER REGARDING VASCULAR ACCESS FOR DIALYSIS FOR END-STAGE RENAL DISEASE (HCC): Primary | ICD-10-CM

## 2024-04-17 DIAGNOSIS — Z99.2 ENCOUNTER REGARDING VASCULAR ACCESS FOR DIALYSIS FOR END-STAGE RENAL DISEASE (HCC): Primary | ICD-10-CM

## 2024-04-17 NOTE — PROGRESS NOTES
2024    Trina MONTEJO Kathleen  1945    Chief Complaint   Patient presents with    Check-Up     Rt. Arm fistula       Patient returns for post operative evaluation status post creation of a right radiocephalic AVF. The patient is accompanied by her daughter. The patient denies any unexpected problems since she was last seen. She denies any right hand pain or weakness.         Procedure Laterality Date     SECTION      1    COLONOSCOPY      CORONARY ANGIOPLASTY WITH STENT PLACEMENT      CT BIOPSY RENAL  2023    CT BIOPSY RENAL 2023 OSIEL Mason MD [facility]    CYST REMOVAL      DIALYSIS FISTULA CREATION Right 3/4/2024    creation AVF right arm performed by Haley Hubbard MD at [facility] [place]    ENDOSCOPY, COLON, DIAGNOSTIC      GASTROSTOMY TUBE PLACEMENT  2014    later removed.    HEEL SPUR SURGERY Left years ago    IR TUNNELED CATHETER PLACEMENT GREATER THAN 5 YEARS  2023    IR TUNNELED CATHETER PLACEMENT GREATER THAN 5 YEARS 2023 Cariln Panchal MD [facility] PROCEDURES    JOINT REPLACEMENT Left late     knee    OTHER SURGICAL HISTORY      scraping of trach x 2 to treat stenosis / last one 2016    POLYPECTOMY  2013    internal hemorrhoids - michelle    TONSILLECTOMY      TOTAL KNEE ARTHROPLASTY Right 2020    RIGHT KNEE TOTAL ARTHROPLASTY    ++RAYMOND++   ++PNB++     ++LATEX ALLERGY++   ++IODINE ALLERGY++ performed by Jostin Cavanaugh MD at [facility] OR    TRACHEOSTOMY  2014    Dorion    UPPER GASTROINTESTINAL ENDOSCOPY      gastritis w. superficial antral ulerations - Michelle       Physical Exam:  The incision(s) are healing without evidence of infection.  Heart rhythm is regular. AVF + thrill. Fistula measures approx 0.6-0.65 cm and is less than 0.5 cm in depth          Right      Left   Brachial     Radial 1    Femoral     Popliteal     Dorsalis Pedis     Posterior Tibial     (3=normal, 2=diminished, 1=barely palpable,

## 2024-04-18 LAB
ALBUMIN SERPL-MCNC: 4.4 G/DL
ALP BLD-CCNC: ABNORMAL U/L
ALT SERPL-CCNC: ABNORMAL U/L
ANION GAP SERPL CALCULATED.3IONS-SCNC: ABNORMAL MMOL/L
AST SERPL-CCNC: ABNORMAL U/L
BASOPHILS ABSOLUTE: ABNORMAL
BASOPHILS RELATIVE PERCENT: 0.7 %
BILIRUB SERPL-MCNC: ABNORMAL MG/DL
BUN BLDV-MCNC: ABNORMAL MG/DL
CALCIUM SERPL-MCNC: 9.3 MG/DL
CHLORIDE BLD-SCNC: 95 MMOL/L
CO2: ABNORMAL
CREAT SERPL-MCNC: 10.46 MG/DL
EGFR: ABNORMAL
EOSINOPHILS ABSOLUTE: ABNORMAL
EOSINOPHILS RELATIVE PERCENT: 5.7 %
GLUCOSE BLD-MCNC: 92 MG/DL
HCT VFR BLD CALC: 32.7 % (ref 36–46)
HEMOGLOBIN: 11 G/DL (ref 12–16)
LYMPHOCYTES ABSOLUTE: ABNORMAL
LYMPHOCYTES RELATIVE PERCENT: 6.9 %
MAGNESIUM: 2.2 MG/DL
MCH RBC QN AUTO: 33.2 PG
MCHC RBC AUTO-ENTMCNC: 33.5 G/DL
MCV RBC AUTO: 99 FL
MONOCYTES ABSOLUTE: ABNORMAL
MONOCYTES RELATIVE PERCENT: 18.1 %
NEUTROPHILS ABSOLUTE: ABNORMAL
NEUTROPHILS RELATIVE PERCENT: 64.8 %
PDW BLD-RTO: 14.7 %
PLATELET # BLD: ABNORMAL 10*3/UL
PMV BLD AUTO: ABNORMAL FL
POTASSIUM SERPL-SCNC: 4.9 MMOL/L
RBC # BLD: 3.3 10^6/ΜL
SODIUM BLD-SCNC: 138 MMOL/L
TOTAL PROTEIN: 4.4
WBC # BLD: 5.44 10^3/ML

## 2024-05-06 ENCOUNTER — OFFICE VISIT (OUTPATIENT)
Dept: PRIMARY CARE CLINIC | Age: 79
End: 2024-05-06
Payer: MEDICARE

## 2024-05-06 VITALS
BODY MASS INDEX: 27.64 KG/M2 | TEMPERATURE: 97.6 F | SYSTOLIC BLOOD PRESSURE: 120 MMHG | HEART RATE: 83 BPM | OXYGEN SATURATION: 96 % | DIASTOLIC BLOOD PRESSURE: 62 MMHG | WEIGHT: 156 LBS

## 2024-05-06 DIAGNOSIS — I25.10 CORONARY ARTERY DISEASE INVOLVING NATIVE CORONARY ARTERY OF NATIVE HEART WITHOUT ANGINA PECTORIS: ICD-10-CM

## 2024-05-06 DIAGNOSIS — Z86.79 HISTORY OF CEREBRAL HEMORRHAGE: ICD-10-CM

## 2024-05-06 DIAGNOSIS — I10 MALIGNANT HYPERTENSION: Primary | ICD-10-CM

## 2024-05-06 DIAGNOSIS — Z99.2 ESRD (END STAGE RENAL DISEASE) ON DIALYSIS (HCC): ICD-10-CM

## 2024-05-06 DIAGNOSIS — E03.8 OTHER SPECIFIED HYPOTHYROIDISM: ICD-10-CM

## 2024-05-06 DIAGNOSIS — Z96.651 HISTORY OF TOTAL RIGHT KNEE REPLACEMENT: ICD-10-CM

## 2024-05-06 DIAGNOSIS — I61.9 CVA (CEREBROVASCULAR ACCIDENT DUE TO INTRACEREBRAL HEMORRHAGE) (HCC): ICD-10-CM

## 2024-05-06 DIAGNOSIS — N18.6 ESRD (END STAGE RENAL DISEASE) ON DIALYSIS (HCC): ICD-10-CM

## 2024-05-06 PROCEDURE — 1090F PRES/ABSN URINE INCON ASSESS: CPT | Performed by: FAMILY MEDICINE

## 2024-05-06 PROCEDURE — 1036F TOBACCO NON-USER: CPT | Performed by: FAMILY MEDICINE

## 2024-05-06 PROCEDURE — G8400 PT W/DXA NO RESULTS DOC: HCPCS | Performed by: FAMILY MEDICINE

## 2024-05-06 PROCEDURE — G8417 CALC BMI ABV UP PARAM F/U: HCPCS | Performed by: FAMILY MEDICINE

## 2024-05-06 PROCEDURE — 3074F SYST BP LT 130 MM HG: CPT | Performed by: FAMILY MEDICINE

## 2024-05-06 PROCEDURE — 99214 OFFICE O/P EST MOD 30 MIN: CPT | Performed by: FAMILY MEDICINE

## 2024-05-06 PROCEDURE — 1123F ACP DISCUSS/DSCN MKR DOCD: CPT | Performed by: FAMILY MEDICINE

## 2024-05-06 PROCEDURE — 3078F DIAST BP <80 MM HG: CPT | Performed by: FAMILY MEDICINE

## 2024-05-06 PROCEDURE — G8427 DOCREV CUR MEDS BY ELIG CLIN: HCPCS | Performed by: FAMILY MEDICINE

## 2024-05-06 RX ORDER — LABETALOL 100 MG/1
TABLET, FILM COATED ORAL
Qty: 90 TABLET | Refills: 3 | Status: SHIPPED | OUTPATIENT
Start: 2024-05-06

## 2024-05-06 NOTE — PROGRESS NOTES
24     Trina Wang    : 1945 Sex: female   Age: 78 y.o.      Chief Complaint   Patient presents with    Hypertension     Nephrology would like BP med discussed due to hypotension     ESRD       Prior to Admission medications    Medication Sig Start Date End Date Taking? Authorizing Provider   labetalol (NORMODYNE) 100 MG tablet 1/2 bid 24  Yes Angel Nicholson DO   mirtazapine (REMERON) 15 MG tablet TAKE 1 TABLET BY MOUTH EVERY NIGHT 24   Angel Nicholson DO   nitroGLYCERIN (NITRODUR) 0.2 MG/HR Place 1 patch onto the skin daily 24   Angel Nicholson DO   calcium acetate (PHOSLO) 667 MG CAPS capsule Take 1 capsule by mouth 3 times daily (with meals)  Patient taking differently: Take 2 capsules by mouth 3 times daily (with meals) 24   Angel Nicholson DO   levothyroxine (SYNTHROID) 88 MCG tablet Take 1 tablet by mouth Daily 23   Angel Nicholson DO   citalopram (CELEXA) 20 MG tablet Take 1 tablet by mouth daily 23   Angel Nicholson DO   mupirocin (BACTROBAN) 2 % ointment Apply topically 3 times daily. 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  She is now on dialysis and managing well.  There have been problems with low blood pressures and we are going to back off on labetalol  100 mg half a tablet twice a day.  Reassess then next 4 weeks and sooner if needed.          Review of Systems   Constitutional: Negative.    HENT: Negative.     Eyes: Negative.    Respiratory: Negative.     Gastrointestinal: Negative.    Endocrine: Negative.    Genitourinary: Negative.    Musculoskeletal: Negative.    Skin: Negative.    Allergic/Immunologic:

## 2024-05-07 RX ORDER — LABETALOL 200 MG/1
200 TABLET, FILM COATED ORAL 2 TIMES DAILY
Qty: 60 TABLET | Refills: 3 | OUTPATIENT
Start: 2024-05-07

## 2024-05-15 ENCOUNTER — OFFICE VISIT (OUTPATIENT)
Dept: VASCULAR SURGERY | Age: 79
End: 2024-05-15
Payer: MEDICARE

## 2024-05-15 VITALS — BODY MASS INDEX: 26.58 KG/M2 | WEIGHT: 150 LBS

## 2024-05-15 DIAGNOSIS — N18.6 END STAGE RENAL DISEASE (HCC): Primary | ICD-10-CM

## 2024-05-15 PROCEDURE — 1123F ACP DISCUSS/DSCN MKR DOCD: CPT | Performed by: SURGERY

## 2024-05-15 PROCEDURE — 99213 OFFICE O/P EST LOW 20 MIN: CPT | Performed by: SURGERY

## 2024-05-15 NOTE — PROGRESS NOTES
file.    REVIEW OF SYSTEMS (Recent symptoms): Negative if blank [], Positive if [x]       Constitutional    [] Fevers / chills  [] General weakness   [] Poor appetite    [] Weight gain or loss  Eyes    [] Blurred vision  [] Wear glasses / contacts   [] Visual disturbances   [] Blindness  Ears, Nose, Throat    [] Hearing loss  [] Ringing in ears   [] Nose bleeding    [] Voice hoarseness  [] Difficulty swallowing      Lungs    [] Cough  [] Chest pain   [] Wheezing    [] Difficult breathing  Heart    [] Chest pain during activity  [] Dizziness   [] Irregular heart beat    [] Fainting episode    [] Leg swelling   Stomach, Intestines    [] Intestinal bleeding  [] Heart burn   [] Abdominal pain    [] Stomach ulcers   [] Change in bowel habits      Kidney, Prostate    [] Frequent need to urinate  [] Incontinence   [] Blood in urine    [] Erectile dysfunction (men)      [] All negative   Hematologic, Lymphatic,   Skin, Musculoskeletal    [] Easy bruising  [] Swollen lymph nodes   [] Skin lesions, ulcers   [] Skin rash  [] Neck or back pain   [] Leg / foot pain     Neurologic    [] Speech problems  [] Memory loss   [] Headaches     [] Walking problems    [] Hand / arm / leg weakness   [] Numbness in arms or legs           PHYSICAL EXAM:      CONSTITUTIONAL:  awake, alert, cooperative, no apparent distress, and appears stated age  EYES:  extra-ocular muscles intact and vision intact  ENT:  normocepalic, without obvious abnormality, atraumatic  NECK:  supple, symmetrical, trachea midline, no jugular venous distension,LUNGS:  no increased work of breathing, good air exchange and clear to auscultation  CARDIOVASCULAR:  regular rate and rhythm   Good thrill through fistula     IMPRESSION/RECOMMENDATIONS:        Problem List Items Addressed This Visit          Genitourinary    End stage renal disease (HCC) - Primary         78 y female with RUE radiocephalic fistula. Dialyzing well and reportedly will have catheter taken out at

## 2024-05-22 RX ORDER — ASPIRIN 81 MG/1
TABLET ORAL
Qty: 90 TABLET | Refills: 3 | Status: SHIPPED | OUTPATIENT
Start: 2024-05-22

## 2024-05-23 RX ORDER — HYDRALAZINE HYDROCHLORIDE 25 MG/1
TABLET, FILM COATED ORAL
Qty: 360 TABLET | Refills: 1 | OUTPATIENT
Start: 2024-05-23

## 2024-07-04 ENCOUNTER — HOSPITAL ENCOUNTER (INPATIENT)
Age: 79
LOS: 4 days | Discharge: HOME | DRG: 377 | End: 2024-07-08
Attending: EMERGENCY MEDICINE | Admitting: HOSPITALIST
Payer: MEDICARE

## 2024-07-04 DIAGNOSIS — K92.2 GASTROINTESTINAL HEMORRHAGE, UNSPECIFIED GASTROINTESTINAL HEMORRHAGE TYPE: ICD-10-CM

## 2024-07-04 DIAGNOSIS — D64.9 ANEMIA REQUIRING TRANSFUSIONS: Primary | ICD-10-CM

## 2024-07-04 DIAGNOSIS — N18.6 ESRD ON DIALYSIS (HCC): ICD-10-CM

## 2024-07-04 DIAGNOSIS — Z99.2 ESRD ON DIALYSIS (HCC): ICD-10-CM

## 2024-07-04 LAB
ALBUMIN SERPL-MCNC: 4 G/DL (ref 3.5–5.2)
ALP SERPL-CCNC: 52 U/L (ref 35–104)
ALT SERPL-CCNC: 6 U/L (ref 0–32)
ANION GAP SERPL CALCULATED.3IONS-SCNC: 12 MMOL/L (ref 7–16)
AST SERPL-CCNC: 11 U/L (ref 0–31)
BASOPHILS # BLD: 0.07 K/UL (ref 0–0.2)
BASOPHILS NFR BLD: 2 % (ref 0–2)
BILIRUB SERPL-MCNC: 0.2 MG/DL (ref 0–1.2)
BUN SERPL-MCNC: 38 MG/DL (ref 6–23)
CALCIUM SERPL-MCNC: 8.5 MG/DL (ref 8.6–10.2)
CHLORIDE SERPL-SCNC: 96 MMOL/L (ref 98–107)
CO2 SERPL-SCNC: 27 MMOL/L (ref 22–29)
CREAT SERPL-MCNC: 5 MG/DL (ref 0.5–1)
EOSINOPHIL # BLD: 0.17 K/UL (ref 0.05–0.5)
EOSINOPHILS RELATIVE PERCENT: 4 % (ref 0–6)
ERYTHROCYTE [DISTWIDTH] IN BLOOD BY AUTOMATED COUNT: 13.8 % (ref 11.5–15)
GFR, ESTIMATED: 8 ML/MIN/1.73M2
GLUCOSE SERPL-MCNC: 84 MG/DL (ref 74–99)
HCT VFR BLD AUTO: 17.5 % (ref 34–48)
HGB BLD-MCNC: 5.6 G/DL (ref 11.5–15.5)
HGB BLD-MCNC: 5.7 G/DL (ref 11.5–15.5)
LYMPHOCYTES NFR BLD: 0.66 K/UL (ref 1.5–4)
LYMPHOCYTES RELATIVE PERCENT: 17 % (ref 20–42)
MAGNESIUM SERPL-MCNC: 2 MG/DL (ref 1.6–2.6)
MCH RBC QN AUTO: 32.7 PG (ref 26–35)
MCHC RBC AUTO-ENTMCNC: 32 G/DL (ref 32–34.5)
MCV RBC AUTO: 102.3 FL (ref 80–99.9)
MONOCYTES NFR BLD: 0.26 K/UL (ref 0.1–0.95)
MONOCYTES NFR BLD: 7 % (ref 2–12)
NEUTROPHILS NFR BLD: 70 % (ref 43–80)
NEUTS SEG NFR BLD: 2.64 K/UL (ref 1.8–7.3)
PLATELET # BLD AUTO: 123 K/UL (ref 130–450)
PMV BLD AUTO: 10.5 FL (ref 7–12)
POTASSIUM SERPL-SCNC: 3.7 MMOL/L (ref 3.5–5)
PROT SERPL-MCNC: 6.4 G/DL (ref 6.4–8.3)
RBC # BLD AUTO: 1.71 M/UL (ref 3.5–5.5)
RBC # BLD: ABNORMAL 10*6/UL
SODIUM SERPL-SCNC: 135 MMOL/L (ref 132–146)
WBC OTHER # BLD: 3.8 K/UL (ref 4.5–11.5)

## 2024-07-04 PROCEDURE — 2580000003 HC RX 258

## 2024-07-04 PROCEDURE — 99285 EMERGENCY DEPT VISIT HI MDM: CPT

## 2024-07-04 PROCEDURE — P9016 RBC LEUKOCYTES REDUCED: HCPCS

## 2024-07-04 PROCEDURE — 83735 ASSAY OF MAGNESIUM: CPT

## 2024-07-04 PROCEDURE — 80053 COMPREHEN METABOLIC PANEL: CPT

## 2024-07-04 PROCEDURE — 6360000002 HC RX W HCPCS

## 2024-07-04 PROCEDURE — 86923 COMPATIBILITY TEST ELECTRIC: CPT

## 2024-07-04 PROCEDURE — 96374 THER/PROPH/DIAG INJ IV PUSH: CPT

## 2024-07-04 PROCEDURE — 85025 COMPLETE CBC W/AUTO DIFF WBC: CPT

## 2024-07-04 PROCEDURE — 30233N1 TRANSFUSION OF NONAUTOLOGOUS RED BLOOD CELLS INTO PERIPHERAL VEIN, PERCUTANEOUS APPROACH: ICD-10-PCS | Performed by: INTERNAL MEDICINE

## 2024-07-04 PROCEDURE — 1200000000 HC SEMI PRIVATE

## 2024-07-04 PROCEDURE — 93005 ELECTROCARDIOGRAM TRACING: CPT

## 2024-07-04 PROCEDURE — 86901 BLOOD TYPING SEROLOGIC RH(D): CPT

## 2024-07-04 PROCEDURE — 86900 BLOOD TYPING SEROLOGIC ABO: CPT

## 2024-07-04 PROCEDURE — 86850 RBC ANTIBODY SCREEN: CPT

## 2024-07-04 PROCEDURE — 36430 TRANSFUSION BLD/BLD COMPNT: CPT

## 2024-07-04 RX ORDER — ACETAMINOPHEN 325 MG/1
650 TABLET ORAL EVERY 6 HOURS PRN
Status: DISCONTINUED | OUTPATIENT
Start: 2024-07-04 | End: 2024-07-08 | Stop reason: HOSPADM

## 2024-07-04 RX ORDER — SODIUM CHLORIDE 9 MG/ML
INJECTION, SOLUTION INTRAVENOUS PRN
Status: DISCONTINUED | OUTPATIENT
Start: 2024-07-04 | End: 2024-07-08 | Stop reason: HOSPADM

## 2024-07-04 RX ORDER — ACETAMINOPHEN 650 MG/1
650 SUPPOSITORY RECTAL EVERY 6 HOURS PRN
Status: DISCONTINUED | OUTPATIENT
Start: 2024-07-04 | End: 2024-07-08 | Stop reason: HOSPADM

## 2024-07-04 RX ORDER — LEVOTHYROXINE SODIUM 88 UG/1
88 TABLET ORAL DAILY
Status: DISCONTINUED | OUTPATIENT
Start: 2024-07-05 | End: 2024-07-08 | Stop reason: HOSPADM

## 2024-07-04 RX ORDER — PROCHLORPERAZINE MALEATE 10 MG
5 TABLET ORAL EVERY 8 HOURS PRN
Status: DISCONTINUED | OUTPATIENT
Start: 2024-07-04 | End: 2024-07-08 | Stop reason: HOSPADM

## 2024-07-04 RX ORDER — SODIUM CHLORIDE 0.9 % (FLUSH) 0.9 %
5-40 SYRINGE (ML) INJECTION EVERY 12 HOURS SCHEDULED
Status: DISCONTINUED | OUTPATIENT
Start: 2024-07-04 | End: 2024-07-08 | Stop reason: HOSPADM

## 2024-07-04 RX ORDER — CITALOPRAM 20 MG/1
20 TABLET ORAL DAILY
Status: DISCONTINUED | OUTPATIENT
Start: 2024-07-05 | End: 2024-07-08 | Stop reason: HOSPADM

## 2024-07-04 RX ORDER — SODIUM CHLORIDE 0.9 % (FLUSH) 0.9 %
5-40 SYRINGE (ML) INJECTION PRN
Status: DISCONTINUED | OUTPATIENT
Start: 2024-07-04 | End: 2024-07-08 | Stop reason: HOSPADM

## 2024-07-04 RX ORDER — ONDANSETRON 2 MG/ML
4 INJECTION INTRAMUSCULAR; INTRAVENOUS EVERY 6 HOURS PRN
Status: DISCONTINUED | OUTPATIENT
Start: 2024-07-04 | End: 2024-07-04 | Stop reason: CLARIF

## 2024-07-04 RX ORDER — CALCIUM ACETATE 667 MG/1
2 CAPSULE ORAL
Status: DISCONTINUED | OUTPATIENT
Start: 2024-07-04 | End: 2024-07-08 | Stop reason: HOSPADM

## 2024-07-04 RX ORDER — PROCHLORPERAZINE EDISYLATE 5 MG/ML
10 INJECTION INTRAMUSCULAR; INTRAVENOUS EVERY 6 HOURS PRN
Status: DISCONTINUED | OUTPATIENT
Start: 2024-07-04 | End: 2024-07-08 | Stop reason: HOSPADM

## 2024-07-04 RX ORDER — SODIUM CHLORIDE 9 MG/ML
INJECTION, SOLUTION INTRAVENOUS CONTINUOUS
Status: DISCONTINUED | OUTPATIENT
Start: 2024-07-04 | End: 2024-07-05

## 2024-07-04 RX ORDER — LABETALOL 100 MG/1
100 TABLET, FILM COATED ORAL EVERY 12 HOURS SCHEDULED
Status: DISCONTINUED | OUTPATIENT
Start: 2024-07-04 | End: 2024-07-08 | Stop reason: HOSPADM

## 2024-07-04 RX ORDER — ONDANSETRON 4 MG/1
4 TABLET, ORALLY DISINTEGRATING ORAL EVERY 8 HOURS PRN
Status: DISCONTINUED | OUTPATIENT
Start: 2024-07-04 | End: 2024-07-04 | Stop reason: CLARIF

## 2024-07-04 RX ADMIN — PANTOPRAZOLE SODIUM 80 MG: 40 INJECTION, POWDER, FOR SOLUTION INTRAVENOUS at 14:19

## 2024-07-04 ASSESSMENT — PAIN - FUNCTIONAL ASSESSMENT: PAIN_FUNCTIONAL_ASSESSMENT: NONE - DENIES PAIN

## 2024-07-04 NOTE — CARE COORDINATION
Internal Medicine On-call Care Coordination Note     I was called by the ED physician because they recommended admission for this patient and we cover their PCP.  The history as I understand it after discussion with the ED physician is as follows:     Admit for Acute anemia  Occult stool for blood positive     I placed admission orders.  Including:     Consult surgery  H/h q6  Npo for now     Dr. Ramirez or his coverage will see the patient tomorrow for H&P and review of all orders.     Electronically signed by Brenton Blancas MD on 7/4/2024 at 6:22 PM

## 2024-07-04 NOTE — ED PROVIDER NOTES
[facility] MED SURG/TELE  EMERGENCY DEPARTMENT ENCOUNTER        Pt Name: Trina Wang  MRN: [id number]  Birthdate 1945  Date of evaluation: 2024  Provider: Viviana Grullon MD  PCP: Angel Nicholson DO  Note Started: 1:58 PM EDT 24    CHIEF COMPLAINT       Chief Complaint   Patient presents with    Abnormal Lab     Hemoglobin 5.7 at HD today        HISTORY OF PRESENT ILLNESS: 1 or more Elements   History From: Patient    Limitations to history : None    Trina Wang is a 79 y.o. female who presents for low hemoglobin reading at dialysis today.  Patient was at dialysis and had half session.  She is followed by Dr. Wilson.  Dialysis was stopped as labs have resulted and showed hemoglobin of about 5.7.  Patient states she feels fatigued.  Otherwise denies chest pain, shortness of breath, abdominal pain, nausea, vomiting, black or bloody stools.  Patient states she has received a transfusion many many years ago and is unsure what the cause of her low hemoglobin at that time was.  She does not believe she has had a colonoscopy or EGD in the past.  She has never had GI bleeding in the past.    Nursing Notes were all reviewed and agreed with or any disagreements were addressed in the HPI.        REVIEW OF EXTERNAL NOTE :       Patient was seen by vascular surgery in office on 5/15/2024 for ESRD    REVIEW OF SYSTEMS :           Positives and Pertinent negatives as per HPI.     SURGICAL HISTORY     Past Surgical History:   Procedure Laterality Date     SECTION      1    COLONOSCOPY      CORONARY ANGIOPLASTY WITH STENT PLACEMENT      CT BIOPSY RENAL  2023    CT BIOPSY RENAL 2023 OSIEL Mason MD [facility] CT    CYST REMOVAL      DIALYSIS FISTULA CREATION Right 3/4/2024    creation AVF right arm performed by Haley Hubbadr MD at [facility] OR    ENDOSCOPY, COLON, DIAGNOSTIC      GASTROSTOMY TUBE PLACEMENT  2014    later removed.    HEEL SPUR SURGERY Left years

## 2024-07-04 NOTE — ED NOTES
ED to Inpatient Handoff Report    Notified floor that electronic handoff available and patient ready for transport to room 546.    Safety Risks: Risk of falls    Patient in Restraints: no    Constant Observer or Patient : no    Telemetry Monitoring Ordered: Yes          Order to transfer to unit without monitor: NO    Last MEWS: 1 Time completed: 1650    Deterioration Index: 22.29    Vitals:    07/04/24 1611 07/04/24 1634 07/04/24 1646 07/04/24 1653   BP: (!) 140/65 120/63 130/65 130/68   Pulse: 68 72  69   Resp: 15 16  18   Temp:  98.6 °F (37 °C) 98.6 °F (37 °C) 98.6 °F (37 °C)   TempSrc:    Oral   SpO2: 98% 99%  100%   Weight:       Height:           Opportunity for questions and clarification was provided.

## 2024-07-05 ENCOUNTER — ANESTHESIA (OUTPATIENT)
Dept: ENDOSCOPY | Age: 79
End: 2024-07-05
Payer: MEDICARE

## 2024-07-05 ENCOUNTER — ANESTHESIA EVENT (OUTPATIENT)
Dept: ENDOSCOPY | Age: 79
End: 2024-07-05
Payer: MEDICARE

## 2024-07-05 LAB
ANION GAP SERPL CALCULATED.3IONS-SCNC: 12 MMOL/L (ref 7–16)
BUN SERPL-MCNC: 48 MG/DL (ref 6–23)
CALCIUM SERPL-MCNC: 8 MG/DL (ref 8.6–10.2)
CHLORIDE SERPL-SCNC: 98 MMOL/L (ref 98–107)
CO2 SERPL-SCNC: 26 MMOL/L (ref 22–29)
CREAT SERPL-MCNC: 6.4 MG/DL (ref 0.5–1)
EKG ATRIAL RATE: 69 BPM
EKG P AXIS: 44 DEGREES
EKG P-R INTERVAL: 142 MS
EKG Q-T INTERVAL: 508 MS
EKG QRS DURATION: 150 MS
EKG QTC CALCULATION (BAZETT): 544 MS
EKG R AXIS: 104 DEGREES
EKG T AXIS: 55 DEGREES
EKG VENTRICULAR RATE: 69 BPM
ERYTHROCYTE [DISTWIDTH] IN BLOOD BY AUTOMATED COUNT: 19.8 % (ref 11.5–15)
GFR, ESTIMATED: 6 ML/MIN/1.73M2
GLUCOSE SERPL-MCNC: 80 MG/DL (ref 74–99)
HCT VFR BLD AUTO: 22.1 % (ref 34–48)
HCT VFR BLD AUTO: 22.4 % (ref 34–48)
HCT VFR BLD AUTO: 23.8 % (ref 34–48)
HGB BLD-MCNC: 7.2 G/DL (ref 11.5–15.5)
HGB BLD-MCNC: 7.3 G/DL (ref 11.5–15.5)
HGB BLD-MCNC: 7.6 G/DL (ref 11.5–15.5)
INR PPP: 1.2
IRON SATN MFR SERPL: ABNORMAL % (ref 15–50)
IRON SERPL-MCNC: 231 UG/DL (ref 37–145)
MCH RBC QN AUTO: 30.7 PG (ref 26–35)
MCHC RBC AUTO-ENTMCNC: 33 G/DL (ref 32–34.5)
MCV RBC AUTO: 92.9 FL (ref 80–99.9)
PARTIAL THROMBOPLASTIN TIME: 38.2 SEC (ref 24.5–35.1)
PLATELET # BLD AUTO: 103 K/UL (ref 130–450)
PMV BLD AUTO: 10.1 FL (ref 7–12)
POTASSIUM SERPL-SCNC: 3.9 MMOL/L (ref 3.5–5)
PROTHROMBIN TIME: 12.5 SEC (ref 9.3–12.4)
RBC # BLD AUTO: 2.38 M/UL (ref 3.5–5.5)
SODIUM SERPL-SCNC: 136 MMOL/L (ref 132–146)
TIBC SERPL-MCNC: ABNORMAL UG/DL (ref 250–450)
WBC OTHER # BLD: 3.4 K/UL (ref 4.5–11.5)

## 2024-07-05 PROCEDURE — 3700000001 HC ADD 15 MINUTES (ANESTHESIA): Performed by: SURGERY

## 2024-07-05 PROCEDURE — 2720000010 HC SURG SUPPLY STERILE: Performed by: SURGERY

## 2024-07-05 PROCEDURE — 2580000003 HC RX 258: Performed by: HOSPITALIST

## 2024-07-05 PROCEDURE — 80048 BASIC METABOLIC PNL TOTAL CA: CPT

## 2024-07-05 PROCEDURE — 6360000002 HC RX W HCPCS: Performed by: HOSPITALIST

## 2024-07-05 PROCEDURE — 83540 ASSAY OF IRON: CPT

## 2024-07-05 PROCEDURE — 85014 HEMATOCRIT: CPT

## 2024-07-05 PROCEDURE — 93010 ELECTROCARDIOGRAM REPORT: CPT | Performed by: INTERNAL MEDICINE

## 2024-07-05 PROCEDURE — 36415 COLL VENOUS BLD VENIPUNCTURE: CPT

## 2024-07-05 PROCEDURE — 83550 IRON BINDING TEST: CPT

## 2024-07-05 PROCEDURE — 3609013000 HC EGD TRANSORAL CONTROL BLEEDING ANY METHOD: Performed by: SURGERY

## 2024-07-05 PROCEDURE — 85610 PROTHROMBIN TIME: CPT

## 2024-07-05 PROCEDURE — 85730 THROMBOPLASTIN TIME PARTIAL: CPT

## 2024-07-05 PROCEDURE — 2500000003 HC RX 250 WO HCPCS: Performed by: HOSPITALIST

## 2024-07-05 PROCEDURE — 2709999900 HC NON-CHARGEABLE SUPPLY: Performed by: SURGERY

## 2024-07-05 PROCEDURE — 3700000000 HC ANESTHESIA ATTENDED CARE: Performed by: SURGERY

## 2024-07-05 PROCEDURE — 0W3P8ZZ CONTROL BLEEDING IN GASTROINTESTINAL TRACT, VIA NATURAL OR ARTIFICIAL OPENING ENDOSCOPIC: ICD-10-PCS | Performed by: SURGERY

## 2024-07-05 PROCEDURE — 7100000011 HC PHASE II RECOVERY - ADDTL 15 MIN: Performed by: SURGERY

## 2024-07-05 PROCEDURE — 85018 HEMOGLOBIN: CPT

## 2024-07-05 PROCEDURE — 6370000000 HC RX 637 (ALT 250 FOR IP): Performed by: HOSPITALIST

## 2024-07-05 PROCEDURE — 85027 COMPLETE CBC AUTOMATED: CPT

## 2024-07-05 PROCEDURE — 7100000010 HC PHASE II RECOVERY - FIRST 15 MIN: Performed by: SURGERY

## 2024-07-05 PROCEDURE — 1200000000 HC SEMI PRIVATE

## 2024-07-05 RX ORDER — INSULIN LISPRO 100 [IU]/ML
0-4 INJECTION, SOLUTION INTRAVENOUS; SUBCUTANEOUS
Status: DISCONTINUED | OUTPATIENT
Start: 2024-07-05 | End: 2024-07-05

## 2024-07-05 RX ORDER — INSULIN LISPRO 100 [IU]/ML
0-4 INJECTION, SOLUTION INTRAVENOUS; SUBCUTANEOUS
Status: DISCONTINUED | OUTPATIENT
Start: 2024-07-06 | End: 2024-07-08 | Stop reason: HOSPADM

## 2024-07-05 RX ORDER — DEXTROSE MONOHYDRATE AND SODIUM CHLORIDE 5; .45 G/100ML; G/100ML
INJECTION, SOLUTION INTRAVENOUS CONTINUOUS
Status: DISCONTINUED | OUTPATIENT
Start: 2024-07-05 | End: 2024-07-07

## 2024-07-05 RX ORDER — SUCRALFATE 1 G/1
1 TABLET ORAL EVERY 6 HOURS SCHEDULED
Status: DISCONTINUED | OUTPATIENT
Start: 2024-07-05 | End: 2024-07-08 | Stop reason: HOSPADM

## 2024-07-05 RX ADMIN — LEVOTHYROXINE SODIUM 88 MCG: 0.09 TABLET ORAL at 06:21

## 2024-07-05 RX ADMIN — SODIUM CHLORIDE, PRESERVATIVE FREE 10 ML: 5 INJECTION INTRAVENOUS at 00:05

## 2024-07-05 RX ADMIN — DEXTROSE AND SODIUM CHLORIDE: 5; 450 INJECTION, SOLUTION INTRAVENOUS at 15:30

## 2024-07-05 RX ADMIN — LABETALOL HYDROCHLORIDE 100 MG: 100 TABLET, FILM COATED ORAL at 20:25

## 2024-07-05 RX ADMIN — SODIUM CHLORIDE: 9 INJECTION, SOLUTION INTRAVENOUS at 00:03

## 2024-07-05 RX ADMIN — PANTOPRAZOLE SODIUM 40 MG: 40 INJECTION, POWDER, FOR SOLUTION INTRAVENOUS at 08:32

## 2024-07-05 RX ADMIN — SODIUM CHLORIDE, PRESERVATIVE FREE 10 ML: 5 INJECTION INTRAVENOUS at 08:33

## 2024-07-05 RX ADMIN — CITALOPRAM HYDROBROMIDE 20 MG: 20 TABLET ORAL at 08:33

## 2024-07-05 RX ADMIN — LABETALOL HYDROCHLORIDE 100 MG: 100 TABLET, FILM COATED ORAL at 08:33

## 2024-07-05 RX ADMIN — CALCIUM ACETATE 1334 MG: 667 CAPSULE ORAL at 08:33

## 2024-07-05 RX ADMIN — LABETALOL HYDROCHLORIDE 100 MG: 100 TABLET, FILM COATED ORAL at 00:04

## 2024-07-05 RX ADMIN — PANTOPRAZOLE SODIUM 40 MG: 40 INJECTION, POWDER, FOR SOLUTION INTRAVENOUS at 00:03

## 2024-07-05 RX ADMIN — PANTOPRAZOLE SODIUM 40 MG: 40 INJECTION, POWDER, FOR SOLUTION INTRAVENOUS at 20:25

## 2024-07-05 ASSESSMENT — PAIN - FUNCTIONAL ASSESSMENT
PAIN_FUNCTIONAL_ASSESSMENT: 0-10
PAIN_FUNCTIONAL_ASSESSMENT: 0-10
PAIN_FUNCTIONAL_ASSESSMENT: ACTIVITIES ARE NOT PREVENTED
PAIN_FUNCTIONAL_ASSESSMENT: ACTIVITIES ARE NOT PREVENTED

## 2024-07-05 ASSESSMENT — LIFESTYLE VARIABLES: SMOKING_STATUS: 1

## 2024-07-05 NOTE — OP NOTE
Called pt to schedule - left message Operative Note      Patient: Trina Wang  YOB: 1945  MRN: [id number]    Date of Procedure: 7/5/2024    Pre-Op Diagnosis Codes:     * Anemia, unspecified type [D64.9]    Post-Op Diagnosis:     Dieulafoy lesion greater curve       Procedure(s):  ESOPHAGOGASTRODUODENOSCOPY   +++LATEX ALLERGY+++  ESOPHAGOGASTRODUODENOSCOPY CONTROL HEMORRHAGE    Surgeon(s):  Uriel Reeves MD    Assistant:   * No surgical staff found *    Anesthesia: Monitor Anesthesia Care    Estimated Blood Loss (mL): less than 50     Complications: None    Specimens:   * No specimens in log *    Implants:  * No implants in log *      Drains: * No LDAs found *    Findings:  Infection Present At Time Of Surgery (PATOS) (choose all levels that have infection present):  No infection present  Other Findings: As above    Detailed Description of Procedure:   The patient was brought to the endoscopy suite and placed on the table in the left lateral decubitus position.  The patient received anesthesia per the department of anesthesiology.  A bite-block was placed.  The endoscope was passed without difficulty through the lumen of the esophagus, stomach and duodenum.  Endoscope was retrieved.  The duodenum was normal.  Upon reentry into the stomach, the patient a mild gastritis.  Also, along the greater curve of the stomach the patient had an area that was actively bleeding.  The area was injected with 2 mL of epinephrine.  The front firing APC was used to cauterize the area.  Hemostasis was noted.  The remainder of the examination was uneventful.  The patient tolerated the procedure well.    Assessment:    Likely Dieulafoy lesion    Plan:    PPI  Carafate  Clear liquid diet  Monitor hemoglobin and hematocrit    Electronically signed by Uriel Reeves MD on 7/5/2024 at 5:24 PM

## 2024-07-05 NOTE — H&P
20 mg (20 mg Oral Given 7/5/24 0833)   labetalol (NORMODYNE) tablet 100 mg (100 mg Oral Given 7/5/24 0833)   levothyroxine (SYNTHROID) tablet 88 mcg (88 mcg Oral Given 7/5/24 0621)   sodium chloride flush 0.9 % injection 5-40 mL (10 mLs IntraVENous Given 7/5/24 0833)   sodium chloride flush 0.9 % injection 5-40 mL (has no administration in time range)   0.9 % sodium chloride infusion (has no administration in time range)   acetaminophen (TYLENOL) tablet 650 mg (has no administration in time range)     Or   acetaminophen (TYLENOL) suppository 650 mg (has no administration in time range)   0.9 % sodium chloride infusion ( IntraVENous New Bag 7/5/24 0003)   pantoprazole (PROTONIX) 40 mg in sodium chloride (PF) 0.9 % 10 mL injection (40 mg IntraVENous Given 7/5/24 0832)   prochlorperazine (COMPAZINE) tablet 5 mg (has no administration in time range)     Or   prochlorperazine (COMPAZINE) injection 10 mg (has no administration in time range)   0.9 % sodium chloride infusion (has no administration in time range)   pantoprazole (PROTONIX) 80 mg in sodium chloride (PF) 0.9 % 20 mL injection (80 mg IntraVENous Given 7/4/24 1419)       Past Medical History:   Diagnosis Date    Acute loss of vision, left 02/24/2022    Arthritis     CAD (coronary artery disease)     follows with Dr. Jansen    CKD (chronic kidney disease)     stage 3 / follows with Dr. Amos Soriano     deny    History of blood transfusion     had with childbirth    Hypertension     Left carotid stenosis 02/24/2022    Respiratory failure (HCC)     history of / 2014  when had stroke, trach  x 2 weeks    Thyroid disease     Tracheal stenosis     Unspecified cerebral artery occlusion with cerebral infarction 09/26/2014    right side flacid, hemorrhagic stroke dt elevated BP/residual s/s is at right side loss of fine motor skills and some weakness, drags right leg, slight memory loss and aphasia when she is tired       Past Surgical History:   Procedure

## 2024-07-05 NOTE — PLAN OF CARE
Problem: Discharge Planning  Goal: Discharge to home or other facility with appropriate resources  Outcome: Progressing     Problem: Skin/Tissue Integrity  Goal: Absence of new skin breakdown  Description: 1.  Monitor for areas of redness and/or skin breakdown  2.  Assess vascular access sites hourly  3.  Every 4-6 hours minimum:  Change oxygen saturation probe site  4.  Every 4-6 hours:  If on nasal continuous positive airway pressure, respiratory therapy assess nares and determine need for appliance change or resting period.  Outcome: Progressing     Problem: Safety - Adult  Goal: Free from fall injury  7/5/2024 0447 by Guadalupe Guevara, RN  Outcome: Progressing

## 2024-07-05 NOTE — CARE COORDINATION
Initial Transition of Care-Met with patient at the bedside, introduced myself and CM role in discharge planning. Patient is independent, her and her niece reside together in a two story home, her bed/bath are on the first floor. She has a walker, uses as needed, no other DME, no history of HHC or SNF.  PCP is Dr. Angel Nicholson, preferred pharmacy is [facility] in [facility]. No anticipated needs.    Nasrin PETERSN, RN  [facility]

## 2024-07-05 NOTE — CONSULTS
General Surgery   Consult Note      Patient's Name/Date of Birth: Trina Wang / 1945    Date: July 5, 2024     PCP: Angel Nicholson DO     Chief Complaint: Anemia    HPI:   Trina Wang is a 79 y.o. female who presents for evaluation of anemia.  General surgery was consulted for GI bleed.  Patient was reportedly undergoing dialysis yesterday where she was told that she had hemoglobin of 5.6 and needed to come to the hospital.  Patient reports having melena for the past couple days.  She denies any recent weight loss.  She denies prior history of melena.  She denies abdominal pain, nausea, vomiting.  She takes ASA 81.    Patient denies any recent weight loss.  She denies any personal or family history of colon cancer, ulcerative colitis, Crohn's disease.  She denies alcohol and tobacco use.  Patient had prior EGD and colonoscopy in 2014 that was significant for gastritis.    Hgb 5.6 on admission.  She was transfused 2 units packed red blood cells and hemoglobin 7.3.  Patient has been afebrile and hemodynamically stable.      Patient Active Problem List   Diagnosis    Essential hypertension    Malignant hypertension    CVA (cerebrovascular accident due to intracerebral hemorrhage) ([facility])    CAD (coronary artery disease), native coronary artery    Mixed hyperlipidemia    Hypothyroidism    Anxiety    Benign essential microscopic hematuria    Chronic pain of right knee    Primary osteoarthritis of right knee    Rash    History of total right knee replacement    Needs flu shot    Right foot pain    PVD (peripheral vascular disease) ([facility])    Left carotid stenosis    Acute loss of vision, left    Urge incontinence of urine    Gait disturbance    History of cerebral hemorrhage    Thrombocytopenia, unspecified    MANAN (acute kidney injury) (HCC)    Stage 3b chronic kidney disease (HCC)    Skin lesion of face    ESRD (end stage renal disease) on dialysis (HCC)    End stage renal disease (HCC)    Type 2

## 2024-07-05 NOTE — ANESTHESIA POSTPROCEDURE EVALUATION
Department of Anesthesiology  Postprocedure Note    Patient: Trina Wang  MRN: [id number]  YOB: 1945  Date of evaluation: 7/5/2024    Procedure Summary       Date: 07/05/24 Room / Location: [place] [address] / [facility]    Anesthesia Start: 1633 Anesthesia Stop: 1653    Procedures:       ESOPHAGOGASTRODUODENOSCOPY   +++LATEX ALLERGY+++      ESOPHAGOGASTRODUODENOSCOPY CONTROL HEMORRHAGE Diagnosis:       Anemia, unspecified type      (Anemia, unspecified type [D64.9])    Surgeons: Uriel Reeves MD Responsible Provider: Theo King MD    Anesthesia Type: MAC ASA Status: 3            Anesthesia Type: No value filed.    Darin Phase I:      Darin Phase II: Darin Score: 10    Anesthesia Post Evaluation    Patient location during evaluation: PACU  Patient participation: complete - patient participated  Level of consciousness: awake and alert  Airway patency: patent  Nausea & Vomiting: no nausea and no vomiting  Cardiovascular status: hemodynamically stable  Respiratory status: acceptable  Hydration status: euvolemic  There was medical reason for not using a multimodal analgesia pain management approach.Pain management: adequate    No notable events documented.

## 2024-07-05 NOTE — ANESTHESIA PRE PROCEDURE
x 2 to treat stenosis / last one 2016    POLYPECTOMY  2013    internal hemorrhoids - michelle    TONSILLECTOMY      TOTAL KNEE ARTHROPLASTY Right 2020    RIGHT KNEE TOTAL ARTHROPLASTY    ++RAYMOND++   ++PNB++     ++LATEX ALLERGY++   ++IODINE ALLERGY++ performed by Jostin Cavanaugh MD at [facility] OR    TRACHEOSTOMY  2014    [place]    UPPER GASTROINTESTINAL ENDOSCOPY      gastritis w. superficial antral ulerations - [place]       Social History:    Social History     Tobacco Use    Smoking status: Former     Current packs/day: 0.00     Average packs/day: 1.5 packs/day for 8.0 years (12.0 ttl pk-yrs)     Types: Cigarettes     Start date: 1968     Quit date: 1976     Years since quittin.5    Smokeless tobacco: Never   Substance Use Topics    Alcohol use: No                                Counseling given: Not Answered      Vital Signs (Current):   Vitals:    24 2113 24 2355 24 0635 24 0749   BP: (!) 141/65 137/65  131/60   Pulse: 69 69  65   Resp: 18 18  14   Temp: 36.9 °C (98.4 °F) 36.8 °C (98.3 °F)  37.1 °C (98.7 °F)   TempSrc: Oral Oral  Oral   SpO2: 96% 96%  97%   Weight:   76.7 kg (169 lb 3 oz)    Height:                                                    BP Readings from Last 3 Encounters:   24 131/60   24 120/62   24 128/60       NPO Status:  greater than 8 hours                                                                               BMI:   Wt Readings from Last 3 Encounters:   24 76.7 kg (169 lb 3 oz)   05/15/24 68 kg (150 lb)   24 70.8 kg (156 lb)     Body mass index is 29.97 kg/m².    CBC:   Lab Results   Component Value Date/Time    WBC 3.4 2024 02:15 AM    RBC 2.38 2024 02:15 AM    HGB 7.6 2024 09:29 AM    HCT 23.8 2024 09:29 AM    MCV 92.9 2024 02:15 AM    RDW 19.8 2024 02:15 AM     2024 02:15 AM       CMP:   Lab Results   Component Value Date/Time     2024 02:15

## 2024-07-06 LAB
25(OH)D3 SERPL-MCNC: 41.3 NG/ML (ref 30–100)
ANION GAP SERPL CALCULATED.3IONS-SCNC: 14 MMOL/L (ref 7–16)
BUN SERPL-MCNC: 60 MG/DL (ref 6–23)
CALCIUM SERPL-MCNC: 7.7 MG/DL (ref 8.6–10.2)
CHLORIDE SERPL-SCNC: 98 MMOL/L (ref 98–107)
CO2 SERPL-SCNC: 23 MMOL/L (ref 22–29)
CREAT SERPL-MCNC: 9.2 MG/DL (ref 0.5–1)
ERYTHROCYTE [DISTWIDTH] IN BLOOD BY AUTOMATED COUNT: 18.7 % (ref 11.5–15)
GFR, ESTIMATED: 4 ML/MIN/1.73M2
GLUCOSE BLD-MCNC: 102 MG/DL (ref 74–99)
GLUCOSE BLD-MCNC: 103 MG/DL (ref 74–99)
GLUCOSE BLD-MCNC: 124 MG/DL (ref 74–99)
GLUCOSE SERPL-MCNC: 80 MG/DL (ref 74–99)
HCT VFR BLD AUTO: 21.4 % (ref 34–48)
HCT VFR BLD AUTO: 21.6 % (ref 34–48)
HCT VFR BLD AUTO: 22.1 % (ref 34–48)
HCT VFR BLD AUTO: 28 % (ref 34–48)
HGB BLD-MCNC: 7 G/DL (ref 11.5–15.5)
HGB BLD-MCNC: 7 G/DL (ref 11.5–15.5)
HGB BLD-MCNC: 7.3 G/DL (ref 11.5–15.5)
HGB BLD-MCNC: 9.3 G/DL (ref 11.5–15.5)
MCH RBC QN AUTO: 31 PG (ref 26–35)
MCHC RBC AUTO-ENTMCNC: 32.4 G/DL (ref 32–34.5)
MCV RBC AUTO: 95.6 FL (ref 80–99.9)
PHOSPHATE SERPL-MCNC: 2.6 MG/DL (ref 2.5–4.5)
PLATELET # BLD AUTO: 108 K/UL (ref 130–450)
PMV BLD AUTO: 10.3 FL (ref 7–12)
POTASSIUM SERPL-SCNC: 4 MMOL/L (ref 3.5–5)
PTH-INTACT SERPL-MCNC: 125 PG/ML (ref 15–65)
RBC # BLD AUTO: 2.26 M/UL (ref 3.5–5.5)
SODIUM SERPL-SCNC: 135 MMOL/L (ref 132–146)
WBC OTHER # BLD: 3.1 K/UL (ref 4.5–11.5)

## 2024-07-06 PROCEDURE — 85014 HEMATOCRIT: CPT

## 2024-07-06 PROCEDURE — 1200000000 HC SEMI PRIVATE

## 2024-07-06 PROCEDURE — 2500000003 HC RX 250 WO HCPCS: Performed by: HOSPITALIST

## 2024-07-06 PROCEDURE — P9016 RBC LEUKOCYTES REDUCED: HCPCS

## 2024-07-06 PROCEDURE — 5A1D70Z PERFORMANCE OF URINARY FILTRATION, INTERMITTENT, LESS THAN 6 HOURS PER DAY: ICD-10-PCS | Performed by: INTERNAL MEDICINE

## 2024-07-06 PROCEDURE — 90935 HEMODIALYSIS ONE EVALUATION: CPT

## 2024-07-06 PROCEDURE — 80048 BASIC METABOLIC PNL TOTAL CA: CPT

## 2024-07-06 PROCEDURE — 82306 VITAMIN D 25 HYDROXY: CPT

## 2024-07-06 PROCEDURE — 6370000000 HC RX 637 (ALT 250 FOR IP): Performed by: HOSPITALIST

## 2024-07-06 PROCEDURE — 6370000000 HC RX 637 (ALT 250 FOR IP): Performed by: SURGERY

## 2024-07-06 PROCEDURE — 84100 ASSAY OF PHOSPHORUS: CPT

## 2024-07-06 PROCEDURE — 82962 GLUCOSE BLOOD TEST: CPT

## 2024-07-06 PROCEDURE — 83970 ASSAY OF PARATHORMONE: CPT

## 2024-07-06 PROCEDURE — 36430 TRANSFUSION BLD/BLD COMPNT: CPT

## 2024-07-06 PROCEDURE — 6360000002 HC RX W HCPCS: Performed by: HOSPITALIST

## 2024-07-06 PROCEDURE — 85027 COMPLETE CBC AUTOMATED: CPT

## 2024-07-06 PROCEDURE — 85018 HEMOGLOBIN: CPT

## 2024-07-06 PROCEDURE — 2580000003 HC RX 258: Performed by: HOSPITALIST

## 2024-07-06 RX ORDER — SODIUM CHLORIDE 9 MG/ML
INJECTION, SOLUTION INTRAVENOUS PRN
Status: DISCONTINUED | OUTPATIENT
Start: 2024-07-06 | End: 2024-07-07

## 2024-07-06 RX ADMIN — LABETALOL HYDROCHLORIDE 100 MG: 100 TABLET, FILM COATED ORAL at 20:57

## 2024-07-06 RX ADMIN — CITALOPRAM HYDROBROMIDE 20 MG: 20 TABLET ORAL at 09:52

## 2024-07-06 RX ADMIN — PANTOPRAZOLE SODIUM 40 MG: 40 INJECTION, POWDER, FOR SOLUTION INTRAVENOUS at 09:52

## 2024-07-06 RX ADMIN — SUCRALFATE 1 G: 1 TABLET ORAL at 05:58

## 2024-07-06 RX ADMIN — SUCRALFATE 1 G: 1 TABLET ORAL at 17:59

## 2024-07-06 RX ADMIN — SODIUM CHLORIDE, PRESERVATIVE FREE 10 ML: 5 INJECTION INTRAVENOUS at 21:01

## 2024-07-06 RX ADMIN — LEVOTHYROXINE SODIUM 88 MCG: 0.09 TABLET ORAL at 05:58

## 2024-07-06 RX ADMIN — SODIUM CHLORIDE, PRESERVATIVE FREE 10 ML: 5 INJECTION INTRAVENOUS at 09:53

## 2024-07-06 RX ADMIN — SUCRALFATE 1 G: 1 TABLET ORAL at 00:19

## 2024-07-06 RX ADMIN — DEXTROSE AND SODIUM CHLORIDE: 5; 450 INJECTION, SOLUTION INTRAVENOUS at 23:06

## 2024-07-06 RX ADMIN — LABETALOL HYDROCHLORIDE 100 MG: 100 TABLET, FILM COATED ORAL at 09:52

## 2024-07-06 RX ADMIN — PANTOPRAZOLE SODIUM 40 MG: 40 INJECTION, POWDER, FOR SOLUTION INTRAVENOUS at 20:58

## 2024-07-06 RX ADMIN — CALCIUM ACETATE 1334 MG: 667 CAPSULE ORAL at 17:59

## 2024-07-06 ASSESSMENT — PAIN SCALES - GENERAL: PAINLEVEL_OUTOF10: 0

## 2024-07-06 NOTE — PLAN OF CARE
Problem: Discharge Planning  Goal: Discharge to home or other facility with appropriate resources  7/6/2024 1512 by Phuong Grove RN  Outcome: Progressing  7/6/2024 0139 by Rylie Warren RN  Outcome: Progressing     Problem: Skin/Tissue Integrity  Goal: Absence of new skin breakdown  Description: 1.  Monitor for areas of redness and/or skin breakdown  2.  Assess vascular access sites hourly  3.  Every 4-6 hours minimum:  Change oxygen saturation probe site  4.  Every 4-6 hours:  If on nasal continuous positive airway pressure, respiratory therapy assess nares and determine need for appliance change or resting period.  7/6/2024 1512 by Phuong Grove RN  Outcome: Progressing  7/6/2024 0139 by Rylie Warren RN  Outcome: Progressing     Problem: Safety - Adult  Goal: Free from fall injury  7/6/2024 1512 by Phuong Grove RN  Outcome: Progressing  7/6/2024 0139 by Rylie Warren RN  Outcome: Progressing     Problem: Chronic Conditions and Co-morbidities  Goal: Patient's chronic conditions and co-morbidity symptoms are monitored and maintained or improved  Outcome: Progressing     Problem: Pain  Goal: Verbalizes/displays adequate comfort level or baseline comfort level  Outcome: Progressing

## 2024-07-06 NOTE — CONSULTS
[facility] Nephrology Consult       Patient's Name:  Trina Wang  Date of Service:  2024  Requesting    Angel Nicholson DO    Reason for Consult:              ESRD     Chief Complaint:                    Severe anemia     Information obtained from:   Patient , son , chart     History of Present Illness: 79 yrs old WF     Known h/o ESRD HD TTS at [facility]  vial rt forearm AVF     Sent in from Dialysis unit as was noted anemia HB 5.7 , ER arrival HB was noted 5.6 s/p 2 units of PRBC transfusion     EGD yesterday suggested bleeding lesion greater curvature of stomach ( Dieulafoy   Lesion) which was injected  with epi and cauterized     Patient feels ok today , no complains due for another blood tr and dialysis today       Past Medical History:   Diagnosis Date    Acute loss of vision, left 2022    Arthritis     CAD (coronary artery disease)     follows with Dr. Jansen    CKD (chronic kidney disease)     stage 3 / follows with Dr. Trinidad    Depression     deny    History of blood transfusion     had with childbirth    Hypertension     Left carotid stenosis 2022    Respiratory failure (HCC)     history of 2014  when had stroke, trach  x 2 weeks    Thyroid disease     Tracheal stenosis     Unspecified cerebral artery occlusion with cerebral infarction 2014    right side flacid, hemorrhagic stroke dt elevated BP/residual s/s is at right side loss of fine motor skills and some weakness, drags right leg, slight memory loss and aphasia when she is tired         Past Surgical History:   Procedure Laterality Date     SECTION      1    COLONOSCOPY      CORONARY ANGIOPLASTY WITH STENT PLACEMENT      CT BIOPSY RENAL  2023    CT BIOPSY RENAL 2023 OSIEL Mason MD [facility] CT    CYST REMOVAL      DIALYSIS FISTULA CREATION Right 3/4/2024    creation AVF right arm performed by Haley Hubbard MD at [facility] OR    ENDOSCOPY, COLON, DIAGNOSTIC

## 2024-07-06 NOTE — FLOWSHEET NOTE
07/06/24 1705   Vital Signs   BP (!) 154/73   Temp 98.2 °F (36.8 °C)   Pulse 65   Respirations 16   Weight - Scale 74.7 kg (164 lb 10.9 oz)   Weight Method Stated   Percent Weight Change -2.66   Pain Assessment   Pain Assessment None - Denies Pain   Pain Level 0   Hemodialysis Fistula/Graft Arteriovenous fistula Right Forearm   Placement Date/Time: 03/04/24 (c)    Inserted by: DR PARKER  Access Type: Arteriovenous fistula  Orientation: Right  Access Location: Forearm   Thrill Present   Bruit Present   Status Deaccessed   Post-Hemodialysis Assessment   Post-Treatment Procedures Blood returned;Access bleeding time < 10 minutes   Machine Disinfection Process Exterior Machine Disinfection;Acid/Vinegar Clean;Heat Disinfect   Rinseback Volume (ml) 300 ml   Blood Volume Processed (Liters) 60 L   Dialyzer Clearance Moderately streaked   Duration of Treatment (minutes) 225 minutes   Heparin Amount Administered During Treatment (mL) 0 mL   Hemodialysis Intake (ml) 600 ml   Hemodialysis Output (ml) 2600 ml   NET Removed (ml) 2000   Tolerated Treatment Good   Patient Response to Treatment pt tolerated tx well 2000mL removed stasis achieved bruit/thrill noted nurse to nurse report given to floor RN pt/vitals stable upon d/c dialysis unit   Patient Disposition Return to room

## 2024-07-07 LAB
ABO/RH: NORMAL
ANION GAP SERPL CALCULATED.3IONS-SCNC: 10 MMOL/L (ref 7–16)
ANTIBODY SCREEN: NEGATIVE
ARM BAND NUMBER: NORMAL
BLOOD BANK BLOOD PRODUCT EXPIRATION DATE: NORMAL
BLOOD BANK DISPENSE STATUS: NORMAL
BLOOD BANK ISBT PRODUCT BLOOD TYPE: 9500
BLOOD BANK PRODUCT CODE: NORMAL
BLOOD BANK SAMPLE EXPIRATION: NORMAL
BLOOD BANK UNIT TYPE AND RH: NORMAL
BPU ID: NORMAL
BUN SERPL-MCNC: 34 MG/DL (ref 6–23)
CALCIUM SERPL-MCNC: 7.9 MG/DL (ref 8.6–10.2)
CHLORIDE SERPL-SCNC: 101 MMOL/L (ref 98–107)
CO2 SERPL-SCNC: 26 MMOL/L (ref 22–29)
COMPONENT: NORMAL
CREAT SERPL-MCNC: 6 MG/DL (ref 0.5–1)
CROSSMATCH RESULT: NORMAL
ERYTHROCYTE [DISTWIDTH] IN BLOOD BY AUTOMATED COUNT: 18.5 % (ref 11.5–15)
GFR, ESTIMATED: 7 ML/MIN/1.73M2
GLUCOSE BLD-MCNC: 118 MG/DL (ref 74–99)
GLUCOSE BLD-MCNC: 94 MG/DL (ref 74–99)
GLUCOSE BLD-MCNC: 99 MG/DL (ref 74–99)
GLUCOSE SERPL-MCNC: 87 MG/DL (ref 74–99)
HCT VFR BLD AUTO: 26.7 % (ref 34–48)
HCT VFR BLD AUTO: 27.6 % (ref 34–48)
HCT VFR BLD AUTO: 28.6 % (ref 34–48)
HCT VFR BLD AUTO: 30.8 % (ref 34–48)
HGB BLD-MCNC: 8.8 G/DL (ref 11.5–15.5)
HGB BLD-MCNC: 9.2 G/DL (ref 11.5–15.5)
HGB BLD-MCNC: 9.4 G/DL (ref 11.5–15.5)
HGB BLD-MCNC: 9.8 G/DL (ref 11.5–15.5)
MCH RBC QN AUTO: 30.2 PG (ref 26–35)
MCHC RBC AUTO-ENTMCNC: 31.8 G/DL (ref 32–34.5)
MCV RBC AUTO: 95.1 FL (ref 80–99.9)
PLATELET # BLD AUTO: 102 K/UL (ref 130–450)
PMV BLD AUTO: 10 FL (ref 7–12)
POTASSIUM SERPL-SCNC: 3.8 MMOL/L (ref 3.5–5)
RBC # BLD AUTO: 3.24 M/UL (ref 3.5–5.5)
SODIUM SERPL-SCNC: 137 MMOL/L (ref 132–146)
TRANSFUSION STATUS: NORMAL
UNIT DIVISION: 0
UNIT ISSUE DATE/TIME: NORMAL
WBC OTHER # BLD: 4.2 K/UL (ref 4.5–11.5)

## 2024-07-07 PROCEDURE — 85018 HEMOGLOBIN: CPT

## 2024-07-07 PROCEDURE — 80048 BASIC METABOLIC PNL TOTAL CA: CPT

## 2024-07-07 PROCEDURE — 6360000002 HC RX W HCPCS: Performed by: HOSPITALIST

## 2024-07-07 PROCEDURE — 2580000003 HC RX 258: Performed by: HOSPITALIST

## 2024-07-07 PROCEDURE — 6370000000 HC RX 637 (ALT 250 FOR IP): Performed by: SURGERY

## 2024-07-07 PROCEDURE — 6370000000 HC RX 637 (ALT 250 FOR IP): Performed by: HOSPITALIST

## 2024-07-07 PROCEDURE — 2500000003 HC RX 250 WO HCPCS: Performed by: HOSPITALIST

## 2024-07-07 PROCEDURE — 85014 HEMATOCRIT: CPT

## 2024-07-07 PROCEDURE — 82962 GLUCOSE BLOOD TEST: CPT

## 2024-07-07 PROCEDURE — 85027 COMPLETE CBC AUTOMATED: CPT

## 2024-07-07 PROCEDURE — 1200000000 HC SEMI PRIVATE

## 2024-07-07 RX ADMIN — CALCIUM ACETATE 1334 MG: 667 CAPSULE ORAL at 16:38

## 2024-07-07 RX ADMIN — SUCRALFATE 1 G: 1 TABLET ORAL at 00:19

## 2024-07-07 RX ADMIN — CALCIUM ACETATE 1334 MG: 667 CAPSULE ORAL at 09:33

## 2024-07-07 RX ADMIN — CITALOPRAM HYDROBROMIDE 20 MG: 20 TABLET ORAL at 09:32

## 2024-07-07 RX ADMIN — SODIUM CHLORIDE, PRESERVATIVE FREE 10 ML: 5 INJECTION INTRAVENOUS at 20:16

## 2024-07-07 RX ADMIN — PANTOPRAZOLE SODIUM 40 MG: 40 INJECTION, POWDER, FOR SOLUTION INTRAVENOUS at 09:32

## 2024-07-07 RX ADMIN — PANTOPRAZOLE SODIUM 40 MG: 40 INJECTION, POWDER, FOR SOLUTION INTRAVENOUS at 20:16

## 2024-07-07 RX ADMIN — LABETALOL HYDROCHLORIDE 100 MG: 100 TABLET, FILM COATED ORAL at 20:15

## 2024-07-07 RX ADMIN — SUCRALFATE 1 G: 1 TABLET ORAL at 11:31

## 2024-07-07 RX ADMIN — LABETALOL HYDROCHLORIDE 100 MG: 100 TABLET, FILM COATED ORAL at 09:31

## 2024-07-07 RX ADMIN — LEVOTHYROXINE SODIUM 88 MCG: 0.09 TABLET ORAL at 05:52

## 2024-07-07 RX ADMIN — SUCRALFATE 1 G: 1 TABLET ORAL at 23:40

## 2024-07-07 RX ADMIN — SUCRALFATE 1 G: 1 TABLET ORAL at 16:38

## 2024-07-07 RX ADMIN — SUCRALFATE 1 G: 1 TABLET ORAL at 05:52

## 2024-07-07 RX ADMIN — CALCIUM ACETATE 1334 MG: 667 CAPSULE ORAL at 11:31

## 2024-07-07 RX ADMIN — SODIUM CHLORIDE, PRESERVATIVE FREE 10 ML: 5 INJECTION INTRAVENOUS at 09:33

## 2024-07-07 ASSESSMENT — PAIN SCALES - GENERAL: PAINLEVEL_OUTOF10: 0

## 2024-07-07 NOTE — PLAN OF CARE
Problem: Discharge Planning  Goal: Discharge to home or other facility with appropriate resources  7/7/2024 0049 by Ally Lund RN  Outcome: Progressing     Problem: Skin/Tissue Integrity  Goal: Absence of new skin breakdown  Description: 1.  Monitor for areas of redness and/or skin breakdown  2.  Assess vascular access sites hourly  3.  Every 4-6 hours minimum:  Change oxygen saturation probe site  4.  Every 4-6 hours:  If on nasal continuous positive airway pressure, respiratory therapy assess nares and determine need for appliance change or resting period.  7/7/2024 0049 by Ally Lund, RN  Outcome: Progressing     Problem: Safety - Adult  Goal: Free from fall injury  7/7/2024 0049 by Ally Lund RN  Outcome: Progressing     Problem: Chronic Conditions and Co-morbidities  Goal: Patient's chronic conditions and co-morbidity symptoms are monitored and maintained or improved  7/7/2024 0049 by Ally Lund, RN  Outcome: Progressing     Problem: Pain  Goal: Verbalizes/displays adequate comfort level or baseline comfort level  7/7/2024 0049 by Ally Lund, RN  Outcome: Progressing

## 2024-07-07 NOTE — PLAN OF CARE
Problem: Skin/Tissue Integrity  Goal: Absence of new skin breakdown  7/7/2024 1218 by Edwina Puentes RN  Outcome: Progressing  7/7/2024 0049 by Ally Lund RN  Outcome: Progressing     Problem: Safety - Adult  Goal: Free from fall injury  7/7/2024 0049 by Ally Lund RN  Outcome: Progressing     Problem: Chronic Conditions and Co-morbidities  Goal: Patient's chronic conditions and co-morbidity symptoms are monitored and maintained or improved  7/7/2024 1218 by Edwina Puentes RN  Outcome: Progressing  7/7/2024 0049 by Ally Lund RN  Outcome: Progressing     Problem: Pain  Goal: Verbalizes/displays adequate comfort level or baseline comfort level  7/7/2024 1218 by Edwina Puentes RN  Outcome: Progressing  7/7/2024 0049 by Ally Lund RN  Outcome: Progressing

## 2024-07-07 NOTE — DISCHARGE SUMMARY
Internal Medicine Discharge Summary    NAME: Trina Wang :  1945  MRN:  [id number] PCP:Angel Nicholson DO    ADMITTED: 2024   DISCHARGED: 2024  2:52 PM    ADMITTING PHYSICIAN: Brenton Blancas MD    PCP: Angel Nicholson DO    CONSULTANT(S):   IP CONSULT TO INTERNAL MEDICINE  IP CONSULT TO GENERAL SURGERY  IP CONSULT TO NEPHROLOGY     ADMITTING DIAGNOSIS:   ESRD on dialysis (HCC) [N18.6, Z99.2]  Anemia requiring transfusions [D64.9]  Gastrointestinal hemorrhage, unspecified gastrointestinal hemorrhage type [K92.2]  Acute anemia [D64.9]     Please see H&P for further details    DISCHARGE DIAGNOSES:   Active Hospital Problems    Diagnosis     Acute anemia [D64.9]     Type 2 diabetes mellitus [E11.9]     ESRD (end stage renal disease) on dialysis (HCC) [N18.6, Z99.2]     Hypothyroidism [E03.9]     Mixed hyperlipidemia [E78.2]     CAD (coronary artery disease), native coronary artery [I25.10]     Essential hypertension [I10]        BRIEF HISTORY OF PRESENT ILLNESS: Trina Wang is a 79 y.o. female patient of Angel Nicholson DO who  has a past medical history of Acute loss of vision, left, Arthritis, CAD (coronary artery disease), CKD (chronic kidney disease), Depression, History of blood transfusion, Hypertension, Left carotid stenosis, Respiratory failure (HCC), Thyroid disease, Tracheal stenosis, and Unspecified cerebral artery occlusion with cerebral infarction. who originally had concerns including Abnormal Lab (Hemoglobin 5.7 at HD today ). at presentation on 2024, and was found to have ESRD on dialysis (HCC) [N18.6, Z99.2]  Anemia requiring transfusions [D64.9]  Gastrointestinal hemorrhage, unspecified gastrointestinal hemorrhage type [K92.2]  Acute anemia [D64.9] after workup.    Please see H&P for further details.    HOSPITAL COURSE:   The patient presented to the hospital with the chief complaint of Abnormal Lab (Hemoglobin 5.7 at HD today )  . The patient was admitted 
14-Nov-2017 11:35

## 2024-07-08 VITALS
OXYGEN SATURATION: 98 % | HEART RATE: 63 BPM | RESPIRATION RATE: 18 BRPM | SYSTOLIC BLOOD PRESSURE: 164 MMHG | TEMPERATURE: 98.1 F | WEIGHT: 174.4 LBS | DIASTOLIC BLOOD PRESSURE: 74 MMHG | HEIGHT: 63 IN | BODY MASS INDEX: 30.9 KG/M2

## 2024-07-08 LAB
ANION GAP SERPL CALCULATED.3IONS-SCNC: 16 MMOL/L (ref 7–16)
BUN SERPL-MCNC: 52 MG/DL (ref 6–23)
CALCIUM SERPL-MCNC: 7.7 MG/DL (ref 8.6–10.2)
CHLORIDE SERPL-SCNC: 103 MMOL/L (ref 98–107)
CO2 SERPL-SCNC: 21 MMOL/L (ref 22–29)
CREAT SERPL-MCNC: 8.8 MG/DL (ref 0.5–1)
GFR, ESTIMATED: 4 ML/MIN/1.73M2
GLUCOSE BLD-MCNC: 135 MG/DL (ref 74–99)
GLUCOSE BLD-MCNC: 94 MG/DL (ref 74–99)
GLUCOSE SERPL-MCNC: 81 MG/DL (ref 74–99)
HCT VFR BLD AUTO: 26.6 % (ref 34–48)
HGB BLD-MCNC: 8.6 G/DL (ref 11.5–15.5)
POTASSIUM SERPL-SCNC: 3.9 MMOL/L (ref 3.5–5)
SODIUM SERPL-SCNC: 140 MMOL/L (ref 132–146)

## 2024-07-08 PROCEDURE — 6360000002 HC RX W HCPCS: Performed by: HOSPITALIST

## 2024-07-08 PROCEDURE — 80048 BASIC METABOLIC PNL TOTAL CA: CPT

## 2024-07-08 PROCEDURE — 36415 COLL VENOUS BLD VENIPUNCTURE: CPT

## 2024-07-08 PROCEDURE — 82962 GLUCOSE BLOOD TEST: CPT

## 2024-07-08 PROCEDURE — 2580000003 HC RX 258: Performed by: HOSPITALIST

## 2024-07-08 PROCEDURE — 2500000003 HC RX 250 WO HCPCS: Performed by: HOSPITALIST

## 2024-07-08 PROCEDURE — 6370000000 HC RX 637 (ALT 250 FOR IP): Performed by: SURGERY

## 2024-07-08 PROCEDURE — 85018 HEMOGLOBIN: CPT

## 2024-07-08 PROCEDURE — 6370000000 HC RX 637 (ALT 250 FOR IP): Performed by: HOSPITALIST

## 2024-07-08 PROCEDURE — 85014 HEMATOCRIT: CPT

## 2024-07-08 RX ORDER — SUCRALFATE 1 G/1
1 TABLET ORAL 4 TIMES DAILY
Qty: 120 TABLET | Refills: 0 | Status: SHIPPED | OUTPATIENT
Start: 2024-07-08

## 2024-07-08 RX ORDER — PANTOPRAZOLE SODIUM 40 MG/1
40 TABLET, DELAYED RELEASE ORAL
Qty: 60 TABLET | Refills: 0 | Status: SHIPPED | OUTPATIENT
Start: 2024-07-08

## 2024-07-08 RX ADMIN — CALCIUM ACETATE 1334 MG: 667 CAPSULE ORAL at 11:49

## 2024-07-08 RX ADMIN — CALCIUM ACETATE 1334 MG: 667 CAPSULE ORAL at 07:57

## 2024-07-08 RX ADMIN — SODIUM CHLORIDE, PRESERVATIVE FREE 10 ML: 5 INJECTION INTRAVENOUS at 07:58

## 2024-07-08 RX ADMIN — SUCRALFATE 1 G: 1 TABLET ORAL at 06:43

## 2024-07-08 RX ADMIN — LABETALOL HYDROCHLORIDE 100 MG: 100 TABLET, FILM COATED ORAL at 07:57

## 2024-07-08 RX ADMIN — SUCRALFATE 1 G: 1 TABLET ORAL at 11:49

## 2024-07-08 RX ADMIN — PANTOPRAZOLE SODIUM 40 MG: 40 INJECTION, POWDER, FOR SOLUTION INTRAVENOUS at 09:42

## 2024-07-08 RX ADMIN — CITALOPRAM HYDROBROMIDE 20 MG: 20 TABLET ORAL at 07:57

## 2024-07-08 RX ADMIN — LEVOTHYROXINE SODIUM 88 MCG: 0.09 TABLET ORAL at 06:43

## 2024-07-08 ASSESSMENT — PAIN SCALES - GENERAL: PAINLEVEL_OUTOF10: 0

## 2024-07-08 NOTE — CARE COORDINATION
7/8/2024  Social Work Discharge Planning:Plan is home with niece. Pt has a ww and is independent. Electronically signed by ADRI Ruiz on 7/8/2024 at 9:49 AM

## 2024-07-08 NOTE — PROGRESS NOTES
4 Eyes Skin Assessment     NAME:  Trina Wang  YOB: 1945  MEDICAL RECORD NUMBER:  [id number]    The patient is being assessed for  Admission    I agree that at least one RN has performed a thorough Head to Toe Skin Assessment on the patient. ALL assessment sites listed below have been assessed.      Areas assessed by both nurses:    Head, Face, Ears, Shoulders, Back, Chest, Arms, Elbows, Hands, Sacrum. Buttock, Coccyx, Ischium, and Legs. Feet and Heels  Blanchable redness to buttocks.    Soft and boggy heels.        Does the Patient have a Wound? No noted wound(s)       Man Prevention initiated by RN: No  Wound Care Orders initiated by RN: No    Pressure Injury (Stage 3,4, Unstageable, DTI, NWPT, and Complex wounds) if present, place Wound referral order by RN under : No    New Ostomies, if present place, Ostomy referral order under : No     Nurse 1 eSignature: Electronically signed by Phuong Grove RN on 7/4/24 at 5:46 PM EDT    **SHARE this note so that the co-signing nurse can place an eSignature**    Nurse 2 eSignature: Electronically signed by Triny Ding RN on 7/4/24 at 6:13 PM EDT    
CLINICAL PHARMACY NOTE: MEDS TO BEDS    Total # of Prescriptions Filled: 2   The following medications were delivered to the patient:  Carafate 1 gm  Protonix 40 mg       Additional Documentation:    
Call out to Dr Carrera on possible dc, changing H and H freq and possible dc in the morning if hemoglobin doesn't drop   
Consult completed to Dr. Reeves via answering service - also forwarded to residents     Electronically signed by Lorena Yeboah RN on 7/5/2024 at 9:52 AM    
Contacted Safia Bashir covering for Yonny via [employer]. Notified her of pt dialysis schedule and whether that schedule would be continued in the morning. No new orders at this time, told to check with provider that is here in the morning.  
GENERAL SURGERY  DAILY PROGRESS NOTE    Patient's Name/Date of Birth: Trina Wang / 1945    Date: 2024     Chief Complaint   Patient presents with    Abnormal Lab     Hemoglobin 5.7 at HD today         Subjective:  Pt resting in bed. States she had dark brown bowel movement. Has been tolerating PO intake.      Objective:  Last 24Hrs  Temp  Av.2 °F (36.8 °C)  Min: 98.1 °F (36.7 °C)  Max: 98.2 °F (36.8 °C)  Resp  Av  Min: 18  Max: 18  Pulse  Av.5  Min: 60  Max: 63  Systolic (24hrs), Av , Min:142 , Max:164     Diastolic (24hrs), Av, Min:73, Max:74    SpO2  Av %  Min: 98 %  Max: 98 %    No intake/output data recorded.      General: In no acute distress, alert and oriented x4  Cardiovascular: Warm throughout, no edema  Respiratory: no respiratory distress, equal chest rise  Abdomen: soft,  nontender, nondistended  Skin: no obvious rashes or lesions appreciated, no jaundice  Extremities: atraumatic      CBC  Recent Labs     24  0600 24  0615 24  0604 24  1245 24  1741 24  0635   WBC 3.1*  --  4.2*  --   --   --    RBC 2.26*  --  3.24*  --   --   --    HGB 7.0*   < > 9.8* 9.2* 8.8* 8.6*   HCT 21.6*   < > 30.8* 27.6* 26.7* 26.6*   MCV 95.6  --  95.1  --   --   --    MCH 31.0  --  30.2  --   --   --    MCHC 32.4  --  31.8*  --   --   --    RDW 18.7*  --  18.5*  --   --   --    *  --  102*  --   --   --    MPV 10.3  --  10.0  --   --   --     < > = values in this interval not displayed.       CMP  Recent Labs     24  0624  0604    137   K 4.0 3.8   CL 98 101   CO2 23 26   BUN 60* 34*   CREATININE 9.2* 6.0*   GLUCOSE 80 87   CALCIUM 7.7* 7.9*         Assessment/Plan:    Patient Active Problem List   Diagnosis    Essential hypertension    Malignant hypertension    CVA (cerebrovascular accident due to intracerebral hemorrhage) (HCC)    CAD (coronary artery disease), native coronary artery    Mixed hyperlipidemia    
Hygiene care provided to patient, bed bath, linen changed, and gown.   
Internal Medicine Progress Note    Patient's name: Trina Wang  : 1945  Chief complaints (on day of admission): Abnormal Lab (Hemoglobin 5.7 at HD today )  Admission date: 2024  Date of service: 2024   Room: [address] MED SURG/TELE  Primary care physician: Angel Nicholson DO  Reason for visit: Follow-up for anemia, GIB    Subjective  Trina is seen lying in bed awake and alert in no distress.  She reports that she is feeling well today.  She denies any nausea or vomiting.  Denies any pain or discomfort.  She reports that she is hungry, asking if she could have a snack.  She denies any fever or chills.  Denies any shortness of breath or difficulty breathing.  She is asking if she will be getting her dialysis today - discussed with nursing and consulted Nephrology for HD management.  No other issues or concerns from nursing.    Review of Systems  Full 10 point review of systems negative unless mentioned above.    Hospital Medications  Current Facility-Administered Medications   Medication Dose Route Frequency Provider Last Rate Last Admin    dextrose 5 % and 0.45 % sodium chloride infusion   IntraVENous Continuous Brenton Blancas MD 50 mL/hr at 24 1530 New Bag at 24 1530    sucralfate (CARAFATE) tablet 1 g  1 g Oral 4 times per day Uriel Reeves MD   1 g at 24    insulin lispro (HUMALOG,ADMELOG) injection vial 0-4 Units  0-4 Units SubCUTAneous TID Brenton Diaz MD        0.9 % sodium chloride infusion   IntraVENous PRN Brenton Blancas MD        calcium acetate (PHOSLO) capsule 1,334 mg  2 capsule Oral TID Brenton Diaz MD   1,334 mg at 24 08    citalopram (CELEXA) tablet 20 mg  20 mg Oral Daily Brenton Blancas MD   20 mg at 24    labetalol (NORMODYNE) tablet 100 mg  100 mg Oral 2 times per day Brenton Blancas MD   100 mg at 24    levothyroxine (SYNTHROID) tablet 88 mcg  88 mcg Oral Daily Brenton Blancas MD   88 mcg at 24    
Internal Medicine Progress Note    Patient's name: Trina Wang  : 1945  Chief complaints (on day of admission): Abnormal Lab (Hemoglobin 5.7 at HD today )  Admission date: 2024  Date of service: 2024   Room: [address] MED SURG/TELE  Primary care physician: Angel Nicholson DO  Reason for visit: Follow-up for anemia, GIB    Subjective  Trina is seen lying in bed asleep in no distress.  She does easily awaken to voice.  She reports that she is feeling well today.  She denies any shortness of breath or difficulty breathing.  Denies any nausea or vomiting.  She denies any pain or discomfort.  She did receive an additional unit of PRBC yesterday with dialysis, asking what her hemoglobin is this morning.  She was started on a bland diet yesterday and is tolerating well.  She is asking if she may be able to go home today.  No other issues or concerns from nursing.    Review of Systems  Full 10 point review of systems negative unless mentioned above.    Hospital Medications  Current Facility-Administered Medications   Medication Dose Route Frequency Provider Last Rate Last Admin    0.9 % sodium chloride infusion   IntraVENous PRN Courtney Urbina MD        sucralfate (CARAFATE) tablet 1 g  1 g Oral 4 times per day Uriel Reeves MD   1 g at 24 0552    insulin lispro (HUMALOG,ADMELOG) injection vial 0-4 Units  0-4 Units SubCUTAneous TID Brenton Diaz MD        0.9 % sodium chloride infusion   IntraVENous PRN Brenton Blancas MD        calcium acetate (PHOSLO) capsule 1,334 mg  2 capsule Oral TID Brenton Diaz MD   1,334 mg at 24 175    citalopram (CELEXA) tablet 20 mg  20 mg Oral Daily Brenton Blancas MD   20 mg at 24 09    labetalol (NORMODYNE) tablet 100 mg  100 mg Oral 2 times per day Brenton lBancas MD   100 mg at 24    levothyroxine (SYNTHROID) tablet 88 mcg  88 mcg Oral Daily Brenton Blancas MD   88 mcg at 24 0552    sodium chloride flush 0.9 % injection 
Message sent to  Viki OSBORN regarding tele order expiring. An order to continue telemetry from [facility]iki OSB[facility].   
Nephro consult sent via perfect serve at [phone]  
Notified Dr. Blancas via perfect serve of need for admission orders.   
Progress Note  7/7/2024 10:51 AM  Subjective:   Admit Date: 7/4/2024  PCP: Angel Nicholson, DO  Interval History: Patient examined doing better having her B fast , no complains , received 1 unit PRBC at dialysis yesterday     Diet: ADULT DIET; Regular; GI [place] (GERD/Peptic Ulcer)    Data:   Scheduled Meds:   sucralfate  1 g Oral 4 times per day    insulin lispro  0-4 Units SubCUTAneous TID WC    calcium acetate  2 capsule Oral TID WC    citalopram  20 mg Oral Daily    labetalol  100 mg Oral 2 times per day    levothyroxine  88 mcg Oral Daily    sodium chloride flush  5-40 mL IntraVENous 2 times per day    pantoprazole (PROTONIX) 40 mg in sodium chloride (PF) 0.9 % 10 mL injection  40 mg IntraVENous Q12H     Continuous Infusions:   sodium chloride      sodium chloride      sodium chloride      sodium chloride       PRN Meds:sodium chloride, sodium chloride, sodium chloride flush, sodium chloride, acetaminophen **OR** acetaminophen, prochlorperazine **OR** prochlorperazine, sodium chloride  I/O last 3 completed shifts:  In: 600   Out: 2600   No intake/output data recorded.    Intake/Output Summary (Last 24 hours) at 7/7/2024 1051  Last data filed at 7/6/2024 1705  Gross per 24 hour   Intake 600 ml   Output 2600 ml   Net -2000 ml     CBC:   Recent Labs     07/05/24  0215 07/05/24  0929 07/06/24  0600 07/06/24  0615 07/06/24  1814 07/07/24  0030 07/07/24  0604   WBC 3.4*  --  3.1*  --   --   --  4.2*   HGB 7.3*   < > 7.0*   < > 9.3* 9.4* 9.8*   *  --  108*  --   --   --  102*    < > = values in this interval not displayed.     BMP:    Recent Labs     07/05/24  0215 07/06/24  0600 07/07/24  0604    135 137   K 3.9 4.0 3.8   CL 98 98 101   CO2 26 23 26   BUN 48* 60* 34*   CREATININE 6.4* 9.2* 6.0*   GLUCOSE 80 80 87     Hepatic:   Recent Labs     07/04/24  1401   AST 11   ALT 6   BILITOT 0.2   ALKPHOS 52     Troponin: No results for input(s): \"TROPONINI\" in the last 72 hours.  BNP: No results for 
Progress note:  Overall, the patient is doing well.  The patient denies abdominal pain.  Afebrile  Slight hypertension  No recorded intake/output or bowel movements  Hemoglobin 7.0  Hematocrit 21.4  Physical examination:  The abdomen is soft, nondistended and nontender  Assessment:  Postoperative day 1  Therapeutic EGD for a possible Dieulafoy lesion  Hemoglobin and hematocrit are stable  Plan:  Proton pump inhibitor twice a day  Carafate 4 times a day  Pratt diet  Monitor hemoglobin and hematocrit  Uriel Reeves MD  
Progress note:  Overall, the patient is doing well.  The patient denies melena and hematochezia.  Afebrile  Normal pulse rate and respirations.  Hemoglobin 9.8  Hematocrit 30.8  Physical examination:  The abdomen is soft, nondistended and nontender  Assessment:  Dieulafoy versus ulceration with control of bleeding on 7/5/2024.  Plan:  Continue to monitor the patient's hemoglobin and hematocrit  Continue the proton pump inhibitor  Continue Carafate  Continue a bland diet.  Uriel Reeves MD  
Report called to floor RN  
[facility]  Nephrology Progress Note  7/8/2024 3:13 PM  Subjective:   Admit Date: 7/4/2024    PCP: Angel Nicholson, DO    Interval History: seen and examined, doing well, patient being discharged today. Hb with slower decline today, stable level. Not having any dark tarry BM at present.      Diet: ADULT DIET; Regular; GI [place] (GERD/Peptic Ulcer)    Data:   Scheduled Meds:   sucralfate  1 g Oral 4 times per day    insulin lispro  0-4 Units SubCUTAneous TID WC    calcium acetate  2 capsule Oral TID WC    citalopram  20 mg Oral Daily    labetalol  100 mg Oral 2 times per day    levothyroxine  88 mcg Oral Daily    sodium chloride flush  5-40 mL IntraVENous 2 times per day    pantoprazole (PROTONIX) 40 mg in sodium chloride (PF) 0.9 % 10 mL injection  40 mg IntraVENous Q12H     Continuous Infusions:   sodium chloride      sodium chloride      sodium chloride       PRN Meds:sodium chloride, sodium chloride flush, sodium chloride, acetaminophen **OR** acetaminophen, prochlorperazine **OR** prochlorperazine, sodium chloride  No intake/output data recorded.  No intake/output data recorded.  No intake or output data in the 24 hours ending 07/08/24 1513    CBC:   Recent Labs     07/06/24  0600 07/06/24  0615 07/07/24  0604 07/07/24  1245 07/07/24  1741 07/08/24  0635   WBC 3.1*  --  4.2*  --   --   --    HGB 7.0*   < > 9.8* 9.2* 8.8* 8.6*   *  --  102*  --   --   --     < > = values in this interval not displayed.     BMP:    Recent Labs     07/06/24  0600 07/07/24  0604 07/08/24  0635    137 140   K 4.0 3.8 3.9   CL 98 101 103   CO2 23 26 21*   BUN 60* 34* 52*   CREATININE 9.2* 6.0* 8.8*   GLUCOSE 80 87 81     Hepatic:   No results for input(s): \"AST\", \"ALT\", \"BILITOT\", \"ALKPHOS\" in the last 72 hours.    Invalid input(s): \"ALB\"    Troponin: No results for input(s): \"TROPONINI\" in the last 72 hours.  BNP: No results for input(s): \"BNP\" in the last 72 hours.  Lipids: No results for input(s): \"CHOL\", \"HDL\" 
Verbal and written discharge instructions reviewed with patient and daughter, voiced understanding. Discharged off the unit via wheelchair and staff.   
injection 5-40 mL  5-40 mL IntraVENous PRN Brenton Blancas MD        0.9 % sodium chloride infusion   IntraVENous PRN Brenton Blancas MD        acetaminophen (TYLENOL) tablet 650 mg  650 mg Oral Q6H PRN Brenton Blancas MD        Or    acetaminophen (TYLENOL) suppository 650 mg  650 mg Rectal Q6H PRN Brenton Blancas MD        pantoprazole (PROTONIX) 40 mg in sodium chloride (PF) 0.9 % 10 mL injection  40 mg IntraVENous Q12H Brenton Blancas MD   40 mg at 07/07/24 2016    prochlorperazine (COMPAZINE) tablet 5 mg  5 mg Oral Q8H PRN Brenton Blancas MD        Or    prochlorperazine (COMPAZINE) injection 10 mg  10 mg IntraVENous Q6H PRN Brenton Blancas MD        0.9 % sodium chloride infusion   IntraVENous PRN Brenton Blancas MD           PRN Medications  sodium chloride, sodium chloride flush, sodium chloride, acetaminophen **OR** acetaminophen, prochlorperazine **OR** prochlorperazine, sodium chloride    Objective  Most Recent Recorded Vitals  BP (!) 164/74   Pulse 63   Temp 98.1 °F (36.7 °C) (Oral)   Resp 18   Ht 1.6 m (5' 3\")   Wt 79.1 kg (174 lb 6.4 oz)   SpO2 98%   BMI 30.89 kg/m²   No intake/output data recorded.  No intake/output data recorded.    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

## 2024-07-19 LAB
ALBUMIN: 4.2 G/DL
ALP BLD-CCNC: ABNORMAL U/L
ALT SERPL-CCNC: ABNORMAL U/L
ANION GAP SERPL CALCULATED.3IONS-SCNC: ABNORMAL MMOL/L
AST SERPL-CCNC: ABNORMAL U/L
BASOPHILS ABSOLUTE: ABNORMAL
BASOPHILS RELATIVE PERCENT: 1.4 %
BILIRUB SERPL-MCNC: ABNORMAL MG/DL
BUN BLDV-MCNC: ABNORMAL MG/DL
CALCIUM SERPL-MCNC: 9.5 MG/DL
CHLORIDE BLD-SCNC: 99 MMOL/L
CO2: ABNORMAL
CREAT SERPL-MCNC: ABNORMAL MG/DL
EOSINOPHILS ABSOLUTE: ABNORMAL
EOSINOPHILS RELATIVE PERCENT: 1.7 %
GFR, ESTIMATED: ABNORMAL
GLUCOSE BLD-MCNC: 148 MG/DL
HCT VFR BLD CALC: 32.4 % (ref 36–46)
HEMOGLOBIN: 10.9 G/DL (ref 12–16)
IRON: 114
LYMPHOCYTES ABSOLUTE: ABNORMAL
LYMPHOCYTES RELATIVE PERCENT: 14.5 %
MAGNESIUM: 2.2 MG/DL
MCH RBC QN AUTO: ABNORMAL PG
MCHC RBC AUTO-ENTMCNC: ABNORMAL G/DL
MCV RBC AUTO: ABNORMAL FL
MONOCYTES ABSOLUTE: ABNORMAL
MONOCYTES RELATIVE PERCENT: 8 %
NEUTROPHILS ABSOLUTE: ABNORMAL
NEUTROPHILS RELATIVE PERCENT: 70.2 %
PLATELET # BLD: 115 K/ΜL
PMV BLD AUTO: ABNORMAL FL
POTASSIUM SERPL-SCNC: 4 MMOL/L
RBC # BLD: ABNORMAL 10*6/UL
SODIUM BLD-SCNC: 138 MMOL/L
TOTAL IRON BINDING CAPACITY: 290
TOTAL PROTEIN: 6.5 G/DL (ref 6.4–8.2)
WBC # BLD: 3.85 10^3/ML

## 2024-07-22 RX ORDER — MIRTAZAPINE 15 MG/1
15 TABLET, FILM COATED ORAL NIGHTLY
Qty: 30 TABLET | Refills: 3 | Status: SHIPPED | OUTPATIENT
Start: 2024-07-22

## 2024-08-05 ENCOUNTER — OFFICE VISIT (OUTPATIENT)
Dept: PRIMARY CARE CLINIC | Age: 79
End: 2024-08-05
Payer: MEDICARE

## 2024-08-05 VITALS
OXYGEN SATURATION: 97 % | SYSTOLIC BLOOD PRESSURE: 118 MMHG | BODY MASS INDEX: 28 KG/M2 | WEIGHT: 158 LBS | TEMPERATURE: 98.1 F | HEART RATE: 66 BPM | DIASTOLIC BLOOD PRESSURE: 60 MMHG | HEIGHT: 63 IN

## 2024-08-05 DIAGNOSIS — I25.10 CORONARY ARTERY DISEASE INVOLVING NATIVE CORONARY ARTERY OF NATIVE HEART WITHOUT ANGINA PECTORIS: ICD-10-CM

## 2024-08-05 DIAGNOSIS — I10 ESSENTIAL HYPERTENSION: Chronic | ICD-10-CM

## 2024-08-05 DIAGNOSIS — Z87.19 HISTORY OF GI BLEED: ICD-10-CM

## 2024-08-05 DIAGNOSIS — N18.6 ESRD (END STAGE RENAL DISEASE) ON DIALYSIS (HCC): ICD-10-CM

## 2024-08-05 DIAGNOSIS — I10 ESSENTIAL HYPERTENSION: Primary | Chronic | ICD-10-CM

## 2024-08-05 DIAGNOSIS — Z99.2 ESRD (END STAGE RENAL DISEASE) ON DIALYSIS (HCC): ICD-10-CM

## 2024-08-05 DIAGNOSIS — I61.9 CVA (CEREBROVASCULAR ACCIDENT DUE TO INTRACEREBRAL HEMORRHAGE) (HCC): ICD-10-CM

## 2024-08-05 PROBLEM — I20.9 ANGINA PECTORIS, UNSPECIFIED (HCC): Status: ACTIVE | Noted: 2024-08-05

## 2024-08-05 PROBLEM — I25.119 ATHEROSCLEROTIC HEART DISEASE OF NATIVE CORONARY ARTERY WITH UNSPECIFIED ANGINA PECTORIS (HCC): Status: ACTIVE | Noted: 2024-08-05

## 2024-08-05 LAB
ALBUMIN: 4.5 G/DL (ref 3.5–5.2)
ALP BLD-CCNC: 83 U/L (ref 35–104)
ALT SERPL-CCNC: 10 U/L (ref 0–32)
ANION GAP SERPL CALCULATED.3IONS-SCNC: 19 MMOL/L (ref 7–16)
AST SERPL-CCNC: 12 U/L (ref 0–31)
BASOPHILS ABSOLUTE: 0.04 K/UL (ref 0–0.2)
BASOPHILS RELATIVE PERCENT: 1 % (ref 0–2)
BILIRUB SERPL-MCNC: 0.4 MG/DL (ref 0–1.2)
BUN BLDV-MCNC: 70 MG/DL (ref 6–23)
CALCIUM SERPL-MCNC: 9.5 MG/DL (ref 8.6–10.2)
CHLORIDE BLD-SCNC: 84 MMOL/L (ref 98–107)
CO2: 26 MMOL/L (ref 22–29)
CREAT SERPL-MCNC: 9.3 MG/DL (ref 0.5–1)
EOSINOPHILS ABSOLUTE: 0.16 K/UL (ref 0.05–0.5)
EOSINOPHILS RELATIVE PERCENT: 4 % (ref 0–6)
GFR, ESTIMATED: 4 ML/MIN/1.73M2
GLUCOSE BLD-MCNC: 77 MG/DL (ref 74–99)
HCT VFR BLD CALC: 36.1 % (ref 34–48)
HEMOGLOBIN: 11.9 G/DL (ref 11.5–15.5)
IMMATURE GRANULOCYTES %: 1 % (ref 0–5)
IMMATURE GRANULOCYTES ABSOLUTE: 0.03 K/UL (ref 0–0.58)
LYMPHOCYTES ABSOLUTE: 0.88 K/UL (ref 1.5–4)
LYMPHOCYTES RELATIVE PERCENT: 20 % (ref 20–42)
MCH RBC QN AUTO: 32.3 PG (ref 26–35)
MCHC RBC AUTO-ENTMCNC: 33 G/DL (ref 32–34.5)
MCV RBC AUTO: 98.1 FL (ref 80–99.9)
MONOCYTES ABSOLUTE: 0.43 K/UL (ref 0.1–0.95)
MONOCYTES RELATIVE PERCENT: 10 % (ref 2–12)
NEUTROPHILS ABSOLUTE: 2.89 K/UL (ref 1.8–7.3)
NEUTROPHILS RELATIVE PERCENT: 65 % (ref 43–80)
PDW BLD-RTO: 16.3 % (ref 11.5–15)
PLATELET, FLUORESCENCE: 127 K/UL (ref 130–450)
PMV BLD AUTO: 11.2 FL (ref 7–12)
POTASSIUM SERPL-SCNC: 4.5 MMOL/L (ref 3.5–5)
RBC # BLD: 3.68 M/UL (ref 3.5–5.5)
SODIUM BLD-SCNC: 129 MMOL/L (ref 132–146)
TOTAL PROTEIN: 7.1 G/DL (ref 6.4–8.3)
WBC # BLD: 4.4 K/UL (ref 4.5–11.5)

## 2024-08-05 PROCEDURE — 1123F ACP DISCUSS/DSCN MKR DOCD: CPT | Performed by: FAMILY MEDICINE

## 2024-08-05 PROCEDURE — 3078F DIAST BP <80 MM HG: CPT | Performed by: FAMILY MEDICINE

## 2024-08-05 PROCEDURE — 1036F TOBACCO NON-USER: CPT | Performed by: FAMILY MEDICINE

## 2024-08-05 PROCEDURE — G8427 DOCREV CUR MEDS BY ELIG CLIN: HCPCS | Performed by: FAMILY MEDICINE

## 2024-08-05 PROCEDURE — G8417 CALC BMI ABV UP PARAM F/U: HCPCS | Performed by: FAMILY MEDICINE

## 2024-08-05 PROCEDURE — 1111F DSCHRG MED/CURRENT MED MERGE: CPT | Performed by: FAMILY MEDICINE

## 2024-08-05 PROCEDURE — 1090F PRES/ABSN URINE INCON ASSESS: CPT | Performed by: FAMILY MEDICINE

## 2024-08-05 PROCEDURE — G8400 PT W/DXA NO RESULTS DOC: HCPCS | Performed by: FAMILY MEDICINE

## 2024-08-05 PROCEDURE — 99214 OFFICE O/P EST MOD 30 MIN: CPT | Performed by: FAMILY MEDICINE

## 2024-08-05 PROCEDURE — 3074F SYST BP LT 130 MM HG: CPT | Performed by: FAMILY MEDICINE

## 2024-08-05 RX ORDER — SUCRALFATE 1 G/1
1 TABLET ORAL 4 TIMES DAILY
Qty: 120 TABLET | Refills: 1 | Status: SHIPPED | OUTPATIENT
Start: 2024-08-05

## 2024-08-05 NOTE — PROGRESS NOTES
24     Trina Wang    : 1945 Sex: female   Age: 79 y.o.      Chief Complaint   Patient presents with    Hypertension       Prior to Admission medications    Medication Sig Start Date End Date Taking? Authorizing Provider   sucralfate (CARAFATE) 1 GM tablet Take 1 tablet by mouth 4 times daily 24  Yes Angel Nicholson DO   mirtazapine (REMERON) 15 MG tablet TAKE 1 TABLET BY MOUTH EVERY NIGHT 24  Yes Angel Nicholson DO   pantoprazole (PROTONIX) 40 MG tablet Take 1 tablet by mouth 2 times daily (before meals) 24  Yes Dianne Banuelos,    aspirin (ASPIRIN LOW DOSE) 81 MG EC tablet TAKE 1 TABLET BY MOUTH EVERY DAY 24  Yes Angel Nicholson DO   labetalol (NORMODYNE) 100 MG tablet 1/2 bid 24  Yes Angel Nicholson DO   nitroGLYCERIN (NITRODUR) 0.2 MG/HR Place 1 patch onto the skin daily 24  Yes Angel Nicholson DO   calcium acetate (PHOSLO) 667 MG CAPS capsule Take 1 capsule by mouth 3 times daily (with meals)  Patient taking differently: Take 2 capsules by mouth 3 times daily (with meals) 2 pills 3 times daily with meals and 1 pill at night with snack. 24  Yes Angel Nicholson DO   levothyroxine (SYNTHROID) 88 MCG tablet Take 1 tablet by mouth Daily 23  Yes Angel Nicholson DO   citalopram (CELEXA) 20 MG tablet Take 1 tablet by mouth daily 23  Yes Angel Nicholson DO   mupirocin (BACTROBAN) 2 % ointment Apply topically 3 times daily. 23   Edgar Reich III, PA          HPI: Quyen seen today hypertension coronary artery disease history of CVA end-stage renal disease and recent hospital admission this past month with gastrointestinal bleeding.  Gastric ulcer was present she was treated with Protonix and Carafate and has been doing well.  Medically stable at this time we will continue with current meds but I did recommend a repeat CBC and CMP today.  All meds reviewed maintain as prescribed.          Review of Systems

## 2024-08-11 DIAGNOSIS — I25.10 CORONARY ARTERY DISEASE INVOLVING NATIVE CORONARY ARTERY OF NATIVE HEART WITHOUT ANGINA PECTORIS: ICD-10-CM

## 2024-08-11 DIAGNOSIS — I73.9 PVD (PERIPHERAL VASCULAR DISEASE) (HCC): ICD-10-CM

## 2024-08-11 DIAGNOSIS — I10 ESSENTIAL HYPERTENSION: Chronic | ICD-10-CM

## 2024-08-11 DIAGNOSIS — N18.32 STAGE 3B CHRONIC KIDNEY DISEASE (HCC): ICD-10-CM

## 2024-08-11 DIAGNOSIS — E78.2 MIXED HYPERLIPIDEMIA: ICD-10-CM

## 2024-08-12 RX ORDER — NITROGLYCERIN 40 MG/1
PATCH TRANSDERMAL
Qty: 90 PATCH | Refills: 1 | Status: SHIPPED | OUTPATIENT
Start: 2024-08-12

## 2024-09-12 ENCOUNTER — TELEPHONE (OUTPATIENT)
Dept: PRIMARY CARE CLINIC | Age: 79
End: 2024-09-12

## 2024-09-17 NOTE — PATIENT CARE CONFERENCE
P Quality Flow/Interdisciplinary Rounds Progress Note        Quality Flow Rounds held on September 8, 2023    Disciplines Attending:  Bedside Nurse, , , and Nursing Unit Leadership    Abbey Mckeon was admitted on 8/31/2023 11:38 AM    Anticipated Discharge Date:       Disposition:    Man Score:  Man Scale Score: 19    Readmission Risk              Risk of Unplanned Readmission:  20           Discussed patient goal for the day, patient clinical progression, and barriers to discharge.   The following Goal(s) of the Day/Commitment(s) have been identified:  Labs - Report Results      Luna Park RN  September 8, 2023 ,DirectAddress_Unknown,[email or web address]

## 2024-09-20 ENCOUNTER — PREP FOR PROCEDURE (OUTPATIENT)
Dept: SURGERY | Age: 79
End: 2024-09-20

## 2024-09-20 RX ORDER — SODIUM CHLORIDE 9 MG/ML
INJECTION, SOLUTION INTRAVENOUS
Status: CANCELLED | OUTPATIENT
Start: 2024-09-20 | End: 2024-09-21

## 2024-09-20 NOTE — H&P
Chart - [id number]  User  LC       Diagnostics  Nurse/Allied Health  Medications  Provider Notes  History & Problems  Administrative  Other Clinical  Workload Items  Activity  Flowsheets  Health Mgmt  Summary    Provider Notes     DATE  DOCUMENT  PROBLEM        STATUS  BY  SPECIALTY  Hx  24 09:51  Dieulafoy lesion of stomach  Uriel Reeves  General Surgery  24 09:58  Acute posthemorrhagic anemia  Uriel Reeves  General Surgery  24 00:00  Other Reports  Trina Wang  Amb  79, [id number]  MRN#   [id number]  Search Patient's Chart     24  09:58  24  09:51                    No Data to Display  24  No Data to Display  Provider Notes  Trina Wang  79  F  1945        Allergy/Adv: amlodipine, Latex, Natural Rubber  [facility]  [address]  [place] [address]    General Surgery Visit/H&P   Signed    Patient: Trina Wang  MR#: [id number]  : 1945  Acct:[id number]  Age/Sex: 79 / F  ADM Date: 24  Loc: SPS.514            Provider Location:   cc: Dr. Angel Nicholson~        Intake  Vital Signs      24  09:51  Height   5 ft 3 in  Weight   152 lb  BMI   26.9  BP   145/67 H  Mean Arterial Pressure   93  Blood Pressure Location   Left Upper Arm  Position   Sitting    Intake  Visit Reasons: 6 week f/u/  Current Pain: No  Fall Within Last 3 Months: No  Allergies    amlodipine (From Norvasc) Allergy (Severe, Verified 24 10:00)  Anaphylaxis  Latex, Natural Rubber Allergy (Severe, Verified 24 10:00)  Face/Tongue/Throat Swelling      Safety Concerns: Feels Safe At This Time    HPI  History of Present Illness:   The patient is a 79-year-old white female who was found to have a gastric Dieulafoy lesion in 2024.  The patient underwent intervention for an active bleed.  Overall, the patient is doing well.  The patient states that her hemoglobin and hematocrit are stable.  Presently, the patient is

## 2024-09-20 NOTE — H&P (VIEW-ONLY)
Chart - [id number]  User  LC       Diagnostics  Nurse/Allied Health  Medications  Provider Notes  History & Problems  Administrative  Other Clinical  Workload Items  Activity  Flowsheets  Health Mgmt  Summary    Provider Notes     DATE  DOCUMENT  PROBLEM        STATUS  BY  SPECIALTY  Hx  24 09:51  Dieulafoy lesion of stomach  Uriel Reeves  General Surgery  24 09:58  Acute posthemorrhagic anemia  Uriel Reeves  General Surgery  24 00:00  Other Reports  Trina Wang  Amb  79, [id number]  MRN#   [id number]  Search Patient's Chart     24  09:58  24  09:51                    No Data to Display  24  No Data to Display  Provider Notes  Trina Wang  79  F  1945        Allergy/Adv: amlodipine, Latex, Natural Rubber  [facility]  [address]  [place] [address]    General Surgery Visit/H&P   Signed    Patient: Trina Wang  MR#: [id number]  : 1945  Acct:[id number]  Age/Sex: 79 / F  ADM Date: 24  Loc: SPS.514            Provider Location:   cc: Dr. Angel Nicholson~        Intake  Vital Signs      24  09:51  Height   5 ft 3 in  Weight   152 lb  BMI   26.9  BP   145/67 H  Mean Arterial Pressure   93  Blood Pressure Location   Left Upper Arm  Position   Sitting    Intake  Visit Reasons: 6 week f/u/  Current Pain: No  Fall Within Last 3 Months: No  Allergies    amlodipine (From Norvasc) Allergy (Severe, Verified 24 10:00)  Anaphylaxis  Latex, Natural Rubber Allergy (Severe, Verified 24 10:00)  Face/Tongue/Throat Swelling      Safety Concerns: Feels Safe At This Time    HPI  History of Present Illness:   The patient is a 79-year-old white female who was found to have a gastric Dieulafoy lesion in 2024.  The patient underwent intervention for an active bleed.  Overall, the patient is doing well.  The patient states that her hemoglobin and hematocrit are stable.  Presently, the patient is

## 2024-10-15 NOTE — PROGRESS NOTES
[facility] PRE-ADMISSION TESTING INSTRUCTIONS    The Preadmission Testing patient is instructed accordingly using the following criteria (check applicable):    ARRIVAL INSTRUCTIONS:  [x] Parking the day of Surgery is located in the Main Entrance lot.  Upon entering the door, make an immediate right to the surgery reception desk    [x] Bring photo ID and insurance card    [x] Bring in a copy of Living will or Durable Power of  papers.    [x] Please be sure to arrange for responsible adult to provide transportation to and from the hospital    [x] Please arrange for responsible adult to be with you for the 24 hour period post procedure due to having anesthesia    [x] If you awake am of surgery not feeling well or have temperature >100 please call [phone]    GENERAL INSTRUCTIONS:    [x] Nothing by mouth after midnight, including gum, candy, mints or water    [x] You may brush your teeth, but do not swallow any water    [x] Take medications as instructed with 1-2 oz of water    [] Stop herbal supplements and vitamins 5 days prior to procedure    [x] Follow preop dosing of blood thinners per physician instructions    [] Take 1/2 dose of evening insulin, but no insulin after midnight    [] No oral diabetic medications after midnight    [] If diabetic and have low blood sugar or feel symptomatic, take 1-2oz apple juice only    [] Bring inhalers day of surgery    [] Bring C-PAP/ Bi-Pap day of surgery    [] Bring urine specimen day of surgery    [x] Shower or bath with soap, lather and rinse well, AM of Surgery, no lotion, powders or creams to surgical site    [] Follow bowel prep as instructed per surgeon    [] No tobacco products within 24 hours of surgery     [] No alcohol or illegal drug use within 24 hours of surgery.    [x] Jewelry, body piercing's, eyeglasses, contact lenses and dentures are not permitted into surgery (bring cases)      [x] Please do not wear any nail polish,

## 2024-10-18 ENCOUNTER — ANESTHESIA EVENT (OUTPATIENT)
Dept: ENDOSCOPY | Age: 79
End: 2024-10-18
Payer: MEDICARE

## 2024-10-18 ENCOUNTER — ANESTHESIA (OUTPATIENT)
Dept: ENDOSCOPY | Age: 79
End: 2024-10-18
Payer: MEDICARE

## 2024-10-18 ENCOUNTER — HOSPITAL ENCOUNTER (OUTPATIENT)
Age: 79
Setting detail: OUTPATIENT SURGERY
Discharge: HOME | End: 2024-10-18
Attending: SURGERY | Admitting: SURGERY
Payer: MEDICARE

## 2024-10-18 VITALS
SYSTOLIC BLOOD PRESSURE: 103 MMHG | DIASTOLIC BLOOD PRESSURE: 64 MMHG | HEART RATE: 69 BPM | TEMPERATURE: 97.8 F | WEIGHT: 156 LBS | OXYGEN SATURATION: 94 % | HEIGHT: 63 IN | BODY MASS INDEX: 27.64 KG/M2 | RESPIRATION RATE: 18 BRPM

## 2024-10-18 DIAGNOSIS — K31.82 DIEULAFOY LESION OF STOMACH: ICD-10-CM

## 2024-10-18 LAB — POTASSIUM SERPL-SCNC: 3.6 MMOL/L (ref 3.5–5)

## 2024-10-18 PROCEDURE — 2709999900 HC NON-CHARGEABLE SUPPLY: Performed by: SURGERY

## 2024-10-18 PROCEDURE — 84132 ASSAY OF SERUM POTASSIUM: CPT

## 2024-10-18 PROCEDURE — 6360000002 HC RX W HCPCS

## 2024-10-18 PROCEDURE — 3609012400 HC EGD TRANSORAL BIOPSY SINGLE/MULTIPLE: Performed by: SURGERY

## 2024-10-18 PROCEDURE — 88305 TISSUE EXAM BY PATHOLOGIST: CPT

## 2024-10-18 PROCEDURE — 7100000011 HC PHASE II RECOVERY - ADDTL 15 MIN: Performed by: SURGERY

## 2024-10-18 PROCEDURE — 3700000000 HC ANESTHESIA ATTENDED CARE: Performed by: SURGERY

## 2024-10-18 PROCEDURE — 2580000003 HC RX 258: Performed by: SURGERY

## 2024-10-18 PROCEDURE — 7100000010 HC PHASE II RECOVERY - FIRST 15 MIN: Performed by: SURGERY

## 2024-10-18 RX ORDER — ACETAMINOPHEN 325 MG/1
650 TABLET ORAL
Status: CANCELLED | OUTPATIENT
Start: 2024-10-18 | End: 2024-10-19

## 2024-10-18 RX ORDER — SODIUM CHLORIDE 9 MG/ML
INJECTION, SOLUTION INTRAVENOUS
Status: COMPLETED | OUTPATIENT
Start: 2024-10-18 | End: 2024-10-18

## 2024-10-18 RX ORDER — PROPOFOL 10 MG/ML
INJECTION, EMULSION INTRAVENOUS
Status: DISCONTINUED | OUTPATIENT
Start: 2024-10-18 | End: 2024-10-18 | Stop reason: SDUPTHER

## 2024-10-18 RX ORDER — ONDANSETRON 2 MG/ML
4 INJECTION INTRAMUSCULAR; INTRAVENOUS
Status: CANCELLED | OUTPATIENT
Start: 2024-10-18 | End: 2024-10-19

## 2024-10-18 RX ORDER — NALOXONE HYDROCHLORIDE 0.4 MG/ML
INJECTION, SOLUTION INTRAMUSCULAR; INTRAVENOUS; SUBCUTANEOUS PRN
Status: CANCELLED | OUTPATIENT
Start: 2024-10-18

## 2024-10-18 RX ORDER — SODIUM CHLORIDE 0.9 % (FLUSH) 0.9 %
5-40 SYRINGE (ML) INJECTION EVERY 12 HOURS SCHEDULED
Status: CANCELLED | OUTPATIENT
Start: 2024-10-18

## 2024-10-18 RX ORDER — SODIUM CHLORIDE 9 MG/ML
INJECTION, SOLUTION INTRAVENOUS PRN
Status: CANCELLED | OUTPATIENT
Start: 2024-10-18

## 2024-10-18 RX ORDER — SODIUM CHLORIDE 0.9 % (FLUSH) 0.9 %
5-40 SYRINGE (ML) INJECTION PRN
Status: CANCELLED | OUTPATIENT
Start: 2024-10-18

## 2024-10-18 RX ADMIN — SODIUM CHLORIDE: 9 INJECTION, SOLUTION INTRAVENOUS at 07:50

## 2024-10-18 RX ADMIN — PROPOFOL 100 MG: 10 INJECTION, EMULSION INTRAVENOUS at 07:52

## 2024-10-18 ASSESSMENT — PAIN - FUNCTIONAL ASSESSMENT: PAIN_FUNCTIONAL_ASSESSMENT: NONE - DENIES PAIN

## 2024-10-18 NOTE — OP NOTE
Operative Note      Patient: Trina Wang  YOB: 1945  MRN: [id number]    Date of Procedure: 10/18/2024    Pre-Op Diagnosis Codes:      * Dieulafoy lesion of stomach [K31.82]    Post-Op Diagnosis:     The GE junction is marked at 40 cm from incisors and grossly normal  Mild chronic gastritis  No ulcerations were noted  No Dieulafoy lesion       Procedure(s):  ESOPHAGOGASTRODUODENOSCOPY   +++LATEX ALLERGY+++  Biopsies  Surgeon(s):  Uriel Reeves MD    Assistant:   * No surgical staff found *    Anesthesia: Monitor Anesthesia Care    Estimated Blood Loss (mL): Minimal    Complications: None    Specimens:   ID Type Source Tests Collected by Time Destination   A : antral bxs Tissue Stomach SURGICAL PATHOLOGY Uriel Reeves MD 10/18/2024 0755        Implants:  * No implants in log *      Drains: * No LDAs found *    Findings:  Infection Present At Time Of Surgery (PATOS) (choose all levels that have infection present):  No infection present  Other Findings: As above    Detailed Description of Procedure:   The patient was brought to the endoscopy suite and placed on the table in the left lateral decubitus position.  The patient received anesthesia per the department of anesthesiology.  A bite-block was placed.  The endoscope was passed without difficulty through the lumen of the esophagus, stomach and duodenum.  The endoscope was retrieved.  No abnormalities were noted to involve the duodenum.  Upon reentering the stomach, the patient a mild gastritis.  Biopsies were obtained.  The retroflexed view did not reveal a hiatal hernia.  The GE junction was marked at 40 cm from the incisors.  No other abnormalities were noted.  The patient tolerated the procedure well.    Assessment:    No abnormalities were noted    Plan:    Continue medications as prescribed.    Electronically signed by Uriel Reeves MD on 10/18/2024 at 8:14 AM

## 2024-10-18 NOTE — ANESTHESIA POSTPROCEDURE EVALUATION
Department of Anesthesiology  Postprocedure Note    Patient: Trina Wang  MRN: [id number]  YOB: 1945  Date of evaluation: 10/18/2024    Procedure Summary       Date: 10/18/24 Room / Location: [address]    Anesthesia Start: 0750 Anesthesia Stop: 0802    Procedure: ESOPHAGOGASTRODUODENOSCOPY   +++LATEX ALLERGY+++ Diagnosis:       Dieulafoy lesion of stomach      (Dieulafoy lesion of stomach [K31.82])    Surgeons: Uriel Reeves MD Responsible Provider: Rosalio Vazquez DO    Anesthesia Type: MAC ASA Status: 3            Anesthesia Type: No value filed.    Darin Phase I: Darin Score: 10    Darin Phase II:      Anesthesia Post Evaluation    Patient location during evaluation: bedside  Patient participation: complete - patient participated  Level of consciousness: awake  Pain score: 0  Airway patency: patent  Nausea & Vomiting: no nausea and no vomiting  Cardiovascular status: blood pressure returned to baseline  Respiratory status: acceptable  Hydration status: stable  Pain management: adequate    No notable events documented.

## 2024-10-18 NOTE — ANESTHESIA PRE PROCEDURE
[email or web address]    Smokeless tobacco: Never   Substance Use Topics    Alcohol use: No                                Counseling given: Not Answered      Vital Signs (Current):   Vitals:    10/15/24 1024 10/18/24 0642   BP:  96/60   Pulse:  72   Resp:  16   Temp:  97.8 °F (36.6 °C)   TempSrc:  Temporal   SpO2:  94%   Weight: 70.8 kg (156 lb) 70.8 kg (156 lb)   Height: 1.6 m (5' 3\") 1.6 m (5' 3\")                                              BP Readings from Last 3 Encounters:   10/18/24 96/60   24 118/60   24 (!) 164/74       NPO Status: Time of last liquid consumption:                         Time of last solid consumption:                         Date of last liquid consumption: 10/17/24                        Date of last solid food consumption: 10/17/24    BMI:   Wt Readings from Last 3 Encounters:   10/18/24 70.8 kg (156 lb)   24 71.7 kg (158 lb)   24 79.1 kg (174 lb 6.4 oz)     Body mass index is 27.63 kg/m².    CBC:   Lab Results   Component Value Date/Time    WBC 4.4 2024 02:41 PM    RBC 3.68 2024 02:41 PM    HGB 11.9 2024 02:41 PM    HCT 36.1 2024 02:41 PM    MCV 98.1 2024 02:41 PM    RDW 16.3 2024 02:41 PM     2024 12:00 AM       CMP:   Lab Results   Component Value Date/Time     2024 02:41 PM    K 4.5 2024 02:41 PM    CL 84 2024 02:41 PM    CO2 26 2024 02:41 PM    BUN 70 2024 02:41 PM    CREATININE 9.3 2024 02:41 PM    GFRAA 31 2022 05:17 PM    LABGLOM 4 2024 02:41 PM    LABGLOM 5 2024 07:51 AM    GLUCOSE 77 2024 02:41 PM    CALCIUM 9.5 2024 02:41 PM    BILITOT 0.4 2024 02:41 PM    ALKPHOS 83 2024 02:41 PM    AST 12 2024 02:41 PM    ALT 10 2024 02:41 PM       POC Tests: No results for input(s): \"POCGLU\", \"POCNA\", \"POCK\", \"POCCL\", \"POCBUN\", \"POCHEMO\", \"POCHCT\" in the last 72 hours.    Coags:   Lab Results   Component Value Date/Time

## 2024-10-18 NOTE — INTERVAL H&P NOTE
Update History & Physical    The patient's History and Physical of October 18, 2024 was reviewed with the patient and I examined the patient. There was no change. The surgical site was confirmed by the patient and me.     Plan: The risks, benefits, expected outcome, and alternative to the recommended procedure have been discussed with the patient. Patient understands and wants to proceed with the procedure.     Electronically signed by Uriel Reeves MD on 10/18/2024 at 6:41 AM

## 2024-10-22 LAB — SURGICAL PATHOLOGY REPORT: NORMAL

## 2024-10-25 ENCOUNTER — OFFICE VISIT (OUTPATIENT)
Dept: PRIMARY CARE CLINIC | Age: 79
End: 2024-10-25

## 2024-10-25 VITALS
BODY MASS INDEX: 26.93 KG/M2 | HEART RATE: 88 BPM | TEMPERATURE: 98.1 F | DIASTOLIC BLOOD PRESSURE: 62 MMHG | SYSTOLIC BLOOD PRESSURE: 102 MMHG | WEIGHT: 152 LBS | OXYGEN SATURATION: 96 %

## 2024-10-25 DIAGNOSIS — Z99.2 ESRD (END STAGE RENAL DISEASE) ON DIALYSIS (HCC): ICD-10-CM

## 2024-10-25 DIAGNOSIS — N18.32 STAGE 3B CHRONIC KIDNEY DISEASE (HCC): ICD-10-CM

## 2024-10-25 DIAGNOSIS — L98.9 SKIN LESIONS, GENERALIZED: Primary | ICD-10-CM

## 2024-10-25 DIAGNOSIS — E78.2 MIXED HYPERLIPIDEMIA: ICD-10-CM

## 2024-10-25 DIAGNOSIS — I73.9 PVD (PERIPHERAL VASCULAR DISEASE) (HCC): ICD-10-CM

## 2024-10-25 DIAGNOSIS — I25.10 CORONARY ARTERY DISEASE INVOLVING NATIVE CORONARY ARTERY OF NATIVE HEART WITHOUT ANGINA PECTORIS: ICD-10-CM

## 2024-10-25 DIAGNOSIS — I10 ESSENTIAL HYPERTENSION: Chronic | ICD-10-CM

## 2024-10-25 DIAGNOSIS — N18.6 ESRD (END STAGE RENAL DISEASE) ON DIALYSIS (HCC): ICD-10-CM

## 2024-10-25 RX ORDER — NITROGLYCERIN 40 MG/1
1 PATCH TRANSDERMAL DAILY
Qty: 90 PATCH | Refills: 1 | Status: SHIPPED | OUTPATIENT
Start: 2024-10-25

## 2024-10-25 RX ORDER — MIRTAZAPINE 15 MG/1
15 TABLET, FILM COATED ORAL NIGHTLY
Qty: 30 TABLET | Refills: 3 | Status: SHIPPED | OUTPATIENT
Start: 2024-10-25

## 2024-11-03 ENCOUNTER — APPOINTMENT (OUTPATIENT)
Dept: CT IMAGING | Age: 79
End: 2024-11-03
Attending: STUDENT IN AN ORGANIZED HEALTH CARE EDUCATION/TRAINING PROGRAM
Payer: MEDICARE

## 2024-11-03 ENCOUNTER — HOSPITAL ENCOUNTER (EMERGENCY)
Age: 79
Discharge: HOME | End: 2024-11-03
Attending: STUDENT IN AN ORGANIZED HEALTH CARE EDUCATION/TRAINING PROGRAM
Payer: MEDICARE

## 2024-11-03 VITALS
WEIGHT: 150 LBS | TEMPERATURE: 97.7 F | HEART RATE: 68 BPM | SYSTOLIC BLOOD PRESSURE: 112 MMHG | DIASTOLIC BLOOD PRESSURE: 64 MMHG | RESPIRATION RATE: 18 BRPM | HEIGHT: 63 IN | BODY MASS INDEX: 26.58 KG/M2 | OXYGEN SATURATION: 96 %

## 2024-11-03 DIAGNOSIS — R10.84 GENERALIZED ABDOMINAL PAIN: ICD-10-CM

## 2024-11-03 DIAGNOSIS — K57.32 DIVERTICULITIS OF COLON: ICD-10-CM

## 2024-11-03 DIAGNOSIS — M25.552 LEFT HIP PAIN: Primary | ICD-10-CM

## 2024-11-03 LAB
ALBUMIN SERPL-MCNC: 4 G/DL (ref 3.5–5.2)
ALP SERPL-CCNC: 76 U/L (ref 35–104)
ALT SERPL-CCNC: 5 U/L (ref 0–32)
ANION GAP SERPL CALCULATED.3IONS-SCNC: 13 MMOL/L (ref 7–16)
AST SERPL-CCNC: 10 U/L (ref 0–31)
BASOPHILS # BLD: 0.03 K/UL (ref 0–0.2)
BASOPHILS NFR BLD: 0 % (ref 0–2)
BILIRUB SERPL-MCNC: 0.4 MG/DL (ref 0–1.2)
BUN SERPL-MCNC: 30 MG/DL (ref 6–23)
CALCIUM SERPL-MCNC: 9.6 MG/DL (ref 8.6–10.2)
CHLORIDE SERPL-SCNC: 92 MMOL/L (ref 98–107)
CO2 SERPL-SCNC: 33 MMOL/L (ref 22–29)
CREAT SERPL-MCNC: 6.3 MG/DL (ref 0.5–1)
EOSINOPHIL # BLD: 0.17 K/UL (ref 0.05–0.5)
EOSINOPHILS RELATIVE PERCENT: 2 % (ref 0–6)
ERYTHROCYTE [DISTWIDTH] IN BLOOD BY AUTOMATED COUNT: 13.3 % (ref 11.5–15)
FLUAV RNA RESP QL NAA+PROBE: NOT DETECTED
FLUBV RNA RESP QL NAA+PROBE: NOT DETECTED
GFR, ESTIMATED: 6 ML/MIN/1.73M2
GLUCOSE SERPL-MCNC: 80 MG/DL (ref 74–99)
HCT VFR BLD AUTO: 36.6 % (ref 34–48)
HGB BLD-MCNC: 11.7 G/DL (ref 11.5–15.5)
IMM GRANULOCYTES # BLD AUTO: 0.04 K/UL (ref 0–0.58)
IMM GRANULOCYTES NFR BLD: 1 % (ref 0–5)
LACTATE BLDV-SCNC: 1.9 MMOL/L (ref 0.5–2.2)
LIPASE SERPL-CCNC: 59 U/L (ref 13–60)
LYMPHOCYTES NFR BLD: 0.84 K/UL (ref 1.5–4)
LYMPHOCYTES RELATIVE PERCENT: 12 % (ref 20–42)
MCH RBC QN AUTO: 32 PG (ref 26–35)
MCHC RBC AUTO-ENTMCNC: 32 G/DL (ref 32–34.5)
MCV RBC AUTO: 100 FL (ref 80–99.9)
MONOCYTES NFR BLD: 0.55 K/UL (ref 0.1–0.95)
MONOCYTES NFR BLD: 8 % (ref 2–12)
NEUTROPHILS NFR BLD: 77 % (ref 43–80)
NEUTS SEG NFR BLD: 5.31 K/UL (ref 1.8–7.3)
PLATELET # BLD AUTO: 145 K/UL (ref 130–450)
PMV BLD AUTO: 10.4 FL (ref 7–12)
POTASSIUM SERPL-SCNC: 3.6 MMOL/L (ref 3.5–5)
PROT SERPL-MCNC: 7.2 G/DL (ref 6.4–8.3)
RBC # BLD AUTO: 3.66 M/UL (ref 3.5–5.5)
SARS-COV-2 RNA RESP QL NAA+PROBE: NOT DETECTED
SODIUM SERPL-SCNC: 138 MMOL/L (ref 132–146)
SOURCE: NORMAL
SPECIMEN DESCRIPTION: NORMAL
TROPONIN I SERPL HS-MCNC: 104 NG/L (ref 0–9)
TROPONIN I SERPL HS-MCNC: 105 NG/L (ref 0–9)
WBC OTHER # BLD: 6.9 K/UL (ref 4.5–11.5)

## 2024-11-03 PROCEDURE — 2500000003 HC RX 250 WO HCPCS: Performed by: STUDENT IN AN ORGANIZED HEALTH CARE EDUCATION/TRAINING PROGRAM

## 2024-11-03 PROCEDURE — 96372 THER/PROPH/DIAG INJ SC/IM: CPT

## 2024-11-03 PROCEDURE — 6360000002 HC RX W HCPCS: Performed by: STUDENT IN AN ORGANIZED HEALTH CARE EDUCATION/TRAINING PROGRAM

## 2024-11-03 PROCEDURE — 74176 CT ABD & PELVIS W/O CONTRAST: CPT

## 2024-11-03 PROCEDURE — 83605 ASSAY OF LACTIC ACID: CPT

## 2024-11-03 PROCEDURE — 83690 ASSAY OF LIPASE: CPT

## 2024-11-03 PROCEDURE — 99284 EMERGENCY DEPT VISIT MOD MDM: CPT

## 2024-11-03 PROCEDURE — 93005 ELECTROCARDIOGRAM TRACING: CPT | Performed by: STUDENT IN AN ORGANIZED HEALTH CARE EDUCATION/TRAINING PROGRAM

## 2024-11-03 PROCEDURE — 87636 SARSCOV2 & INF A&B AMP PRB: CPT

## 2024-11-03 PROCEDURE — 80053 COMPREHEN METABOLIC PANEL: CPT

## 2024-11-03 PROCEDURE — 85025 COMPLETE CBC W/AUTO DIFF WBC: CPT

## 2024-11-03 PROCEDURE — 84484 ASSAY OF TROPONIN QUANT: CPT

## 2024-11-03 PROCEDURE — 6370000000 HC RX 637 (ALT 250 FOR IP): Performed by: STUDENT IN AN ORGANIZED HEALTH CARE EDUCATION/TRAINING PROGRAM

## 2024-11-03 RX ORDER — AMOXICILLIN AND CLAVULANATE POTASSIUM 500; 125 MG/1; MG/1
1 TABLET, FILM COATED ORAL DAILY
Qty: 7 TABLET | Refills: 0 | Status: SHIPPED | OUTPATIENT
Start: 2024-11-03 | End: 2024-11-10

## 2024-11-03 RX ORDER — MORPHINE SULFATE 2 MG/ML
2 INJECTION, SOLUTION INTRAMUSCULAR; INTRAVENOUS ONCE
Status: COMPLETED | OUTPATIENT
Start: 2024-11-03 | End: 2024-11-03

## 2024-11-03 RX ORDER — HYDROCODONE BITARTRATE AND ACETAMINOPHEN 5; 325 MG/1; MG/1
1 TABLET ORAL ONCE
Status: COMPLETED | OUTPATIENT
Start: 2024-11-03 | End: 2024-11-03

## 2024-11-03 RX ORDER — ORPHENADRINE CITRATE 30 MG/ML
30 INJECTION INTRAMUSCULAR; INTRAVENOUS ONCE
Status: COMPLETED | OUTPATIENT
Start: 2024-11-03 | End: 2024-11-03

## 2024-11-03 RX ORDER — LIDOCAINE 4 G/G
1 PATCH TOPICAL DAILY
Qty: 10 EACH | Refills: 0 | Status: SHIPPED | OUTPATIENT
Start: 2024-11-03 | End: 2024-11-13

## 2024-11-03 RX ORDER — METHYLPREDNISOLONE 4 MG/1
TABLET ORAL
Qty: 1 KIT | Refills: 0 | Status: SHIPPED | OUTPATIENT
Start: 2024-11-03 | End: 2024-11-09

## 2024-11-03 RX ORDER — HYDROCODONE BITARTRATE AND ACETAMINOPHEN 5; 325 MG/1; MG/1
1 TABLET ORAL EVERY 6 HOURS PRN
Qty: 10 TABLET | Refills: 0 | Status: SHIPPED | OUTPATIENT
Start: 2024-11-03 | End: 2024-11-06

## 2024-11-03 RX ADMIN — ORPHENADRINE CITRATE 30 MG: 30 INJECTION, SOLUTION INTRAMUSCULAR; INTRAVENOUS at 12:22

## 2024-11-03 RX ADMIN — HYDROCODONE BITARTRATE AND ACETAMINOPHEN 1 TABLET: 5; 325 TABLET ORAL at 17:55

## 2024-11-03 RX ADMIN — MORPHINE SULFATE 2 MG: 2 INJECTION, SOLUTION INTRAMUSCULAR; INTRAVENOUS at 12:22

## 2024-11-03 ASSESSMENT — PAIN DESCRIPTION - LOCATION
LOCATION: HIP
LOCATION: HIP

## 2024-11-03 ASSESSMENT — PAIN DESCRIPTION - DESCRIPTORS
DESCRIPTORS: ACHING
DESCRIPTORS: SHARP

## 2024-11-03 ASSESSMENT — LIFESTYLE VARIABLES
HOW MANY STANDARD DRINKS CONTAINING ALCOHOL DO YOU HAVE ON A TYPICAL DAY: PATIENT DOES NOT DRINK
HOW OFTEN DO YOU HAVE A DRINK CONTAINING ALCOHOL: NEVER

## 2024-11-03 ASSESSMENT — PAIN - FUNCTIONAL ASSESSMENT: PAIN_FUNCTIONAL_ASSESSMENT: NONE - DENIES PAIN

## 2024-11-03 ASSESSMENT — PAIN DESCRIPTION - ORIENTATION
ORIENTATION: LEFT
ORIENTATION: LEFT

## 2024-11-03 ASSESSMENT — PAIN SCALES - GENERAL: PAINLEVEL_OUTOF10: 7

## 2024-11-03 NOTE — DISCHARGE INSTRUCTIONS
CT ABDOMEN PELVIS WO CONTRAST Additional Contrast? None   Final Result   1. Findings above could indicate acute diverticulitis involving the sigmoid   colon versus focal colitis.  Follow-up recommended to exclude possibility of   neoplasm.   2. Atrophy of both kidneys suggestive of chronic medical renal disease.

## 2024-11-05 LAB
EKG ATRIAL RATE: 70 BPM
EKG P AXIS: 67 DEGREES
EKG P-R INTERVAL: 124 MS
EKG Q-T INTERVAL: 472 MS
EKG QRS DURATION: 152 MS
EKG QTC CALCULATION (BAZETT): 509 MS
EKG R AXIS: 149 DEGREES
EKG T AXIS: 69 DEGREES
EKG VENTRICULAR RATE: 70 BPM

## 2024-11-05 PROCEDURE — 93010 ELECTROCARDIOGRAM REPORT: CPT | Performed by: INTERNAL MEDICINE

## 2024-11-07 NOTE — ED PROVIDER NOTES
disposition considerations/shared decision making with patient, consults, disposition:            Chronic Conditions:   Past Medical History:   Diagnosis Date    Acute loss of vision, left 02/24/2022    Arthritis     CAD (coronary artery disease)     follows with Dr. Jansen    CKD (chronic kidney disease)     stage 3 / follows with Dr. Amos Soriano     denjenifer    Hemodialysis patient (HCC)     History of blood transfusion     had with childbirth    Hypertension     Left carotid stenosis 02/24/2022    Respiratory failure     history of / 2014  when had stroke, trach  x 2 weeks    Thyroid disease     Tracheal stenosis     Unspecified cerebral artery occlusion with cerebral infarction 09/26/2014    right side flacid, hemorrhagic stroke dt elevated BP/residual s/s is at right side loss of fine motor skills and some weakness, drags right leg, slight memory loss and aphasia when she is tired       CONSULTS: (Who and What was discussed)  None    Discussion with Other Profesionals : None    Social Determinants : None    Records Reviewed : None    CC/HPI Summary, DDx, ED Course, and Reassessment:   Patient is a 79-year-old female past medical history of hypertension, ESRD on HD, hyperlipidemia, CAD as well as type 2 diabetes.  Patient presents with chief complaint of lower abdominal pain, nausea, vomiting as well as left hip pain.  Vital signs stable presentation.  On physical exam heart regular rate and rhythm, lungs are auscultation bilaterally, abdomen soft with mild lower abdominal tenderness no rigidity bound or guarding.  Mild left hip pain with tenderness to palpation.  Differential diagnosis includes arthritis, gastritis, gastroenteritis, diverticulitis, viral illness.  COVID flu test obtained was negative, troponins obtained was 104 and repeat 105.  CMP obtain demonstrate chronically creatinine 6.3 lipase 59 lactic acid 1.9 CBC demonstrate no acute abnormality CT scan of the abdomen pelvis demonstrated chronic  degenerative changes on the left hip as well as simple appearing diverticulitis.  Patient given 30 mg of IM Norflex 2 mg of IM morphine.  On repeat evaluation patient does note some improvement symptoms however left hip pain is returning.  Patient was given 1 5 extra 25 mg Norco for pain some improvement.  Findings consistent with abdominal pain likely secondary to diverticulitis as well as acute on chronic left hip pain.  Decision made to discharge patient.  Patient given prescription for Augmentin renally dosed as well as Norco lidocaine and Medrol Dosepak.  Patient directed to follow-up with primary care doctor as well as orthopedic surgery strict return precautions discussed all questions answered patient agreement plan of care discharged in stable condition.    FINAL IMPRESSION      1. Left hip pain    2. Generalized abdominal pain    3. Diverticulitis of colon          DISPOSITION/PLAN     DISPOSITION Decision To Discharge 11/03/2024 05:45:38 PM    PATIENT REFERRED TO:  Angel Nicholson,   [address] [facility] [address]  [phone]    Schedule an appointment as soon as possible for a visit       [facility] Emergency Department  [address] [address]  [phone]  Go to   If symptoms worsen      DISCHARGE MEDICATIONS:  Discharge Medication List as of 11/3/2024  6:06 PM        START taking these medications    Details   methylPREDNISolone (MEDROL, SUSAN,) 4 MG tablet Take by mouth., Disp-1 kit, R-0Normal      HYDROcodone-acetaminophen (NORCO) 5-325 MG per tablet Take 1 tablet by mouth every 6 hours as needed for Pain for up to 3 days. Intended supply: 3 days. Take lowest dose possible to manage pain Max Daily Amount: 4 tablets, Disp-10 tablet, R-0Normal      lidocaine 4 % external patch Place 1 patch onto the skin daily for 10 days, TransDERmal, DAILY Starting Sun 11/3/2024, Until Wed 11/13/2024, For 10 days, Disp-10 each, R-0, Normal

## 2024-11-26 ENCOUNTER — OFFICE VISIT (OUTPATIENT)
Dept: PRIMARY CARE CLINIC | Age: 79
End: 2024-11-26
Payer: MEDICARE

## 2024-11-26 VITALS
TEMPERATURE: 97.6 F | OXYGEN SATURATION: 95 % | WEIGHT: 154 LBS | HEART RATE: 74 BPM | SYSTOLIC BLOOD PRESSURE: 110 MMHG | DIASTOLIC BLOOD PRESSURE: 62 MMHG | HEIGHT: 63 IN | BODY MASS INDEX: 27.29 KG/M2

## 2024-11-26 DIAGNOSIS — I10 ESSENTIAL HYPERTENSION: Chronic | ICD-10-CM

## 2024-11-26 DIAGNOSIS — Z99.2 ESRD (END STAGE RENAL DISEASE) ON DIALYSIS (HCC): Primary | ICD-10-CM

## 2024-11-26 DIAGNOSIS — I25.10 CORONARY ARTERY DISEASE INVOLVING NATIVE CORONARY ARTERY OF NATIVE HEART WITHOUT ANGINA PECTORIS: ICD-10-CM

## 2024-11-26 DIAGNOSIS — N18.6 ESRD (END STAGE RENAL DISEASE) ON DIALYSIS (HCC): Primary | ICD-10-CM

## 2024-11-26 DIAGNOSIS — I73.9 PVD (PERIPHERAL VASCULAR DISEASE) (HCC): ICD-10-CM

## 2024-11-26 DIAGNOSIS — E78.2 MIXED HYPERLIPIDEMIA: ICD-10-CM

## 2024-11-26 PROBLEM — I25.119 ATHEROSCLEROTIC HEART DISEASE OF NATIVE CORONARY ARTERY WITH UNSPECIFIED ANGINA PECTORIS (HCC): Status: RESOLVED | Noted: 2024-08-05 | Resolved: 2024-11-26

## 2024-11-26 PROBLEM — D69.6 THROMBOCYTOPENIA, UNSPECIFIED (HCC): Status: RESOLVED | Noted: 2023-02-07 | Resolved: 2024-11-26

## 2024-11-26 PROBLEM — I20.9 ANGINA PECTORIS, UNSPECIFIED (HCC): Status: RESOLVED | Noted: 2024-08-05 | Resolved: 2024-11-26

## 2024-11-26 PROCEDURE — 99214 OFFICE O/P EST MOD 30 MIN: CPT | Performed by: FAMILY MEDICINE

## 2024-11-26 PROCEDURE — 1090F PRES/ABSN URINE INCON ASSESS: CPT | Performed by: FAMILY MEDICINE

## 2024-11-26 PROCEDURE — G8417 CALC BMI ABV UP PARAM F/U: HCPCS | Performed by: FAMILY MEDICINE

## 2024-11-26 PROCEDURE — 1159F MED LIST DOCD IN RCRD: CPT | Performed by: FAMILY MEDICINE

## 2024-11-26 PROCEDURE — G8400 PT W/DXA NO RESULTS DOC: HCPCS | Performed by: FAMILY MEDICINE

## 2024-11-26 PROCEDURE — 1036F TOBACCO NON-USER: CPT | Performed by: FAMILY MEDICINE

## 2024-11-26 PROCEDURE — 3074F SYST BP LT 130 MM HG: CPT | Performed by: FAMILY MEDICINE

## 2024-11-26 PROCEDURE — G8427 DOCREV CUR MEDS BY ELIG CLIN: HCPCS | Performed by: FAMILY MEDICINE

## 2024-11-26 PROCEDURE — 1123F ACP DISCUSS/DSCN MKR DOCD: CPT | Performed by: FAMILY MEDICINE

## 2024-11-26 PROCEDURE — 3078F DIAST BP <80 MM HG: CPT | Performed by: FAMILY MEDICINE

## 2024-11-26 PROCEDURE — G8484 FLU IMMUNIZE NO ADMIN: HCPCS | Performed by: FAMILY MEDICINE

## 2024-11-26 RX ORDER — LEVOTHYROXINE SODIUM 88 UG/1
88 TABLET ORAL DAILY
Qty: 90 TABLET | Refills: 3 | Status: SHIPPED | OUTPATIENT
Start: 2024-11-26

## 2024-11-26 RX ORDER — CITALOPRAM HYDROBROMIDE 20 MG/1
20 TABLET ORAL DAILY
Qty: 90 TABLET | Refills: 3 | Status: SHIPPED | OUTPATIENT
Start: 2024-11-26

## 2024-11-26 NOTE — PROGRESS NOTES
24     Trina Wang    : 1945 Sex: female   Age: 79 y.o.      Chief Complaint   Patient presents with    Hypertension    Discuss Medications       Prior to Admission medications    Medication Sig Start Date End Date Taking? Authorizing Provider   citalopram (CELEXA) 20 MG tablet Take 1 tablet by mouth daily 24  Yes Angel Nicholson DO   levothyroxine (SYNTHROID) 88 MCG tablet Take 1 tablet by mouth Daily 24  Yes Angel Nicholson DO   mirtazapine (REMERON) 15 MG tablet Take 1 tablet by mouth nightly 10/25/24   Angel Nicholson DO   pantoprazole (PROTONIX) 40 MG tablet Take 1 tablet by mouth 2 times daily (before meals) 24   Dianne Banuelos,    aspirin (ASPIRIN LOW DOSE) 81 MG EC tablet TAKE 1 TABLET BY MOUTH EVERY DAY 24   Angel Nicholson DO   calcium acetate (PHOSLO) 667 MG CAPS capsule Take 1 capsule by mouth 3 times daily (with meals)  Patient taking differently: Take 2 capsules by mouth 2 pills with each meal and 1 pill at night with snack. 24   Angel Nicholson DO          HPI: Patient seen today hypertension coronary artery disease peripheral vascular disease hyperlipidemia end-stage renal disease and medically doing fairly well at this time.  Presents with her daughter and we reviewed all medications comfortable with present regimen.  Asked about some mild sleep disturbance and may try Tylenol PM if needed.          Review of Systems   Constitutional: Negative.    HENT: Negative.     Eyes: Negative.    Respiratory: Negative.     Gastrointestinal: Negative.    Endocrine: Negative.    Genitourinary: Negative.    Musculoskeletal: Negative.    Skin: Negative.    Allergic/Immunologic: Negative.    Neurological: Negative.    Hematological: Negative.    Psychiatric/Behavioral: Negative.        Systems review stable meds as prescribed.        Current Outpatient Medications:     citalopram (CELEXA) 20 MG tablet, Take 1 tablet by mouth daily, Disp: 90

## 2025-02-03 DIAGNOSIS — E78.2 MIXED HYPERLIPIDEMIA: ICD-10-CM

## 2025-02-03 DIAGNOSIS — I73.9 PVD (PERIPHERAL VASCULAR DISEASE) (HCC): ICD-10-CM

## 2025-02-03 DIAGNOSIS — N18.6 ESRD (END STAGE RENAL DISEASE) ON DIALYSIS (HCC): ICD-10-CM

## 2025-02-03 DIAGNOSIS — Z99.2 ESRD (END STAGE RENAL DISEASE) ON DIALYSIS (HCC): ICD-10-CM

## 2025-02-03 DIAGNOSIS — I25.10 CORONARY ARTERY DISEASE INVOLVING NATIVE CORONARY ARTERY OF NATIVE HEART WITHOUT ANGINA PECTORIS: ICD-10-CM

## 2025-02-03 DIAGNOSIS — I10 ESSENTIAL HYPERTENSION: Chronic | ICD-10-CM

## 2025-02-03 LAB
BASOPHILS ABSOLUTE: 0.03 K/UL (ref 0–0.2)
BASOPHILS RELATIVE PERCENT: 1 % (ref 0–2)
EOSINOPHILS ABSOLUTE: 0.1 K/UL (ref 0.05–0.5)
EOSINOPHILS RELATIVE PERCENT: 2 % (ref 0–6)
HCT VFR BLD CALC: 32 % (ref 34–48)
HEMOGLOBIN: 10.6 G/DL (ref 11.5–15.5)
IMMATURE GRANULOCYTES %: 1 % (ref 0–5)
IMMATURE GRANULOCYTES ABSOLUTE: 0.04 K/UL (ref 0–0.58)
LYMPHOCYTES ABSOLUTE: 1.23 K/UL (ref 1.5–4)
LYMPHOCYTES RELATIVE PERCENT: 19 % (ref 20–42)
MCH RBC QN AUTO: 32.3 PG (ref 26–35)
MCHC RBC AUTO-ENTMCNC: 33.1 G/DL (ref 32–34.5)
MCV RBC AUTO: 97.6 FL (ref 80–99.9)
MONOCYTES ABSOLUTE: 0.58 K/UL (ref 0.1–0.95)
MONOCYTES RELATIVE PERCENT: 9 % (ref 2–12)
NEUTROPHILS ABSOLUTE: 4.52 K/UL (ref 1.8–7.3)
NEUTROPHILS RELATIVE PERCENT: 70 % (ref 43–80)
PDW BLD-RTO: 13.2 % (ref 11.5–15)
PLATELET # BLD: 154 K/UL (ref 130–450)
PMV BLD AUTO: 11.2 FL (ref 7–12)
RBC # BLD: 3.28 M/UL (ref 3.5–5.5)
WBC # BLD: 6.5 K/UL (ref 4.5–11.5)

## 2025-02-04 LAB
ALBUMIN: 4.3 G/DL (ref 3.5–5.2)
ALP BLD-CCNC: 88 U/L (ref 35–104)
ALT SERPL-CCNC: 6 U/L (ref 0–32)
ANION GAP SERPL CALCULATED.3IONS-SCNC: 23 MMOL/L (ref 7–16)
AST SERPL-CCNC: 11 U/L (ref 0–31)
BILIRUB SERPL-MCNC: 0.4 MG/DL (ref 0–1.2)
BUN BLDV-MCNC: 96 MG/DL (ref 6–23)
CALCIUM SERPL-MCNC: 9.4 MG/DL (ref 8.6–10.2)
CHLORIDE BLD-SCNC: 91 MMOL/L (ref 98–107)
CHOLESTEROL, TOTAL: 150 MG/DL
CO2: 18 MMOL/L (ref 22–29)
CREAT SERPL-MCNC: 12.2 MG/DL (ref 0.5–1)
GFR, ESTIMATED: 3 ML/MIN/1.73M2
GLUCOSE BLD-MCNC: 72 MG/DL (ref 74–99)
HDLC SERPL-MCNC: 38 MG/DL
LDL CHOLESTEROL: 92 MG/DL
POTASSIUM SERPL-SCNC: 7.4 MMOL/L (ref 3.5–5)
SODIUM BLD-SCNC: 132 MMOL/L (ref 132–146)
THYROXINE (T4): 7.4 UG/DL (ref 4.5–11.7)
TOTAL PROTEIN: 7.3 G/DL (ref 6.4–8.3)
TRIGL SERPL-MCNC: 99 MG/DL
TSH SERPL DL<=0.05 MIU/L-ACNC: 5.94 UIU/ML (ref 0.27–4.2)
VLDLC SERPL CALC-MCNC: 20 MG/DL

## 2025-02-06 LAB — POTASSIUM SERPL-SCNC: 5.1 MMOL/L (ref 3.5–5)

## 2025-02-10 ENCOUNTER — OFFICE VISIT (OUTPATIENT)
Dept: PRIMARY CARE CLINIC | Age: 80
End: 2025-02-10

## 2025-02-10 VITALS
SYSTOLIC BLOOD PRESSURE: 130 MMHG | HEIGHT: 63 IN | DIASTOLIC BLOOD PRESSURE: 60 MMHG | OXYGEN SATURATION: 95 % | WEIGHT: 155 LBS | BODY MASS INDEX: 27.46 KG/M2 | HEART RATE: 79 BPM | TEMPERATURE: 97.7 F

## 2025-02-10 DIAGNOSIS — Z99.2 ESRD (END STAGE RENAL DISEASE) ON DIALYSIS (HCC): Primary | ICD-10-CM

## 2025-02-10 DIAGNOSIS — E78.2 MIXED HYPERLIPIDEMIA: ICD-10-CM

## 2025-02-10 DIAGNOSIS — B35.6 TINEA CRURIS: ICD-10-CM

## 2025-02-10 DIAGNOSIS — E03.8 OTHER SPECIFIED HYPOTHYROIDISM: ICD-10-CM

## 2025-02-10 DIAGNOSIS — I73.9 PVD (PERIPHERAL VASCULAR DISEASE) (HCC): ICD-10-CM

## 2025-02-10 DIAGNOSIS — I10 ESSENTIAL HYPERTENSION: Chronic | ICD-10-CM

## 2025-02-10 DIAGNOSIS — N18.6 ESRD (END STAGE RENAL DISEASE) ON DIALYSIS (HCC): Primary | ICD-10-CM

## 2025-02-10 DIAGNOSIS — I25.10 CORONARY ARTERY DISEASE INVOLVING NATIVE CORONARY ARTERY OF NATIVE HEART WITHOUT ANGINA PECTORIS: ICD-10-CM

## 2025-02-10 RX ORDER — CLOTRIMAZOLE AND BETAMETHASONE DIPROPIONATE 10; .64 MG/G; MG/G
CREAM TOPICAL
Qty: 45 G | Refills: 1 | Status: SHIPPED | OUTPATIENT
Start: 2025-02-10

## 2025-02-10 RX ORDER — LEVOTHYROXINE SODIUM 88 UG/1
88 TABLET ORAL DAILY
Qty: 90 TABLET | Refills: 3 | Status: SHIPPED | OUTPATIENT
Start: 2025-02-10

## 2025-02-10 NOTE — PROGRESS NOTES
daily.            No follow-ups on file.      Angel Nicholson DO    Note was generated with the assistance of voice recognition software.  Document was reviewed however may contain grammatical errors.

## 2025-02-13 DIAGNOSIS — I73.9 PVD (PERIPHERAL VASCULAR DISEASE) (HCC): ICD-10-CM

## 2025-02-13 DIAGNOSIS — I25.10 CORONARY ARTERY DISEASE INVOLVING NATIVE CORONARY ARTERY OF NATIVE HEART WITHOUT ANGINA PECTORIS: ICD-10-CM

## 2025-02-13 DIAGNOSIS — E78.2 MIXED HYPERLIPIDEMIA: ICD-10-CM

## 2025-02-13 DIAGNOSIS — I10 ESSENTIAL HYPERTENSION: Chronic | ICD-10-CM

## 2025-02-13 RX ORDER — CITALOPRAM HYDROBROMIDE 20 MG/1
20 TABLET ORAL DAILY
Qty: 90 TABLET | Refills: 3 | Status: SHIPPED | OUTPATIENT
Start: 2025-02-13

## 2025-03-03 NOTE — TELEPHONE ENCOUNTER
Pantoprazole order at the pharmacy said it was to be once daily and your directions say bid, so they wont refill it because she ran out too soon. Do you still want her on this? If so, she needs a new script.

## 2025-03-04 ENCOUNTER — TELEPHONE (OUTPATIENT)
Dept: PRIMARY CARE CLINIC | Age: 80
End: 2025-03-04

## 2025-03-04 RX ORDER — PANTOPRAZOLE SODIUM 40 MG/1
40 TABLET, DELAYED RELEASE ORAL
Qty: 180 TABLET | Refills: 1 | Status: SHIPPED | OUTPATIENT
Start: 2025-03-04

## 2025-03-04 NOTE — TELEPHONE ENCOUNTER
Pt needs a physical within 30 days of going to [place] on the 3/16, in order for her to get dialysis in [place]. There is no available appointments. Where can we put her?

## 2025-03-04 NOTE — TELEPHONE ENCOUNTER
Daughter called back. Nephrologist will be able to fill out paperwork so this appointment is not needed.

## 2025-04-03 RX ORDER — MIRTAZAPINE 15 MG/1
15 TABLET, FILM COATED ORAL NIGHTLY
Qty: 30 TABLET | Refills: 3 | Status: SHIPPED | OUTPATIENT
Start: 2025-04-03

## 2025-04-03 NOTE — PROGRESS NOTES
[place] from Dr. Bey's office called to notify pt is on HD and usually has tx on Tuesdays.  Informed [place] per anesthesia pt should not have dialysis same day as OR.      [place] returned call to PAT to state pt is rescheduled for dialysis on Monday 4/7/25 instead of day of surgery Tuesday 4/8/25.

## 2025-04-04 ENCOUNTER — TELEPHONE (OUTPATIENT)
Dept: PRIMARY CARE CLINIC | Age: 80
End: 2025-04-04

## 2025-04-04 NOTE — TELEPHONE ENCOUNTER
Surgery on Tuesday, they are looking for completed paperwork for this pt. He did receive it, and I told her that but she wanted to expedite the paperwork if possible  Fax to [phone]

## 2025-04-04 NOTE — PROGRESS NOTES
[facility] PRE-ADMISSION TESTING INSTRUCTIONS    The Preadmission Testing patient is instructed accordingly using the following criteria (check applicable):    ARRIVAL INSTRUCTIONS:   Arrival Time: 1245    [x] Parking the day of Surgery is located in the Main Entrance lot.  Upon entering through the main entrance (Entrance A) make an immediate right to the surgery reception desk    [x] Bring photo ID and insurance card    [x] Bring in a copy of Living will or Durable Power of  papers.    [x] Please be sure to arrange for a responsible adult to provide transportation to and from the hospital    [x] Please arrange for a responsible adult to be with you for the 24 hour period post procedure, due to having anesthesia    [x] Please notify surgeon if you develop any illness between now and time of surgery (cold, cough, sore throat, fever, nausea, vomiting) or any signs of infections  including skin, wounds, and dental.    [x] If you awake am of surgery not feeling well or have temperature >100 please call [phone].    GENERAL INSTRUCTIONS:    [x] No solid foods after midnight, may have up to 8oz of water until 4 hours prior to surgery. No gum, no candy, no mints.  NPO time: 1115       [x] You may brush your teeth    [x] Take medications as instructed (Read back and verified by patient)   Take celexa, protonix    [] Bring inhalers day of surgery    [x] Stop herbal supplements and vitamins 5 days prior to procedure    [x] Follow preop dosing of blood thinners per physician instructions    [] Take 1/2 dose of evening insulin, but no insulin after midnight    [] No oral diabetic medications after midnight    [] If diabetic and have low blood sugar or feel symptomatic, take 1-2oz apple juice only    [] Bring urine specimen day of surgery    [x] Shower or bath with soap, lather and rinse well, AM of Surgery, no lotion, powders or creams to surgical site    [x] Please do not wear any nail

## 2025-04-07 ENCOUNTER — ANESTHESIA EVENT (OUTPATIENT)
Dept: OPERATING ROOM | Age: 80
End: 2025-04-07
Payer: MEDICARE

## 2025-04-08 ENCOUNTER — ANESTHESIA (OUTPATIENT)
Dept: OPERATING ROOM | Age: 80
End: 2025-04-08
Payer: MEDICARE

## 2025-04-08 ENCOUNTER — HOSPITAL ENCOUNTER (OUTPATIENT)
Age: 80
Setting detail: OUTPATIENT SURGERY
Discharge: HOME | End: 2025-04-08
Attending: OPHTHALMOLOGY | Admitting: OPHTHALMOLOGY
Payer: MEDICARE

## 2025-04-08 VITALS
OXYGEN SATURATION: 96 % | RESPIRATION RATE: 18 BRPM | BODY MASS INDEX: 26.93 KG/M2 | SYSTOLIC BLOOD PRESSURE: 105 MMHG | WEIGHT: 152 LBS | HEART RATE: 78 BPM | HEIGHT: 63 IN | DIASTOLIC BLOOD PRESSURE: 57 MMHG | TEMPERATURE: 97.7 F

## 2025-04-08 LAB — POTASSIUM SERPL-SCNC: 5 MMOL/L (ref 3.5–5)

## 2025-04-08 PROCEDURE — V2632 POST CHMBR INTRAOCULAR LENS: HCPCS | Performed by: OPHTHALMOLOGY

## 2025-04-08 PROCEDURE — 2580000003 HC RX 258

## 2025-04-08 PROCEDURE — 7100000011 HC PHASE II RECOVERY - ADDTL 15 MIN: Performed by: OPHTHALMOLOGY

## 2025-04-08 PROCEDURE — 6360000002 HC RX W HCPCS: Performed by: OPHTHALMOLOGY

## 2025-04-08 PROCEDURE — 2580000003 HC RX 258: Performed by: OPHTHALMOLOGY

## 2025-04-08 PROCEDURE — 3600000002 HC SURGERY LEVEL 2 BASE: Performed by: OPHTHALMOLOGY

## 2025-04-08 PROCEDURE — 2500000003 HC RX 250 WO HCPCS: Performed by: OPHTHALMOLOGY

## 2025-04-08 PROCEDURE — 6370000000 HC RX 637 (ALT 250 FOR IP): Performed by: OPHTHALMOLOGY

## 2025-04-08 PROCEDURE — 84132 ASSAY OF SERUM POTASSIUM: CPT

## 2025-04-08 PROCEDURE — 3600000012 HC SURGERY LEVEL 2 ADDTL 15MIN: Performed by: OPHTHALMOLOGY

## 2025-04-08 PROCEDURE — 2709999900 HC NON-CHARGEABLE SUPPLY: Performed by: OPHTHALMOLOGY

## 2025-04-08 PROCEDURE — 7100000010 HC PHASE II RECOVERY - FIRST 15 MIN: Performed by: OPHTHALMOLOGY

## 2025-04-08 PROCEDURE — 6360000002 HC RX W HCPCS

## 2025-04-08 PROCEDURE — 3700000000 HC ANESTHESIA ATTENDED CARE: Performed by: OPHTHALMOLOGY

## 2025-04-08 PROCEDURE — 3700000001 HC ADD 15 MINUTES (ANESTHESIA): Performed by: OPHTHALMOLOGY

## 2025-04-08 DEVICE — CLAREON™ ASPHERIC UV ABSORBING IOL - STERILE UV-ABSORBING HYDROPHOBIC ACRYLIC FOLDABLE ASPHERIC POSTERIOR CHAMBER INTRAOCULAR LENS
Type: IMPLANTABLE DEVICE | Site: EYE | Status: FUNCTIONAL
Brand: CLAREON™

## 2025-04-08 RX ORDER — CIPROFLOXACIN HYDROCHLORIDE 3.5 MG/ML
1 SOLUTION/ DROPS TOPICAL 3 TIMES DAILY
Status: DISCONTINUED | OUTPATIENT
Start: 2025-04-08 | End: 2025-04-08 | Stop reason: HOSPADM

## 2025-04-08 RX ORDER — SODIUM CHLORIDE 9 MG/ML
INJECTION, SOLUTION INTRAVENOUS
Status: DISCONTINUED | OUTPATIENT
Start: 2025-04-08 | End: 2025-04-08 | Stop reason: SDUPTHER

## 2025-04-08 RX ORDER — PHENYLEPHRINE HYDROCHLORIDE 25 MG/ML
1 SOLUTION/ DROPS OPHTHALMIC ONCE
Status: COMPLETED | OUTPATIENT
Start: 2025-04-08 | End: 2025-04-08

## 2025-04-08 RX ORDER — KETOROLAC TROMETHAMINE 5 MG/ML
1 SOLUTION OPHTHALMIC SEE ADMIN INSTRUCTIONS
Status: DISCONTINUED | OUTPATIENT
Start: 2025-04-08 | End: 2025-04-08 | Stop reason: HOSPADM

## 2025-04-08 RX ORDER — CIPROFLOXACIN HYDROCHLORIDE 3.5 MG/ML
1 SOLUTION/ DROPS TOPICAL SEE ADMIN INSTRUCTIONS
Status: DISCONTINUED | OUTPATIENT
Start: 2025-04-08 | End: 2025-04-08 | Stop reason: HOSPADM

## 2025-04-08 RX ORDER — TETRACAINE HYDROCHLORIDE 5 MG/ML
1 SOLUTION OPHTHALMIC SEE ADMIN INSTRUCTIONS
Status: DISCONTINUED | OUTPATIENT
Start: 2025-04-08 | End: 2025-04-08 | Stop reason: HOSPADM

## 2025-04-08 RX ORDER — PREDNISOLONE ACETATE 10 MG/ML
SUSPENSION/ DROPS OPHTHALMIC PRN
Status: DISCONTINUED | OUTPATIENT
Start: 2025-04-08 | End: 2025-04-08 | Stop reason: ALTCHOICE

## 2025-04-08 RX ORDER — FENTANYL CITRATE 50 UG/ML
INJECTION, SOLUTION INTRAMUSCULAR; INTRAVENOUS
Status: DISCONTINUED | OUTPATIENT
Start: 2025-04-08 | End: 2025-04-08 | Stop reason: SDUPTHER

## 2025-04-08 RX ORDER — TROPICAMIDE 10 MG/ML
1 SOLUTION/ DROPS OPHTHALMIC ONCE
Status: COMPLETED | OUTPATIENT
Start: 2025-04-08 | End: 2025-04-08

## 2025-04-08 RX ORDER — LIDOCAINE HYDROCHLORIDE 40 MG/ML
INJECTION, SOLUTION RETROBULBAR PRN
Status: DISCONTINUED | OUTPATIENT
Start: 2025-04-08 | End: 2025-04-08 | Stop reason: ALTCHOICE

## 2025-04-08 RX ORDER — KETOROLAC TROMETHAMINE 5 MG/ML
1 SOLUTION OPHTHALMIC 3 TIMES DAILY
Status: DISCONTINUED | OUTPATIENT
Start: 2025-04-08 | End: 2025-04-08 | Stop reason: HOSPADM

## 2025-04-08 RX ORDER — POVIDONE-IODINE 5 %
SOLUTION, NON-ORAL OPHTHALMIC (EYE) PRN
Status: DISCONTINUED | OUTPATIENT
Start: 2025-04-08 | End: 2025-04-08 | Stop reason: ALTCHOICE

## 2025-04-08 RX ORDER — PREDNISOLONE ACETATE 10 MG/ML
1 SUSPENSION/ DROPS OPHTHALMIC 3 TIMES DAILY
Status: DISCONTINUED | OUTPATIENT
Start: 2025-04-08 | End: 2025-04-08 | Stop reason: HOSPADM

## 2025-04-08 RX ORDER — ONDANSETRON 2 MG/ML
INJECTION INTRAMUSCULAR; INTRAVENOUS
Status: DISCONTINUED | OUTPATIENT
Start: 2025-04-08 | End: 2025-04-08 | Stop reason: SDUPTHER

## 2025-04-08 RX ADMIN — FENTANYL CITRATE 50 MCG: 50 INJECTION, SOLUTION INTRAMUSCULAR; INTRAVENOUS at 14:57

## 2025-04-08 RX ADMIN — Medication 1 DROP: at 14:05

## 2025-04-08 RX ADMIN — Medication 1 DROP: at 14:10

## 2025-04-08 RX ADMIN — CIPROFLOXACIN HYDROCHLORIDE 1 DROP: 3 SOLUTION/ DROPS OPHTHALMIC at 14:10

## 2025-04-08 RX ADMIN — PHENYLEPHRINE HYDROCHLORIDE 1 DROP: 25 SOLUTION/ DROPS OPHTHALMIC at 14:16

## 2025-04-08 RX ADMIN — CIPROFLOXACIN HYDROCHLORIDE 1 DROP: 3 SOLUTION/ DROPS OPHTHALMIC at 14:05

## 2025-04-08 RX ADMIN — PHENYLEPHRINE HYDROCHLORIDE 1 DROP: 25 SOLUTION/ DROPS OPHTHALMIC at 14:05

## 2025-04-08 RX ADMIN — TETRACAINE HYDROCHLORIDE 1 DROP: 5 SOLUTION OPHTHALMIC at 14:00

## 2025-04-08 RX ADMIN — ONDANSETRON 4 MG: 2 INJECTION, SOLUTION INTRAMUSCULAR; INTRAVENOUS at 14:57

## 2025-04-08 RX ADMIN — PHENYLEPHRINE HYDROCHLORIDE 1 DROP: 25 SOLUTION/ DROPS OPHTHALMIC at 14:10

## 2025-04-08 RX ADMIN — Medication 1 DROP: at 14:16

## 2025-04-08 RX ADMIN — CIPROFLOXACIN HYDROCHLORIDE 1 DROP: 3 SOLUTION/ DROPS OPHTHALMIC at 14:15

## 2025-04-08 RX ADMIN — Medication 1 DROP: at 14:15

## 2025-04-08 RX ADMIN — Medication 1 DROP: at 14:06

## 2025-04-08 RX ADMIN — SODIUM CHLORIDE: 9 INJECTION, SOLUTION INTRAVENOUS at 14:52

## 2025-04-08 ASSESSMENT — LIFESTYLE VARIABLES: SMOKING_STATUS: 0

## 2025-04-08 ASSESSMENT — PAIN - FUNCTIONAL ASSESSMENT: PAIN_FUNCTIONAL_ASSESSMENT: 0-10

## 2025-04-08 NOTE — DISCHARGE INSTRUCTIONS
DISCHARGE INSTRUCTIONS CATARACT EXTRACTION    EYE MEDICATION  Ocuflox (one drop in operated eye) morning, afternoon, and evening for one week.  Ketorolac (one drop in operated eye) morning, afternoon, and evening for one week then twice a day for 3 weeks.  Pred Forte 1% (one drop in operated eye) morning, afternoon, and evening for one week then twice a day for 3 weeks.  Eye shield on at bedtime for one week. No eye pads under shield.  DO  Wash your hands prior to administering eye drops.  Wait 5 minutes in between eye drops.  You may watch TV and read.  You may do light housework.  You may drive after 2 days as vision permits  May sleep on either side.  May shower, have hair done. Avoid getting dirty water in operated eye.  Check with your physician prior to resuming any strenuous aerobic activity.  DO NOT  Do not rub your eye.  Avoid heavy lifting greater than 25 lbs.  No swimming for one week.  WHAT TO EXPECT  Vision normally may be blurry day of surgery; vision will progressively improve. Glasses will be prescribed in about 4 weeks.  Drooping upper lid, tearing, scratching, itching and sensitivity to bright light are common. Wear dark glasses for comfort if your eye is light sensitive. Apply lukewarm compress for 10 minutes to comfort your eye if needed.  You may see some floaters which are quite common.  Call my office and talk to a nurse immediately, [phone], if you have sudden increases in pain or discharge, blurred or diminished vision in short period of time, or bump the eye. After 5 p.m. your physician may be reached through the [facility] at [phone].    POST OP APPOINTMENT   Your post op appointment is on Wednesday 4/9/25 at 11:15 a.m. at the [facility]. office    Dianne Bey MD 4/8/2025 3:20 PM

## 2025-04-08 NOTE — ANESTHESIA POSTPROCEDURE EVALUATION
Department of Anesthesiology  Postprocedure Note    Patient: Trina Wang  MRN: [id number]  YOB: 1945  Date of evaluation: 4/8/2025    Procedure Summary       Date: 04/08/25 Room / Location: [address]    Anesthesia Start: 1452 Anesthesia Stop: 1523    Procedure: RIGHT EYE CATARACT EXTRACTION INTRAOCULAR LENS IMPLANT (Right: Eye) Diagnosis:       Combined forms of age-related cataract of right eye      (Combined forms of age-related cataract of right eye [H25.811])    Surgeons: Dianne Bey MD Responsible Provider: Ronni Peoples DO    Anesthesia Type: MAC ASA Status: 4            Anesthesia Type: MAC    Darin Phase I: Darin Score: 10    Darin Phase II: Darin Score: 10    Anesthesia Post Evaluation    Patient location during evaluation: bedside  Patient participation: complete - patient participated  Level of consciousness: awake and awake and alert  Pain score: 0  Airway patency: patent  Nausea & Vomiting: no nausea and no vomiting  Cardiovascular status: blood pressure returned to baseline  Respiratory status: acceptable and room air  Hydration status: euvolemic  Pain management: adequate        No notable events documented.

## 2025-04-08 NOTE — OP NOTE
Surgeon: Dianne Bey M.D.           PREOPERATIVE DIAGNOSIS: Visually significant cataract,  right eye.  POSTOPERATIVE DIAGNOSIS:  Visually significant cataract,  right eye.  OPERATION:  Phacoemulsification and posterior chamber lens implant,  right eye  ANESTHESIA:  Topical with monitored anesthesia care.  INDICATIONS:  Difficulty with reading and/or driving.  COMPLICATIONS:  None    SURGEON’S DESCRIPTION OF OPERATION    The patient was dilated and topical anesthetic gel was applied to the operative eye in the pre-op area.  The patient was then taken to the operating room and was prepped and draped in the standard fashion for sterile intraocular surgery.  A time out procedure was performed verifying the correct patient, correct site, and correct lens with Dr. Bey and the OR staff.  An adhesive drape was placed on the operative eye to cover the eyelashes, an eyelid speculum was inserted, and the operating microscope was positioned over the patient.    A 15 degree super sharp blade was used to create a paracentesis wound.  Approximately 0.3 ml of Sugarcaine consisting of 4% lidocaine preservative free, 1:1,000 preservative free epinephrine 1 ml and 3 ml BSS was injected.  Dispersive viscoelastic was used to fill the anterior chamber.  A 2.4 mm triplanar clear corneal wound was created at the temporal limbus using a metal keratome.  A cystotome was used to initiate a capsulotomy and capsulorrhexis forceps were used to complete a continuous circular capsulotomy.    The lens nucleus was hydrodissected and hydrodelineated using balanced salt saline on a cannula.  The nucleus was phacoemulsified using a vertical chopping technique.  Epinuclear material was aspirated and emulsified.  Remaining cortical material was aspirated and the posterior capsule polished using the I&A handpiece.    The posterior chamber and capsular bag were filled with viscoelastic.  An Dick model # [id number] posterior chamber lens,  power 23.5, was injected into the capsular bag using the lens cartridge and injector system.  Viscoelastic was removed using the I&A handpiece.  The anterior chamber was filled with balanced salt solution through the paracentesis and both the paracentesis site and cataract incisions were hydrated with BSS.  Cefuroxime 0.2 ml of 10 mg/ml solution was injected into the capsular bag and the wounds were verified to be water-tight using a Weck-nakia sponge.    The lid speculum and drapes were removed and Ocuflox and Pred Forte drops were placed in the eye.  The patient tolerated the procedure well and was taken to the recovery room in satisfactory condition.  The patient is to follow-up in the office with Dr. Bey tomorrow.     Dianne Bey MD 4/8/2025 3:18 PM

## 2025-04-08 NOTE — ANESTHESIA PRE PROCEDURE
hyperlipidemia E78.2    Hypothyroidism E03.9    Anxiety F41.9    Benign essential microscopic hematuria R31.1    Chronic pain of right knee M25.561, G89.29    Primary osteoarthritis of right knee M17.11    Rash R21    History of total right knee replacement Z96.651    Needs flu shot Z23    Right foot pain M79.671    PVD (peripheral vascular disease) I73.9    Left carotid stenosis I65.22    Acute loss of vision, left H53.132    Urge incontinence of urine N39.41    Gait disturbance R26.9    History of cerebral hemorrhage Z86.79    MANAN (acute kidney injury) N17.9    Stage 3b chronic kidney disease (HCC) N18.32    Skin lesion of face L98.9    ESRD (end stage renal disease) on dialysis (HCC) N18.6, Z99.2    End stage renal disease (HCC) N18.6    Type 2 diabetes mellitus E11.9    Acute anemia D64.9    History of GI bleed Z87.19    Tinea cruris B35.6       Past Medical History:        Diagnosis Date    Acute loss of vision, left 2022    Arthritis     CAD (coronary artery disease)     followed with Dr. Jansen- no current issues    Cataract     CKD (chronic kidney disease)     Dr. Wilson- renal failure    Depression     deny    Hemodialysis patient     Current schedule Tues-Thurs-Sat    History of blood transfusion     had with childbirth    Hypertension     Left carotid stenosis 2022    Respiratory failure     history of 2014  when had stroke, trach  x 2 weeks    Short-term memory loss     Thyroid disease     Tracheal stenosis     Unspecified cerebral artery occlusion with cerebral infarction 2014    right side effected, hemorrhagic stroke dt elevated BP/residual s/s is at right side loss of fine motor skills and some weakness, drags right leg, slight memory loss and aphasia when she is tired. uses walker       Past Surgical History:        Procedure Laterality Date     SECTION      1    2014    CORONARY ANGIOPLASTY WITH STENT PLACEMENT      CT BIOPSY RENAL  2023    CT

## (undated) DEVICE — SPONGE GZ W4XL4IN RAYON POLY CVR W/NONWOVEN FAB STRL 2/PK

## (undated) DEVICE — BLOCK BITE 60FR RUBBER ADLT DENTAL

## (undated) DEVICE — ELECTRODE PT RET AD L9FT HI MOIST COND ADH HYDRGEL CORDED

## (undated) DEVICE — 40436 HEAD REST OCULAR: Brand: 40436 HEAD REST OCULAR

## (undated) DEVICE — THE MONARCH® "D" CARTRIDGE IS A SINGLE-USE POLYPROPYLENE CARTRIDGE FOR POSTERIOR CHAMBER IOL DELIVERY: Brand: MONARCH® III

## (undated) DEVICE — 1000 S-DRAPE TOWEL DRAPE 10/BX: Brand: STERI-DRAPE™

## (undated) DEVICE — GLOVE ORANGE PI 7   MSG9070

## (undated) DEVICE — PACK PROCEDURE SURG GEN CUST

## (undated) DEVICE — SYRINGE MED 5ML STD CLR PLAS LUERLOCK TIP N CTRL DISP

## (undated) DEVICE — BLADE CLIPPER GEN PURP NS

## (undated) DEVICE — COVER HNDL LT DISP

## (undated) DEVICE — LOOP VES W13MM THK09MM MINI RED SIL FLD REPELLENT

## (undated) DEVICE — ZIMMER® STERILE DISPOSABLE TOURNIQUET CUFF WITH PLC, DUAL PORT, SINGLE BLADDER, 30 IN. (76 CM)

## (undated) DEVICE — Z INACTIVE USE 2660664 SOLUTION IRRIG 3000ML 0.9% SOD CHL USP UROMATIC PLAS CONT

## (undated) DEVICE — GRADUATE TRIANG MEASURE 1000ML BLK PRNT

## (undated) DEVICE — NEEDLE SCLERO L200CM OD0.51MM ID0.24MM SHTH DIA1.8MM LOK

## (undated) DEVICE — CLEARCUT® SLIT KNIFE INTREPID MICRO-COAXIAL SYSTEM 2.4 SB: Brand: CLEARCUT®; INTREPID

## (undated) DEVICE — FIAPC® PROBE W/ FILTER 2200 A OD 2.3MM/6.9FR; L 2.2M/7.2FT: Brand: ERBE

## (undated) DEVICE — TUBING, SUCTION, 9/32" X 10', STRAIGHT: Brand: MEDLINE

## (undated) DEVICE — BLADE SAW SAG 6 SYS 135X1.19X90MM

## (undated) DEVICE — MARKER,SKIN,WI/RULER AND LABELS: Brand: MEDLINE

## (undated) DEVICE — TAPE ADH W3INXL10YD WHT COT WVN BK POWERFUL RUB BASE HIGHLY

## (undated) DEVICE — BOWL AND CEMENT CARTRIDGE WITH BREAKAWAY FEMORAL NOZZLE: Brand: ACM

## (undated) DEVICE — PADDING CAST W6INXL4YD COT LO LINTING WYTEX

## (undated) DEVICE — COVER MICROSCOPE KNOB LG

## (undated) DEVICE — NEEDLE HYPO 22GA L1.5IN BLK POLYPR HUB S STL REG BVL STR

## (undated) DEVICE — 2108 SERIES SAGITTAL BLADE FAN, OFFSET  (34.5 X 0.8 X 64.0MM)

## (undated) DEVICE — Device

## (undated) DEVICE — APPLICATOR MEDICATED 26 CC SOLUTION HI LT ORNG CHLORAPREP

## (undated) DEVICE — SYRINGE MED 10ML TRNSLUC BRL PLUNG BLK MRK POLYPR CTRL

## (undated) DEVICE — AV FISTULA: Brand: MEDLINE INDUSTRIES, INC.

## (undated) DEVICE — SOLUTION IRRIGATION BAL SALT SOLUTION 15 ML STRL BSS

## (undated) DEVICE — FORCEPS BX OVL CUP FEN DISPOSABLE CAP L 160CM RAD JAW 4

## (undated) DEVICE — CANNULA OPHTH 25GA 7 8IN ORNG 45DEG ANG 4MM FR END DOME SHP

## (undated) DEVICE — 3M™ IOBAN™ 2 ANTIMICROBIAL INCISE DRAPE 6650EZ: Brand: IOBAN™ 2

## (undated) DEVICE — SIGMOIDOSCOPIC SUCTION INSTRUMENT 18 FR W/WINGED CAP CONTROL AND 6 FOOT (1.8M) TUBING: Brand: SIGMOIDOSCOPIC

## (undated) DEVICE — TUBE IRRIG HNDPC HI FLO TP INTRPULS W/SUCTION TUBE

## (undated) DEVICE — SOLUTION IRRIG 500ML BAL SALT FLX BG BSS

## (undated) DEVICE — SLING ARM L L165IN D75IN WHT POLY MESH ENVELOP MTL SIDE

## (undated) DEVICE — KNIFE OPHTH D5MM 15DEG GRN MICUNITOM

## (undated) DEVICE — TOTAL KNEE PK

## (undated) DEVICE — STRYKER PERFORMANCE SERIES SAGITTAL BLADE: Brand: STRYKER PERFORMANCE SERIES

## (undated) DEVICE — SOLUTION IRRIG 500ML 0.9% SOD CHLO USP POUR PLAS BTL

## (undated) DEVICE — TOWEL,OR,DSP,ST,BLUE,STD,6/PK,12PK/CS: Brand: MEDLINE

## (undated) DEVICE — BASIC SINGLE BASIN 1-LF: Brand: MEDLINE INDUSTRIES, INC.

## (undated) DEVICE — BANDAGE COMPR W6INXL12FT SMOOTH FOR LIMB EXSANG ESMARCH

## (undated) DEVICE — CATHETER IV 14GA L1.75IN OD2.146MM ID1.740MM ORNG VIALON

## (undated) DEVICE — SCALPEL 15 DEG NO 715

## (undated) DEVICE — STRIP,CLOSURE,WOUND,MEDI-STRIP,1/2X4: Brand: MEDLINE

## (undated) DEVICE — DRAPE,REIN 53X77,STERILE: Brand: MEDLINE

## (undated) DEVICE — DRAPE,TOP,102X53,STERILE: Brand: MEDLINE

## (undated) DEVICE — SYRINGE MED 50ML LUERLOCK TIP

## (undated) DEVICE — GLOVE SURG SZ 75 CRM LTX FREE POLYISOPRENE POLYMER BEAD ANTI

## (undated) DEVICE — DECANTER BAG 9": Brand: MEDLINE INDUSTRIES, INC.

## (undated) DEVICE — SOLUTION IV IRRIG POUR BRL 0.9% SODIUM CHL 2F7124

## (undated) DEVICE — GOWN,SIRUS,FABRNF,XL,20/CS: Brand: MEDLINE

## (undated) DEVICE — SPONGE LAP W18XL18IN WHT COT 4 PLY FLD STRUNG RADPQ DISP ST

## (undated) DEVICE — GLASSES SAFETY PROTCT GRN

## (undated) DEVICE — ALCON INTREPID CURVED 0.3MM POLYMER I/A TIP: Brand: ALCON, INTREPID

## (undated) DEVICE — SHIELD EYE W3XL2.5IN UNIV CLR PLAS LTWT

## (undated) DEVICE — DOUBLE BASIN SET: Brand: MEDLINE INDUSTRIES, INC.

## (undated) DEVICE — 4-PORT MANIFOLD: Brand: NEPTUNE 2